# Patient Record
Sex: MALE | Race: BLACK OR AFRICAN AMERICAN | NOT HISPANIC OR LATINO | Employment: FULL TIME | URBAN - METROPOLITAN AREA
[De-identification: names, ages, dates, MRNs, and addresses within clinical notes are randomized per-mention and may not be internally consistent; named-entity substitution may affect disease eponyms.]

---

## 2017-01-14 ENCOUNTER — TELEPHONE (OUTPATIENT)
Dept: NURSING | Facility: CLINIC | Age: 57
End: 2017-01-14

## 2017-01-14 ENCOUNTER — HOSPITAL ENCOUNTER (EMERGENCY)
Facility: CLINIC | Age: 57
Discharge: HOME | End: 2017-01-15
Attending: EMERGENCY MEDICINE | Admitting: EMERGENCY MEDICINE
Payer: MEDICAID

## 2017-01-14 DIAGNOSIS — I10 ESSENTIAL HYPERTENSION: ICD-10-CM

## 2017-01-14 DIAGNOSIS — T50.905A MEDICATION SIDE EFFECTS, INITIAL ENCOUNTER: ICD-10-CM

## 2017-01-14 DIAGNOSIS — R51.9 ACUTE NONINTRACTABLE HEADACHE, UNSPECIFIED HEADACHE TYPE: ICD-10-CM

## 2017-01-14 PROCEDURE — 93005 ELECTROCARDIOGRAM TRACING: CPT

## 2017-01-14 PROCEDURE — 99285 EMERGENCY DEPT VISIT HI MDM: CPT

## 2017-01-14 PROCEDURE — 96361 HYDRATE IV INFUSION ADD-ON: CPT

## 2017-01-14 PROCEDURE — 96375 TX/PRO/DX INJ NEW DRUG ADDON: CPT

## 2017-01-14 PROCEDURE — 96374 THER/PROPH/DIAG INJ IV PUSH: CPT

## 2017-01-14 RX ORDER — DIPHENHYDRAMINE HYDROCHLORIDE 50 MG/ML
25 INJECTION INTRAMUSCULAR; INTRAVENOUS ONCE
Status: COMPLETED | OUTPATIENT
Start: 2017-01-14 | End: 2017-01-15

## 2017-01-14 RX ORDER — METOCLOPRAMIDE HYDROCHLORIDE 5 MG/ML
10 INJECTION INTRAMUSCULAR; INTRAVENOUS ONCE
Status: COMPLETED | OUTPATIENT
Start: 2017-01-14 | End: 2017-01-15

## 2017-01-14 ASSESSMENT — ENCOUNTER SYMPTOMS
VOMITING: 0
CHILLS: 1
NUMBNESS: 1
FEVER: 1
NAUSEA: 1
WEAKNESS: 0
DIZZINESS: 1
HEADACHES: 1
PHOTOPHOBIA: 1

## 2017-01-14 NOTE — ED AVS SNAPSHOT
[facility] Emergency Department    [address]    [facility] [address]    Phone:  [phone]    Fax:  [phone]                                       Perico Houston   MRN: [id number]    Department:  [facility] Emergency Department   Date of Visit:  1/14/2017           After Visit Summary Signature Page     I have received my discharge instructions, and my questions have been answered. I have discussed any challenges I see with this plan with the nurse or doctor.    ..........................................................................................................................................  Patient/Patient Representative Signature      ..........................................................................................................................................  Patient Representative Print Name and Relationship to Patient    ..................................................               ................................................  Date                                            Time    ..........................................................................................................................................  Reviewed by Signature/Title    ...................................................              ..............................................  Date                                                            Time

## 2017-01-14 NOTE — ED AVS SNAPSHOT
[facility] Emergency Department    [address] [place] [address]    Phone:  [phone]    Fax:  [phone]                                       Perico Houston   MRN: [id number]    Department:  [facility] Emergency Department   Date of Visit:  1/14/2017           Patient Information     Date Of Birth          1960        Your diagnoses for this visit were:     Acute nonintractable headache, unspecified headache type     Medication side effects, initial encounter     Essential hypertension        You were seen by Barbi Ho MD.      Follow-up Information     Follow up with [facility], [place].    Specialty:  Family Practice    Why:  This week for recheck    Contact information:    [address] [place] [address]  [phone]          Discharge Instructions       It is safe to take your regular medications including clonazepam.      We were unable to dispose of your venlafaxine medication.  Discuss this with your local pharmacy.      Discharge Instructions  Headache    You were seen today for a headache. Headaches may be caused by many different things such as muscle tension, sinus inflammation, anxiety and stress, having too little sleep, too much alcohol, some medical conditions or injury. You may have a migraine, which is caused by changes in the blood vessels in your head.  At this time your doctor does not find that your headache is a sign of anything dangerous or life-threatening.  However, sometimes the signs of serious illness do not show up right away.  If you have new or worse symptoms, you may need to be seen again in the emergency department or by your primary doctor.      Return to the Emergency Department if:    You get a fever of 101 F or higher.    Your headache gets much worse.    You get a stiff neck with your headache.    You get a new headache that is different or worse than headaches you have had before.    You are  vomiting and can t keep food or water down.    You have blurry or double vision or other problems with your eyes.    You have a new weakness on one side of your body.    You have difficulty with balance which is new.    You or your family thinks you are confused.    You have a seizure or convulsion.    What can I do to help myself?    Pain medications - You may take a pain medication such as Tylenol  (acetaminophen), Advil , Nuprin  (ibuprofen) or Aleve  (naproxen).  If you have been given a narcotic such as Vicodin  (hydrocodone with acetaminophen), Percocet  (oxycodone with acetaminophen), codeine, or a muscle relaxant such as Flexeril  (cyclobenzaprine) or Soma  (carisoprodol), do not drive for four hours after you have taken it. If the narcotic contains Tylenol  (acetaminophen), do not take Tylenol  with it. All narcotics will cause constipation, so eat a high fiber diet.        Take a pain reliever as soon as you notice symptoms.  Starting medications as soon as you start to have symptoms may lessen the amount of pain you have.    Relaxing in a quiet, dark room may help.    Get enough sleep and eat meals regularly.    Schedule an appointment with your primary physician as instructed, or at least within 1 week.    You may need to watch for certain foods or other things which may trigger your headaches.  Keeping a journal of your headaches and possible triggers may help you and your primary doctor to identify things which you should avoid which may be causing your headaches.  If you were given a prescription for medicine here today, be sure to read all of the information (including the package insert) that comes with your prescription.  This will include important information about the medicine, its side effects, and any warnings that you need to know about.  The pharmacist who fills the prescription can provide more information and answer questions you may have about the medicine.  If you have questions or  concerns that the pharmacist cannot address, please call or return to the Emergency Department.     Remember that you can always come back to the Emergency Department if you are not able to see your regular doctor in the amount of time listed above, if you get any new symptoms, or if there is anything that worries you.          24 Hour Appointment Hotline       To make an appointment at any [facility], call [phone] ([phone]). If you don't have a family doctor or clinic, we will help you find one. [facility] clinics are conveniently located to serve the needs of you and your family.             Review of your medicines      Our records show that you are taking the medicines listed below. If these are incorrect, please call your family doctor or clinic.        Dose / Directions Last dose taken    acetaminophen 325 MG tablet   Commonly known as:  TYLENOL   Dose:  650 mg   Quantity:  100 tablet        Take 2 tablets (650 mg) by mouth every 6 hours as needed Maximum Dose in 24hr (includes all routes of administration and all acetaminophen-containing products): 4000 mg   Refills:  0        alum & mag hydroxide-simethicone 200-200-20 MG/5ML Susp suspension   Commonly known as:  MYLANTA/MAALOX   Dose:  30 mL   Quantity:  1 Bottle        Take 30 mLs by mouth every 4 hours as needed for indigestion   Refills:  0        aspirin 81 MG EC tablet   Dose:  81 mg   Quantity:  30 tablet        Take 1 tablet (81 mg) by mouth daily   Refills:  0        clonazePAM 0.5 MG tablet   Commonly known as:  klonoPIN   Dose:  0.5 mg   Quantity:  20 tablet        Take 1 tablet (0.5 mg) by mouth 3 times daily as needed for anxiety   Refills:  0        GABAPENTIN PO   Dose:  300 mg        Take 300 mg by mouth 3 times daily   Refills:  0        lisinopril 40 MG tablet   Commonly known as:  PRINIVIL/ZESTRIL   Dose:  40 mg   Quantity:  30 tablet        Take 1 tablet (40 mg) by mouth daily   Refills:  0        metFORMIN 1000 MG tablet    Commonly known as:  GLUCOPHAGE   Dose:  1000 mg   Quantity:  60 tablet        Take 1 tablet (1,000 mg) by mouth 2 times daily (with meals)   Refills:  0        nitroglycerin 0.4 MG sublingual tablet   Commonly known as:  NITROSTAT   Dose:  0.4 mg   Quantity:  25 tablet        Place 1 tablet (0.4 mg) under the tongue every 5 minutes as needed for chest pain if you are still having symptoms after 3 doses (15 minutes) call 911.   Refills:  0                Procedures and tests performed during your visit     Alcohol ethyl    CBC with platelets differential    Comprehensive metabolic panel    EKG 12-lead, tracing only    Head CT w/o contrast      Orders Needing Specimen Collection     None      Pending Results     No orders found for last 2 day(s).            Pending Culture Results     No orders found for last 2 day(s).       Test Results from your hospital stay           1/15/2017 12:32 AM - Interface, Vital Vio Results      Component Results     Component Value Ref Range & Units Status    WBC 8.6 4.0 - 11.0 10e9/L Final    RBC Count 5.25 4.4 - 5.9 10e12/L Final    Hemoglobin 14.5 13.3 - 17.7 g/dL Final    Hematocrit 44.6 40.0 - 53.0 % Final    MCV 85 78 - 100 fl Final    MCH 27.6 26.5 - 33.0 pg Final    MCHC 32.5 31.5 - 36.5 g/dL Final    RDW 13.0 10.0 - 15.0 % Final    Platelet Count 253 150 - 450 10e9/L Final    Diff Method Automated Method  Final    % Neutrophils 53.4 % Final    % Lymphocytes 33.4 % Final    % Monocytes 9.1 % Final    % Eosinophils 2.8 % Final    % Basophils 0.6 % Final    % Immature Granulocytes 0.7 % Final    Nucleated RBCs 0 0 /100 Final    Absolute Neutrophil 4.6 1.6 - 8.3 10e9/L Final    Absolute Lymphocytes 2.9 0.8 - 5.3 10e9/L Final    Absolute Monocytes 0.8 0.0 - 1.3 10e9/L Final    Absolute Eosinophils 0.2 0.0 - 0.7 10e9/L Final    Absolute Basophils 0.1 0.0 - 0.2 10e9/L Final    Abs Immature Granulocytes 0.1 0 - 0.4 10e9/L Final    Absolute Nucleated RBC 0.0  Final         1/15/2017  12:47 AM - Interface, Flexilab Results      Component Results     Component Value Ref Range & Units Status    Sodium 134 133 - 144 mmol/L Final    Potassium 4.3 3.4 - 5.3 mmol/L Final    Chloride 102 94 - 109 mmol/L Final    Carbon Dioxide 26 20 - 32 mmol/L Final    Anion Gap 6 3 - 14 mmol/L Final    Glucose 157 (H) 70 - 99 mg/dL Final    Urea Nitrogen 18 7 - 30 mg/dL Final    Creatinine 0.65 (L) 0.66 - 1.25 mg/dL Final    GFR Estimate >90  Non  GFR Calc   >60 mL/min/1.7m2 Final    GFR Estimate If Black >90   GFR Calc   >60 mL/min/1.7m2 Final    Calcium 8.4 (L) 8.5 - 10.1 mg/dL Final    Bilirubin Total 0.4 0.2 - 1.3 mg/dL Final    Albumin 3.6 3.4 - 5.0 g/dL Final    Protein Total 6.9 6.8 - 8.8 g/dL Final    Alkaline Phosphatase 72 40 - 150 U/L Final    ALT 19 0 - 70 U/L Final    AST 10 0 - 45 U/L Final         1/15/2017 12:44 AM - Interface, Radiant Ib      Narrative     CT HEAD W/O CONTRAST 1/15/2017 12:39 AM    HISTORY: Hypertension. Headache.    COMPARISON: 6/2/2016    TECHNIQUE: Noncontrast head CT.  Radiation dose for this scan was  reduced using automated exposure control, adjustment of the mA and/or  kV according to patient size, or iterative reconstruction technique.    FINDINGS: No intracranial hemorrhage. No abnormal extra-axial fluid  collection. Midline is maintained. Ventricular volumes are normal. No  evidence of infarct. Calvarium is intact. Sinuses and mastoid air  cells are normally aerated.        Impression     IMPRESSION: Normal head CT.    VANITA SHAH MD         1/15/2017 12:47 AM - Interface, Flexilab Results      Component Results     Component Value Ref Range & Units Status    Ethanol g/dL <0.01 <0.01 g/dL Final                Clinical Quality Measure: Blood Pressure Screening     Your blood pressure was checked while you were in the emergency department today. The last reading we obtained was  BP: 131/83 mmHg . Please read the guidelines below about what  "these numbers mean and what you should do about them.  If your systolic blood pressure (the top number) is less than 120 and your diastolic blood pressure (the bottom number) is less than 80, then your blood pressure is normal. There is nothing more that you need to do about it.  If your systolic blood pressure (the top number) is 120-139 or your diastolic blood pressure (the bottom number) is 80-89, your blood pressure may be higher than it should be. You should have your blood pressure rechecked within a year by a primary care provider.  If your systolic blood pressure (the top number) is 140 or greater or your diastolic blood pressure (the bottom number) is 90 or greater, you may have high blood pressure. High blood pressure is treatable, but if left untreated over time it can put you at risk for heart attack, stroke, or kidney failure. You should have your blood pressure rechecked by a primary care provider within the next 4 weeks.  If your provider in the emergency department today gave you specific instructions to follow-up with your doctor or provider even sooner than that, you should follow that instruction and not wait for up to 4 weeks for your follow-up visit.        Thank you for choosing [facility]       Thank you for choosing [facility] for your care. Our goal is always to provide you with excellent care. Hearing back from our patients is one way we can continue to improve our services. Please take a few minutes to complete the written survey that you may receive in the mail after you visit with us. Thank you!        [facility] Information     [employer] lets you send messages to your doctor, view your test results, renew your prescriptions, schedule appointments and more. To sign up, go to [email or web address] . Click on \"Log in\" on the left side of the screen, which will take you to the Welcome page. Then click on \"Sign up Now\" on the right side of the page.     You will be asked to enter the access code " listed below, as well as some personal information. Please follow the directions to create your username and password.     Your access code is: [email or web address]  Expires: 3/5/2017  1:38 PM     Your access code will  in 90 days. If you need help or a new code, please call your [facility] clinic or [phone].        Care EveryWhere ID     This is your Care EveryWhere ID. This could be used by other organizations to access your [facility] medical records  [id number]        After Visit Summary       This is your record. Keep this with you and show to your community pharmacist(s) and doctor(s) at your next visit.

## 2017-01-15 ENCOUNTER — APPOINTMENT (OUTPATIENT)
Dept: CT IMAGING | Facility: CLINIC | Age: 57
End: 2017-01-15
Attending: EMERGENCY MEDICINE
Payer: MEDICAID

## 2017-01-15 VITALS
BODY MASS INDEX: 28.44 KG/M2 | RESPIRATION RATE: 18 BRPM | DIASTOLIC BLOOD PRESSURE: 87 MMHG | SYSTOLIC BLOOD PRESSURE: 135 MMHG | OXYGEN SATURATION: 98 % | WEIGHT: 210 LBS | TEMPERATURE: 97.6 F | HEART RATE: 76 BPM | HEIGHT: 72 IN

## 2017-01-15 LAB
ALBUMIN SERPL-MCNC: 3.6 G/DL (ref 3.4–5)
ALP SERPL-CCNC: 72 U/L (ref 40–150)
ALT SERPL W P-5'-P-CCNC: 19 U/L (ref 0–70)
ANION GAP SERPL CALCULATED.3IONS-SCNC: 6 MMOL/L (ref 3–14)
AST SERPL W P-5'-P-CCNC: 10 U/L (ref 0–45)
BASOPHILS # BLD AUTO: 0.1 10E9/L (ref 0–0.2)
BASOPHILS NFR BLD AUTO: 0.6 %
BILIRUB SERPL-MCNC: 0.4 MG/DL (ref 0.2–1.3)
BUN SERPL-MCNC: 18 MG/DL (ref 7–30)
CALCIUM SERPL-MCNC: 8.4 MG/DL (ref 8.5–10.1)
CHLORIDE SERPL-SCNC: 102 MMOL/L (ref 94–109)
CO2 SERPL-SCNC: 26 MMOL/L (ref 20–32)
CREAT SERPL-MCNC: 0.65 MG/DL (ref 0.66–1.25)
DIFFERENTIAL METHOD BLD: NORMAL
EOSINOPHIL # BLD AUTO: 0.2 10E9/L (ref 0–0.7)
EOSINOPHIL NFR BLD AUTO: 2.8 %
ERYTHROCYTE [DISTWIDTH] IN BLOOD BY AUTOMATED COUNT: 13 % (ref 10–15)
ETHANOL SERPL-MCNC: <0.01 G/DL
GFR SERPL CREATININE-BSD FRML MDRD: ABNORMAL ML/MIN/1.7M2
GLUCOSE SERPL-MCNC: 157 MG/DL (ref 70–99)
HCT VFR BLD AUTO: 44.6 % (ref 40–53)
HGB BLD-MCNC: 14.5 G/DL (ref 13.3–17.7)
IMM GRANULOCYTES # BLD: 0.1 10E9/L (ref 0–0.4)
IMM GRANULOCYTES NFR BLD: 0.7 %
INTERPRETATION ECG - MUSE: NORMAL
LYMPHOCYTES # BLD AUTO: 2.9 10E9/L (ref 0.8–5.3)
LYMPHOCYTES NFR BLD AUTO: 33.4 %
MCH RBC QN AUTO: 27.6 PG (ref 26.5–33)
MCHC RBC AUTO-ENTMCNC: 32.5 G/DL (ref 31.5–36.5)
MCV RBC AUTO: 85 FL (ref 78–100)
MONOCYTES # BLD AUTO: 0.8 10E9/L (ref 0–1.3)
MONOCYTES NFR BLD AUTO: 9.1 %
NEUTROPHILS # BLD AUTO: 4.6 10E9/L (ref 1.6–8.3)
NEUTROPHILS NFR BLD AUTO: 53.4 %
NRBC # BLD AUTO: 0 10*3/UL
NRBC BLD AUTO-RTO: 0 /100
PLATELET # BLD AUTO: 253 10E9/L (ref 150–450)
POTASSIUM SERPL-SCNC: 4.3 MMOL/L (ref 3.4–5.3)
PROT SERPL-MCNC: 6.9 G/DL (ref 6.8–8.8)
RBC # BLD AUTO: 5.25 10E12/L (ref 4.4–5.9)
SODIUM SERPL-SCNC: 134 MMOL/L (ref 133–144)
WBC # BLD AUTO: 8.6 10E9/L (ref 4–11)

## 2017-01-15 PROCEDURE — 80320 DRUG SCREEN QUANTALCOHOLS: CPT | Performed by: EMERGENCY MEDICINE

## 2017-01-15 PROCEDURE — 96361 HYDRATE IV INFUSION ADD-ON: CPT

## 2017-01-15 PROCEDURE — 85025 COMPLETE CBC W/AUTO DIFF WBC: CPT | Performed by: EMERGENCY MEDICINE

## 2017-01-15 PROCEDURE — 25000125 ZZHC RX 250: Performed by: EMERGENCY MEDICINE

## 2017-01-15 PROCEDURE — 70450 CT HEAD/BRAIN W/O DYE: CPT

## 2017-01-15 PROCEDURE — 25000128 H RX IP 250 OP 636: Performed by: EMERGENCY MEDICINE

## 2017-01-15 PROCEDURE — 96375 TX/PRO/DX INJ NEW DRUG ADDON: CPT

## 2017-01-15 PROCEDURE — 96374 THER/PROPH/DIAG INJ IV PUSH: CPT

## 2017-01-15 PROCEDURE — 80053 COMPREHEN METABOLIC PANEL: CPT | Performed by: EMERGENCY MEDICINE

## 2017-01-15 RX ADMIN — DIPHENHYDRAMINE HYDROCHLORIDE 25 MG: 50 INJECTION, SOLUTION INTRAMUSCULAR; INTRAVENOUS at 00:17

## 2017-01-15 RX ADMIN — SODIUM CHLORIDE 1000 ML: 9 INJECTION, SOLUTION INTRAVENOUS at 00:16

## 2017-01-15 RX ADMIN — METOCLOPRAMIDE 10 MG: 5 INJECTION, SOLUTION INTRAMUSCULAR; INTRAVENOUS at 00:18

## 2017-01-15 NOTE — ED PROVIDER NOTES
"  History     Chief Complaint:  Headache      HPI   Perico Houston is a 56 year old male with a history of diabetes mellitus, NSTEMI, coronary artery disease, and hypertension who presents to the emergency department via EMS for evaluation of headache. Of note, the patient states that he inadvertently took 2 doses of venlafaxine 75mg at 0800 this morning, which he was previously prescribed but does not currently take daily.  He thought it was his medication for nerve pain.  Following this, the patient states that he developed an intense headache with photophobia and dizziness this morning, explaining that he felt somewhat \"off balance while walking.\" He additionally notes that he has been intermittently flushed and chilled as well. The patient states that his wife was concerned about this headache and called a nursing line, who recommended symptomatic relief. He notes that he took a short nap this afternoon, but woke up with a worsened headache and nausea without vomiting. The patient took Tylenol for this headache, with little relief. He called poison control this evening, who recommended that he seek evaluation here in the emergency department. The patient then contacted EMS this evening. He received IV fluids and Zofran in route. Of note, the patient additionally has had intermittent tingling in his left hand for the past week or so as well, but denies any weakness.     Allergies:  Aspirin, abdominal pain     Medications:    GABAPENTIN PO  clonazePAM (KLONOPIN) 0.5 MG tablet  aspirin EC 81 MG EC tablet  metFORMIN (GLUCOPHAGE) 1000 MG tablet  lisinopril (PRINIVIL,ZESTRIL) 40 MG tablet  alum & mag hydroxide-simethicone (MYLANTA/MAALOX) 200-200-20 MG/5ML SUSP  nitroglycerin (NITROSTAT) 0.4 MG SL tablet    Past Medical History:    hypertension  GERD  Depression  Anxiety  coronary artery disease  diabetes mellitus  NSTEMI     Past Surgical History:    cholecystectomy  Partial liver resection  Clavicle " "surgery  Angiogram     Family History:    diabetes mellitus     Social History:  Presents alone.  Current Some day smoker, 0.25 ppd for 20 years.  Positive for alcohol use, twice monthly.  Marital Status:   [2]    Review of Systems   Constitutional: Positive for fever (\"feverish\") and chills.   Eyes: Positive for photophobia.   Gastrointestinal: Positive for nausea. Negative for vomiting.   Neurological: Positive for dizziness, numbness and headaches. Negative for weakness.   All other systems reviewed and are negative.    Physical Exam     Patient Vitals for the past 24 hrs:   BP Temp Temp src Pulse Resp SpO2 Height Weight   01/15/17 0130 131/83 mmHg - - - - 98 % - -   01/15/17 0015 (!) 160/100 mmHg - - - - - - -   01/15/17 0000 (!) 169/100 mmHg - - - - - - -   01/14/17 2352 - - - - - 98 % - -   01/14/17 2345 (!) 177/109 mmHg - - - - - - -   01/14/17 2338 - - - - - 97 % - -   01/14/17 2330 (!) 162/102 mmHg - - - - - - -   01/14/17 2324 - - - - - 97 % - -   01/14/17 2321 (!) 166/101 mmHg 97.6  F (36.4  C) Oral 76 18 98 % 1.829 m (6') 95.255 kg (210 lb)       Physical Exam  General: Cooperative, lights dimmed in room  Head:  The scalp, face, and head appear normal and symmetric  Eyes:  Sclera white; PERRL; EOMI  ENT:    External ears and nares normal  Neck:  No meningismus or muscular tenderness  CV:  Regular rate and rhythm  No murmur   Resp:  Breath sounds clear and equal bilaterally  GI:  Abdomen is soft, non-tender, non-distended  MS:  Moves all extremities    Sensation intact in all dermatomes of the hand.  No weakness or numbness.  Full ROM.  Strength symmetric and intact.  Neuro: Speech is normal and fluent. No apparent deficit    Strength 5/5 x4.  Sensation intact x4.      Cranial nerves II-XII intact by examination.    No pronator drift, normal heel to shin      Emergency Department Course   ECG:  Indication: Dizziness and Headache  Time: 0036  Vent. Rate 68 bpm. MI interval 168. QRS duration 92. " QT/QTc 420/446. P-R-T axis 36 3 -13.  Normal sinus rhythm. Normal ECG. No significant change compared to EKG dated 12/5/2016. Read time: 0042    Imaging:  Radiographic findings were communicated with the patient who voiced understanding of the findings.    CT Head without contrast:   Normal head CT. As per radiology.    Laboratory:  CBC: WBC: 8.6, HGB: 14.5, PLT: 253  CMP: Glucose 157 (H). Creatinine 0.65 (L), Calcium 8.4 (L), o/w WNL     Alcohol Ethyl: <0.01    Interventions:  0016 NS 1L IV  0017 Benadryl 25 mg IV  0018 Reglan 10 mg IV    Emergency Department Course:  Nursing notes and vitals reviewed. I performed an exam of the patient as documented above.     2351 I consulted with Haritha with poison control regarding the patient's history and presentation here in the emergency department.     IV inserted. Medicine administered as documented above. Blood drawn. This was sent to the lab for further testing, results above.    EKG obtained in the ED, see results above.     The patient was sent for a Head CT while in the emergency department, findings above.     0132 I rechecked the patient and discussed the results of his workup thus far.    Findings and plan explained to the Patient. Patient discharged home with instructions regarding supportive care, medications, and reasons to return. The importance of close follow-up was reviewed.   I personally reviewed the laboratory results with the Patient and answered all related questions prior to discharge.     Impression & Plan    Medical Decision Making:  Perico Houston is a 56 year old male who is here for evaluation of a new type of headache for him after accidentally taking medication that he has not been on now for some time. Although this could certainly be medication related, the severity of symptoms was concerning for separate pathology such as tumor or bleed.  Elevated blood pressure on arrival is concerning for the latter.  Electrolyte disorders, hyperglycemia,  renal insufficieny, and alcohol intoxication were all considered as well. Case was discussed with the poison center and they do not feel that the patient took enough of the medication to be at risk for seizures. However, side effects can last up to 24 hours. They state that it is safe for him to take his regular benzodiazepines and in fact, they would recommend it for treating side effects with this particular medication. Remainder of workup was negative here. Patient was sleeping soon after arrival and is doing better.  He will be discharged and can follow up in clinic.    Diagnosis:    ICD-10-CM   1. Acute nonintractable headache, unspecified headache type R51   2. Medication side effects, initial encounter T88.7XXA   3. Essential hypertension I10       Disposition:  discharged to home    ILoan, am serving as a scribe on 1/14/2017 at 11:37 PM to personally document services performed by Barbi Ho MD based on my observations and the provider's statements to me.       Loan Fraser  1/14/2017   [facility] EMERGENCY DEPARTMENT        Barbi Ho MD  01/15/17 0343

## 2017-01-15 NOTE — ED NOTES
Pt to ER with c/o HA , pain starts at back of neck and spreads up and over to front of head, Pt states that he accidentally took his clonazepam and his venlafaxine  At the same time this a.m. , EMS gave IVF and Zofran.

## 2017-01-15 NOTE — ED NOTES
Bed: HW01  Expected date:   Expected time:   Means of arrival:   Comments:  [facility]

## 2017-01-15 NOTE — TELEPHONE ENCOUNTER
"Call Type: Triage Call    Presenting Problem: \" I took the wrong medication today, I took 2  tabls of Venlafaxine, 75mg, and I have a bad headache now , and  upset stomach .  He has such a  bad headache , and bad  stomach ache  form this medication . He  is having a hard time  thinking, there  has been no relief .  He is quite concerned, he does have a history  of  liver resection, and   of heart racing . He is rambling , does  not  have  a blood pressure cuff at home. He is not sweating, but he  does fellconfused, and  disoriented.  He has been trying to sleep  though this, has spoken to  [employer] control, and [facility]  nurseline also . They advised hime to wait it out, and the effects  will disapate, however they have not changed, and he feels he cant  tolerate the feeling.  He had taken his klonopin today also, and has  never taken then together .   he is wanting to come ot emergency for  his headache pain.  Triage Note:  Guideline Title: Headache  Recommended Disposition: See Provider within 4 hours  Original Inclination: Did not know what to do  Override Disposition:  Intended Action: Go to Hospital / ED  Physician Contacted: No  Onset of vomiting associated with severe, worsening headache, especially if  different from previous headaches                       See Provider within 4  hours ?  YES  Follows head trauma ? NO  Unconscious now ? NO  Smoke/carbon monoxide exposure ? NO  New seizure now or within last 6 hours ? NO  Pregnancy 20 weeks or more with sudden onset or worsening facial or hand swelling  ? NO  Temperature of 101.5 F (38.6 C) or greater that has not responded to 24 hours of  home care measures ? NO  New onset of severe dizziness/vertigo ? NO  Sudden loss or change in vision (double or blurred vision, increased light  sensitivity, partial loss of visual field) AND not previously evaluated ? NO  Sudden, severe disabling head pain OR caller spontaneously verbalizes \"worst  headache of my " "life\" ? NO  Sudden change in mental status or new change in speech ? NO  New onset of moderate to severe headache and taking anticoagulants (e.g. Coumadin,  warfarin, Lovenox, Plavix, Pradaxa, or Xarelto). ? NO  New onset of severe or worsening generalized headache AND fever, neck pain with  forward head movement (no injury) or altered mental status ? NO  High to low (but not zero) risk of exposure to Ebola within the past 21 days ? NO  Any temperature elevation in an immunocompromised individual OR frail elderly with  signs of dehydration ? NO  New numbness, weakness or paralysis involving face, arm or leg, especially on same  side of body, loss of balance or coordination, confusion or trouble speaking  occurring now or within last 8 hours ? NO  Sudden onset of one-sided headache with severe eye pain or eye tearing on same  side ? NO  Physician Instructions:  Care Advice: Call provider if symptoms worsen or new symptoms develop.  Do not eat or drink anything until evaluated by provider.  Do not take aspirin for headache until discussing with your provider.  Go to the ED if has new onset of stiff neck, drowsiness or confusion.  CAUTIONS  List, or take, all current prescription(s), nonprescription or alternative  medication(s) to provider for evaluation.  "

## 2017-01-15 NOTE — DISCHARGE INSTRUCTIONS
It is safe to take your regular medications including clonazepam.      We were unable to dispose of your venlafaxine medication.  Discuss this with your local pharmacy.      Discharge Instructions  Headache    You were seen today for a headache. Headaches may be caused by many different things such as muscle tension, sinus inflammation, anxiety and stress, having too little sleep, too much alcohol, some medical conditions or injury. You may have a migraine, which is caused by changes in the blood vessels in your head.  At this time your doctor does not find that your headache is a sign of anything dangerous or life-threatening.  However, sometimes the signs of serious illness do not show up right away.  If you have new or worse symptoms, you may need to be seen again in the emergency department or by your primary doctor.      Return to the Emergency Department if:    You get a fever of 101 F or higher.    Your headache gets much worse.    You get a stiff neck with your headache.    You get a new headache that is different or worse than headaches you have had before.    You are vomiting and can t keep food or water down.    You have blurry or double vision or other problems with your eyes.    You have a new weakness on one side of your body.    You have difficulty with balance which is new.    You or your family thinks you are confused.    You have a seizure or convulsion.    What can I do to help myself?    Pain medications - You may take a pain medication such as Tylenol  (acetaminophen), Advil , Nuprin  (ibuprofen) or Aleve  (naproxen).  If you have been given a narcotic such as Vicodin  (hydrocodone with acetaminophen), Percocet  (oxycodone with acetaminophen), codeine, or a muscle relaxant such as Flexeril  (cyclobenzaprine) or Soma  (carisoprodol), do not drive for four hours after you have taken it. If the narcotic contains Tylenol  (acetaminophen), do not take Tylenol  with it. All narcotics will cause  constipation, so eat a high fiber diet.        Take a pain reliever as soon as you notice symptoms.  Starting medications as soon as you start to have symptoms may lessen the amount of pain you have.    Relaxing in a quiet, dark room may help.    Get enough sleep and eat meals regularly.    Schedule an appointment with your primary physician as instructed, or at least within 1 week.    You may need to watch for certain foods or other things which may trigger your headaches.  Keeping a journal of your headaches and possible triggers may help you and your primary doctor to identify things which you should avoid which may be causing your headaches.  If you were given a prescription for medicine here today, be sure to read all of the information (including the package insert) that comes with your prescription.  This will include important information about the medicine, its side effects, and any warnings that you need to know about.  The pharmacist who fills the prescription can provide more information and answer questions you may have about the medicine.  If you have questions or concerns that the pharmacist cannot address, please call or return to the Emergency Department.     Remember that you can always come back to the Emergency Department if you are not able to see your regular doctor in the amount of time listed above, if you get any new symptoms, or if there is anything that worries you.

## 2017-01-22 ENCOUNTER — APPOINTMENT (OUTPATIENT)
Dept: GENERAL RADIOLOGY | Facility: CLINIC | Age: 57
End: 2017-01-22
Attending: EMERGENCY MEDICINE
Payer: MEDICAID

## 2017-01-22 ENCOUNTER — HOSPITAL ENCOUNTER (EMERGENCY)
Facility: CLINIC | Age: 57
Discharge: HOME | End: 2017-01-23
Attending: EMERGENCY MEDICINE | Admitting: EMERGENCY MEDICINE
Payer: MEDICAID

## 2017-01-22 DIAGNOSIS — S20.229A BACK CONTUSION, UNSPECIFIED LATERALITY, INITIAL ENCOUNTER: ICD-10-CM
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
## 2017-01-22 PROCEDURE — 25000125 ZZHC RX 250: Performed by: EMERGENCY MEDICINE

## 2017-01-22 PROCEDURE — 99284 EMERGENCY DEPT VISIT MOD MDM: CPT

## 2017-01-22 PROCEDURE — 72220 X-RAY EXAM SACRUM TAILBONE: CPT

## 2017-01-22 PROCEDURE — 72100 X-RAY EXAM L-S SPINE 2/3 VWS: CPT

## 2017-01-22 PROCEDURE — 25000132 ZZH RX MED GY IP 250 OP 250 PS 637: Performed by: EMERGENCY MEDICINE

## 2017-01-22 RX ORDER — ONDANSETRON 4 MG/1
4 TABLET, ORALLY DISINTEGRATING ORAL ONCE
Status: COMPLETED | OUTPATIENT
Start: 2017-01-22 | End: 2017-01-22

## 2017-01-22 RX ORDER — OXYCODONE AND ACETAMINOPHEN 5; 325 MG/1; MG/1
1 TABLET ORAL ONCE
Status: COMPLETED | OUTPATIENT
Start: 2017-01-22 | End: 2017-01-22

## 2017-01-22 RX ORDER — IBUPROFEN 800 MG/1
800 TABLET, FILM COATED ORAL ONCE
Status: COMPLETED | OUTPATIENT
Start: 2017-01-22 | End: 2017-01-22

## 2017-01-22 RX ADMIN — ONDANSETRON 4 MG: 4 TABLET, ORALLY DISINTEGRATING ORAL at 23:27

## 2017-01-22 RX ADMIN — IBUPROFEN 800 MG: 800 TABLET, FILM COATED ORAL at 23:21

## 2017-01-22 RX ADMIN — OXYCODONE HYDROCHLORIDE AND ACETAMINOPHEN 1 TABLET: 5; 325 TABLET ORAL at 23:21

## 2017-01-22 ASSESSMENT — ENCOUNTER SYMPTOMS
BACK PAIN: 1
LIGHT-HEADEDNESS: 1

## 2017-01-22 NOTE — ED AVS SNAPSHOT
[facility] Emergency Department    [address]    [facility] [address]    Phone:  [phone]    Fax:  [phone]                                       Perico Houston   MRN: [id number]    Department:  [facility] Emergency Department   Date of Visit:  1/22/2017           After Visit Summary Signature Page     I have received my discharge instructions, and my questions have been answered. I have discussed any challenges I see with this plan with the nurse or doctor.    ..........................................................................................................................................  Patient/Patient Representative Signature      ..........................................................................................................................................  Patient Representative Print Name and Relationship to Patient    ..................................................               ................................................  Date                                            Time    ..........................................................................................................................................  Reviewed by Signature/Title    ...................................................              ..............................................  Date                                                            Time

## 2017-01-22 NOTE — ED AVS SNAPSHOT
[facility] Emergency Department    [address] [place] [address]    Phone:  [phone]    Fax:  [phone]                                       Perico Houston   MRN: [id number]    Department:  [facility] Emergency Department   Date of Visit:  1/22/2017           Patient Information     Date Of Birth          1960        Your diagnoses for this visit were:     Fall, initial encounter     Back contusion, unspecified laterality, initial encounter        You were seen by Evans Beckham MD.      Follow-up Information     Follow up with [facility], [place] In 2 days.    Specialty:  Family Practice    Why:  Tuesday as scheduled.    Contact information:    [address] [place] [address]  [phone]          Follow up with [facility] Emergency Department.    Specialty:  EMERGENCY MEDICINE    Why:  As needed, If symptoms worsen    Contact information:    [address] [place] [address]  [phone]        Discharge Instructions         Back Contusion    You have a contusion to your back. A contusion is also called a bruise. There is swelling and some bleeding under the skin. The skin may be purplish. You may have muscle aching and stiffness in the area of the bruise. There are no broken bones.  Contusions heal on their own, without further treatment. However, pain and skin discoloration may take weeks to months to go away.   Home care    Rest. Avoid heavy lifting, strenuous exertion, or any activity that causes pain.    Ice the area to reduce pain and swelling. Put ice cubes in a plastic bag or use a cold pack. (Wrap the cold source in a thin towel. Do not place it directly on your skin.) Ice the injured area for 20 minutes every 1-2 hours the first day. Continue with ice packs 3-4 times a day for the next two days, then as needed for the relief of pain and swelling.    Take any prescribed pain medication. If  none was prescribed, take acetaminophen, ibuprofen, or naproxen to control pain.  Follow-up care  Follow up with your healthcare provider, or as directed. Call if you are not better in 1-2 weeks.  When to seek medical advice  Call your healthcare provider for any of the following:    New or worsening pain    Increased swelling around the bruise    Pain spreads to one or both legs    Weakness or numbness in one or both legs     Loss of bowel or bladder control    Numbness in the groin or genital area    Fever of 100.4 F (38 C) or higher, or as directed by your healthcare provider    8117-8352 [employer]. [address] [place] [address]. All rights reserved. This information is not intended as a substitute for professional medical care. Always follow your healthcare professional's instructions.          24 Hour Appointment Hotline       To make an appointment at any [facility], call [phone] ([phone]). If you don't have a family doctor or clinic, we will help you find one. [facility] clinics are conveniently located to serve the needs of you and your family.             Review of your medicines      Our records show that you are taking the medicines listed below. If these are incorrect, please call your family doctor or clinic.        Dose / Directions Last dose taken    acetaminophen 325 MG tablet   Commonly known as:  TYLENOL   Dose:  650 mg   Quantity:  100 tablet        Take 2 tablets (650 mg) by mouth every 6 hours as needed Maximum Dose in 24hr (includes all routes of administration and all acetaminophen-containing products): 4000 mg   Refills:  0        alum & mag hydroxide-simethicone 200-200-20 MG/5ML Susp suspension   Commonly known as:  MYLANTA/MAALOX   Dose:  30 mL   Quantity:  1 Bottle        Take 30 mLs by mouth every 4 hours as needed for indigestion   Refills:  0        aspirin 81 MG EC tablet   Dose:  81 mg   Quantity:  30 tablet        Take 1 tablet (81 mg) by  mouth daily   Refills:  0        clonazePAM 0.5 MG tablet   Commonly known as:  klonoPIN   Dose:  0.5 mg   Quantity:  20 tablet        Take 1 tablet (0.5 mg) by mouth 3 times daily as needed for anxiety   Refills:  0        GABAPENTIN PO   Dose:  300 mg        Take 300 mg by mouth 3 times daily   Refills:  0        lisinopril 40 MG tablet   Commonly known as:  PRINIVIL/ZESTRIL   Dose:  40 mg   Quantity:  30 tablet        Take 1 tablet (40 mg) by mouth daily   Refills:  0        metFORMIN 1000 MG tablet   Commonly known as:  GLUCOPHAGE   Dose:  1000 mg   Quantity:  60 tablet        Take 1 tablet (1,000 mg) by mouth 2 times daily (with meals)   Refills:  0        nitroglycerin 0.4 MG sublingual tablet   Commonly known as:  NITROSTAT   Dose:  0.4 mg   Quantity:  25 tablet        Place 1 tablet (0.4 mg) under the tongue every 5 minutes as needed for chest pain if you are still having symptoms after 3 doses (15 minutes) call 911.   Refills:  0                Procedures and tests performed during your visit     Lumbar spine XR, 2-3 views    XR Sacrum and Coccyx 2 Views      Orders Needing Specimen Collection     None      Pending Results     Date and Time Order Name Status Description    1/22/2017 2318 XR Sacrum and Coccyx 2 Views Preliminary     1/22/2017 2318 Lumbar spine XR, 2-3 views Preliminary             Pending Culture Results     No orders found for last 2 day(s).       Test Results from your hospital stay           1/23/2017  2:31 AM - Interface, Radiant Ib      Narrative     XR LUMBAR SPINE 2-3 VIEWS  1/23/2017 2:01 AM      HISTORY: Fall, low back pain.     COMPARISON: 2/3/2016.        Impression     IMPRESSION: No acute fracture or malalignment. Small endplate  osteophytes at L3-L4 and L4-L5.         1/23/2017  2:31 AM - Interface, Radiant Ib      Narrative     XR SACRUM AND COCCYX 2 VIEWS  1/23/2017 2:01 AM      HISTORY: Fall, sacral pain.     COMPARISON: None.        Impression     IMPRESSION: No acute  fracture or malalignment.                Clinical Quality Measure: Blood Pressure Screening     Your blood pressure was checked while you were in the emergency department today. The last reading we obtained was  BP: (!) 163/94 mmHg . Please read the guidelines below about what these numbers mean and what you should do about them.  If your systolic blood pressure (the top number) is less than 120 and your diastolic blood pressure (the bottom number) is less than 80, then your blood pressure is normal. There is nothing more that you need to do about it.  If your systolic blood pressure (the top number) is 120-139 or your diastolic blood pressure (the bottom number) is 80-89, your blood pressure may be higher than it should be. You should have your blood pressure rechecked within a year by a primary care provider.  If your systolic blood pressure (the top number) is 140 or greater or your diastolic blood pressure (the bottom number) is 90 or greater, you may have high blood pressure. High blood pressure is treatable, but if left untreated over time it can put you at risk for heart attack, stroke, or kidney failure. You should have your blood pressure rechecked by a primary care provider within the next 4 weeks.  If your provider in the emergency department today gave you specific instructions to follow-up with your doctor or provider even sooner than that, you should follow that instruction and not wait for up to 4 weeks for your follow-up visit.        Thank you for choosing [facility]       Thank you for choosing [facility] for your care. Our goal is always to provide you with excellent care. Hearing back from our patients is one way we can continue to improve our services. Please take a few minutes to complete the written survey that you may receive in the mail after you visit with us. Thank you!        [employer] Information     [employer] lets you send messages to your doctor, view your test results, renew your  "prescriptions, schedule appointments and more. To sign up, go to [email or web address] . Click on \"Log in\" on the left side of the screen, which will take you to the Welcome page. Then click on \"Sign up Now\" on the right side of the page.     You will be asked to enter the access code listed below, as well as some personal information. Please follow the directions to create your username and password.     Your access code is: [email or web address]  Expires: 3/5/2017  1:38 PM     Your access code will  in 90 days. If you need help or a new code, please call your [facility] clinic or [phone].        Care EveryWhere ID     This is your Care EveryWhere ID. This could be used by other organizations to access your [facility] medical records  [id number]        After Visit Summary       This is your record. Keep this with you and show to your community pharmacist(s) and doctor(s) at your next visit.                  "

## 2017-01-23 VITALS
HEART RATE: 82 BPM | OXYGEN SATURATION: 95 % | DIASTOLIC BLOOD PRESSURE: 95 MMHG | SYSTOLIC BLOOD PRESSURE: 155 MMHG | TEMPERATURE: 98.6 F | RESPIRATION RATE: 18 BRPM

## 2017-01-23 NOTE — ED NOTES
Pt was at  last night, had a few glasses of wine and fell backwards and landed on butt/back. Pt's friends helped him up and assisted him home. This morning pt woke up with lower back pain mainly on right side. Pt reports feeling nauseous right now. Denies pain anywhere else other than lower back. ABCs intact.

## 2017-01-23 NOTE — DISCHARGE INSTRUCTIONS
Back Contusion    You have a contusion to your back. A contusion is also called a bruise. There is swelling and some bleeding under the skin. The skin may be purplish. You may have muscle aching and stiffness in the area of the bruise. There are no broken bones.  Contusions heal on their own, without further treatment. However, pain and skin discoloration may take weeks to months to go away.   Home care    Rest. Avoid heavy lifting, strenuous exertion, or any activity that causes pain.    Ice the area to reduce pain and swelling. Put ice cubes in a plastic bag or use a cold pack. (Wrap the cold source in a thin towel. Do not place it directly on your skin.) Ice the injured area for 20 minutes every 1-2 hours the first day. Continue with ice packs 3-4 times a day for the next two days, then as needed for the relief of pain and swelling.    Take any prescribed pain medication. If none was prescribed, take acetaminophen, ibuprofen, or naproxen to control pain.  Follow-up care  Follow up with your healthcare provider, or as directed. Call if you are not better in 1-2 weeks.  When to seek medical advice  Call your healthcare provider for any of the following:    New or worsening pain    Increased swelling around the bruise    Pain spreads to one or both legs    Weakness or numbness in one or both legs     Loss of bowel or bladder control    Numbness in the groin or genital area    Fever of 100.4 F (38 C) or higher, or as directed by your healthcare provider    1848-7830 [employer]. [address] [place] [address], [place] [address]. All rights reserved. This information is not intended as a substitute for professional medical care. Always follow your healthcare professional's instructions.

## 2017-01-23 NOTE — ED NOTES
Bed: ED17  Expected date: 1/22/17  Expected time: 10:47 PM  Means of arrival: Ambulance  Comments:  Carlos Manuel Nolan
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
## 2017-01-24 ENCOUNTER — APPOINTMENT (OUTPATIENT)
Dept: CT IMAGING | Facility: CLINIC | Age: 57
End: 2017-01-24
Attending: EMERGENCY MEDICINE
Payer: MEDICAID

## 2017-01-24 ENCOUNTER — HOSPITAL ENCOUNTER (EMERGENCY)
Facility: CLINIC | Age: 57
Discharge: HOME | End: 2017-01-24
Attending: EMERGENCY MEDICINE | Admitting: EMERGENCY MEDICINE
Payer: MEDICAID

## 2017-01-24 ENCOUNTER — APPOINTMENT (OUTPATIENT)
Dept: GENERAL RADIOLOGY | Facility: CLINIC | Age: 57
End: 2017-01-24
Attending: EMERGENCY MEDICINE
Payer: MEDICAID

## 2017-01-24 VITALS
SYSTOLIC BLOOD PRESSURE: 141 MMHG | DIASTOLIC BLOOD PRESSURE: 93 MMHG | RESPIRATION RATE: 24 BRPM | HEART RATE: 92 BPM | OXYGEN SATURATION: 97 % | TEMPERATURE: 98.3 F

## 2017-01-24 DIAGNOSIS — M54.9 ACUTE BACK PAIN, UNSPECIFIED BACK LOCATION, UNSPECIFIED BACK PAIN LATERALITY: ICD-10-CM

## 2017-01-24 PROCEDURE — 99284 EMERGENCY DEPT VISIT MOD MDM: CPT | Mod: 25

## 2017-01-24 PROCEDURE — 72072 X-RAY EXAM THORAC SPINE 3VWS: CPT

## 2017-01-24 PROCEDURE — 70450 CT HEAD/BRAIN W/O DYE: CPT

## 2017-01-24 PROCEDURE — 25000132 ZZH RX MED GY IP 250 OP 250 PS 637: Performed by: EMERGENCY MEDICINE

## 2017-01-24 RX ORDER — OXYCODONE AND ACETAMINOPHEN 5; 325 MG/1; MG/1
1 TABLET ORAL ONCE
Status: COMPLETED | OUTPATIENT
Start: 2017-01-24 | End: 2017-01-24

## 2017-01-24 RX ORDER — METHOCARBAMOL 500 MG/1
1000 TABLET, FILM COATED ORAL 3 TIMES DAILY PRN
Qty: 30 TABLET | Refills: 1 | Status: SHIPPED | OUTPATIENT
Start: 2017-01-24 | End: 2017-06-04

## 2017-01-24 RX ORDER — DIAZEPAM 5 MG
5 TABLET ORAL ONCE
Status: COMPLETED | OUTPATIENT
Start: 2017-01-24 | End: 2017-01-24

## 2017-01-24 RX ORDER — HYDROCODONE BITARTRATE AND ACETAMINOPHEN 5; 325 MG/1; MG/1
1 TABLET ORAL ONCE
Status: COMPLETED | OUTPATIENT
Start: 2017-01-24 | End: 2017-01-24

## 2017-01-24 RX ADMIN — HYDROCODONE BITARTRATE AND ACETAMINOPHEN 1 TABLET: 5; 325 TABLET ORAL at 11:25

## 2017-01-24 RX ADMIN — OXYCODONE HYDROCHLORIDE AND ACETAMINOPHEN 1 TABLET: 5; 325 TABLET ORAL at 13:35

## 2017-01-24 RX ADMIN — DIAZEPAM 5 MG: 5 TABLET ORAL at 11:25

## 2017-01-24 ASSESSMENT — ENCOUNTER SYMPTOMS
BACK PAIN: 1
WEAKNESS: 0
DIZZINESS: 0
HEADACHES: 0
NUMBNESS: 0
NAUSEA: 0
VOMITING: 0
NECK PAIN: 1

## 2017-01-24 NOTE — ED NOTES
Sent from clinic with back pain. Patient reports has had back pain since falling on Sunday. Seen in ED at that time. States has been taking ibuprofen and ice with no relief. Patient is tearful and appear anxious.

## 2017-01-24 NOTE — ED AVS SNAPSHOT
[facility] Emergency Department    [address] [place] [address]    Phone:  [phone]    Fax:  [phone]                                       Perico Houston   MRN: [id number]    Department:  [facility] Emergency Department   Date of Visit:  1/24/2017           Patient Information     Date Of Birth          1960        Your diagnoses for this visit were:     Acute back pain, unspecified back location, unspecified back pain laterality        You were seen by Vidal Malin DO.      Follow-up Information     Follow up with [facility], [place]. Call in 2 days.    Specialty:  Family Practice    Why:  As needed    Contact information:    [address] [place] [address]  [phone]          Follow up with [facility] Emergency Department.    Specialty:  EMERGENCY MEDICINE    Why:  If symptoms worsen    Contact information:    [address] [place] [id number] [phone]      24 Hour Appointment Hotline       To make an appointment at any [facility] clinic, call [phone] ([phone]). If you don't have a family doctor or clinic, we will help you find one. [facility] clinics are conveniently located to serve the needs of you and your family.             Review of your medicines      START taking        Dose / Directions Last dose taken    methocarbamol 500 MG tablet   Commonly known as:  ROBAXIN   Dose:  1000 mg   Quantity:  30 tablet        Take 2 tablets (1,000 mg) by mouth 3 times daily as needed for muscle spasms   Refills:  1          Our records show that you are taking the medicines listed below. If these are incorrect, please call your family doctor or clinic.        Dose / Directions Last dose taken    acetaminophen 325 MG tablet   Commonly known as:  TYLENOL   Dose:  650 mg   Quantity:  100 tablet        Take 2 tablets (650 mg) by mouth every 6 hours as needed Maximum Dose in 24hr (includes all  routes of administration and all acetaminophen-containing products): 4000 mg   Refills:  0        alum & mag hydroxide-simethicone 200-200-20 MG/5ML Susp suspension   Commonly known as:  MYLANTA/MAALOX   Dose:  30 mL   Quantity:  1 Bottle        Take 30 mLs by mouth every 4 hours as needed for indigestion   Refills:  0        aspirin 81 MG EC tablet   Dose:  81 mg   Quantity:  30 tablet        Take 1 tablet (81 mg) by mouth daily   Refills:  0        clonazePAM 0.5 MG tablet   Commonly known as:  klonoPIN   Dose:  0.5 mg   Quantity:  20 tablet        Take 1 tablet (0.5 mg) by mouth 3 times daily as needed for anxiety   Refills:  0        GABAPENTIN PO   Dose:  300 mg        Take 300 mg by mouth 3 times daily   Refills:  0        lisinopril 40 MG tablet   Commonly known as:  PRINIVIL/ZESTRIL   Dose:  40 mg   Quantity:  30 tablet        Take 1 tablet (40 mg) by mouth daily   Refills:  0        metFORMIN 1000 MG tablet   Commonly known as:  GLUCOPHAGE   Dose:  1000 mg   Quantity:  60 tablet        Take 1 tablet (1,000 mg) by mouth 2 times daily (with meals)   Refills:  0        nitroglycerin 0.4 MG sublingual tablet   Commonly known as:  NITROSTAT   Dose:  0.4 mg   Quantity:  25 tablet        Place 1 tablet (0.4 mg) under the tongue every 5 minutes as needed for chest pain if you are still having symptoms after 3 doses (15 minutes) call 911.   Refills:  0                Prescriptions were sent or printed at these locations (1 Prescription)                   Other Prescriptions                Printed at Department/Unit printer (1 of 1)         methocarbamol (ROBAXIN) 500 MG tablet                Procedures and tests performed during your visit     Head CT w/o contrast    Thoracic spine XR, 3 views      Orders Needing Specimen Collection     None      Pending Results     No orders found from 1/23/2017 to 1/25/2017.            Pending Culture Results     No orders found from 1/23/2017 to 1/25/2017.       Test Results from  your hospital stay           1/24/2017 12:33 PM - Interface, Radiant Ib      Narrative     XR THORACIC SPINE 3 VW 1/24/2017 12:32 PM    HISTORY: fall, back pain        Impression     IMPRESSION: Minimal thoracolumbar curve. Exam otherwise unremarkable.    PACO MORRIS MD         1/24/2017 12:39 PM - Interface, Radiant Ib      Narrative     CT SCAN OF THE HEAD WITHOUT CONTRAST January 24, 2017 12:15 PM     HISTORY: Fall, head injury.    TECHNIQUE: Axial images of the head and coronal reformations without  IV contrast material. Radiation dose for this scan was reduced using  automated exposure control, adjustment of the mA and/or kV according  to patient size, or iterative reconstruction technique.    COMPARISON: 1/15/2017    FINDINGS: The ventricles are normal in size, shape and configuration.   The brain parenchyma and subarachnoid spaces are normal. There is no  evidence of intracranial hemorrhage, mass, acute infarct or anomaly.     The visualized portions of the sinuses and mastoids appear normal.  There is no evidence of trauma.        Impression     IMPRESSION: Normal CT scan of the head.     DHEERAJ WEISS MD                Clinical Quality Measure: Blood Pressure Screening     Your blood pressure was checked while you were in the emergency department today. The last reading we obtained was  BP: 138/90 mmHg . Please read the guidelines below about what these numbers mean and what you should do about them.  If your systolic blood pressure (the top number) is less than 120 and your diastolic blood pressure (the bottom number) is less than 80, then your blood pressure is normal. There is nothing more that you need to do about it.  If your systolic blood pressure (the top number) is 120-139 or your diastolic blood pressure (the bottom number) is 80-89, your blood pressure may be higher than it should be. You should have your blood pressure rechecked within a year by a primary care provider.  If your systolic blood  "pressure (the top number) is 140 or greater or your diastolic blood pressure (the bottom number) is 90 or greater, you may have high blood pressure. High blood pressure is treatable, but if left untreated over time it can put you at risk for heart attack, stroke, or kidney failure. You should have your blood pressure rechecked by a primary care provider within the next 4 weeks.  If your provider in the emergency department today gave you specific instructions to follow-up with your doctor or provider even sooner than that, you should follow that instruction and not wait for up to 4 weeks for your follow-up visit.        Thank you for choosing [facility]       Thank you for choosing [facility] for your care. Our goal is always to provide you with excellent care. Hearing back from our patients is one way we can continue to improve our services. Please take a few minutes to complete the written survey that you may receive in the mail after you visit with us. Thank you!        [employer] Information     [employer] lets you send messages to your doctor, view your test results, renew your prescriptions, schedule appointments and more. To sign up, go to [email or web address] . Click on \"Log in\" on the left side of the screen, which will take you to the Welcome page. Then click on \"Sign up Now\" on the right side of the page.     You will be asked to enter the access code listed below, as well as some personal information. Please follow the directions to create your username and password.     Your access code is: [email or web address]  Expires: 3/5/2017  1:38 PM     Your access code will  in 90 days. If you need help or a new code, please call your [facility] clinic or [phone].        Care EveryWhere ID     This is your Care EveryWhere ID. This could be used by other organizations to access your [facility] medical records  [id number]        After Visit Summary       This is your record. Keep this with you and show to your community " pharmacist(s) and doctor(s) at your next visit.

## 2017-01-24 NOTE — ED NOTES
Pt educated on Percocet. Pt has a ride home today, pt given another pillow for comfort. Respirations equal and unlabored

## 2017-01-24 NOTE — ED NOTES
Bed: ED06  Expected date: 1/24/17  Expected time: 10:23 AM  Means of arrival: Ambulance  Comments:  BV 55 y/o fall

## 2017-01-24 NOTE — ED AVS SNAPSHOT
[facility] Emergency Department    [address]    [facility] [address]    Phone:  [phone]    Fax:  [phone]                                       Perico Houston   MRN: [id number]    Department:  [facility] Emergency Department   Date of Visit:  1/24/2017           After Visit Summary Signature Page     I have received my discharge instructions, and my questions have been answered. I have discussed any challenges I see with this plan with the nurse or doctor.    ..........................................................................................................................................  Patient/Patient Representative Signature      ..........................................................................................................................................  Patient Representative Print Name and Relationship to Patient    ..................................................               ................................................  Date                                            Time    ..........................................................................................................................................  Reviewed by Signature/Title    ...................................................              ..............................................  Date                                                            Time
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
## 2017-01-25 ENCOUNTER — HOSPITAL ENCOUNTER (EMERGENCY)
Facility: CLINIC | Age: 57
Discharge: HOME | End: 2017-01-25
Attending: EMERGENCY MEDICINE | Admitting: EMERGENCY MEDICINE
Payer: MEDICAID

## 2017-01-25 VITALS
HEART RATE: 86 BPM | OXYGEN SATURATION: 97 % | DIASTOLIC BLOOD PRESSURE: 94 MMHG | RESPIRATION RATE: 24 BRPM | SYSTOLIC BLOOD PRESSURE: 158 MMHG | TEMPERATURE: 97.8 F | BODY MASS INDEX: 28.49 KG/M2 | WEIGHT: 210.1 LBS

## 2017-01-25 DIAGNOSIS — M54.42 BILATERAL LOW BACK PAIN WITH LEFT-SIDED SCIATICA, UNSPECIFIED CHRONICITY: ICD-10-CM

## 2017-01-25 PROCEDURE — 99284 EMERGENCY DEPT VISIT MOD MDM: CPT | Mod: 25

## 2017-01-25 PROCEDURE — 25000125 ZZHC RX 250: Performed by: EMERGENCY MEDICINE

## 2017-01-25 PROCEDURE — 25000132 ZZH RX MED GY IP 250 OP 250 PS 637: Performed by: EMERGENCY MEDICINE

## 2017-01-25 PROCEDURE — 96372 THER/PROPH/DIAG INJ SC/IM: CPT

## 2017-01-25 RX ORDER — KETOROLAC TROMETHAMINE 30 MG/ML
30 INJECTION, SOLUTION INTRAMUSCULAR; INTRAVENOUS ONCE
Status: COMPLETED | OUTPATIENT
Start: 2017-01-25 | End: 2017-01-25

## 2017-01-25 RX ORDER — CYCLOBENZAPRINE HCL 10 MG
10 TABLET ORAL ONCE
Status: COMPLETED | OUTPATIENT
Start: 2017-01-25 | End: 2017-01-25

## 2017-01-25 RX ORDER — LIDOCAINE 50 MG/G
PATCH TOPICAL
Qty: 15 PATCH | Refills: 0 | Status: SHIPPED | OUTPATIENT
Start: 2017-01-25 | End: 2017-01-29

## 2017-01-25 RX ORDER — TRAMADOL HYDROCHLORIDE 50 MG/1
50 TABLET ORAL EVERY 6 HOURS PRN
Qty: 15 TABLET | Refills: 0 | Status: SHIPPED | OUTPATIENT
Start: 2017-01-25 | End: 2017-05-23

## 2017-01-25 RX ORDER — OXYCODONE HYDROCHLORIDE 5 MG/1
5 TABLET ORAL ONCE
Status: COMPLETED | OUTPATIENT
Start: 2017-01-25 | End: 2017-01-25

## 2017-01-25 RX ADMIN — OXYCODONE HYDROCHLORIDE 5 MG: 5 TABLET ORAL at 11:53

## 2017-01-25 RX ADMIN — KETOROLAC TROMETHAMINE 30 MG: 30 INJECTION, SOLUTION INTRAMUSCULAR at 11:53

## 2017-01-25 RX ADMIN — CYCLOBENZAPRINE HYDROCHLORIDE 10 MG: 10 TABLET, FILM COATED ORAL at 11:53

## 2017-01-25 ASSESSMENT — ENCOUNTER SYMPTOMS
BACK PAIN: 1
HEADACHES: 0
NUMBNESS: 0
NAUSEA: 0
NERVOUS/ANXIOUS: 1
NECK PAIN: 0
DIZZINESS: 0
WEAKNESS: 0
VOMITING: 0

## 2017-01-25 NOTE — DISCHARGE INSTRUCTIONS
Please follow up closely with your regular physician. Please return to the ED if your symptoms worsen or if you develop new or concerning symptoms.       Discharge Instructions  Back Pain  You were seen today for back pain. Back pain can have many causes, but most will get better without surgery or other specific treatment. Sometimes there is a herniated ( slipped ) disc. We don t usually do MRI scans to look for these right away, since most herniated discs will get better on their own with time.  Today, we did not find any evidence that your back pain was caused by a serious condition, such as an infection, fracture, or tumor. However, sometimes symptoms develop over time and cannot be found during an emergency visit, so it is very important that you follow up with your primary doctor.  Return to the Emergency Department if:    You develop a fever with your back pain.     You have weakness or change in sensation in one or both legs.    You lose control of your bowels or bladder, or can t empty your bladder.    Your pain gets much worse.     Follow-up with your doctor:    Unless your pain has completely gone away, please make an appointment with your doctor within one week.  You may need further management of your back pain, such as more pain medication, imaging such as an X-ray or MRI, or physical therapy.    What can I do to help myself?    Remain Active -- People are often afraid that they will hurt their back further or delay recovery by remaining active, but this is one of the best things you can do for your back. In fact, prolonged bed rest is not recommended. Studies have shown that people with low back pain recover faster when they remain active. Movement helps to bring blood flow to the muscles and relieve muscle spasms as well as preventing loss of muscle strength.    Heat -- Using a heating pad can help with low back pain during the first few weeks. Do not sleep with a heating pad, as you can be burned.      Pain medications - You may take a pain medication such as Tylenol  (acetaminophen), Advil , Nuprin  (ibuprofen) or Aleve  (naproxen).  If you have been given a narcotic such as Vicodin  (hydrocodone with acetaminophen), Percocet  (oxycodone with acetaminophen), codeine, or a muscle relaxant such as Flexeril  (cyclobenzaprine) or Soma  (carisoprodol), do not drive for four hours after you have taken it. If the narcotic contains Tylenol  (acetaminophen), do not take Tylenol  with it. All narcotics will cause constipation, so eat a high fiber diet.   If you were given a prescription for medicine here today, be sure to read all of the information (including the package insert) that comes with your prescription.  This will include important information about the medicine, its side effects, and any warnings that you need to know about.  The pharmacist who fills the prescription can provide more information and answer questions you may have about the medicine.  If you have questions or concerns that the pharmacist cannot address, please call or return to the Emergency Department.   Opioid Medication Information    Pain medications are among the most commonly prescribed medicines, so we are including this information for all our patients. If you did not receive pain medication or get a prescription for pain medicine, you can ignore it.     You may have been given a prescription for an opioid (narcotic) pain medicine and/or have received a pain medicine while here in the Emergency Department. These medicines can make you drowsy or impaired. You must not drive, operate dangerous equipment, or engage in any other dangerous activities while taking these medications. If you drive while taking these medications, you could be arrested for DUI, or driving under the influence. Do not drink any alcohol while you are taking these medications.     Opioid pain medications can cause addiction. If you have a history of chemical  dependency of any type, you are at a higher risk of becoming addicted to pain medications.  Only take these prescribed medications to treat your pain when all other options have been tried. Take it for as short a time and as few doses as possible. Store your pain pills in a secure place, as they are frequently stolen and provide a dangerous opportunity for children or visitors in your house to start abusing these powerful medications. We will not replace any lost or stolen medicine.  As soon as your pain is better, you should flush all your remaining medication.     Many prescription pain medications contain Tylenol  (acetaminophen), including Vicodin , Tylenol #3 , Norco , Lortab , and Percocet .  You should not take any extra pills of Tylenol  if you are using these prescription medications or you can get very sick.  Do not ever take more than 3000 mg of acetaminophen in any 24 hour period.    All opioids tend to cause constipation. Drink plenty of water and eat foods that have a lot of fiber, such as fruits, vegetables, prune juice, apple juice and high fiber cereal.  Take a laxative if you don t move your bowels at least every other day. Miralax , Milk of Magnesia, Colace , or Senna  can be used to keep you regular.      Remember that you can always come back to the Emergency Department if you are not able to see your regular doctor in the amount of time listed above, if you get any new symptoms, or if there is anything that worries you.

## 2017-01-25 NOTE — ED NOTES
Pt fell several days ago and was seen in ED. Pt was seen at his  yesterday and they activated EMS to bring pt to ED. Pt's son brought pt to ED today. Pt speaking in a nearly unintelligible manner with intermittent crying.
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
## 2017-01-25 NOTE — ED AVS SNAPSHOT
[facility] Emergency Department    [address] [place] [address]    Phone:  [phone]    Fax:  [phone]                                       Perico [place]   MRN: [id number]    Department:  [facility] Emergency Department   Date of Visit:  1/25/2017           Patient Information     Date Of Birth          1960        Your diagnoses for this visit were:     Bilateral low back pain with left-sided sciatica, unspecified chronicity        You were seen by Shefali Garcia MD.      Follow-up Information     Follow up with [facility], [place] In 3 days.    Specialty:  Family Practice    Contact information:    [address] [place] [address]  [phone]          Discharge Instructions       Please follow up closely with your regular physician. Please return to the ED if your symptoms worsen or if you develop new or concerning symptoms.       Discharge Instructions  Back Pain  You were seen today for back pain. Back pain can have many causes, but most will get better without surgery or other specific treatment. Sometimes there is a herniated ( slipped ) disc. We don t usually do MRI scans to look for these right away, since most herniated discs will get better on their own with time.  Today, we did not find any evidence that your back pain was caused by a serious condition, such as an infection, fracture, or tumor. However, sometimes symptoms develop over time and cannot be found during an emergency visit, so it is very important that you follow up with your primary doctor.  Return to the Emergency Department if:    You develop a fever with your back pain.     You have weakness or change in sensation in one or both legs.    You lose control of your bowels or bladder, or can t empty your bladder.    Your pain gets much worse.     Follow-up with your doctor:    Unless your pain has completely gone away, please make an appointment with your doctor  within one week.  You may need further management of your back pain, such as more pain medication, imaging such as an X-ray or MRI, or physical therapy.    What can I do to help myself?    Remain Active -- People are often afraid that they will hurt their back further or delay recovery by remaining active, but this is one of the best things you can do for your back. In fact, prolonged bed rest is not recommended. Studies have shown that people with low back pain recover faster when they remain active. Movement helps to bring blood flow to the muscles and relieve muscle spasms as well as preventing loss of muscle strength.    Heat -- Using a heating pad can help with low back pain during the first few weeks. Do not sleep with a heating pad, as you can be burned.     Pain medications - You may take a pain medication such as Tylenol  (acetaminophen), Advil , Nuprin  (ibuprofen) or Aleve  (naproxen).  If you have been given a narcotic such as Vicodin  (hydrocodone with acetaminophen), Percocet  (oxycodone with acetaminophen), codeine, or a muscle relaxant such as Flexeril  (cyclobenzaprine) or Soma  (carisoprodol), do not drive for four hours after you have taken it. If the narcotic contains Tylenol  (acetaminophen), do not take Tylenol  with it. All narcotics will cause constipation, so eat a high fiber diet.   If you were given a prescription for medicine here today, be sure to read all of the information (including the package insert) that comes with your prescription.  This will include important information about the medicine, its side effects, and any warnings that you need to know about.  The pharmacist who fills the prescription can provide more information and answer questions you may have about the medicine.  If you have questions or concerns that the pharmacist cannot address, please call or return to the Emergency Department.   Opioid Medication Information    Pain medications are among the most commonly  prescribed medicines, so we are including this information for all our patients. If you did not receive pain medication or get a prescription for pain medicine, you can ignore it.     You may have been given a prescription for an opioid (narcotic) pain medicine and/or have received a pain medicine while here in the Emergency Department. These medicines can make you drowsy or impaired. You must not drive, operate dangerous equipment, or engage in any other dangerous activities while taking these medications. If you drive while taking these medications, you could be arrested for DUI, or driving under the influence. Do not drink any alcohol while you are taking these medications.     Opioid pain medications can cause addiction. If you have a history of chemical dependency of any type, you are at a higher risk of becoming addicted to pain medications.  Only take these prescribed medications to treat your pain when all other options have been tried. Take it for as short a time and as few doses as possible. Store your pain pills in a secure place, as they are frequently stolen and provide a dangerous opportunity for children or visitors in your house to start abusing these powerful medications. We will not replace any lost or stolen medicine.  As soon as your pain is better, you should flush all your remaining medication.     Many prescription pain medications contain Tylenol  (acetaminophen), including Vicodin , Tylenol #3 , Norco , Lortab , and Percocet .  You should not take any extra pills of Tylenol  if you are using these prescription medications or you can get very sick.  Do not ever take more than 3000 mg of acetaminophen in any 24 hour period.    All opioids tend to cause constipation. Drink plenty of water and eat foods that have a lot of fiber, such as fruits, vegetables, prune juice, apple juice and high fiber cereal.  Take a laxative if you don t move your bowels at least every other day. Miralax , Milk of  Magnesia, Colace , or Senna  can be used to keep you regular.      Remember that you can always come back to the Emergency Department if you are not able to see your regular doctor in the amount of time listed above, if you get any new symptoms, or if there is anything that worries you.        24 Hour Appointment Hotline       To make an appointment at any [facility], call [phone] ([phone]). If you don't have a family doctor or clinic, we will help you find one. [facility] are conveniently located to serve the needs of you and your family.             Review of your medicines      START taking        Dose / Directions Last dose taken    traMADol 50 MG tablet   Commonly known as:  ULTRAM   Dose:  50 mg   Quantity:  15 tablet        Take 1 tablet (50 mg) by mouth every 6 hours as needed for pain   Refills:  0          Our records show that you are taking the medicines listed below. If these are incorrect, please call your family doctor or clinic.        Dose / Directions Last dose taken    acetaminophen 325 MG tablet   Commonly known as:  TYLENOL   Dose:  650 mg   Quantity:  100 tablet        Take 2 tablets (650 mg) by mouth every 6 hours as needed Maximum Dose in 24hr (includes all routes of administration and all acetaminophen-containing products): 4000 mg   Refills:  0        alum & mag hydroxide-simethicone 200-200-20 MG/5ML Susp suspension   Commonly known as:  MYLANTA/MAALOX   Dose:  30 mL   Quantity:  1 Bottle        Take 30 mLs by mouth every 4 hours as needed for indigestion   Refills:  0        aspirin 81 MG EC tablet   Dose:  81 mg   Quantity:  30 tablet        Take 1 tablet (81 mg) by mouth daily   Refills:  0        clonazePAM 0.5 MG tablet   Commonly known as:  klonoPIN   Dose:  0.5 mg   Quantity:  20 tablet        Take 1 tablet (0.5 mg) by mouth 3 times daily as needed for anxiety   Refills:  0        GABAPENTIN PO   Dose:  300 mg        Take 300 mg by mouth 3 times daily    Refills:  0        lisinopril 40 MG tablet   Commonly known as:  PRINIVIL/ZESTRIL   Dose:  40 mg   Quantity:  30 tablet        Take 1 tablet (40 mg) by mouth daily   Refills:  0        metFORMIN 1000 MG tablet   Commonly known as:  GLUCOPHAGE   Dose:  1000 mg   Quantity:  60 tablet        Take 1 tablet (1,000 mg) by mouth 2 times daily (with meals)   Refills:  0        methocarbamol 500 MG tablet   Commonly known as:  ROBAXIN   Dose:  1000 mg   Quantity:  30 tablet        Take 2 tablets (1,000 mg) by mouth 3 times daily as needed for muscle spasms   Refills:  1        nitroglycerin 0.4 MG sublingual tablet   Commonly known as:  NITROSTAT   Dose:  0.4 mg   Quantity:  25 tablet        Place 1 tablet (0.4 mg) under the tongue every 5 minutes as needed for chest pain if you are still having symptoms after 3 doses (15 minutes) call 911.   Refills:  0                Prescriptions were sent or printed at these locations (1 Prescription)                   Other Prescriptions                Printed at Department/Unit printer (1 of 1)         traMADol (ULTRAM) 50 MG tablet                Orders Needing Specimen Collection     None      Pending Results     No orders found from 1/24/2017 to 1/26/2017.            Pending Culture Results     No orders found from 1/24/2017 to 1/26/2017.             Test Results from your hospital stay            Clinical Quality Measure: Blood Pressure Screening     Your blood pressure was checked while you were in the emergency department today. The last reading we obtained was  BP: 147/90 mmHg . Please read the guidelines below about what these numbers mean and what you should do about them.  If your systolic blood pressure (the top number) is less than 120 and your diastolic blood pressure (the bottom number) is less than 80, then your blood pressure is normal. There is nothing more that you need to do about it.  If your systolic blood pressure (the top number) is 120-139 or your diastolic  "blood pressure (the bottom number) is 80-89, your blood pressure may be higher than it should be. You should have your blood pressure rechecked within a year by a primary care provider.  If your systolic blood pressure (the top number) is 140 or greater or your diastolic blood pressure (the bottom number) is 90 or greater, you may have high blood pressure. High blood pressure is treatable, but if left untreated over time it can put you at risk for heart attack, stroke, or kidney failure. You should have your blood pressure rechecked by a primary care provider within the next 4 weeks.  If your provider in the emergency department today gave you specific instructions to follow-up with your doctor or provider even sooner than that, you should follow that instruction and not wait for up to 4 weeks for your follow-up visit.        Thank you for choosing [facility]       Thank you for choosing [facility] for your care. Our goal is always to provide you with excellent care. Hearing back from our patients is one way we can continue to improve our services. Please take a few minutes to complete the written survey that you may receive in the mail after you visit with us. Thank you!        [employer] Information     [employer] lets you send messages to your doctor, view your test results, renew your prescriptions, schedule appointments and more. To sign up, go to [email or web address] . Click on \"Log in\" on the left side of the screen, which will take you to the Welcome page. Then click on \"Sign up Now\" on the right side of the page.     You will be asked to enter the access code listed below, as well as some personal information. Please follow the directions to create your username and password.     Your access code is: [email or web address]  Expires: 3/5/2017  1:38 PM     Your access code will  in 90 days. If you need help or a new code, please call your [facility] clinic or [phone].        Care EveryWhere ID     This is your Care " EveryWhere ID. This could be used by other organizations to access your [facility] medical records  [id number]        After Visit Summary       This is your record. Keep this with you and show to your community pharmacist(s) and doctor(s) at your next visit.

## 2017-01-25 NOTE — ED AVS SNAPSHOT
[facility] Emergency Department    [address]    [facility] [address]    Phone:  [phone]    Fax:  [phone]                                       Perico Houston   MRN: [id number]    Department:  [facility] Emergency Department   Date of Visit:  1/25/2017           After Visit Summary Signature Page     I have received my discharge instructions, and my questions have been answered. I have discussed any challenges I see with this plan with the nurse or doctor.    ..........................................................................................................................................  Patient/Patient Representative Signature      ..........................................................................................................................................  Patient Representative Print Name and Relationship to Patient    ..................................................               ................................................  Date                                            Time    ..........................................................................................................................................  Reviewed by Signature/Title    ...................................................              ..............................................  Date                                                            Time

## 2017-01-29 ENCOUNTER — HOSPITAL ENCOUNTER (EMERGENCY)
Facility: CLINIC | Age: 57
Discharge: HOME | End: 2017-01-29
Attending: EMERGENCY MEDICINE | Admitting: EMERGENCY MEDICINE
Payer: MEDICAID

## 2017-01-29 VITALS
HEART RATE: 75 BPM | HEIGHT: 72 IN | BODY MASS INDEX: 30.2 KG/M2 | TEMPERATURE: 97.7 F | OXYGEN SATURATION: 97 % | WEIGHT: 223 LBS | RESPIRATION RATE: 20 BRPM | DIASTOLIC BLOOD PRESSURE: 73 MMHG | SYSTOLIC BLOOD PRESSURE: 118 MMHG

## 2017-01-29 DIAGNOSIS — M54.50 ACUTE RIGHT-SIDED LOW BACK PAIN WITHOUT SCIATICA: ICD-10-CM

## 2017-01-29 PROCEDURE — 25000132 ZZH RX MED GY IP 250 OP 250 PS 637: Performed by: EMERGENCY MEDICINE

## 2017-01-29 PROCEDURE — 96372 THER/PROPH/DIAG INJ SC/IM: CPT

## 2017-01-29 PROCEDURE — 25000125 ZZHC RX 250: Performed by: EMERGENCY MEDICINE

## 2017-01-29 PROCEDURE — 99284 EMERGENCY DEPT VISIT MOD MDM: CPT | Mod: 25

## 2017-01-29 RX ORDER — DIAZEPAM 5 MG
5 TABLET ORAL ONCE
Status: COMPLETED | OUTPATIENT
Start: 2017-01-29 | End: 2017-01-29

## 2017-01-29 RX ORDER — KETOROLAC TROMETHAMINE 30 MG/ML
30 INJECTION, SOLUTION INTRAMUSCULAR; INTRAVENOUS ONCE
Status: COMPLETED | OUTPATIENT
Start: 2017-01-29 | End: 2017-01-29

## 2017-01-29 RX ADMIN — DIAZEPAM 5 MG: 5 TABLET ORAL at 08:04

## 2017-01-29 RX ADMIN — KETOROLAC TROMETHAMINE 30 MG: 30 INJECTION, SOLUTION INTRAMUSCULAR at 08:04

## 2017-01-29 ASSESSMENT — ENCOUNTER SYMPTOMS
NECK PAIN: 1
BACK PAIN: 1

## 2017-01-29 NOTE — ED NOTES
Patient fell on 1/21/17.  Has been seen here and at the clinic several times this week.  Called 911 this morning due to pain continuing.  ABCDs intact.  Patient ambulatory to the bed from the medic cart.  Patient walks with a stiff right leg, but was able to ambulate.  ABCDs intact.

## 2017-01-29 NOTE — ED NOTES
Bed: ED07  Expected date: 1/29/17  Expected time: 7:19 AM  Means of arrival: Ambulance  Comments:  Carlos Manuel [phone] M, back pain for 1 week

## 2017-01-29 NOTE — ED AVS SNAPSHOT
[facility]    [address] [place] [address]    Phone:  [phone]    Fax:  [phone]                                       Perico Houston   MRN: [id number]    Department:  [facility]   Date of Visit:  1/29/2017           Patient Information     Date Of Birth          1960        Your diagnoses for this visit were:     Acute right-sided low back pain without sciatica        You were seen by Evans Aburto MD.      Follow-up Information     Call [facility], [place].    Specialty:  Family Practice    Why:  tomorrow for recheck    Contact information:    [address] [place] [address]  [phone]          Discharge Instructions         Exercises To Strengthen Your Lower Back  Strong lower-back and abdominal muscles work together to support your spine. The exercises below will help strengthen the muscles of the lower back. It is important that you begin exercising slowly and increase levels gradually.  Always begin any exercise program with stretching. If you feel pain while doing any of these exercises, stop and talk to your doctor about a more specific exercise program that better suits your condition.   Low back stretch  The point of stretching is to make you more flexible and increase your range of motion. Stretch only as much as you are able. Stretch slowly. Do not push your stretch to the limit. If at any point you feel pain while stretching, this is your (temporary) limit.    Lie on your back with your knees bent and both feet on the ground.    Slowly raise your left knee to your chest as you flatten your lower back against the floor. Hold for 5 seconds.    Relax and repeat the exercise with your right knee.    Do 10 of these exercises for each leg.    Repeat hugging both knees to your chest at the same time.  Building lower back strength  Start your exercise routine with 10 to 30 minutes a day,  1 to 3 times a day.  Initial exercises  Lying on your back:    Ankle pumps: Move your foot up and down, towards your head, and then away. Repeat 10 times with each foot.    Heel slides: Slowly bend your knee, drawing the heel of your foot towards you. Then slide your heel/foot from you, straightening your knee. Do not lift your foot off the floor (this is not a leg lift).    Abdominal contraction: Bend your knees and put your hands on your stomach. Tighten your stomach muscles. Hold for 5 seconds, then relax. Repeat 10 times.    Straight leg raise: Bend one leg at the knee and keep the other leg straight. Tighten your stomach muscles. Slowly lift your straight leg 6 to 12 inches off the floor and hold for up to 5 seconds. Repeat 10 times on each side.  Standin. Wall squats: Stand with your back against the wall. Move your feet about 12 inches away from the wall. Tighten your stomach muscles, and slowly bend your knees until they are at about a 45 degree angle. Do not go down too far. Hold about 5 seconds. Then slowly return to your starting position. Repeat 10 times.  2. Heel raises: Stand facing the wall. Slowly raise the heels of your feet up and down, while keeping your toes on the floor. If you have trouble balancing, you can touch the wall with your hands. Repeat 10 times.  More advanced exercises  When you feel comfortable enough, try these exercises.  1. Kneeling lumbar extension: Begin on your hands and knees. At the same time, raise and straighten your right arm and left leg until they are parallel to the ground. Hold for 2 seconds and come back slowly to a starting position. Repeat with left arm and right leg, alternating 10 times.  2. Prone lumbar extension: Lie face down, arms extended overhead, palms on the floor. At the same time, raise your right arm and left leg as high as comfortably possible. Hold for 10 seconds and slowly return to start. Repeat with left arm and right leg, alternating 10  times. Gradually build up to 20 times. (Advanced: Repeat this exercise raising both arms and both legs a few inches off the floor at the same time. Hold for 5 seconds and release.)  3. Pelvic tilt: Lie on the floor on your back with your knees bent at 90 degrees. Your feet should be flat on the floor. Inhale, exhale, then slowly contract your abdominal muscles bringing your navel toward your spine. Let your pelvis rock back until your lower back is flat on the floor. Hold for 10 seconds while breathing smoothly.  4. Abdominal crunch: Perform a pelvic tilt (above) flattening your lower back against the floor. Holding the tension in your abdominal muscles, take another breath and raise your shoulder blades off the ground (this is not a full sit-up). Keep your head in line with your body (don t bend your neck forward). Hold for 2 seconds, then slowly lower.    7462-2825 [employer]. [address] [place] [address]. All rights reserved. This information is not intended as a substitute for professional medical care. Always follow your healthcare professional's instructions.          Back Contusion    You have a contusion to your back. A contusion is also called a bruise. There is swelling and some bleeding under the skin. The skin may be purplish. You may have muscle aching and stiffness in the area of the bruise. There are no broken bones.  Contusions heal on their own, without further treatment. However, pain and skin discoloration may take weeks to months to go away.   Home care    Rest. Avoid heavy lifting, strenuous exertion, or any activity that causes pain.    Ice the area to reduce pain and swelling. Put ice cubes in a plastic bag or use a cold pack. (Wrap the cold source in a thin towel. Do not place it directly on your skin.) Ice the injured area for 20 minutes every 1-2 hours the first day. Continue with ice packs 3-4 times a day for the next two days, then as needed for the relief of  pain and swelling.    Take any prescribed pain medication. If none was prescribed, take acetaminophen, ibuprofen, or naproxen to control pain.  Follow-up care  Follow up with your healthcare provider, or as directed. Call if you are not better in 1-2 weeks.  When to seek medical advice  Call your healthcare provider for any of the following:    New or worsening pain    Increased swelling around the bruise    Pain spreads to one or both legs    Weakness or numbness in one or both legs     Loss of bowel or bladder control    Numbness in the groin or genital area    Fever of 100.4 F (38 C) or higher, or as directed by your healthcare provider    6785-9308 [employer]. [address] [place] [address], [place] [address]. All rights reserved. This information is not intended as a substitute for professional medical care. Always follow your healthcare professional's instructions.      Discharge Instructions  Back Pain  You were seen today for back pain. Back pain can have many causes, but most will get better without surgery or other specific treatment. Sometimes there is a herniated ( slipped ) disc. We don t usually do MRI scans to look for these right away, since most herniated discs will get better on their own with time.  Today, we did not find any evidence that your back pain was caused by a serious condition, such as an infection, fracture, or tumor. However, sometimes symptoms develop over time and cannot be found during an emergency visit, so it is very important that you follow up with your primary doctor.  Return to the Emergency Department if:    You develop a fever with your back pain.     You have weakness or change in sensation in one or both legs.    You lose control of your bowels or bladder, or can t empty your bladder.    Your pain gets much worse.     Follow-up with your doctor:    Unless your pain has completely gone away, please make an appointment with your doctor within one week.  You may need  further management of your back pain, such as more pain medication, imaging such as an X-ray or MRI, or physical therapy.    What can I do to help myself?    Remain Active -- People are often afraid that they will hurt their back further or delay recovery by remaining active, but this is one of the best things you can do for your back. In fact, prolonged bed rest is not recommended. Studies have shown that people with low back pain recover faster when they remain active. Movement helps to bring blood flow to the muscles and relieve muscle spasms as well as preventing loss of muscle strength.    Heat -- Using a heating pad can help with low back pain during the first few weeks. Do not sleep with a heating pad, as you can be burned.     Pain medications - You may take a pain medication such as Tylenol  (acetaminophen), Advil , Nuprin  (ibuprofen) or Aleve  (naproxen).  If you have been given a narcotic such as Vicodin  (hydrocodone with acetaminophen), Percocet  (oxycodone with acetaminophen), codeine, or a muscle relaxant such as Flexeril  (cyclobenzaprine) or Soma  (carisoprodol), do not drive for four hours after you have taken it. If the narcotic contains Tylenol  (acetaminophen), do not take Tylenol  with it. All narcotics will cause constipation, so eat a high fiber diet.   If you were given a prescription for medicine here today, be sure to read all of the information (including the package insert) that comes with your prescription.  This will include important information about the medicine, its side effects, and any warnings that you need to know about.  The pharmacist who fills the prescription can provide more information and answer questions you may have about the medicine.  If you have questions or concerns that the pharmacist cannot address, please call or return to the Emergency Department.       24 Hour Appointment Hotline       To make an appointment at any [facility], call [phone]  ([phone]). If you don't have a family doctor or clinic, we will help you find one. [facility] clinics are conveniently located to serve the needs of you and your family.             Review of your medicines      Our records show that you are taking the medicines listed below. If these are incorrect, please call your family doctor or clinic.        Dose / Directions Last dose taken    aspirin 81 MG EC tablet   Dose:  81 mg   Quantity:  30 tablet        Take 1 tablet (81 mg) by mouth daily   Refills:  0        clonazePAM 0.5 MG tablet   Commonly known as:  klonoPIN   Dose:  0.5 mg   Quantity:  20 tablet        Take 1 tablet (0.5 mg) by mouth 3 times daily as needed for anxiety   Refills:  0        GABAPENTIN PO   Dose:  300 mg        Take 300 mg by mouth 3 times daily   Refills:  0        GLIPIZIDE XL PO        Refills:  0        lisinopril 40 MG tablet   Commonly known as:  PRINIVIL/ZESTRIL   Dose:  40 mg   Quantity:  30 tablet        Take 1 tablet (40 mg) by mouth daily   Refills:  0        metFORMIN 1000 MG tablet   Commonly known as:  GLUCOPHAGE   Dose:  1000 mg   Quantity:  60 tablet        Take 1 tablet (1,000 mg) by mouth 2 times daily (with meals)   Refills:  0        methocarbamol 500 MG tablet   Commonly known as:  ROBAXIN   Dose:  1000 mg   Quantity:  30 tablet        Take 2 tablets (1,000 mg) by mouth 3 times daily as needed for muscle spasms   Refills:  1        nitroglycerin 0.4 MG sublingual tablet   Commonly known as:  NITROSTAT   Dose:  0.4 mg   Quantity:  25 tablet        Place 1 tablet (0.4 mg) under the tongue every 5 minutes as needed for chest pain if you are still having symptoms after 3 doses (15 minutes) call 911.   Refills:  0        traMADol 50 MG tablet   Commonly known as:  ULTRAM   Dose:  50 mg   Quantity:  15 tablet        Take 1 tablet (50 mg) by mouth every 6 hours as needed for pain   Refills:  0                Orders Needing Specimen Collection     None      Pending Results      No orders found from 1/28/2017 to 1/30/2017.            Pending Culture Results     No orders found from 1/28/2017 to 1/30/2017.             Test Results from your hospital stay            Clinical Quality Measure: Blood Pressure Screening     Your blood pressure was checked while you were in the emergency department today. The last reading we obtained was  BP: 118/73 mmHg . Please read the guidelines below about what these numbers mean and what you should do about them.  If your systolic blood pressure (the top number) is less than 120 and your diastolic blood pressure (the bottom number) is less than 80, then your blood pressure is normal. There is nothing more that you need to do about it.  If your systolic blood pressure (the top number) is 120-139 or your diastolic blood pressure (the bottom number) is 80-89, your blood pressure may be higher than it should be. You should have your blood pressure rechecked within a year by a primary care provider.  If your systolic blood pressure (the top number) is 140 or greater or your diastolic blood pressure (the bottom number) is 90 or greater, you may have high blood pressure. High blood pressure is treatable, but if left untreated over time it can put you at risk for heart attack, stroke, or kidney failure. You should have your blood pressure rechecked by a primary care provider within the next 4 weeks.  If your provider in the emergency department today gave you specific instructions to follow-up with your doctor or provider even sooner than that, you should follow that instruction and not wait for up to 4 weeks for your follow-up visit.        Thank you for choosing [facility]       Thank you for choosing [facility] for your care. Our goal is always to provide you with excellent care. Hearing back from our patients is one way we can continue to improve our services. Please take a few minutes to complete the written survey that you may receive in the mail after you visit  "with us. Thank you!        [facility] Information     [facility] lets you send messages to your doctor, view your test results, renew your prescriptions, schedule appointments and more. To sign up, go to [email or web address] . Click on \"Log in\" on the left side of the screen, which will take you to the Welcome page. Then click on \"Sign up Now\" on the right side of the page.     You will be asked to enter the access code listed below, as well as some personal information. Please follow the directions to create your username and password.     Your access code is: [email or web address]  Expires: 3/5/2017  1:38 PM     Your access code will  in 90 days. If you need help or a new code, please call your [facility] clinic or [phone].        Care EveryWhere ID     This is your Care EveryWhere ID. This could be used by other organizations to access your [facility] medical records  [id number]        After Visit Summary       This is your record. Keep this with you and show to your community pharmacist(s) and doctor(s) at your next visit.                  "

## 2017-01-29 NOTE — DISCHARGE INSTRUCTIONS
Exercises To Strengthen Your Lower Back  Strong lower-back and abdominal muscles work together to support your spine. The exercises below will help strengthen the muscles of the lower back. It is important that you begin exercising slowly and increase levels gradually.  Always begin any exercise program with stretching. If you feel pain while doing any of these exercises, stop and talk to your doctor about a more specific exercise program that better suits your condition.   Low back stretch  The point of stretching is to make you more flexible and increase your range of motion. Stretch only as much as you are able. Stretch slowly. Do not push your stretch to the limit. If at any point you feel pain while stretching, this is your (temporary) limit.    Lie on your back with your knees bent and both feet on the ground.    Slowly raise your left knee to your chest as you flatten your lower back against the floor. Hold for 5 seconds.    Relax and repeat the exercise with your right knee.    Do 10 of these exercises for each leg.    Repeat hugging both knees to your chest at the same time.  Building lower back strength  Start your exercise routine with 10 to 30 minutes a day, 1 to 3 times a day.  Initial exercises  Lying on your back:    Ankle pumps: Move your foot up and down, towards your head, and then away. Repeat 10 times with each foot.    Heel slides: Slowly bend your knee, drawing the heel of your foot towards you. Then slide your heel/foot from you, straightening your knee. Do not lift your foot off the floor (this is not a leg lift).    Abdominal contraction: Bend your knees and put your hands on your stomach. Tighten your stomach muscles. Hold for 5 seconds, then relax. Repeat 10 times.    Straight leg raise: Bend one leg at the knee and keep the other leg straight. Tighten your stomach muscles. Slowly lift your straight leg 6 to 12 inches off the floor and hold for up to 5 seconds. Repeat 10 times on each  side.  Standin. Wall squats: Stand with your back against the wall. Move your feet about 12 inches away from the wall. Tighten your stomach muscles, and slowly bend your knees until they are at about a 45 degree angle. Do not go down too far. Hold about 5 seconds. Then slowly return to your starting position. Repeat 10 times.  2. Heel raises: Stand facing the wall. Slowly raise the heels of your feet up and down, while keeping your toes on the floor. If you have trouble balancing, you can touch the wall with your hands. Repeat 10 times.  More advanced exercises  When you feel comfortable enough, try these exercises.  1. Kneeling lumbar extension: Begin on your hands and knees. At the same time, raise and straighten your right arm and left leg until they are parallel to the ground. Hold for 2 seconds and come back slowly to a starting position. Repeat with left arm and right leg, alternating 10 times.  2. Prone lumbar extension: Lie face down, arms extended overhead, palms on the floor. At the same time, raise your right arm and left leg as high as comfortably possible. Hold for 10 seconds and slowly return to start. Repeat with left arm and right leg, alternating 10 times. Gradually build up to 20 times. (Advanced: Repeat this exercise raising both arms and both legs a few inches off the floor at the same time. Hold for 5 seconds and release.)  3. Pelvic tilt: Lie on the floor on your back with your knees bent at 90 degrees. Your feet should be flat on the floor. Inhale, exhale, then slowly contract your abdominal muscles bringing your navel toward your spine. Let your pelvis rock back until your lower back is flat on the floor. Hold for 10 seconds while breathing smoothly.  4. Abdominal crunch: Perform a pelvic tilt (above) flattening your lower back against the floor. Holding the tension in your abdominal muscles, take another breath and raise your shoulder blades off the ground (this is not a full sit-up).  Keep your head in line with your body (don t bend your neck forward). Hold for 2 seconds, then slowly lower.    4889-4102 [employer]. [address] [place] [address]. All rights reserved. This information is not intended as a substitute for professional medical care. Always follow your healthcare professional's instructions.          Back Contusion    You have a contusion to your back. A contusion is also called a bruise. There is swelling and some bleeding under the skin. The skin may be purplish. You may have muscle aching and stiffness in the area of the bruise. There are no broken bones.  Contusions heal on their own, without further treatment. However, pain and skin discoloration may take weeks to months to go away.   Home care    Rest. Avoid heavy lifting, strenuous exertion, or any activity that causes pain.    Ice the area to reduce pain and swelling. Put ice cubes in a plastic bag or use a cold pack. (Wrap the cold source in a thin towel. Do not place it directly on your skin.) Ice the injured area for 20 minutes every 1-2 hours the first day. Continue with ice packs 3-4 times a day for the next two days, then as needed for the relief of pain and swelling.    Take any prescribed pain medication. If none was prescribed, take acetaminophen, ibuprofen, or naproxen to control pain.  Follow-up care  Follow up with your healthcare provider, or as directed. Call if you are not better in 1-2 weeks.  When to seek medical advice  Call your healthcare provider for any of the following:    New or worsening pain    Increased swelling around the bruise    Pain spreads to one or both legs    Weakness or numbness in one or both legs     Loss of bowel or bladder control    Numbness in the groin or genital area    Fever of 100.4 F (38 C) or higher, or as directed by your healthcare provider    4094-8473 [employer]. [address] [place] [address]. All rights reserved. This  information is not intended as a substitute for professional medical care. Always follow your healthcare professional's instructions.      Discharge Instructions  Back Pain  You were seen today for back pain. Back pain can have many causes, but most will get better without surgery or other specific treatment. Sometimes there is a herniated ( slipped ) disc. We don t usually do MRI scans to look for these right away, since most herniated discs will get better on their own with time.  Today, we did not find any evidence that your back pain was caused by a serious condition, such as an infection, fracture, or tumor. However, sometimes symptoms develop over time and cannot be found during an emergency visit, so it is very important that you follow up with your primary doctor.  Return to the Emergency Department if:    You develop a fever with your back pain.     You have weakness or change in sensation in one or both legs.    You lose control of your bowels or bladder, or can t empty your bladder.    Your pain gets much worse.     Follow-up with your doctor:    Unless your pain has completely gone away, please make an appointment with your doctor within one week.  You may need further management of your back pain, such as more pain medication, imaging such as an X-ray or MRI, or physical therapy.    What can I do to help myself?    Remain Active -- People are often afraid that they will hurt their back further or delay recovery by remaining active, but this is one of the best things you can do for your back. In fact, prolonged bed rest is not recommended. Studies have shown that people with low back pain recover faster when they remain active. Movement helps to bring blood flow to the muscles and relieve muscle spasms as well as preventing loss of muscle strength.    Heat -- Using a heating pad can help with low back pain during the first few weeks. Do not sleep with a heating pad, as you can be burned.     Pain  medications - You may take a pain medication such as Tylenol  (acetaminophen), Advil , Nuprin  (ibuprofen) or Aleve  (naproxen).  If you have been given a narcotic such as Vicodin  (hydrocodone with acetaminophen), Percocet  (oxycodone with acetaminophen), codeine, or a muscle relaxant such as Flexeril  (cyclobenzaprine) or Soma  (carisoprodol), do not drive for four hours after you have taken it. If the narcotic contains Tylenol  (acetaminophen), do not take Tylenol  with it. All narcotics will cause constipation, so eat a high fiber diet.   If you were given a prescription for medicine here today, be sure to read all of the information (including the package insert) that comes with your prescription.  This will include important information about the medicine, its side effects, and any warnings that you need to know about.  The pharmacist who fills the prescription can provide more information and answer questions you may have about the medicine.  If you have questions or concerns that the pharmacist cannot address, please call or return to the Emergency Department.
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
## 2017-01-29 NOTE — ED NOTES
"Patient states \"ever time I takes ma pills the pain gets worser.  It like it knows it comin and can't handle it.\"  "

## 2017-01-29 NOTE — ED AVS SNAPSHOT
[facility] Emergency Department    [address]    [facility] [address]    Phone:  [phone]    Fax:  [phone]                                       Perico Houston   MRN: [id number]    Department:  [facility] Emergency Department   Date of Visit:  1/29/2017           After Visit Summary Signature Page     I have received my discharge instructions, and my questions have been answered. I have discussed any challenges I see with this plan with the nurse or doctor.    ..........................................................................................................................................  Patient/Patient Representative Signature      ..........................................................................................................................................  Patient Representative Print Name and Relationship to Patient    ..................................................               ................................................  Date                                            Time    ..........................................................................................................................................  Reviewed by Signature/Title    ...................................................              ..............................................  Date                                                            Time

## 2017-02-09 ENCOUNTER — HOSPITAL ENCOUNTER (EMERGENCY)
Facility: CLINIC | Age: 57
Discharge: HOME | End: 2017-02-09
Attending: EMERGENCY MEDICINE | Admitting: EMERGENCY MEDICINE
Payer: COMMERCIAL

## 2017-02-09 VITALS
SYSTOLIC BLOOD PRESSURE: 153 MMHG | OXYGEN SATURATION: 98 % | DIASTOLIC BLOOD PRESSURE: 97 MMHG | RESPIRATION RATE: 10 BRPM | HEART RATE: 78 BPM | TEMPERATURE: 97.5 F

## 2017-02-09 DIAGNOSIS — M54.5 LOW BACK PAIN, UNSPECIFIED BACK PAIN LATERALITY, UNSPECIFIED CHRONICITY, WITH SCIATICA PRESENCE UNSPECIFIED: ICD-10-CM

## 2017-02-09 PROCEDURE — 99283 EMERGENCY DEPT VISIT LOW MDM: CPT

## 2017-02-09 PROCEDURE — 25000132 ZZH RX MED GY IP 250 OP 250 PS 637: Performed by: EMERGENCY MEDICINE

## 2017-02-09 RX ORDER — IBUPROFEN 600 MG/1
600 TABLET, FILM COATED ORAL ONCE
Status: COMPLETED | OUTPATIENT
Start: 2017-02-09 | End: 2017-02-09

## 2017-02-09 RX ORDER — CYCLOBENZAPRINE HCL 10 MG
10 TABLET ORAL 3 TIMES DAILY PRN
Qty: 20 TABLET | Refills: 0 | Status: SHIPPED | OUTPATIENT
Start: 2017-02-09 | End: 2017-02-15

## 2017-02-09 RX ORDER — DIAZEPAM 5 MG
5 TABLET ORAL ONCE
Status: COMPLETED | OUTPATIENT
Start: 2017-02-09 | End: 2017-02-09

## 2017-02-09 RX ORDER — METHYLPREDNISOLONE 4 MG
4 TABLET, DOSE PACK ORAL SEE ADMIN INSTRUCTIONS
Qty: 21 TABLET | Refills: 0 | Status: SHIPPED | OUTPATIENT
Start: 2017-02-09 | End: 2017-05-23

## 2017-02-09 RX ORDER — OXYCODONE AND ACETAMINOPHEN 5; 325 MG/1; MG/1
1 TABLET ORAL ONCE
Status: COMPLETED | OUTPATIENT
Start: 2017-02-09 | End: 2017-02-09

## 2017-02-09 RX ADMIN — DIAZEPAM 5 MG: 5 TABLET ORAL at 10:52

## 2017-02-09 RX ADMIN — OXYCODONE HYDROCHLORIDE AND ACETAMINOPHEN 1 TABLET: 5; 325 TABLET ORAL at 10:52

## 2017-02-09 RX ADMIN — IBUPROFEN 600 MG: 600 TABLET ORAL at 10:52

## 2017-02-09 ASSESSMENT — ENCOUNTER SYMPTOMS
BACK PAIN: 1
WEAKNESS: 0
NUMBNESS: 0
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
## 2017-02-09 NOTE — ED AVS SNAPSHOT
[facility] Emergency Department    [address]    [facility] [address]    Phone:  [phone]    Fax:  [phone]                                       Perico Houston   MRN: [id number]    Department:  [facility] Emergency Department   Date of Visit:  2/9/2017           After Visit Summary Signature Page     I have received my discharge instructions, and my questions have been answered. I have discussed any challenges I see with this plan with the nurse or doctor.    ..........................................................................................................................................  Patient/Patient Representative Signature      ..........................................................................................................................................  Patient Representative Print Name and Relationship to Patient    ..................................................               ................................................  Date                                            Time    ..........................................................................................................................................  Reviewed by Signature/Title    ...................................................              ..............................................  Date                                                            Time

## 2017-02-09 NOTE — ED NOTES
Bed: ED08  Expected date:   Expected time:   Means of arrival:   Comments:  massimo [phone]  55 yo male

## 2017-02-09 NOTE — ED NOTES
"Patient reports he fell 2 weeks ago, \"slipped and went backwards and landed on the edge of the porch on the right side of my back and ever since then I've had pain that goes straight down my right leg and sometimes into the left too\". He was seen here. He is still having pain.  He has an appointment at pain clinic tomorrow but \"it's so bad I cried all night and I passed out twice from the pain\".  He has been taking tylenol and ibuprofen for pain.  Given 100 mcg of fentanyl by EMS for pain 10/10, now 5/10.    "

## 2017-02-09 NOTE — ED AVS SNAPSHOT
[facility] Emergency Department    [address] [place] [address]    Phone:  [phone]    Fax:  [phone]                                       Perico Houston   MRN: [id number]    Department:  [facility] Emergency Department   Date of Visit:  2/9/2017           Patient Information     Date Of Birth          1960        Your diagnoses for this visit were:     Low back pain, unspecified back pain laterality, unspecified chronicity, with sciatica presence unspecified        You were seen by Mick Mcclain MD.      Follow-up Information     Follow up with [facility], [place]. Call today.    Specialty:  Family Practice    Contact information:    [address]   [place] [address]  [phone]          Discharge Instructions         Discharge Instructions  Back Pain  You were seen today for back pain. Back pain can have many causes, but most will get better without surgery or other specific treatment. Sometimes there is a herniated ( slipped ) disc. We don t usually do MRI scans to look for these right away, since most herniated discs will get better on their own with time.  Today, we did not find any evidence that your back pain was caused by a serious condition, such as an infection, fracture, or tumor. However, sometimes symptoms develop over time and cannot be found during an emergency visit, so it is very important that you follow up with your primary doctor.  Return to the Emergency Department if:    You develop a fever with your back pain.     You have weakness or change in sensation in one or both legs.    You lose control of your bowels or bladder, or can t empty your bladder.    Your pain gets much worse.     Follow-up with your doctor:    Unless your pain has completely gone away, please make an appointment with your doctor within one week.  You may need further management of your back pain, such as more pain medication, imaging such as an  X-ray or MRI, or physical therapy.    What can I do to help myself?    Remain Active -- People are often afraid that they will hurt their back further or delay recovery by remaining active, but this is one of the best things you can do for your back. In fact, prolonged bed rest is not recommended. Studies have shown that people with low back pain recover faster when they remain active. Movement helps to bring blood flow to the muscles and relieve muscle spasms as well as preventing loss of muscle strength.    Heat -- Using a heating pad can help with low back pain during the first few weeks. Do not sleep with a heating pad, as you can be burned.     Pain medications - You may take a pain medication such as Tylenol  (acetaminophen), Advil , Nuprin  (ibuprofen) or Aleve  (naproxen).  If you have been given a narcotic such as Vicodin  (hydrocodone with acetaminophen), Percocet  (oxycodone with acetaminophen), codeine, or a muscle relaxant such as Flexeril  (cyclobenzaprine) or Soma  (carisoprodol), do not drive for four hours after you have taken it. If the narcotic contains Tylenol  (acetaminophen), do not take Tylenol  with it. All narcotics will cause constipation, so eat a high fiber diet.   If you were given a prescription for medicine here today, be sure to read all of the information (including the package insert) that comes with your prescription.  This will include important information about the medicine, its side effects, and any warnings that you need to know about.  The pharmacist who fills the prescription can provide more information and answer questions you may have about the medicine.  If you have questions or concerns that the pharmacist cannot address, please call or return to the Emergency Department.   Opioid Medication Information    Pain medications are among the most commonly prescribed medicines, so we are including this information for all our patients. If you did not receive pain medication  or get a prescription for pain medicine, you can ignore it.     You may have been given a prescription for an opioid (narcotic) pain medicine and/or have received a pain medicine while here in the Emergency Department. These medicines can make you drowsy or impaired. You must not drive, operate dangerous equipment, or engage in any other dangerous activities while taking these medications. If you drive while taking these medications, you could be arrested for DUI, or driving under the influence. Do not drink any alcohol while you are taking these medications.     Opioid pain medications can cause addiction. If you have a history of chemical dependency of any type, you are at a higher risk of becoming addicted to pain medications.  Only take these prescribed medications to treat your pain when all other options have been tried. Take it for as short a time and as few doses as possible. Store your pain pills in a secure place, as they are frequently stolen and provide a dangerous opportunity for children or visitors in your house to start abusing these powerful medications. We will not replace any lost or stolen medicine.  As soon as your pain is better, you should flush all your remaining medication.     Many prescription pain medications contain Tylenol  (acetaminophen), including Vicodin , Tylenol #3 , Norco , Lortab , and Percocet .  You should not take any extra pills of Tylenol  if you are using these prescription medications or you can get very sick.  Do not ever take more than 3000 mg of acetaminophen in any 24 hour period.    All opioids tend to cause constipation. Drink plenty of water and eat foods that have a lot of fiber, such as fruits, vegetables, prune juice, apple juice and high fiber cereal.  Take a laxative if you don t move your bowels at least every other day. Miralax , Milk of Magnesia, Colace , or Senna  can be used to keep you regular.      Remember that you can always come back to the Emergency  Department if you are not able to see your regular doctor in the amount of time listed above, if you get any new symptoms, or if there is anything that worries you.        24 Hour Appointment Hotline       To make an appointment at any [facility], call [phone] ([phone]). If you don't have a family doctor or clinic, we will help you find one. [facility] are conveniently located to serve the needs of you and your family.             Review of your medicines      START taking        Dose / Directions Last dose taken    cyclobenzaprine 10 MG tablet   Commonly known as:  FLEXERIL   Dose:  10 mg   Quantity:  20 tablet        Take 1 tablet (10 mg) by mouth 3 times daily as needed for muscle spasms   Refills:  0        methylPREDNISolone 4 MG tablet   Commonly known as:  MEDROL DOSEPAK   Dose:  4 mg   Quantity:  21 tablet        Take 1 tablet (4 mg) by mouth See Admin Instructions   Refills:  0          Our records show that you are taking the medicines listed below. If these are incorrect, please call your family doctor or clinic.        Dose / Directions Last dose taken    aspirin 81 MG EC tablet   Dose:  81 mg   Quantity:  30 tablet        Take 1 tablet (81 mg) by mouth daily   Refills:  0        clonazePAM 0.5 MG tablet   Commonly known as:  klonoPIN   Dose:  0.5 mg   Quantity:  20 tablet        Take 1 tablet (0.5 mg) by mouth 3 times daily as needed for anxiety   Refills:  0        GABAPENTIN PO   Dose:  300 mg        Take 300 mg by mouth 3 times daily   Refills:  0        GLIPIZIDE XL PO        Refills:  0        lisinopril 40 MG tablet   Commonly known as:  PRINIVIL/ZESTRIL   Dose:  40 mg   Quantity:  30 tablet        Take 1 tablet (40 mg) by mouth daily   Refills:  0        metFORMIN 1000 MG tablet   Commonly known as:  GLUCOPHAGE   Dose:  1000 mg   Quantity:  60 tablet        Take 1 tablet (1,000 mg) by mouth 2 times daily (with meals)   Refills:  0        methocarbamol 500 MG tablet    Commonly known as:  ROBAXIN   Dose:  1000 mg   Quantity:  30 tablet        Take 2 tablets (1,000 mg) by mouth 3 times daily as needed for muscle spasms   Refills:  1        nitroglycerin 0.4 MG sublingual tablet   Commonly known as:  NITROSTAT   Dose:  0.4 mg   Quantity:  25 tablet        Place 1 tablet (0.4 mg) under the tongue every 5 minutes as needed for chest pain if you are still having symptoms after 3 doses (15 minutes) call 911.   Refills:  0        traMADol 50 MG tablet   Commonly known as:  ULTRAM   Dose:  50 mg   Quantity:  15 tablet        Take 1 tablet (50 mg) by mouth every 6 hours as needed for pain   Refills:  0                Prescriptions were sent or printed at these locations (2 Prescriptions)                   Other Prescriptions                Printed at Department/Unit printer (2 of 2)         methylPREDNISolone (MEDROL DOSEPAK) 4 MG tablet               cyclobenzaprine (FLEXERIL) 10 MG tablet                Orders Needing Specimen Collection     None      Pending Results     No orders found from 2/8/2017 to 2/10/2017.            Pending Culture Results     No orders found from 2/8/2017 to 2/10/2017.             Test Results from your hospital stay            Clinical Quality Measure: Blood Pressure Screening     Your blood pressure was checked while you were in the emergency department today. The last reading we obtained was  BP: 145/87 mmHg . Please read the guidelines below about what these numbers mean and what you should do about them.  If your systolic blood pressure (the top number) is less than 120 and your diastolic blood pressure (the bottom number) is less than 80, then your blood pressure is normal. There is nothing more that you need to do about it.  If your systolic blood pressure (the top number) is 120-139 or your diastolic blood pressure (the bottom number) is 80-89, your blood pressure may be higher than it should be. You should have your blood pressure rechecked within a  "year by a primary care provider.  If your systolic blood pressure (the top number) is 140 or greater or your diastolic blood pressure (the bottom number) is 90 or greater, you may have high blood pressure. High blood pressure is treatable, but if left untreated over time it can put you at risk for heart attack, stroke, or kidney failure. You should have your blood pressure rechecked by a primary care provider within the next 4 weeks.  If your provider in the emergency department today gave you specific instructions to follow-up with your doctor or provider even sooner than that, you should follow that instruction and not wait for up to 4 weeks for your follow-up visit.        Thank you for choosing [facility]       Thank you for choosing [facility] for your care. Our goal is always to provide you with excellent care. Hearing back from our patients is one way we can continue to improve our services. Please take a few minutes to complete the written survey that you may receive in the mail after you visit with us. Thank you!        [employer] Information     [employer] lets you send messages to your doctor, view your test results, renew your prescriptions, schedule appointments and more. To sign up, go to [email or web address] . Click on \"Log in\" on the left side of the screen, which will take you to the Welcome page. Then click on \"Sign up Now\" on the right side of the page.     You will be asked to enter the access code listed below, as well as some personal information. Please follow the directions to create your username and password.     Your access code is: [email or web address]  Expires: 3/5/2017  1:38 PM     Your access code will  in 90 days. If you need help or a new code, please call your [facility] clinic or [phone].        Care EveryWhere ID     This is your Care EveryWhere ID. This could be used by other organizations to access your [facility] medical records  [id number]        After Visit Summary       This is " your record. Keep this with you and show to your community pharmacist(s) and doctor(s) at your next visit.

## 2017-02-16 ENCOUNTER — HOSPITAL ENCOUNTER (EMERGENCY)
Facility: CLINIC | Age: 57
Discharge: HOME | End: 2017-02-16
Attending: EMERGENCY MEDICINE | Admitting: EMERGENCY MEDICINE
Payer: COMMERCIAL

## 2017-02-16 VITALS
TEMPERATURE: 97.5 F | RESPIRATION RATE: 18 BRPM | DIASTOLIC BLOOD PRESSURE: 93 MMHG | SYSTOLIC BLOOD PRESSURE: 152 MMHG | HEART RATE: 83 BPM | OXYGEN SATURATION: 97 %

## 2017-02-16 DIAGNOSIS — M54.5 CHRONIC LOW BACK PAIN, UNSPECIFIED BACK PAIN LATERALITY, WITH SCIATICA PRESENCE UNSPECIFIED: ICD-10-CM

## 2017-02-16 DIAGNOSIS — M25.519 SHOULDER PAIN, UNSPECIFIED CHRONICITY, UNSPECIFIED LATERALITY: ICD-10-CM

## 2017-02-16 DIAGNOSIS — G89.29 CHRONIC LOW BACK PAIN, UNSPECIFIED BACK PAIN LATERALITY, WITH SCIATICA PRESENCE UNSPECIFIED: ICD-10-CM

## 2017-02-16 PROCEDURE — 99283 EMERGENCY DEPT VISIT LOW MDM: CPT

## 2017-02-16 PROCEDURE — 25000132 ZZH RX MED GY IP 250 OP 250 PS 637: Performed by: EMERGENCY MEDICINE

## 2017-02-16 RX ORDER — DIAZEPAM 5 MG
5 TABLET ORAL ONCE
Status: COMPLETED | OUTPATIENT
Start: 2017-02-16 | End: 2017-02-16

## 2017-02-16 RX ORDER — OXYCODONE AND ACETAMINOPHEN 5; 325 MG/1; MG/1
1 TABLET ORAL ONCE
Status: COMPLETED | OUTPATIENT
Start: 2017-02-16 | End: 2017-02-16

## 2017-02-16 RX ORDER — IBUPROFEN 200 MG
400 TABLET ORAL ONCE
Status: COMPLETED | OUTPATIENT
Start: 2017-02-16 | End: 2017-02-16

## 2017-02-16 RX ADMIN — IBUPROFEN 400 MG: 200 TABLET, FILM COATED ORAL at 08:16

## 2017-02-16 RX ADMIN — OXYCODONE HYDROCHLORIDE AND ACETAMINOPHEN 1 TABLET: 5; 325 TABLET ORAL at 08:16

## 2017-02-16 RX ADMIN — DIAZEPAM 5 MG: 5 TABLET ORAL at 08:16

## 2017-02-16 ASSESSMENT — ENCOUNTER SYMPTOMS
BACK PAIN: 1
NUMBNESS: 0
WEAKNESS: 0

## 2017-02-16 NOTE — ED PROVIDER NOTES
"  History     Chief Complaint:  Back Pain    HPI   Perico Houston is a 56 year old male who presents to the emergency department today for evaluation of back pain. The patient sustained a mechanical fall on the 21st of December and has had 6 ED visits since then with the same complaint of back pain and right shoulder pain. He has had multiple imaging studies, including lumbar, thoracic, and cervical spine x-ray, as well as a head CT, which all came back negative on 1/23-1/24 of this year. The patient returns to the ED today with continuation of his pain in the lumbar back pain and right shoulder; the latter of which is more chronic. He states that he has yet to have an MRI of his back through his pain specialist and has been following with them, but came to the ED because he is in too much pain; affirming that \"it is the same pain\" as previous. He states that he is unsure exactly why he came back to the ED, but does not know what to do about his pain and who he should be going too for ongoing care. He has been told multiple times to follow up with a primary care physician or back pain specialist. He has continued with Robaxin, Tylenol, and Motrin, but is still having considerable pain in his back and shoulder. He denies any numbness, weakness, more recent trauma or falls. He has not experienced any saddle anesthesia, or loss of bowel or bladder control. No other concerns are voiced at this time.     Allergies:  No Known Drug Allergies     Medications:    Medrol Dosepak  Glipizide  Ultram  Robaxin  Klonopin  Aspirin 81 mg  Nitrostat  Metformin  Lisinopril    Past Medical History:    Anxiety  CAD  Depressive disorder  Diabetes mellitus  GERD  Hypertension  NSTEMI    Past Surgical History:    Cholecystectomy  Partial liver  Clavicle surgery  Angiogram    Family History:    Father - Diabetes  Daughter - Diabetes    Social History:  The patient was unaccompanied to the ED.  Smoking Status: Current Some Day " Smoker  Alcohol Use: Positive  Marital Status:   [2]     Review of Systems   Genitourinary: Negative for enuresis.   Musculoskeletal: Positive for back pain (Lumbar).        Right shoulder pain   Neurological: Negative for weakness and numbness.   All other systems reviewed and are negative.    Physical Exam   Vitals:  Patient Vitals for the past 24 hrs:   BP Temp Temp src Pulse Resp SpO2   02/16/17 0853 - - - - 18 -   02/16/17 0845 (!) 152/93 - - - - -   02/16/17 0815 (!) 171/106 - - - - -   02/16/17 0800 (!) 155/112 - - - - -   02/16/17 0745 (!) 147/103 97.5  F (36.4  C) Oral 83 20 97 %     Physical Exam  General: The patient is alert, in no respiratory distress. Patient in minor distress secondary to pain.    HENT: Mucous membranes moist.    Cardiovascular: Regular rate and rhythm. Good pulses in all four extremities. Normal capillary refill and skin turgor.     Respiratory: Lungs are clear. No nasal flaring. No retractions. No wheezing, no crackles.    Gastrointestinal: Abdomen soft. No guarding, no rebound. No palpable hernias.     Musculoskeletal: No gross deformity.     Skin: No rashes or petechiae.     Neurologic: The patient is alert and oriented x3. GCS 15. No testable cranial nerve deficit. Follows commands with clear and appropriate speech. Gives appropriate answers. Good strength in all extremities. No gross neurologic deficit. Gross sensation intact. Pupils are round and reactive. No meningismus.     Lymphatic: No cervical adenopathy. No lower extremity swelling.    Psychiatric: The patient is non-tearful.    Emergency Department Course     Interventions:  0816 Percocet 1 Tablet PO  0816 Ibuprofen 400 mg PO  0816 Valium 5 mg PO     Emergency Department Course:  Nursing notes and vitals reviewed.  I performed an exam of the patient as documented above.   I discussed the treatment plan with the patient. They expressed understanding of this plan and consented to discharge. They will be discharged  home with instructions for care and follow up. In addition, the patient will return to the emergency department if their symptoms persist, worsen, if new symptoms arise or if there is any concern.  All questions were answered.    Impression & Plan      Medical Decision Making:  Perico Houston is a 56 year old male who has had several visits to the ER due to pain in his back. There has been no new trauma and there has been multiple x-rays that have been negative. The patient says that he has seen a pain specialist and they questioned why had not had an MRI. I discussed with the patient that he should follow up with an outpatient physician to schedule this as this is not an emergency. There are no signs of neurologic deficit or bowel or bladder problems. The patient admits that he came to the ER because he is unsure what to do. The plan will be for follow up with PMD and using them to coordinate his care. He felt comfortable with. Patient was treated with pain medication here. He was discharged and actually scheduled an appointment for himself in the clinic. He was discharged neurologically intact.       Diagnosis:    ICD-10-CM    1. Chronic low back pain, unspecified back pain laterality, with sciatica presence unspecified M54.5     G89.29    2. Shoulder pain, unspecified chronicity, unspecified laterality M25.519        Scribe Disclosure:  I, Dhruv Alonzo, am serving as a scribe at 7:37 AM on 2/16/2017 to document services personally performed by Rogelio Bedolla MD, based on my observations and the provider's statements to me.    2/16/2017   [facility] EMERGENCY DEPARTMENT       Rogelio Bedolla MD  02/22/17 8455

## 2017-02-16 NOTE — ED AVS SNAPSHOT
[facility] Emergency Department    [address] [place] [address]    Phone:  [phone]    Fax:  [phone]                                       Perico Houston   MRN: [id number]    Department:  [facility] Emergency Department   Date of Visit:  2/16/2017           Patient Information     Date Of Birth          1960        Your diagnoses for this visit were:     Chronic low back pain, unspecified back pain laterality, with sciatica presence unspecified     Shoulder pain, unspecified chronicity, unspecified laterality        You were seen by Rogelio Bedolla MD.      Follow-up Information     Follow up with [facility], [place]. Schedule an appointment as soon as possible for a visit in 1 day.    Specialty:  Family Practice    Contact information:    [address] [place] [address]  [phone]          Discharge Instructions         Discharge Instructions  Back Pain  You were seen today for back pain. Back pain can have many causes, but most will get better without surgery or other specific treatment. Sometimes there is a herniated ( slipped ) disc. We don t usually do MRI scans to look for these right away, since most herniated discs will get better on their own with time.  Today, we did not find any evidence that your back pain was caused by a serious condition, such as an infection, fracture, or tumor. However, sometimes symptoms develop over time and cannot be found during an emergency visit, so it is very important that you follow up with your primary doctor.  Return to the Emergency Department if:    You develop a fever with your back pain.     You have weakness or change in sensation in one or both legs.    You lose control of your bowels or bladder, or can t empty your bladder.    Your pain gets much worse.     Follow-up with your doctor:    Unless your pain has completely gone away, please make an appointment with your doctor within  one week.  You may need further management of your back pain, such as more pain medication, imaging such as an X-ray or MRI, or physical therapy.    What can I do to help myself?    Remain Active -- People are often afraid that they will hurt their back further or delay recovery by remaining active, but this is one of the best things you can do for your back. In fact, prolonged bed rest is not recommended. Studies have shown that people with low back pain recover faster when they remain active. Movement helps to bring blood flow to the muscles and relieve muscle spasms as well as preventing loss of muscle strength.    Heat -- Using a heating pad can help with low back pain during the first few weeks. Do not sleep with a heating pad, as you can be burned.     Pain medications - You may take a pain medication such as Tylenol  (acetaminophen), Advil , Nuprin  (ibuprofen) or Aleve  (naproxen).  If you have been given a narcotic such as Vicodin  (hydrocodone with acetaminophen), Percocet  (oxycodone with acetaminophen), codeine, or a muscle relaxant such as Flexeril  (cyclobenzaprine) or Soma  (carisoprodol), do not drive for four hours after you have taken it. If the narcotic contains Tylenol  (acetaminophen), do not take Tylenol  with it. All narcotics will cause constipation, so eat a high fiber diet.   If you were given a prescription for medicine here today, be sure to read all of the information (including the package insert) that comes with your prescription.  This will include important information about the medicine, its side effects, and any warnings that you need to know about.  The pharmacist who fills the prescription can provide more information and answer questions you may have about the medicine.  If you have questions or concerns that the pharmacist cannot address, please call or return to the Emergency Department.   Opioid Medication Information    Pain medications are among the most commonly prescribed  medicines, so we are including this information for all our patients. If you did not receive pain medication or get a prescription for pain medicine, you can ignore it.     You may have been given a prescription for an opioid (narcotic) pain medicine and/or have received a pain medicine while here in the Emergency Department. These medicines can make you drowsy or impaired. You must not drive, operate dangerous equipment, or engage in any other dangerous activities while taking these medications. If you drive while taking these medications, you could be arrested for DUI, or driving under the influence. Do not drink any alcohol while you are taking these medications.     Opioid pain medications can cause addiction. If you have a history of chemical dependency of any type, you are at a higher risk of becoming addicted to pain medications.  Only take these prescribed medications to treat your pain when all other options have been tried. Take it for as short a time and as few doses as possible. Store your pain pills in a secure place, as they are frequently stolen and provide a dangerous opportunity for children or visitors in your house to start abusing these powerful medications. We will not replace any lost or stolen medicine.  As soon as your pain is better, you should flush all your remaining medication.     Many prescription pain medications contain Tylenol  (acetaminophen), including Vicodin , Tylenol #3 , Norco , Lortab , and Percocet .  You should not take any extra pills of Tylenol  if you are using these prescription medications or you can get very sick.  Do not ever take more than 3000 mg of acetaminophen in any 24 hour period.    All opioids tend to cause constipation. Drink plenty of water and eat foods that have a lot of fiber, such as fruits, vegetables, prune juice, apple juice and high fiber cereal.  Take a laxative if you don t move your bowels at least every other day. Miralax , Milk of Magnesia,  Colace , or Senna  can be used to keep you regular.      Remember that you can always come back to the Emergency Department if you are not able to see your regular doctor in the amount of time listed above, if you get any new symptoms, or if there is anything that worries you.        24 Hour Appointment Hotline       To make an appointment at any [facility], call [phone] ([phone]). If you don't have a family doctor or clinic, we will help you find one. [facility] clinics are conveniently located to serve the needs of you and your family.             Review of your medicines      Our records show that you are taking the medicines listed below. If these are incorrect, please call your family doctor or clinic.        Dose / Directions Last dose taken    aspirin 81 MG EC tablet   Dose:  81 mg   Quantity:  30 tablet        Take 1 tablet (81 mg) by mouth daily   Refills:  0        clonazePAM 0.5 MG tablet   Commonly known as:  klonoPIN   Dose:  0.5 mg   Quantity:  20 tablet        Take 1 tablet (0.5 mg) by mouth 3 times daily as needed for anxiety   Refills:  0        GABAPENTIN PO   Dose:  300 mg        Take 300 mg by mouth 3 times daily   Refills:  0        GLIPIZIDE XL PO        Refills:  0        lisinopril 40 MG tablet   Commonly known as:  PRINIVIL/ZESTRIL   Dose:  40 mg   Quantity:  30 tablet        Take 1 tablet (40 mg) by mouth daily   Refills:  0        metFORMIN 1000 MG tablet   Commonly known as:  GLUCOPHAGE   Dose:  1000 mg   Quantity:  60 tablet        Take 1 tablet (1,000 mg) by mouth 2 times daily (with meals)   Refills:  0        methocarbamol 500 MG tablet   Commonly known as:  ROBAXIN   Dose:  1000 mg   Quantity:  30 tablet        Take 2 tablets (1,000 mg) by mouth 3 times daily as needed for muscle spasms   Refills:  1        methylPREDNISolone 4 MG tablet   Commonly known as:  MEDROL DOSEPAK   Dose:  4 mg   Quantity:  21 tablet        Take 1 tablet (4 mg) by mouth See Admin  Instructions   Refills:  0        nitroglycerin 0.4 MG sublingual tablet   Commonly known as:  NITROSTAT   Dose:  0.4 mg   Quantity:  25 tablet        Place 1 tablet (0.4 mg) under the tongue every 5 minutes as needed for chest pain if you are still having symptoms after 3 doses (15 minutes) call 911.   Refills:  0        traMADol 50 MG tablet   Commonly known as:  ULTRAM   Dose:  50 mg   Quantity:  15 tablet        Take 1 tablet (50 mg) by mouth every 6 hours as needed for pain   Refills:  0          ASK your doctor about these medications        Dose / Directions Last dose taken    cyclobenzaprine 10 MG tablet   Commonly known as:  FLEXERIL   Dose:  10 mg   Quantity:  20 tablet   Ask about: Should I take this medication?        Take 1 tablet (10 mg) by mouth 3 times daily as needed for muscle spasms   Refills:  0                Orders Needing Specimen Collection     None      Pending Results     No orders found from 2/14/2017 to 2/17/2017.            Pending Culture Results     No orders found from 2/14/2017 to 2/17/2017.             Test Results from your hospital stay            Clinical Quality Measure: Blood Pressure Screening     Your blood pressure was checked while you were in the emergency department today. The last reading we obtained was  BP: (!) 176/114 . Please read the guidelines below about what these numbers mean and what you should do about them.  If your systolic blood pressure (the top number) is less than 120 and your diastolic blood pressure (the bottom number) is less than 80, then your blood pressure is normal. There is nothing more that you need to do about it.  If your systolic blood pressure (the top number) is 120-139 or your diastolic blood pressure (the bottom number) is 80-89, your blood pressure may be higher than it should be. You should have your blood pressure rechecked within a year by a primary care provider.  If your systolic blood pressure (the top number) is 140 or greater or  "your diastolic blood pressure (the bottom number) is 90 or greater, you may have high blood pressure. High blood pressure is treatable, but if left untreated over time it can put you at risk for heart attack, stroke, or kidney failure. You should have your blood pressure rechecked by a primary care provider within the next 4 weeks.  If your provider in the emergency department today gave you specific instructions to follow-up with your doctor or provider even sooner than that, you should follow that instruction and not wait for up to 4 weeks for your follow-up visit.        Thank you for choosing [facility]       Thank you for choosing [facility] for your care. Our goal is always to provide you with excellent care. Hearing back from our patients is one way we can continue to improve our services. Please take a few minutes to complete the written survey that you may receive in the mail after you visit with us. Thank you!        [email or web address] Information     [employer] lets you send messages to your doctor, view your test results, renew your prescriptions, schedule appointments and more. To sign up, go to [email or web address] . Click on \"Log in\" on the left side of the screen, which will take you to the Welcome page. Then click on \"Sign up Now\" on the right side of the page.     You will be asked to enter the access code listed below, as well as some personal information. Please follow the directions to create your username and password.     Your access code is: [email or web address]  Expires: 3/5/2017  1:38 PM     Your access code will  in 90 days. If you need help or a new code, please call your [facility] clinic or [phone].        Care EveryWhere ID     This is your Care EveryWhere ID. This could be used by other organizations to access your [facility] medical records  [id number]        After Visit Summary       This is your record. Keep this with you and show to your community pharmacist(s) and doctor(s) at your next visit. "

## 2017-02-16 NOTE — ED NOTES
"Pt here with low back pain that radiates into the R groin/leg area. Pt reports falling 3 weeks ago and has been seen multiple times since. Pt reports the \"percocet and clonazepam\" aren't working. Pt was going to call the pain clinic today to tell them that it is not working. Pt states \"there has to be some nerve damage\" Last took percocet at 4 am. Pt able to transfer from wheelchair to bed without assistance.   "

## 2017-02-16 NOTE — ED AVS SNAPSHOT
[facility] Emergency Department    [address]    [facility] [address]    Phone:  [phone]    Fax:  [phone]                                       Perico Houston   MRN: [id number]    Department:  [facility] Emergency Department   Date of Visit:  2/16/2017           After Visit Summary Signature Page     I have received my discharge instructions, and my questions have been answered. I have discussed any challenges I see with this plan with the nurse or doctor.    ..........................................................................................................................................  Patient/Patient Representative Signature      ..........................................................................................................................................  Patient Representative Print Name and Relationship to Patient    ..................................................               ................................................  Date                                            Time    ..........................................................................................................................................  Reviewed by Signature/Title    ...................................................              ..............................................  Date                                                            Time

## 2017-04-13 ENCOUNTER — HOSPITAL ENCOUNTER (EMERGENCY)
Facility: CLINIC | Age: 57
Discharge: HOME | End: 2017-04-13
Admitting: EMERGENCY MEDICINE
Payer: COMMERCIAL

## 2017-04-13 VITALS
RESPIRATION RATE: 20 BRPM | HEART RATE: 83 BPM | DIASTOLIC BLOOD PRESSURE: 94 MMHG | SYSTOLIC BLOOD PRESSURE: 152 MMHG | OXYGEN SATURATION: 100 % | TEMPERATURE: 98.3 F

## 2017-04-13 LAB — INTERPRETATION ECG - MUSE: NORMAL

## 2017-04-13 PROCEDURE — 40000268 ZZH STATISTIC NO CHARGES

## 2017-04-13 PROCEDURE — 93005 ELECTROCARDIOGRAM TRACING: CPT

## 2017-04-13 NOTE — ED NOTES
Patient called to be taken back to ED treatment area at 1010 and 1127. Patient is not present in ED triage area.

## 2017-04-13 NOTE — ED NOTES
In Triage: ABC's intact. Alert and oriented x 3.  Pt c/o increased leg pain since last night. Took home pain meds with little relief. States he was at therapy today and chest began to also hurt but he is most concerned about his leg pain.

## 2017-04-23 ENCOUNTER — HOSPITAL ENCOUNTER (EMERGENCY)
Facility: CLINIC | Age: 57
Discharge: HOME | End: 2017-04-23
Attending: EMERGENCY MEDICINE | Admitting: EMERGENCY MEDICINE
Payer: COMMERCIAL

## 2017-04-23 VITALS
TEMPERATURE: 98.7 F | HEART RATE: 92 BPM | SYSTOLIC BLOOD PRESSURE: 123 MMHG | OXYGEN SATURATION: 95 % | RESPIRATION RATE: 18 BRPM | BODY MASS INDEX: 30.65 KG/M2 | DIASTOLIC BLOOD PRESSURE: 72 MMHG | WEIGHT: 226 LBS

## 2017-04-23 DIAGNOSIS — M54.16 LUMBAR RADICULOPATHY: ICD-10-CM

## 2017-04-23 LAB
ANION GAP SERPL CALCULATED.3IONS-SCNC: 7 MMOL/L (ref 3–14)
BASOPHILS # BLD AUTO: 0.1 10E9/L (ref 0–0.2)
BASOPHILS NFR BLD AUTO: 0.7 %
BUN SERPL-MCNC: 17 MG/DL (ref 7–30)
CALCIUM SERPL-MCNC: 8.6 MG/DL (ref 8.5–10.1)
CHLORIDE SERPL-SCNC: 100 MMOL/L (ref 94–109)
CO2 SERPL-SCNC: 28 MMOL/L (ref 20–32)
CREAT SERPL-MCNC: 0.84 MG/DL (ref 0.66–1.25)
D DIMER PPP FEU-MCNC: NORMAL UG/ML FEU (ref 0–0.5)
DIFFERENTIAL METHOD BLD: NORMAL
EOSINOPHIL # BLD AUTO: 0.4 10E9/L (ref 0–0.7)
EOSINOPHIL NFR BLD AUTO: 4.7 %
ERYTHROCYTE [DISTWIDTH] IN BLOOD BY AUTOMATED COUNT: 13.2 % (ref 10–15)
GFR SERPL CREATININE-BSD FRML MDRD: ABNORMAL ML/MIN/1.7M2
GLUCOSE BLDC GLUCOMTR-MCNC: 133 MG/DL (ref 70–99)
GLUCOSE SERPL-MCNC: 118 MG/DL (ref 70–99)
HCT VFR BLD AUTO: 45.2 % (ref 40–53)
HGB BLD-MCNC: 14.5 G/DL (ref 13.3–17.7)
IMM GRANULOCYTES # BLD: 0.1 10E9/L (ref 0–0.4)
IMM GRANULOCYTES NFR BLD: 0.9 %
LYMPHOCYTES # BLD AUTO: 2.9 10E9/L (ref 0.8–5.3)
LYMPHOCYTES NFR BLD AUTO: 39.2 %
MAGNESIUM SERPL-MCNC: 1.8 MG/DL (ref 1.6–2.3)
MCH RBC QN AUTO: 28.1 PG (ref 26.5–33)
MCHC RBC AUTO-ENTMCNC: 32.1 G/DL (ref 31.5–36.5)
MCV RBC AUTO: 88 FL (ref 78–100)
MONOCYTES # BLD AUTO: 0.7 10E9/L (ref 0–1.3)
MONOCYTES NFR BLD AUTO: 9 %
NEUTROPHILS # BLD AUTO: 3.4 10E9/L (ref 1.6–8.3)
NEUTROPHILS NFR BLD AUTO: 45.5 %
NRBC # BLD AUTO: 0 10*3/UL
NRBC BLD AUTO-RTO: 0 /100
PLATELET # BLD AUTO: 252 10E9/L (ref 150–450)
POTASSIUM SERPL-SCNC: 4.3 MMOL/L (ref 3.4–5.3)
RBC # BLD AUTO: 5.16 10E12/L (ref 4.4–5.9)
SODIUM SERPL-SCNC: 135 MMOL/L (ref 133–144)
WBC # BLD AUTO: 7.4 10E9/L (ref 4–11)

## 2017-04-23 PROCEDURE — 83735 ASSAY OF MAGNESIUM: CPT | Performed by: EMERGENCY MEDICINE

## 2017-04-23 PROCEDURE — 25000132 ZZH RX MED GY IP 250 OP 250 PS 637: Performed by: EMERGENCY MEDICINE

## 2017-04-23 PROCEDURE — 99283 EMERGENCY DEPT VISIT LOW MDM: CPT

## 2017-04-23 PROCEDURE — 36415 COLL VENOUS BLD VENIPUNCTURE: CPT | Performed by: EMERGENCY MEDICINE

## 2017-04-23 PROCEDURE — 80048 BASIC METABOLIC PNL TOTAL CA: CPT | Performed by: EMERGENCY MEDICINE

## 2017-04-23 PROCEDURE — 00000146 ZZHCL STATISTIC GLUCOSE BY METER IP

## 2017-04-23 PROCEDURE — 85379 FIBRIN DEGRADATION QUANT: CPT | Performed by: EMERGENCY MEDICINE

## 2017-04-23 PROCEDURE — 85025 COMPLETE CBC W/AUTO DIFF WBC: CPT | Performed by: EMERGENCY MEDICINE

## 2017-04-23 RX ORDER — HYDROXYZINE HYDROCHLORIDE 25 MG/1
50 TABLET, FILM COATED ORAL ONCE
Status: COMPLETED | OUTPATIENT
Start: 2017-04-23 | End: 2017-04-23

## 2017-04-23 RX ORDER — GABAPENTIN 300 MG/1
300 CAPSULE ORAL 3 TIMES DAILY
Qty: 90 CAPSULE | Refills: 1 | Status: SHIPPED | OUTPATIENT
Start: 2017-04-23 | End: 2022-09-30

## 2017-04-23 RX ORDER — HYDROXYZINE HYDROCHLORIDE 50 MG/1
50 TABLET, FILM COATED ORAL 3 TIMES DAILY PRN
Qty: 20 TABLET | Refills: 0 | Status: SHIPPED | OUTPATIENT
Start: 2017-04-23 | End: 2022-09-30

## 2017-04-23 RX ORDER — LIDOCAINE 50 MG/G
1 PATCH TOPICAL EVERY 24 HOURS
Qty: 10 PATCH | Refills: 0 | Status: SHIPPED | OUTPATIENT
Start: 2017-04-23 | End: 2017-05-03

## 2017-04-23 RX ORDER — GABAPENTIN 300 MG/1
300 CAPSULE ORAL ONCE
Status: COMPLETED | OUTPATIENT
Start: 2017-04-23 | End: 2017-04-23

## 2017-04-23 RX ADMIN — GABAPENTIN 300 MG: 300 CAPSULE ORAL at 10:57

## 2017-04-23 RX ADMIN — HYDROXYZINE HYDROCHLORIDE 50 MG: 25 TABLET ORAL at 10:57

## 2017-04-23 ASSESSMENT — ENCOUNTER SYMPTOMS
BACK PAIN: 1
HEADACHES: 1
DIFFICULTY URINATING: 0
DYSURIA: 0
FREQUENCY: 0
CONSTIPATION: 1

## 2017-04-23 NOTE — ED AVS SNAPSHOT
[facility] Emergency Department    [address]    [facility] [address]    Phone:  [phone]    Fax:  [phone]                                       Perico Houston   MRN: [id number]    Department:  [facility] Emergency Department   Date of Visit:  4/23/2017           After Visit Summary Signature Page     I have received my discharge instructions, and my questions have been answered. I have discussed any challenges I see with this plan with the nurse or doctor.    ..........................................................................................................................................  Patient/Patient Representative Signature      ..........................................................................................................................................  Patient Representative Print Name and Relationship to Patient    ..................................................               ................................................  Date                                            Time    ..........................................................................................................................................  Reviewed by Signature/Title    ...................................................              ..............................................  Date                                                            Time

## 2017-04-23 NOTE — ED NOTES
Patient has numerous sites of pain. Abdominal pain, right leg pain, headache, neck ache. Patient states he takes 6 Percocet a day with no relief.

## 2017-04-23 NOTE — ED AVS SNAPSHOT
[facility] Emergency Department    [address]    [place] [address]    Phone:  [phone]    Fax:  [phone]                                       Perico Houston   MRN: [id number]    Department:  [facility] Emergency Department   Date of Visit:  4/23/2017           Patient Information     Date Of Birth          1960        Your diagnoses for this visit were:     Lumbar radiculopathy        You were seen by Gavin Cyr MD.      Follow-up Information     Follow up with [facility], [place] In 3 days.    Specialty:  Family Practice    Why:  As needed    Contact information:    [address] [place] [address]  [phone]          Discharge Instructions         Discharge Instructions  Back Pain  You were seen today for back pain. Back pain can have many causes, but most will get better without surgery or other specific treatment. Sometimes there is a herniated ( slipped ) disc. We don t usually do MRI scans to look for these right away, since most herniated discs will get better on their own with time.  Today, we did not find any evidence that your back pain was caused by a serious condition, such as an infection, fracture, or tumor. However, sometimes symptoms develop over time and cannot be found during an emergency visit, so it is very important that you follow up with your primary doctor.  Return to the Emergency Department if:    You develop a fever with your back pain.     You have weakness or change in sensation in one or both legs.    You lose control of your bowels or bladder, or can t empty your bladder.    Your pain gets much worse.     Follow-up with your doctor:    Unless your pain has completely gone away, please make an appointment with your doctor within one week.  You may need further management of your back pain, such as more pain medication, imaging such as an X-ray or MRI, or physical therapy.    What can I do to help  myself?    Remain Active -- People are often afraid that they will hurt their back further or delay recovery by remaining active, but this is one of the best things you can do for your back. In fact, prolonged bed rest is not recommended. Studies have shown that people with low back pain recover faster when they remain active. Movement helps to bring blood flow to the muscles and relieve muscle spasms as well as preventing loss of muscle strength.    Heat -- Using a heating pad can help with low back pain during the first few weeks. Do not sleep with a heating pad, as you can be burned.     Pain medications - You may take a pain medication such as Tylenol  (acetaminophen), Advil , Nuprin  (ibuprofen) or Aleve  (naproxen).  If you have been given a narcotic such as Vicodin  (hydrocodone with acetaminophen), Percocet  (oxycodone with acetaminophen), codeine, or a muscle relaxant such as Flexeril  (cyclobenzaprine) or Soma  (carisoprodol), do not drive for four hours after you have taken it. If the narcotic contains Tylenol  (acetaminophen), do not take Tylenol  with it. All narcotics will cause constipation, so eat a high fiber diet.   If you were given a prescription for medicine here today, be sure to read all of the information (including the package insert) that comes with your prescription.  This will include important information about the medicine, its side effects, and any warnings that you need to know about.  The pharmacist who fills the prescription can provide more information and answer questions you may have about the medicine.  If you have questions or concerns that the pharmacist cannot address, please call or return to the Emergency Department.   Opioid Medication Information    Pain medications are among the most commonly prescribed medicines, so we are including this information for all our patients. If you did not receive pain medication or get a prescription for pain medicine, you can ignore it.      You may have been given a prescription for an opioid (narcotic) pain medicine and/or have received a pain medicine while here in the Emergency Department. These medicines can make you drowsy or impaired. You must not drive, operate dangerous equipment, or engage in any other dangerous activities while taking these medications. If you drive while taking these medications, you could be arrested for DUI, or driving under the influence. Do not drink any alcohol while you are taking these medications.     Opioid pain medications can cause addiction. If you have a history of chemical dependency of any type, you are at a higher risk of becoming addicted to pain medications.  Only take these prescribed medications to treat your pain when all other options have been tried. Take it for as short a time and as few doses as possible. Store your pain pills in a secure place, as they are frequently stolen and provide a dangerous opportunity for children or visitors in your house to start abusing these powerful medications. We will not replace any lost or stolen medicine.  As soon as your pain is better, you should flush all your remaining medication.     Many prescription pain medications contain Tylenol  (acetaminophen), including Vicodin , Tylenol #3 , Norco , Lortab , and Percocet .  You should not take any extra pills of Tylenol  if you are using these prescription medications or you can get very sick.  Do not ever take more than 3000 mg of acetaminophen in any 24 hour period.    All opioids tend to cause constipation. Drink plenty of water and eat foods that have a lot of fiber, such as fruits, vegetables, prune juice, apple juice and high fiber cereal.  Take a laxative if you don t move your bowels at least every other day. Miralax , Milk of Magnesia, Colace , or Senna  can be used to keep you regular.      Remember that you can always come back to the Emergency Department if you are not able to see your regular doctor  in the amount of time listed above, if you get any new symptoms, or if there is anything that worries you.        24 Hour Appointment Hotline       To make an appointment at any [facility] clinic, call [phone] ([phone]). If you don't have a family doctor or clinic, we will help you find one. [facility] clinics are conveniently located to serve the needs of you and your family.             Review of your medicines      START taking        Dose / Directions Last dose taken    hydrOXYzine 50 MG tablet   Commonly known as:  ATARAX   Dose:  50 mg   Quantity:  20 tablet        Take 1 tablet (50 mg) by mouth 3 times daily as needed for other (back pain)   Refills:  0        lidocaine 5 % Patch   Commonly known as:  LIDODERM   Dose:  1 patch   Quantity:  10 patch        Place 1 patch onto the skin every 24 hours for 10 days   Refills:  0          CONTINUE these medicines which may have CHANGED, or have new prescriptions. If we are uncertain of the size of tablets/capsules you have at home, strength may be listed as something that might have changed.        Dose / Directions Last dose taken    gabapentin 300 MG capsule   Commonly known as:  NEURONTIN   Dose:  300 mg   What changed:  medication strength   Quantity:  90 capsule        Take 1 capsule (300 mg) by mouth 3 times daily   Refills:  1          Our records show that you are taking the medicines listed below. If these are incorrect, please call your family doctor or clinic.        Dose / Directions Last dose taken    aspirin 81 MG EC tablet   Dose:  81 mg   Quantity:  30 tablet        Take 1 tablet (81 mg) by mouth daily   Refills:  0        clonazePAM 0.5 MG tablet   Commonly known as:  klonoPIN   Dose:  0.5 mg   Quantity:  20 tablet        Take 1 tablet (0.5 mg) by mouth 3 times daily as needed for anxiety   Refills:  0        GLIPIZIDE XL PO        Refills:  0        lisinopril 40 MG tablet   Commonly known as:  PRINIVIL/ZESTRIL   Dose:  40 mg   Quantity:   30 tablet        Take 1 tablet (40 mg) by mouth daily   Refills:  0        metFORMIN 1000 MG tablet   Commonly known as:  GLUCOPHAGE   Dose:  1000 mg   Quantity:  60 tablet        Take 1 tablet (1,000 mg) by mouth 2 times daily (with meals)   Refills:  0        methocarbamol 500 MG tablet   Commonly known as:  ROBAXIN   Dose:  1000 mg   Quantity:  30 tablet        Take 2 tablets (1,000 mg) by mouth 3 times daily as needed for muscle spasms   Refills:  1        methylPREDNISolone 4 MG tablet   Commonly known as:  MEDROL DOSEPAK   Dose:  4 mg   Quantity:  21 tablet        Take 1 tablet (4 mg) by mouth See Admin Instructions   Refills:  0        nitroglycerin 0.4 MG sublingual tablet   Commonly known as:  NITROSTAT   Dose:  0.4 mg   Quantity:  25 tablet        Place 1 tablet (0.4 mg) under the tongue every 5 minutes as needed for chest pain if you are still having symptoms after 3 doses (15 minutes) call 911.   Refills:  0        traMADol 50 MG tablet   Commonly known as:  ULTRAM   Dose:  50 mg   Quantity:  15 tablet        Take 1 tablet (50 mg) by mouth every 6 hours as needed for pain   Refills:  0                Prescriptions were sent or printed at these locations (3 Prescriptions)                   Other Prescriptions                Printed at Department/Unit printer (3 of 3)         gabapentin (NEURONTIN) 300 MG capsule               hydrOXYzine (ATARAX) 50 MG tablet               lidocaine (LIDODERM) 5 % Patch                Procedures and tests performed during your visit     Basic metabolic panel (BMP)    CBC + differential    D dimer quantitative    Glucose by meter    Magnesium      Orders Needing Specimen Collection     Ordered          04/23/17 1044  UA with Microscopic reflex to Culture - STAT, [facility]: STAT, Needs to be Collected     Scheduled Task Status   04/23/17 1045 Collect UA with Microscopic reflex to Culture Open   Order Class:  PCU Collect                04/23/17 1044  Drug abuse screen 77 urine -  STAT, [facility]: STAT, Needs to be Collected     Scheduled Task Status   04/23/17 1045 Collect Drug abuse screen 77 urine Open   Order Class:  PCU Collect                  Pending Results     No orders found from 4/21/2017 to 4/24/2017.            Pending Culture Results     No orders found from 4/21/2017 to 4/24/2017.            Test Results From Your Hospital Stay        4/23/2017 10:36 AM      Component Results     Component Value Ref Range & Units Status    Glucose 133 (H) 70 - 99 mg/dL Final    Dr/RN Notified         4/23/2017 11:44 AM      Component Results     Component Value Ref Range & Units Status    Sodium 135 133 - 144 mmol/L Final    Potassium 4.3 3.4 - 5.3 mmol/L Final    Chloride 100 94 - 109 mmol/L Final    Carbon Dioxide 28 20 - 32 mmol/L Final    Anion Gap 7 3 - 14 mmol/L Final    Glucose 118 (H) 70 - 99 mg/dL Final    Urea Nitrogen 17 7 - 30 mg/dL Final    Creatinine 0.84 0.66 - 1.25 mg/dL Final    GFR Estimate >90  Non  GFR Calc   >60 mL/min/1.7m2 Final    GFR Estimate If Black >90   GFR Calc   >60 mL/min/1.7m2 Final    Calcium 8.6 8.5 - 10.1 mg/dL Final         4/23/2017 11:14 AM      Component Results     Component Value Ref Range & Units Status    WBC 7.4 4.0 - 11.0 10e9/L Final    RBC Count 5.16 4.4 - 5.9 10e12/L Final    Hemoglobin 14.5 13.3 - 17.7 g/dL Final    Hematocrit 45.2 40.0 - 53.0 % Final    MCV 88 78 - 100 fl Final    MCH 28.1 26.5 - 33.0 pg Final    MCHC 32.1 31.5 - 36.5 g/dL Final    RDW 13.2 10.0 - 15.0 % Final    Platelet Count 252 150 - 450 10e9/L Final    Diff Method Automated Method  Final    % Neutrophils 45.5 % Final    % Lymphocytes 39.2 % Final    % Monocytes 9.0 % Final    % Eosinophils 4.7 % Final    % Basophils 0.7 % Final    % Immature Granulocytes 0.9 % Final    Nucleated RBCs 0 0 /100 Final    Absolute Neutrophil 3.4 1.6 - 8.3 10e9/L Final    Absolute Lymphocytes 2.9 0.8 - 5.3 10e9/L Final    Absolute Monocytes 0.7 0.0 - 1.3 10e9/L Final     Absolute Eosinophils 0.4 0.0 - 0.7 10e9/L Final    Absolute Basophils 0.1 0.0 - 0.2 10e9/L Final    Abs Immature Granulocytes 0.1 0 - 0.4 10e9/L Final    Absolute Nucleated RBC 0.0  Final         4/23/2017 11:28 AM      Component Results     Component Value Ref Range & Units Status    D Dimer  0.0 - 0.50 ug/ml FEU Final    <0.3  This D-dimer assay is intended for use in conjuntion with a clinical pretest   probability assessment model to exclude pulmonary embolism (PE) and as an aid   in the diagnosis of deep venous thrombosis (DVT) in outpatients suspected of PE   or DVT. The cut-off value is 0.5 g/mL FEU.           4/23/2017 11:44 AM      Component Results     Component Value Ref Range & Units Status    Magnesium 1.8 1.6 - 2.3 mg/dL Final                Clinical Quality Measure: Blood Pressure Screening     Your blood pressure was checked while you were in the emergency department today. The last reading we obtained was  BP: 123/72 . Please read the guidelines below about what these numbers mean and what you should do about them.  If your systolic blood pressure (the top number) is less than 120 and your diastolic blood pressure (the bottom number) is less than 80, then your blood pressure is normal. There is nothing more that you need to do about it.  If your systolic blood pressure (the top number) is 120-139 or your diastolic blood pressure (the bottom number) is 80-89, your blood pressure may be higher than it should be. You should have your blood pressure rechecked within a year by a primary care provider.  If your systolic blood pressure (the top number) is 140 or greater or your diastolic blood pressure (the bottom number) is 90 or greater, you may have high blood pressure. High blood pressure is treatable, but if left untreated over time it can put you at risk for heart attack, stroke, or kidney failure. You should have your blood pressure rechecked by a primary care provider within the next 4  "weeks.  If your provider in the emergency department today gave you specific instructions to follow-up with your doctor or provider even sooner than that, you should follow that instruction and not wait for up to 4 weeks for your follow-up visit.        Thank you for choosing [facility]       Thank you for choosing [facility] for your care. Our goal is always to provide you with excellent care. Hearing back from our patients is one way we can continue to improve our services. Please take a few minutes to complete the written survey that you may receive in the mail after you visit with us. Thank you!        [employer] Information     [employer] lets you send messages to your doctor, view your test results, renew your prescriptions, schedule appointments and more. To sign up, go to [email or web address] . Click on \"Log in\" on the left side of the screen, which will take you to the Welcome page. Then click on \"Sign up Now\" on the right side of the page.     You will be asked to enter the access code listed below, as well as some personal information. Please follow the directions to create your username and password.     Your access code is: [email or web address]  Expires: 2017 12:18 PM     Your access code will  in 90 days. If you need help or a new code, please call your [facility] clinic or [phone].        Care EveryWhere ID     This is your Care EveryWhere ID. This could be used by other organizations to access your [facility] medical records  [id number]        After Visit Summary       This is your record. Keep this with you and show to your community pharmacist(s) and doctor(s) at your next visit.                  "
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
## 2017-05-09 ENCOUNTER — HOSPITAL ENCOUNTER (EMERGENCY)
Facility: CLINIC | Age: 57
Discharge: HOME | End: 2017-05-09
Attending: EMERGENCY MEDICINE | Admitting: EMERGENCY MEDICINE
Payer: COMMERCIAL

## 2017-05-09 VITALS
SYSTOLIC BLOOD PRESSURE: 169 MMHG | OXYGEN SATURATION: 97 % | TEMPERATURE: 97.9 F | HEART RATE: 86 BPM | DIASTOLIC BLOOD PRESSURE: 109 MMHG | RESPIRATION RATE: 18 BRPM

## 2017-05-09 DIAGNOSIS — R07.9 NONSPECIFIC CHEST PAIN: ICD-10-CM
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
## 2017-05-09 DIAGNOSIS — F41.9 ANXIETY: ICD-10-CM

## 2017-05-09 LAB
ANION GAP SERPL CALCULATED.3IONS-SCNC: 10 MMOL/L (ref 3–14)
BASOPHILS # BLD AUTO: 0.1 10E9/L (ref 0–0.2)
BASOPHILS NFR BLD AUTO: 0.7 %
BUN SERPL-MCNC: 15 MG/DL (ref 7–30)
CALCIUM SERPL-MCNC: 9.1 MG/DL (ref 8.5–10.1)
CHLORIDE SERPL-SCNC: 101 MMOL/L (ref 94–109)
CO2 SERPL-SCNC: 23 MMOL/L (ref 20–32)
CREAT SERPL-MCNC: 0.84 MG/DL (ref 0.66–1.25)
DIFFERENTIAL METHOD BLD: NORMAL
EOSINOPHIL # BLD AUTO: 0.2 10E9/L (ref 0–0.7)
EOSINOPHIL NFR BLD AUTO: 2.2 %
ERYTHROCYTE [DISTWIDTH] IN BLOOD BY AUTOMATED COUNT: 12.3 % (ref 10–15)
GFR SERPL CREATININE-BSD FRML MDRD: ABNORMAL ML/MIN/1.7M2
GLUCOSE SERPL-MCNC: 157 MG/DL (ref 70–99)
HCT VFR BLD AUTO: 49 % (ref 40–53)
HGB BLD-MCNC: 16.1 G/DL (ref 13.3–17.7)
IMM GRANULOCYTES # BLD: 0 10E9/L (ref 0–0.4)
IMM GRANULOCYTES NFR BLD: 0.4 %
INTERPRETATION ECG - MUSE: NORMAL
LYMPHOCYTES # BLD AUTO: 2.7 10E9/L (ref 0.8–5.3)
LYMPHOCYTES NFR BLD AUTO: 37.7 %
MCH RBC QN AUTO: 27.7 PG (ref 26.5–33)
MCHC RBC AUTO-ENTMCNC: 32.9 G/DL (ref 31.5–36.5)
MCV RBC AUTO: 84 FL (ref 78–100)
MONOCYTES # BLD AUTO: 0.7 10E9/L (ref 0–1.3)
MONOCYTES NFR BLD AUTO: 9.2 %
NEUTROPHILS # BLD AUTO: 3.6 10E9/L (ref 1.6–8.3)
NEUTROPHILS NFR BLD AUTO: 49.8 %
NRBC # BLD AUTO: 0 10*3/UL
NRBC BLD AUTO-RTO: 0 /100
PLATELET # BLD AUTO: 308 10E9/L (ref 150–450)
POTASSIUM SERPL-SCNC: 4.2 MMOL/L (ref 3.4–5.3)
RBC # BLD AUTO: 5.81 10E12/L (ref 4.4–5.9)
SODIUM SERPL-SCNC: 134 MMOL/L (ref 133–144)
TROPONIN I SERPL-MCNC: NORMAL UG/L (ref 0–0.04)
WBC # BLD AUTO: 7.2 10E9/L (ref 4–11)

## 2017-05-09 PROCEDURE — 25000125 ZZHC RX 250: Performed by: EMERGENCY MEDICINE

## 2017-05-09 PROCEDURE — 80048 BASIC METABOLIC PNL TOTAL CA: CPT | Performed by: EMERGENCY MEDICINE

## 2017-05-09 PROCEDURE — 99284 EMERGENCY DEPT VISIT MOD MDM: CPT

## 2017-05-09 PROCEDURE — 85025 COMPLETE CBC W/AUTO DIFF WBC: CPT | Performed by: EMERGENCY MEDICINE

## 2017-05-09 PROCEDURE — 84484 ASSAY OF TROPONIN QUANT: CPT | Performed by: EMERGENCY MEDICINE

## 2017-05-09 PROCEDURE — 25000132 ZZH RX MED GY IP 250 OP 250 PS 637: Performed by: EMERGENCY MEDICINE

## 2017-05-09 PROCEDURE — 93005 ELECTROCARDIOGRAM TRACING: CPT

## 2017-05-09 RX ORDER — ASPIRIN 325 MG
325 TABLET ORAL ONCE
Status: COMPLETED | OUTPATIENT
Start: 2017-05-09 | End: 2017-05-09

## 2017-05-09 RX ORDER — OXYCODONE AND ACETAMINOPHEN 5; 325 MG/1; MG/1
2 TABLET ORAL EVERY 4 HOURS PRN
COMMUNITY
End: 2017-06-04

## 2017-05-09 RX ORDER — OXYCODONE AND ACETAMINOPHEN 5; 325 MG/1; MG/1
2 TABLET ORAL ONCE
Status: COMPLETED | OUTPATIENT
Start: 2017-05-09 | End: 2017-05-09

## 2017-05-09 RX ADMIN — ASPIRIN 325 MG ORAL TABLET 325 MG: 325 PILL ORAL at 09:33

## 2017-05-09 RX ADMIN — OXYCODONE HYDROCHLORIDE AND ACETAMINOPHEN 2 TABLET: 5; 325 TABLET ORAL at 10:39

## 2017-05-09 RX ADMIN — LIDOCAINE HYDROCHLORIDE 30 ML: 20 SOLUTION ORAL; TOPICAL at 09:33

## 2017-05-09 ASSESSMENT — ENCOUNTER SYMPTOMS
COUGH: 0
FEVER: 0
ABDOMINAL PAIN: 1
NAUSEA: 1
ARTHRALGIAS: 1
NERVOUS/ANXIOUS: 1

## 2017-05-09 NOTE — ED NOTES
Bed: ED01  Expected date: 5/9/17  Expected time: 8:15 AM  Means of arrival: Ambulance  Comments:  Carlos Manuel 029 67 M
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
## 2017-05-09 NOTE — ED NOTES
Arrives to ED via EMS , RLE pain ongoing goes to pain clinic, chest pain 0400 nitro x 2 and percocet x 2. Pt extremely anxious, wife reported pt has not been getting gout of bed, not compliant with meds.

## 2017-05-09 NOTE — DISCHARGE INSTRUCTIONS
You were evaluated for chest pain, and for a fall. You did not appear to have a serious cause for your chest pain, or any significant injuries from your fall.    Keep your appointment with your pain clinic tomorrow as planned.    If there are any different or worse symptoms (like worse chest pain or trouble breathing, numbness, weakness, worse back pain) return to the ER.      *CHEST PAIN, NONCARDIAC    Based on your visit today, the exact cause of your chest pain is not certain. Your condition does not seem serious and your pain does not appear to be coming from your heart. However, sometimes the signs of a serious problem take more time to appear. Therefore, please watch for the warning signs listed below.  HOME CARE:  1. Rest today and avoid strenuous activity.  2. Take any prescribed medicine as directed.  FOLLOW UP with your doctor in 1-3 days.   GET PROMPT MEDICAL ATTENTION if any of the following occur:    A change in the type of pain: if it feels different, becomes more severe, lasts longer, or begins to spread into your shoulder, arm, neck, jaw or back    Shortness of breath or increased pain with breathing    Cough with blood or dark colored sputum (phlegm)    Weakness, dizziness, or fainting    Fever over 101  F (38.3  C)    Swelling, pain or redness in one leg    6345-7451 [facility], [address] [place] [address]. All rights reserved. This information is not intended as a substitute for professional medical care. Always follow your healthcare professional's instructions.      Mechanical Fall  You have had a fall today. It appears that the cause is  mechanical . That means that you slipped, tripped or lost your balance. If your fall had been due to fainting or a seizure, further tests would be required.  Home Care:  3. Rest today and resume your normal activities when you are feeling back to normal.  4. If you were injured during the fall, follow the advice from your doctor regarding  care of your injury.  5. You may use acetaminophen (Tylenol) or ibuprofen (Motrin, Advil) to control pain, unless another pain medicine was prescribed. [NOTE: If you have chronic liver or kidney disease or ever had a stomach ulcer or GI bleeding, talk with your doctor before using these medicines.]  Fall Prevention:    Was there anything that caused your fall that can be fixed, removed, or replaced?    Make your home safe by keeping walkways clear of objects you may trip over.    Use non-slip pads under rugs.    Do not walk in poorly lit areas.    Do not stand on chairs or wobbly ladders.    Use caution when reaching overhead or looking upward. This position can cause a loss of balance.    Be sure your shoes fit properly, have non-slip bottoms and are in good condition.    Be cautious when going up and down curbs, and walking on uneven sidewalks.    If your balance is poor, consider using a cane or walker.    Stay as active as you can. Balance, flexibility, strength, and endurance all come from exercise. They all play a role in preventing falls.  Follow Up  with your doctor or as advised by our staff.  Get Prompt Medical Attention  if any of the following occur:    Repeated mechanical falls, or unexplained falls    Dizziness, fainting or seizure    Severe headache    Chest pain or shortness of breath    Palpitations (very rapid or very slow or irregular heartbeat)    Blood in vomit, stools (black or red color)    Weakness of an arm or leg or one side of the face    Difficulty with speech or vision    5294-8363 [employer]. [address] [place] [address], [place] [address]. All rights reserved. This information is not intended as a substitute for professional medical care. Always follow your healthcare professional's instructions.

## 2017-05-09 NOTE — ED AVS SNAPSHOT
[facility] Emergency Department    [address] [place] [address]    Phone:  [phone]    Fax:  [phone]                                       Perico Houston   MRN: [id number]    Department:  [facility] Emergency Department   Date of Visit:  5/9/2017           Patient Information     Date Of Birth          1960        Your diagnoses for this visit were:     Anxiety     Nonspecific chest pain     Fall, initial encounter        You were seen by Cierra Sanchez MD.        Discharge Instructions       You were evaluated for chest pain, and for a fall. You did not appear to have a serious cause for your chest pain, or any significant injuries from your fall.    Keep your appointment with your pain clinic tomorrow as planned.    If there are any different or worse symptoms (like worse chest pain or trouble breathing, numbness, weakness, worse back pain) return to the ER.      *CHEST PAIN, NONCARDIAC    Based on your visit today, the exact cause of your chest pain is not certain. Your condition does not seem serious and your pain does not appear to be coming from your heart. However, sometimes the signs of a serious problem take more time to appear. Therefore, please watch for the warning signs listed below.  HOME CARE:  1. Rest today and avoid strenuous activity.  2. Take any prescribed medicine as directed.  FOLLOW UP with your doctor in 1-3 days.   GET PROMPT MEDICAL ATTENTION if any of the following occur:    A change in the type of pain: if it feels different, becomes more severe, lasts longer, or begins to spread into your shoulder, arm, neck, jaw or back    Shortness of breath or increased pain with breathing    Cough with blood or dark colored sputum (phlegm)    Weakness, dizziness, or fainting    Fever over 101  F (38.3  C)    Swelling, pain or redness in one leg    8602-7140 [facility], [address] [place] [address], [place] [address]. All rights reserved.  This information is not intended as a substitute for professional medical care. Always follow your healthcare professional's instructions.      Mechanical Fall  You have had a fall today. It appears that the cause is  mechanical . That means that you slipped, tripped or lost your balance. If your fall had been due to fainting or a seizure, further tests would be required.  Home Care:  3. Rest today and resume your normal activities when you are feeling back to normal.  4. If you were injured during the fall, follow the advice from your doctor regarding care of your injury.  5. You may use acetaminophen (Tylenol) or ibuprofen (Motrin, Advil) to control pain, unless another pain medicine was prescribed. [NOTE: If you have chronic liver or kidney disease or ever had a stomach ulcer or GI bleeding, talk with your doctor before using these medicines.]  Fall Prevention:    Was there anything that caused your fall that can be fixed, removed, or replaced?    Make your home safe by keeping walkways clear of objects you may trip over.    Use non-slip pads under rugs.    Do not walk in poorly lit areas.    Do not stand on chairs or wobbly ladders.    Use caution when reaching overhead or looking upward. This position can cause a loss of balance.    Be sure your shoes fit properly, have non-slip bottoms and are in good condition.    Be cautious when going up and down curbs, and walking on uneven sidewalks.    If your balance is poor, consider using a cane or walker.    Stay as active as you can. Balance, flexibility, strength, and endurance all come from exercise. They all play a role in preventing falls.  Follow Up  with your doctor or as advised by our staff.  Get Prompt Medical Attention  if any of the following occur:    Repeated mechanical falls, or unexplained falls    Dizziness, fainting or seizure    Severe headache    Chest pain or shortness of breath    Palpitations (very rapid or very slow or irregular  heartbeat)    Blood in vomit, stools (black or red color)    Weakness of an arm or leg or one side of the face    Difficulty with speech or vision    9003-6951 [employer]. [address] [place] [address], [place] [address]. All rights reserved. This information is not intended as a substitute for professional medical care. Always follow your healthcare professional's instructions.          24 Hour Appointment Hotline       To make an appointment at any [facility], call [phone] ([phone]). If you don't have a family doctor or clinic, we will help you find one. [facility] clinics are conveniently located to serve the needs of you and your family.             Review of your medicines      Our records show that you are taking the medicines listed below. If these are incorrect, please call your family doctor or clinic.        Dose / Directions Last dose taken    aspirin 81 MG EC tablet   Dose:  81 mg   Quantity:  30 tablet        Take 1 tablet (81 mg) by mouth daily   Refills:  0        clonazePAM 0.5 MG tablet   Commonly known as:  klonoPIN   Dose:  0.5 mg   Quantity:  20 tablet        Take 1 tablet (0.5 mg) by mouth 3 times daily as needed for anxiety   Refills:  0        gabapentin 300 MG capsule   Commonly known as:  NEURONTIN   Dose:  300 mg   Quantity:  90 capsule        Take 1 capsule (300 mg) by mouth 3 times daily   Refills:  1        GLIPIZIDE XL PO        Refills:  0        hydrOXYzine 50 MG tablet   Commonly known as:  ATARAX   Dose:  50 mg   Quantity:  20 tablet        Take 1 tablet (50 mg) by mouth 3 times daily as needed for other (back pain)   Refills:  0        lisinopril 40 MG tablet   Commonly known as:  PRINIVIL/ZESTRIL   Dose:  40 mg   Quantity:  30 tablet        Take 1 tablet (40 mg) by mouth daily   Refills:  0        metFORMIN 1000 MG tablet   Commonly known as:  GLUCOPHAGE   Dose:  1000 mg   Quantity:  60 tablet        Take 1 tablet (1,000 mg) by mouth 2 times daily (with  meals)   Refills:  0        methocarbamol 500 MG tablet   Commonly known as:  ROBAXIN   Dose:  1000 mg   Quantity:  30 tablet        Take 2 tablets (1,000 mg) by mouth 3 times daily as needed for muscle spasms   Refills:  1        methylPREDNISolone 4 MG tablet   Commonly known as:  MEDROL DOSEPAK   Dose:  4 mg   Quantity:  21 tablet        Take 1 tablet (4 mg) by mouth See Admin Instructions   Refills:  0        nitroglycerin 0.4 MG sublingual tablet   Commonly known as:  NITROSTAT   Dose:  0.4 mg   Quantity:  25 tablet        Place 1 tablet (0.4 mg) under the tongue every 5 minutes as needed for chest pain if you are still having symptoms after 3 doses (15 minutes) call 911.   Refills:  0        oxyCODONE-acetaminophen 5-325 MG per tablet   Commonly known as:  PERCOCET   Dose:  2 tablet        Take 2 tablets by mouth every 4 hours as needed for moderate to severe pain   Refills:  0        traMADol 50 MG tablet   Commonly known as:  ULTRAM   Dose:  50 mg   Quantity:  15 tablet        Take 1 tablet (50 mg) by mouth every 6 hours as needed for pain   Refills:  0                Procedures and tests performed during your visit     Basic metabolic panel    CBC with platelets differential    Cardiac Continuous Monitoring    Troponin I      Orders Needing Specimen Collection     None      Pending Results     No orders found from 5/7/2017 to 5/10/2017.            Pending Culture Results     No orders found from 5/7/2017 to 5/10/2017.            Pending Results Instructions     If you had any lab results that were not finalized at the time of your Discharge, you can call the ED Lab Result RN at [phone]. You will be contacted by this team for any positive Lab results or changes in treatment. The nurses are available 7 days a week from 10A to 6:30P.  You can leave a message 24 hours per day and they will return your call.        Test Results From Your Hospital Stay        5/9/2017  9:34 AM      Component Results      Component Value Ref Range & Units Status    WBC 7.2 4.0 - 11.0 10e9/L Final    RBC Count 5.81 4.4 - 5.9 10e12/L Final    Hemoglobin 16.1 13.3 - 17.7 g/dL Final    Hematocrit 49.0 40.0 - 53.0 % Final    MCV 84 78 - 100 fl Final    MCH 27.7 26.5 - 33.0 pg Final    MCHC 32.9 31.5 - 36.5 g/dL Final    RDW 12.3 10.0 - 15.0 % Final    Platelet Count 308 150 - 450 10e9/L Final    Diff Method Automated Method  Final    % Neutrophils 49.8 % Final    % Lymphocytes 37.7 % Final    % Monocytes 9.2 % Final    % Eosinophils 2.2 % Final    % Basophils 0.7 % Final    % Immature Granulocytes 0.4 % Final    Nucleated RBCs 0 0 /100 Final    Absolute Neutrophil 3.6 1.6 - 8.3 10e9/L Final    Absolute Lymphocytes 2.7 0.8 - 5.3 10e9/L Final    Absolute Monocytes 0.7 0.0 - 1.3 10e9/L Final    Absolute Eosinophils 0.2 0.0 - 0.7 10e9/L Final    Absolute Basophils 0.1 0.0 - 0.2 10e9/L Final    Abs Immature Granulocytes 0.0 0 - 0.4 10e9/L Final    Absolute Nucleated RBC 0.0  Final         5/9/2017  9:58 AM      Component Results     Component Value Ref Range & Units Status    Troponin I ES  0.000 - 0.045 ug/L Final    <0.015  The 99th percentile for upper reference range is 0.045 ug/L.  Troponin values in   the range of 0.045 - 0.120 ug/L may be associated with risks of adverse   clinical events.           5/9/2017  9:55 AM      Component Results     Component Value Ref Range & Units Status    Sodium 134 133 - 144 mmol/L Final    Potassium 4.2 3.4 - 5.3 mmol/L Final    Chloride 101 94 - 109 mmol/L Final    Carbon Dioxide 23 20 - 32 mmol/L Final    Anion Gap 10 3 - 14 mmol/L Final    Glucose 157 (H) 70 - 99 mg/dL Final    Urea Nitrogen 15 7 - 30 mg/dL Final    Creatinine 0.84 0.66 - 1.25 mg/dL Final    GFR Estimate >90  Non  GFR Calc   >60 mL/min/1.7m2 Final    GFR Estimate If Black >90   GFR Calc   >60 mL/min/1.7m2 Final    Calcium 9.1 8.5 - 10.1 mg/dL Final                Clinical Quality Measure: Blood  Pressure Screening     Your blood pressure was checked while you were in the emergency department today. The last reading we obtained was  BP: (!) 174/100 . Please read the guidelines below about what these numbers mean and what you should do about them.  If your systolic blood pressure (the top number) is less than 120 and your diastolic blood pressure (the bottom number) is less than 80, then your blood pressure is normal. There is nothing more that you need to do about it.  If your systolic blood pressure (the top number) is 120-139 or your diastolic blood pressure (the bottom number) is 80-89, your blood pressure may be higher than it should be. You should have your blood pressure rechecked within a year by a primary care provider.  If your systolic blood pressure (the top number) is 140 or greater or your diastolic blood pressure (the bottom number) is 90 or greater, you may have high blood pressure. High blood pressure is treatable, but if left untreated over time it can put you at risk for heart attack, stroke, or kidney failure. You should have your blood pressure rechecked by a primary care provider within the next 4 weeks.  If your provider in the emergency department today gave you specific instructions to follow-up with your doctor or provider even sooner than that, you should follow that instruction and not wait for up to 4 weeks for your follow-up visit.        Thank you for choosing [facility]       Thank you for choosing [facility] for your care. Our goal is always to provide you with excellent care. Hearing back from our patients is one way we can continue to improve our services. Please take a few minutes to complete the written survey that you may receive in the mail after you visit with us. Thank you!        [employer] Information     [employer] lets you send messages to your doctor, view your test results, renew your prescriptions, schedule appointments and more. To sign up, go to [email or web address] Click on \"Log in\" on the left side of the screen, which will take you to the Welcome page. Then click on \"Sign up Now\" on the right side of the page.     You will be asked to enter the access code listed below, as well as some personal information. Please follow the directions to create your username and password.     Your access code is: [email or web address]  Expires: 2017 12:18 PM     Your access code will  in 90 days. If you need help or a new code, please call your [facility] clinic or [phone].        Care EveryWhere ID     This is your Care EveryWhere ID. This could be used by other organizations to access your [facility] medical records  [id number]        After Visit Summary       This is your record. Keep this with you and show to your community pharmacist(s) and doctor(s) at your next visit.                  "

## 2017-05-09 NOTE — ED AVS SNAPSHOT
[facility] Emergency Department    [address]    [facility] [address]    Phone:  [phone]    Fax:  [phone]                                       Perico Houston   MRN: [id number]    Department:  [facility] Emergency Department   Date of Visit:  5/9/2017           After Visit Summary Signature Page     I have received my discharge instructions, and my questions have been answered. I have discussed any challenges I see with this plan with the nurse or doctor.    ..........................................................................................................................................  Patient/Patient Representative Signature      ..........................................................................................................................................  Patient Representative Print Name and Relationship to Patient    ..................................................               ................................................  Date                                            Time    ..........................................................................................................................................  Reviewed by Signature/Title    ...................................................              ..............................................  Date                                                            Time

## 2017-05-23 ENCOUNTER — APPOINTMENT (OUTPATIENT)
Dept: GENERAL RADIOLOGY | Facility: CLINIC | Age: 57
End: 2017-05-23
Attending: EMERGENCY MEDICINE
Payer: COMMERCIAL

## 2017-05-23 ENCOUNTER — HOSPITAL ENCOUNTER (EMERGENCY)
Facility: CLINIC | Age: 57
Discharge: HOME | End: 2017-05-23
Attending: EMERGENCY MEDICINE | Admitting: EMERGENCY MEDICINE
Payer: COMMERCIAL

## 2017-05-23 VITALS
OXYGEN SATURATION: 99 % | RESPIRATION RATE: 18 BRPM | DIASTOLIC BLOOD PRESSURE: 95 MMHG | BODY MASS INDEX: 28.6 KG/M2 | WEIGHT: 230 LBS | SYSTOLIC BLOOD PRESSURE: 151 MMHG | TEMPERATURE: 97.6 F | HEIGHT: 75 IN

## 2017-05-23 DIAGNOSIS — G89.29 ACUTE EXACERBATION OF CHRONIC LOW BACK PAIN: ICD-10-CM

## 2017-05-23 DIAGNOSIS — M54.50 ACUTE EXACERBATION OF CHRONIC LOW BACK PAIN: ICD-10-CM

## 2017-05-23 PROCEDURE — 25000132 ZZH RX MED GY IP 250 OP 250 PS 637: Performed by: EMERGENCY MEDICINE

## 2017-05-23 PROCEDURE — 99283 EMERGENCY DEPT VISIT LOW MDM: CPT

## 2017-05-23 PROCEDURE — 25000125 ZZHC RX 250: Performed by: EMERGENCY MEDICINE

## 2017-05-23 PROCEDURE — 72100 X-RAY EXAM L-S SPINE 2/3 VWS: CPT

## 2017-05-23 RX ORDER — OXYCODONE AND ACETAMINOPHEN 5; 325 MG/1; MG/1
1 TABLET ORAL ONCE
Status: COMPLETED | OUTPATIENT
Start: 2017-05-23 | End: 2017-05-23

## 2017-05-23 RX ORDER — IBUPROFEN 800 MG/1
800 TABLET, FILM COATED ORAL ONCE
Status: COMPLETED | OUTPATIENT
Start: 2017-05-23 | End: 2017-05-23

## 2017-05-23 RX ADMIN — IBUPROFEN 800 MG: 800 TABLET, FILM COATED ORAL at 04:53

## 2017-05-23 RX ADMIN — OXYCODONE HYDROCHLORIDE AND ACETAMINOPHEN 1 TABLET: 5; 325 TABLET ORAL at 04:53

## 2017-05-23 RX ADMIN — LIDOCAINE HYDROCHLORIDE 30 ML: 20 SOLUTION ORAL; TOPICAL at 05:57

## 2017-05-23 ASSESSMENT — ENCOUNTER SYMPTOMS
BACK PAIN: 1
FEVER: 0

## 2017-05-23 NOTE — ED AVS SNAPSHOT
[facility] Emergency Department    [address]    [facility] [address]    Phone:  [phone]    Fax:  [phone]                                       Perico Houston   MRN: [id number]    Department:  [facility] Emergency Department   Date of Visit:  5/23/2017           After Visit Summary Signature Page     I have received my discharge instructions, and my questions have been answered. I have discussed any challenges I see with this plan with the nurse or doctor.    ..........................................................................................................................................  Patient/Patient Representative Signature      ..........................................................................................................................................  Patient Representative Print Name and Relationship to Patient    ..................................................               ................................................  Date                                            Time    ..........................................................................................................................................  Reviewed by Signature/Title    ...................................................              ..............................................  Date                                                            Time

## 2017-05-23 NOTE — ED PROVIDER NOTES
History     Chief Complaint:  Back Pain      HPI   Perico Houston is a 57 year old male who presents to the emergency department today for evaluation of back pain. The patient suffered a mechanical fall on the 21st of December and has had frequent ED visits since then with the same complaint of back pain (See ED care plan below). He has had multiple imaging studies, including lumbar, thoracic, and cervical spine x-ray, as well as a head CT, which all came back negative on 1/23-1/24 of this year. He states that he has an upcoming injection scheduled two days from now on 5/25/17. He states that he ran out of his oral percocet and he no longer has enough to bridge the gap to this appointment and he presents tonight due to increased low back pain with radiation to his bilateral lower extremities, his typical presentation.  The patient notes that he fell about 2 weeks ago and was seen in the emergency department, but imaging was not done at that time. The patient denies fevers.        Allergies:  No known drug allergies.      Medications:    Oxycodone-acetaminophen (percocet) 5-325 mg per tablet  Gabapentin (neurontin) 300 mg capsule  Hydroxyzine (atarax) 50 mg tablet  Glipizide xl po  Methocarbamol (robaxin) 500 mg tablet  Clonazepam (klonopin) 0.5 mg tablet  Aspirin ec 81 mg ec tablet  Nitroglycerin (nitrostat) 0.4 mg sl tablet  Metformin (glucophage) 1000 mg tablet  Lisinopril (prinivil,zestril) 40 mg tablet    Past Medical History:    Anxiety   CAD (coronary artery disease)   Chronic low back pain   Depressive disorder   Gastro-oesophageal reflux disease   Hypertension   Diabetes mellitus type 2 with neurological manifestations  Cervical disc herniation    Past Surgical History:    Angiogram Smooth 40-50% distal RCA stenosis, 50% stenosis in a small caliber first diagonal, minimal CAD, no other stenoses greater than 25%, EF 55%. Recommend Medical management and risk factor modification.  Cholecystectomy   Clavicle  "surgery     Family History:    Diabetes - Father, Daughter     Social History:  Marital Status:   Presents to the ED alone  Tobacco Use: Former Smoker, 0.25 ppd for 20 years, quit 05/2017   Alcohol Use: Yes  PCP: [facility]      Review of Systems   Constitutional: Negative for fever.   Musculoskeletal: Positive for back pain.        Positive for bilateral leg pain   All other systems reviewed and are negative.        Physical Exam     Patient Vitals for the past 24 hrs:   BP Temp Temp src Heart Rate Resp SpO2 Height Weight   05/23/17 0545 (!) 151/95 - - - - - - -   05/23/17 0532 - - - - - 99 % - -   05/23/17 0531 (!) 135/92 - - - - - - -   05/23/17 0433 (!) 184/111 97.6  F (36.4  C) Oral 79 18 98 % 1.905 m (6' 3\") 104.3 kg (230 lb)   05/23/17 0430 (!) 179/106 - - - - - - -       Physical Exam     Nursing note and vitals reviewed.  Constitutional: Cooperative. anxious appearing laying flat on back.  HENT:   Mouth/Throat: Mucous membranes are normal.   Cardiovascular: Normal rate, regular rhythm and normal heart sounds.  No murmur.  Pulmonary/Chest: Effort normal and breath sounds normal. No respiratory distress. No wheezes. No rales.   Abdominal: Soft. Normal appearance and bowel sounds are normal. No distension. There is no tenderness. There is no rigidity and no guarding.   Musculoskeletal: Normal range of motion of LE's.   Neurological: Alert. 1 + patellar reflexes bilaterally.  normal strength and sensation in his legs   Skin: Skin is warm and dry. No rash noted.   Psychiatric: Normal mood and affect.     Emergency Department Course     Imaging:  Lumbar spine XR, 2-3 views  1. No visualized acute fracture or malalignment of the lumbar spine.  2. Minimal degenerative changes in the lumbar spine.  3. Atherosclerotic calcification in the abdominal aorta.  Report per radiology.     Radiographic findings were communicated with the patient who voiced understanding of the " findings.    Interventions:  (0453) Ibuprofen, 800 mg, PO   (0453) Percocet, 5-325 mg, PO x 1  (0557) GI Cocktail, 30 mL suspension, PO      Emergency Department Course:  Nursing notes and vitals reviewed.  I performed an exam of the patient as documented above.   The patient was sent for a lumbar spine x-ray while in the emergency department, findings above.     (4441) I rechecked on the patient.    Findings and plan explained to the patient. Patient discharged home with instructions regarding supportive care, medications, and reasons to return. The importance of close follow-up was reviewed.      Impression & Plan      Medical Decision Making:  Perico Houston is a 57 year old male well known to the emergency department with a ED care plan. Please see below. Today he presents with back pain. X-ray was obtained given the history of a fall down the steps two weeks ago. There had been no imaging since that time, but fortunately radiographs are normal here. No indication for MRI or CT. This is not concerning for decompensated lumbar stenosis or cauda equina syndrome. I am not concerned for spinal hematoma or abscess. His neurologic exam a this time in the lower extremities is normal. He did receive one tablet of percocet here, but per the chronic pain policy, will not receive anything IV or be discharged home with anything. It sounds like he is in between pain clinics, but I have encouraged him to call his regular physician's office this week to talk about ongoing pain management in the interim before his next appointment for what sounds like directed steroid injections on Thursday. Blood pressure was initially elevated. He does have history of this in association with his anxiety. Without intervention this has improved to 130's/90's.       Diagnosis:    ICD-10-CM    1. Acute exacerbation of chronic low back pain M54.5     G89.29    2. Elevated blood pressure I10     Improved       Disposition:  discharged to  home      I, Froylan Jacobson , am serving as a scribe on 5/23/2017 at 6:29 AM to personally document services performed by Dr. Hummel based on my observations and the provider's statements to me.      5/23/2017   [facility] EMERGENCY DEPARTMENT      Jaquan Bailey MD Wed Nov 16, 2016 4:11 PM   EMERGENCY CARE PLAN     At each Emergency Department visit, we will evaluate this patient to determine if an emergency medical condition is present.     Acute care plan for the following conditions: Recurrent visits to ED for recurrent epigastric pain, chest pain, and headaches.  Brief History of Condition:   Perico Houston is a 56-year-old male with type II diabetes, who often presents to the ED with epigastric pain and/or chest pain. An anxiety component often accompanies his symptoms and likely plays a major role in his frequent presentations. He has had a diagnosis in 01/2106 of NSTEMI (max Trop 0.944), with subsequent heart cath which showed mild-moderate CAD (see below) without need of intervention. He has gastritis/duodenitis on resent Upper GI (see below) and has proven with multiple repeat studies to have negative lab tests and imaging studies. Often he arrives to the ED via EMS and appears to be in extreme pain with associated diaphoresis, which makes differentiating an acute vs. chronic condition difficult and leads to unnecessary testing.     Previous Surgical History:  Cholecystectomy; Partial Liver Resection     Authorized treatments in the Emergency Department (with specific medications and doses):      1. Chest Pain: Perico often presents with chest pain and usually other associated symptoms including headache, back pain, fatigue, dizziness, and epigastric pain. He is currently medically managed by Cardiology who believes anxiety is likely playing a role in his recurrent presentations. There may be a stable angina component as well as he feels nitroglycerin often improves his pain. If  symptomatically improved and has unchanged ECG from baseline, with negative serial troponins it has been proven he can be safely discharged home with outpatient follow up.      2. Abdominal/Epigastric pain: He has had a cholecystectomy, and has had multiple lab tests and imaging studies in the ED that have proven to be negative. No history of pancreatitis or liver disease has been found. His pain is often resolved with a GI cocktail. He sees a GI specialist from [facility], and is being treated with omeprazole and Hyoscyamine. Plans are for future gastric emptying study for further evaluation. (see upper endoscopy results below)   *Recommend: limiting unnecessary CT imaging studies and serum lab tests.     3. Depression: Evaluate for suicidal ideation or patient safety risk. DEC evaluation if warranted.      Indications for Admission or Consultation: Hospitalization should be avoided unless clearly medically indicated.   Hospitalization should be considered: if patient signs of acute organ dysfunction, or suicidal ideation.     Expected home rescue plan: Call Primary Care Clinic     Follow up plan after an ED visit: Primary MD clinic visit     Restricted Status if restricted to Hospital or Prescriber: none     PCP: Ivan Patel M.D. ([facility]) [place], [place]  Cardiology: Claudia Melgoza M.D.  Gastroenterology: Theron Francis M.D.   ED Visits: # 27 ED Visits since 10/2015 Admissions: # 5 since 10/2015     [place] Prescription Monitoring Program:  Routine Benzodiazepine Rx s for same provider. No significant narcotic prescriptions during last 12 months  Past Procedural Studies:   Myocardial Perfusion/Lexiscan:   Myocardial perfusion imaging using single isotope technique demonstrated no evidence for myocardial ischemia. EF 53%  Heart Cath 01/04/2016:  1. Smooth 40-50% distal RCA stenosis.  2. 50% stenosis in a small caliber first diagonal.   3. Otherwise minimal coronary artery disease, no other stenosis greater than  25%.   4. Normal left ventricular function. Estimated ejection fraction of 55%.  EGD 07/19/2016:   Impression: - Normal esophagus.  - Erythematous mucosa in the antrum. - Erythematous duodenopathy.   Imaging:  #8 CT scans since 01/2016: 3 Head CT s, 1 CTA Head/Neck, 2 CT Chest, 2 CT abdomen (all essentially negative)    Initiated:  November 16, 2016          Jaquan Hummel MD  05/23/17 0703

## 2017-05-23 NOTE — ED AVS SNAPSHOT
[facility] Emergency Department    [address] [place] [address]    Phone:  [phone]    Fax:  [phone]                                       Perico Houston   MRN: [id number]    Department:  [facility] Emergency Department   Date of Visit:  5/23/2017           Patient Information     Date Of Birth          1960        Your diagnoses for this visit were:     Acute exacerbation of chronic low back pain     Elevated blood pressure Improved       You were seen by Jaquan Hummel MD.      Follow-up Information     Follow up with PCP and your pain clinic this week.        Discharge Instructions       Discharge Instructions  Chronic Pain  You were seen today for an issue regarding chronic or recurrent pain. You may have a condition that gives you pain every day, or a condition that causes pain that keeps coming back, or several conditions involving pain.   Many patients with chronic or recurrent pain come to the Emergency Department thinking that a shot of narcotic pain medicine or a prescription for pain pills to take home is the best answer to their problem. We have discovered, though, that these approaches put our patients at high risk of long-term problems. This sort of treatment may actually make your pain worse, make it harder to control, and put you at an unacceptable risk of complications.   Because of the risks, we are very hesitant to treat your type of pain with short-acting or potentially habit-forming medications. These medications represent a significant risk to your health, and need to be managed by a physician who can follow your care consistently.  We have established a policy for the treatment of patients with chronic or recurrent pain that does not allow for treatment with injection narcotics or take-home prescriptions for narcotics.  We will treat your symptoms with non-narcotic medications and other treatments, and we will make referrals to pain  specialists or other specialized providers if we think such referrals would benefit you.  You should expect to receive treatment consistent with this policy during future visits.    If you have concerns about our policy, please discuss them with your primary care provider or pain specialist, who can contact us if necessary for further information or clarification.  If you were given a prescription for medicine here today, be sure to read all of the information (including the package insert) that comes with your prescription.  This will include important information about the medicine, its side effects, and any warnings that you need to know about.  The pharmacist who fills the prescription can provide more information and answer questions you may have about the medicine.  If you have questions or concerns that the pharmacist cannot address, please call or return to the Emergency Department.   Remember that you can always come back to the Emergency Department if you develop any new symptoms or if there is anything that worries you.        Discharge References/Attachments     HIGH BLOOD PRESSURE, ESTABLISHED, OUT OF CONTROL (ENGLISH)      24 Hour Appointment Hotline       To make an appointment at any [facility], call [phone] ([phone]). If you don't have a family doctor or clinic, we will help you find one. [facility] clinics are conveniently located to serve the needs of you and your family.             Review of your medicines      Our records show that you are taking the medicines listed below. If these are incorrect, please call your family doctor or clinic.        Dose / Directions Last dose taken    aspirin 81 MG EC tablet   Dose:  81 mg   Quantity:  30 tablet        Take 1 tablet (81 mg) by mouth daily   Refills:  0        clonazePAM 0.5 MG tablet   Commonly known as:  klonoPIN   Dose:  0.5 mg   Quantity:  20 tablet        Take 1 tablet (0.5 mg) by mouth 3 times daily as needed for anxiety    Refills:  0        gabapentin 300 MG capsule   Commonly known as:  NEURONTIN   Dose:  300 mg   Quantity:  90 capsule        Take 1 capsule (300 mg) by mouth 3 times daily   Refills:  1        GLIPIZIDE XL PO        Refills:  0        hydrOXYzine 50 MG tablet   Commonly known as:  ATARAX   Dose:  50 mg   Quantity:  20 tablet        Take 1 tablet (50 mg) by mouth 3 times daily as needed for other (back pain)   Refills:  0        lisinopril 40 MG tablet   Commonly known as:  PRINIVIL/ZESTRIL   Dose:  40 mg   Quantity:  30 tablet        Take 1 tablet (40 mg) by mouth daily   Refills:  0        metFORMIN 1000 MG tablet   Commonly known as:  GLUCOPHAGE   Dose:  1000 mg   Quantity:  60 tablet        Take 1 tablet (1,000 mg) by mouth 2 times daily (with meals)   Refills:  0        methocarbamol 500 MG tablet   Commonly known as:  ROBAXIN   Dose:  1000 mg   Quantity:  30 tablet        Take 2 tablets (1,000 mg) by mouth 3 times daily as needed for muscle spasms   Refills:  1        nitroglycerin 0.4 MG sublingual tablet   Commonly known as:  NITROSTAT   Dose:  0.4 mg   Quantity:  25 tablet        Place 1 tablet (0.4 mg) under the tongue every 5 minutes as needed for chest pain if you are still having symptoms after 3 doses (15 minutes) call 911.   Refills:  0        oxyCODONE-acetaminophen 5-325 MG per tablet   Commonly known as:  PERCOCET   Dose:  2 tablet        Take 2 tablets by mouth every 4 hours as needed for moderate to severe pain   Refills:  0                Procedures and tests performed during your visit     Lumbar spine XR, 2-3 views      Orders Needing Specimen Collection     None      Pending Results     Date and Time Order Name Status Description    5/23/2017 0448 Lumbar spine XR, 2-3 views Preliminary             Pending Culture Results     No orders found from 5/21/2017 to 5/24/2017.            Pending Results Instructions     If you had any lab results that were not finalized at the time of your Discharge,  you can call the ED Lab Result RN at [phone]. You will be contacted by this team for any positive Lab results or changes in treatment. The nurses are available 7 days a week from 10A to 6:30P.  You can leave a message 24 hours per day and they will return your call.        Test Results From Your Hospital Stay        5/23/2017  5:35 AM      Narrative     LUMBAR SPINE 2 VIEWS  5/23/2017 5:10 AM     HISTORY: Fall two weeks ago.    COMPARISON: 1/23/2017.        Impression     IMPRESSION:  1. No visualized acute fracture or malalignment of the lumbar spine.  2. Minimal degenerative changes in the lumbar spine.  3. Atherosclerotic calcification in the abdominal aorta.                Clinical Quality Measure: Blood Pressure Screening     Your blood pressure was checked while you were in the emergency department today. The last reading we obtained was  BP: (!) 135/92 . Please read the guidelines below about what these numbers mean and what you should do about them.  If your systolic blood pressure (the top number) is less than 120 and your diastolic blood pressure (the bottom number) is less than 80, then your blood pressure is normal. There is nothing more that you need to do about it.  If your systolic blood pressure (the top number) is 120-139 or your diastolic blood pressure (the bottom number) is 80-89, your blood pressure may be higher than it should be. You should have your blood pressure rechecked within a year by a primary care provider.  If your systolic blood pressure (the top number) is 140 or greater or your diastolic blood pressure (the bottom number) is 90 or greater, you may have high blood pressure. High blood pressure is treatable, but if left untreated over time it can put you at risk for heart attack, stroke, or kidney failure. You should have your blood pressure rechecked by a primary care provider within the next 4 weeks.  If your provider in the emergency department today gave you specific  "instructions to follow-up with your doctor or provider even sooner than that, you should follow that instruction and not wait for up to 4 weeks for your follow-up visit.        Thank you for choosing [facility]       Thank you for choosing [facility] for your care. Our goal is always to provide you with excellent care. Hearing back from our patients is one way we can continue to improve our services. Please take a few minutes to complete the written survey that you may receive in the mail after you visit with us. Thank you!        [employer] Information     [employer] lets you send messages to your doctor, view your test results, renew your prescriptions, schedule appointments and more. To sign up, go to [email or web address] . Click on \"Log in\" on the left side of the screen, which will take you to the Welcome page. Then click on \"Sign up Now\" on the right side of the page.     You will be asked to enter the access code listed below, as well as some personal information. Please follow the directions to create your username and password.     Your access code is: [email or web address]  Expires: 2017 12:18 PM     Your access code will  in 90 days. If you need help or a new code, please call your [facility] clinic or [phone].        Care EveryWhere ID     This is your Care EveryWhere ID. This could be used by other organizations to access your [facility] medical records  [id number]        After Visit Summary       This is your record. Keep this with you and show to your community pharmacist(s) and doctor(s) at your next visit.                  "

## 2017-05-23 NOTE — DISCHARGE INSTRUCTIONS
Discharge Instructions  Chronic Pain  You were seen today for an issue regarding chronic or recurrent pain. You may have a condition that gives you pain every day, or a condition that causes pain that keeps coming back, or several conditions involving pain.   Many patients with chronic or recurrent pain come to the Emergency Department thinking that a shot of narcotic pain medicine or a prescription for pain pills to take home is the best answer to their problem. We have discovered, though, that these approaches put our patients at high risk of long-term problems. This sort of treatment may actually make your pain worse, make it harder to control, and put you at an unacceptable risk of complications.   Because of the risks, we are very hesitant to treat your type of pain with short-acting or potentially habit-forming medications. These medications represent a significant risk to your health, and need to be managed by a physician who can follow your care consistently.  We have established a policy for the treatment of patients with chronic or recurrent pain that does not allow for treatment with injection narcotics or take-home prescriptions for narcotics.  We will treat your symptoms with non-narcotic medications and other treatments, and we will make referrals to pain specialists or other specialized providers if we think such referrals would benefit you.  You should expect to receive treatment consistent with this policy during future visits.    If you have concerns about our policy, please discuss them with your primary care provider or pain specialist, who can contact us if necessary for further information or clarification.  If you were given a prescription for medicine here today, be sure to read all of the information (including the package insert) that comes with your prescription.  This will include important information about the medicine, its side effects, and any warnings that you need to know about.   The pharmacist who fills the prescription can provide more information and answer questions you may have about the medicine.  If you have questions or concerns that the pharmacist cannot address, please call or return to the Emergency Department.   Remember that you can always come back to the Emergency Department if you develop any new symptoms or if there is anything that worries you.

## 2017-06-04 ENCOUNTER — HOSPITAL ENCOUNTER (EMERGENCY)
Facility: CLINIC | Age: 57
Discharge: HOME | End: 2017-06-04
Attending: EMERGENCY MEDICINE | Admitting: EMERGENCY MEDICINE
Payer: COMMERCIAL

## 2017-06-04 ENCOUNTER — APPOINTMENT (OUTPATIENT)
Dept: GENERAL RADIOLOGY | Facility: CLINIC | Age: 57
End: 2017-06-04
Attending: EMERGENCY MEDICINE
Payer: COMMERCIAL

## 2017-06-04 VITALS
OXYGEN SATURATION: 95 % | SYSTOLIC BLOOD PRESSURE: 158 MMHG | DIASTOLIC BLOOD PRESSURE: 93 MMHG | HEART RATE: 88 BPM | RESPIRATION RATE: 16 BRPM | TEMPERATURE: 98.3 F

## 2017-06-04 DIAGNOSIS — M54.41 ACUTE RIGHT-SIDED LOW BACK PAIN WITH RIGHT-SIDED SCIATICA: ICD-10-CM

## 2017-06-04 DIAGNOSIS — M25.551 HIP PAIN, RIGHT: ICD-10-CM

## 2017-06-04 DIAGNOSIS — G89.4 CHRONIC PAIN SYNDROME: ICD-10-CM

## 2017-06-04 LAB
ALBUMIN SERPL-MCNC: 3.7 G/DL (ref 3.4–5)
ALP SERPL-CCNC: 58 U/L (ref 40–150)
ALT SERPL W P-5'-P-CCNC: 36 U/L (ref 0–70)
ANION GAP SERPL CALCULATED.3IONS-SCNC: 7 MMOL/L (ref 3–14)
APAP SERPL-MCNC: NORMAL MG/L (ref 10–20)
AST SERPL W P-5'-P-CCNC: 23 U/L (ref 0–45)
BASOPHILS # BLD AUTO: 0 10E9/L (ref 0–0.2)
BASOPHILS NFR BLD AUTO: 0.6 %
BILIRUB SERPL-MCNC: 0.6 MG/DL (ref 0.2–1.3)
BUN SERPL-MCNC: 13 MG/DL (ref 7–30)
CALCIUM SERPL-MCNC: 8.8 MG/DL (ref 8.5–10.1)
CHLORIDE SERPL-SCNC: 102 MMOL/L (ref 94–109)
CO2 SERPL-SCNC: 27 MMOL/L (ref 20–32)
CREAT SERPL-MCNC: 0.75 MG/DL (ref 0.66–1.25)
DIFFERENTIAL METHOD BLD: NORMAL
EOSINOPHIL # BLD AUTO: 0.2 10E9/L (ref 0–0.7)
EOSINOPHIL NFR BLD AUTO: 3.4 %
ERYTHROCYTE [DISTWIDTH] IN BLOOD BY AUTOMATED COUNT: 12.9 % (ref 10–15)
GFR SERPL CREATININE-BSD FRML MDRD: ABNORMAL ML/MIN/1.7M2
GLUCOSE SERPL-MCNC: 171 MG/DL (ref 70–99)
HCT VFR BLD AUTO: 41.6 % (ref 40–53)
HGB BLD-MCNC: 13.3 G/DL (ref 13.3–17.7)
IMM GRANULOCYTES # BLD: 0 10E9/L (ref 0–0.4)
IMM GRANULOCYTES NFR BLD: 0.3 %
LYMPHOCYTES # BLD AUTO: 2.4 10E9/L (ref 0.8–5.3)
LYMPHOCYTES NFR BLD AUTO: 37.3 %
MCH RBC QN AUTO: 27.5 PG (ref 26.5–33)
MCHC RBC AUTO-ENTMCNC: 32 G/DL (ref 31.5–36.5)
MCV RBC AUTO: 86 FL (ref 78–100)
MONOCYTES # BLD AUTO: 0.5 10E9/L (ref 0–1.3)
MONOCYTES NFR BLD AUTO: 8.3 %
NEUTROPHILS # BLD AUTO: 3.2 10E9/L (ref 1.6–8.3)
NEUTROPHILS NFR BLD AUTO: 50.1 %
NRBC # BLD AUTO: 0 10*3/UL
NRBC BLD AUTO-RTO: 0 /100
PLATELET # BLD AUTO: 254 10E9/L (ref 150–450)
POTASSIUM SERPL-SCNC: 3.8 MMOL/L (ref 3.4–5.3)
PROT SERPL-MCNC: 6.8 G/DL (ref 6.8–8.8)
RBC # BLD AUTO: 4.84 10E12/L (ref 4.4–5.9)
SODIUM SERPL-SCNC: 136 MMOL/L (ref 133–144)
WBC # BLD AUTO: 6.4 10E9/L (ref 4–11)

## 2017-06-04 PROCEDURE — 73552 X-RAY EXAM OF FEMUR 2/>: CPT | Mod: RT

## 2017-06-04 PROCEDURE — 85025 COMPLETE CBC W/AUTO DIFF WBC: CPT | Performed by: EMERGENCY MEDICINE

## 2017-06-04 PROCEDURE — 96374 THER/PROPH/DIAG INJ IV PUSH: CPT

## 2017-06-04 PROCEDURE — 80053 COMPREHEN METABOLIC PANEL: CPT | Performed by: EMERGENCY MEDICINE

## 2017-06-04 PROCEDURE — 99285 EMERGENCY DEPT VISIT HI MDM: CPT | Mod: 25

## 2017-06-04 PROCEDURE — 73562 X-RAY EXAM OF KNEE 3: CPT | Mod: RT

## 2017-06-04 PROCEDURE — 72170 X-RAY EXAM OF PELVIS: CPT

## 2017-06-04 PROCEDURE — 80329 ANALGESICS NON-OPIOID 1 OR 2: CPT | Performed by: EMERGENCY MEDICINE

## 2017-06-04 PROCEDURE — 25000128 H RX IP 250 OP 636: Performed by: EMERGENCY MEDICINE

## 2017-06-04 RX ORDER — LIDOCAINE 40 MG/G
CREAM TOPICAL
Status: DISCONTINUED | OUTPATIENT
Start: 2017-06-04 | End: 2017-06-04 | Stop reason: HOSPADM

## 2017-06-04 RX ORDER — KETOROLAC TROMETHAMINE 15 MG/ML
15 INJECTION, SOLUTION INTRAMUSCULAR; INTRAVENOUS ONCE
Status: COMPLETED | OUTPATIENT
Start: 2017-06-04 | End: 2017-06-04

## 2017-06-04 RX ADMIN — KETOROLAC TROMETHAMINE 15 MG: 15 INJECTION, SOLUTION INTRAMUSCULAR; INTRAVENOUS at 20:04

## 2017-06-04 ASSESSMENT — ENCOUNTER SYMPTOMS: BACK PAIN: 1

## 2017-06-04 NOTE — ED NOTES
Patient presents with a complaint of right sided hip pain. Patient states that this afternoon he tripped and fell striking his right hip on a door frame. Patient states that he then took 7x 350mg of tylenol and laid down in bed to take a nap. Patient states he woke up with abdominal discomfort and called EMS for assistance. EMS reports the patient arrived on scene and was escorting the patient down a straight hallway when the patient appeared to fall to the ground without cause. EMS reports the patient was able to rise without assistance and continue walking to the ambulance. Patient reports that he has not been able to get his regular pain medicine because of issues with his pain clinic. Patient reports that he has been taking approx 20 - 22 pills of tylenol per day for the past week with an unknown mix of 325mg and 500mg tablets since he has been without his regular pain medications. ABC intact, A&Ox4, CMS intact.
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
## 2017-06-04 NOTE — ED AVS SNAPSHOT
[facility] Emergency Department    [address]    [facility] [address]    Phone:  [phone]    Fax:  [phone]                                       Perico Houston   MRN: [id number]    Department:  [facility] Emergency Department   Date of Visit:  6/4/2017           After Visit Summary Signature Page     I have received my discharge instructions, and my questions have been answered. I have discussed any challenges I see with this plan with the nurse or doctor.    ..........................................................................................................................................  Patient/Patient Representative Signature      ..........................................................................................................................................  Patient Representative Print Name and Relationship to Patient    ..................................................               ................................................  Date                                            Time    ..........................................................................................................................................  Reviewed by Signature/Title    ...................................................              ..............................................  Date                                                            Time

## 2017-06-04 NOTE — ED AVS SNAPSHOT
[facility] Emergency Department    [address] [place] [address]    Phone:  [phone]    Fax:  [phone]                                       Perico Houston   MRN: [id number]    Department:  [facility] Emergency Department   Date of Visit:  6/4/2017           Patient Information     Date Of Birth          1960        Your diagnoses for this visit were:     Acute right-sided low back pain with right-sided sciatica     Hip pain, right     Chronic pain syndrome        You were seen by Mario Leroy MD.      Follow-up Information     Follow up with [facility], [place] In 2 days.    Specialty:  Family Practice    Contact information:    [address]  [place] [address]  [phone]          Please follow up.    Why:  [facility] 1 week, call monday for an appointment        Discharge Instructions         Back Basics: A Healthy Spine  A healthy spine supports the body while letting it move freely. It does this with the help of three natural curves. Strong, flexible muscles help, too. They support the spine by keeping its curves properly aligned. The disks that cushion the bones of your spine also play a role in back fitness.    Three natural curves  The spine is made of bones (vertebrae) and pads of soft tissue (disks). These parts are arranged in three curves: cervical, thoracic, and lumbar. When properly aligned, these curves keep your body balanced. They also support your body when you move. By distributing your weight throughout your spine, the curves make back injuries less likely.  Strong, flexible muscles  Strong, flexible back muscles help support the three curves of the spine. They do so by holding the vertebrae and disks in proper alignment. Strong, flexible abdominal, hip, and leg muscles also reduce strain on the back.  The lumbar curve  The lumbar curve is the hardest-working part of the spine. It carries  more weight and moves the most. Aligning this curve helps prevent damage to vertebrae, disks, and other parts of the spine.  Cushioning disks  Disks are the soft pads of tissue between the vertebrae. The disks absorb shock caused by movement. Each disk has a spongy center (nucleus) and a tougher outer ring (annulus). Movement within the nucleus allows the vertebrae to rock back and forth on the disks. This provides the flexibility needed to bend and move.         5041-3906 [employer]. [address] [place] [address]. All rights reserved. This information is not intended as a substitute for professional medical care. Always follow your healthcare professional's instructions.          Back Care Tips    Caring for your back  These are things you can do to prevent a recurrence of acute back pain and to reduce symptoms from chronic back pain:    Maintain a healthy weight. If you are overweight, losing weight will help most types of back pain.    Exercise is an important part of recovery from most types of back pain. The back is supported by the muscles behind and in front of the spine. This means both the back muscles and the abdominal muscles must be strengthened to provide better support for your spine.     Swimming and brisk walking are good overall exercises to improve your fitness level.    Practice safe lifting methods (below).    Practice good posture when sitting, standing and walking. Avoid prolonged sitting. This puts more stress on the lower back than standing or walking.    Wear quality shoes with sufficient arch support. Foot and ankle alignment can affect back symptoms. Women should avoid high heels.    Therapeutic massage can help  relax the back muscles without stretching them.    During the first 24 to 72 hours after an acute injury or flare-up of chronic back pain, apply an ice pack to the painful area for 20 minutes and then remove it for 20 minutes over a period of 60 to 90  minutes or several times a day. As a safety precaution, do not use a heating pad at bedtime. Sleeping on a heating pad can lead to skin burns or tissue damage.    Ice and heat therapies can be alternated.  Medications  Talk to your doctor before using medications, especially if you have other medical problems or are taking other medicines.    You may use acetaminophen or ibuprofen to control pain, unless other pain medicine was prescribed. If you have chronic conditions like diabetes, liver or kidney disease, stomach ulcers or gastrointestinal bleeding, or are taking blood thinners, talk with your doctor before taking any meidcations.    Be careful if you are given prescription pain medicines, narcotics, or medication for muscle spasm. They can cause drowsiness, affect your coordination, reflexes and judgment. Do not drive or operate heavy machinery.  Lumbar stretch  Here is a simple stretching exercise that will help relax muscle spasm and keep your back more limber. If exercise makes your back pain worse, don t do it.    Lie on your back with your knees bent and both feet on the ground.    Slowly raise your left knee to your chest as you flatten your lower back against the floor. Hold for 5 seconds.    Relax and repeat the exercise with your right knee.    Do 10 of these exercises for each leg.  Safe lifting method    Don t bend over at the waist to lift an object off the floor.  Instead, bend your knees and hips in a squat.     Keep your back and head upright    Hold the object close to your body, directly in front of you.    Straighten your legs to lift the object.     Lower the object to the floor in the reverse fashion.    If you must slide something across the floor, push it.  Posture tips  Sitting  Sit in chairs with straight backs or low-back support. rKeep your k nees lower than your hip, with your feet flat on the floor.  When driving, sit up straight. Adjust the seat forward so you are not leaning toward  the steering wheel.  A small pillow or rolled towel behind your lower back may help if you are driving long distances.   Standing  When standing for long periods, shift most of your weight to one leg at a time. Alternate legs every few minutes.   Sleeping  The best way to sleep is on your side with your knees bent. Put a low pillow under your head to support your neck in a neutral spine position. Avoid thick pillows that bend your neck to one side. Put a pillow between your legs to further relax your lower back. If you sleep on your back, put pillows under your knees to support your legs in a slightly flexed position. Use a firm mattress. If your mattress sags, replace it, or use a 1/2-inch plywood board under the mattress to add support.  Follow-up care  Follow up with your health care provider or as directed by our staff.  If X-rays, a CT scan or an MRI scan were taken, they will be reviewed by a radiologist. You will be notified of any new findings that may affect your care.  Call 911  Seek emergency medical care if any of the following occur:    Trouble breathing    Confusion    Very drowsy or trouble breathing    Fainting or loss of consciousness    Rapid or very slow heart rate    Loss of  bwel or bladder control  When to seek medical care  Call your health care provider if any of the following occur:    Pain becomes worse or spreads to your arms or legs    Weakness or numbness in one or both arms or legs    Numbness in the groin area    6856-4200 [employer]. [address] [place] [address]. All rights reserved. This information is not intended as a substitute for professional medical care. Always follow your healthcare professional's instructions.          24 Hour Appointment Hotline       To make an appointment at any [facility], call [phone] ([phone]). If you don't have a family doctor or clinic, we will help you find one. [facility] are conveniently located  to serve the needs of you and your family.             Review of your medicines      Our records show that you are taking the medicines listed below. If these are incorrect, please call your family doctor or clinic.        Dose / Directions Last dose taken    aspirin 81 MG EC tablet   Dose:  81 mg   Quantity:  30 tablet        Take 1 tablet (81 mg) by mouth daily   Refills:  0        clonazePAM 0.5 MG tablet   Commonly known as:  klonoPIN   Dose:  0.5 mg   Quantity:  20 tablet        Take 1 tablet (0.5 mg) by mouth 3 times daily as needed for anxiety   Refills:  0        gabapentin 300 MG capsule   Commonly known as:  NEURONTIN   Dose:  300 mg   Quantity:  90 capsule        Take 1 capsule (300 mg) by mouth 3 times daily   Refills:  1        GLIPIZIDE XL PO        Refills:  0        hydrOXYzine 50 MG tablet   Commonly known as:  ATARAX   Dose:  50 mg   Quantity:  20 tablet        Take 1 tablet (50 mg) by mouth 3 times daily as needed for other (back pain)   Refills:  0        lisinopril 40 MG tablet   Commonly known as:  PRINIVIL/ZESTRIL   Dose:  40 mg   Quantity:  30 tablet        Take 1 tablet (40 mg) by mouth daily   Refills:  0        metFORMIN 1000 MG tablet   Commonly known as:  GLUCOPHAGE   Dose:  1000 mg   Quantity:  60 tablet        Take 1 tablet (1,000 mg) by mouth 2 times daily (with meals)   Refills:  0        nitroglycerin 0.4 MG sublingual tablet   Commonly known as:  NITROSTAT   Dose:  0.4 mg   Quantity:  25 tablet        Place 1 tablet (0.4 mg) under the tongue every 5 minutes as needed for chest pain if you are still having symptoms after 3 doses (15 minutes) call 911.   Refills:  0                Procedures and tests performed during your visit     Acetaminophen level    CBC with platelets differential    Comprehensive metabolic panel    Peripheral IV catheter    XR Femur Right 2 Views    XR Knee Right 3 Views    XR Pelvis 1/2 Views      Orders Needing Specimen Collection     None      Pending  Results     Date and Time Order Name Status Description    6/4/2017 1828 XR Pelvis 1/2 Views Preliminary     6/4/2017 1827 XR Knee Right 3 Views Preliminary     6/4/2017 1827 XR Femur Right 2 Views Preliminary             Pending Culture Results     No orders found from 6/2/2017 to 6/5/2017.            Pending Results Instructions     If you had any lab results that were not finalized at the time of your Discharge, you can call the ED Lab Result RN at [phone]. You will be contacted by this team for any positive Lab results or changes in treatment. The nurses are available 7 days a week from 10A to 6:30P.  You can leave a message 24 hours per day and they will return your call.        Test Results From Your Hospital Stay        6/4/2017  7:25 PM      Narrative     RIGHT FEMUR TWO VIEWS   6/4/2017 7:15 PM     HISTORY: Pain, evaluate for fracture.    COMPARISON: None.        Impression     IMPRESSION: No acute fracture or dislocation identified. Patellar  osteophytes.         6/4/2017  7:26 PM      Narrative     RIGHT KNEE THREE VIEWS   6/4/2017 7:16 PM     HISTORY: Pain, evaluate for fracture.    COMPARISON: None.        Impression     IMPRESSION: No acute fracture or dislocation identified. Patellar  osteophytes noted. Vascular calcifications.         6/4/2017  6:57 PM      Component Results     Component Value Ref Range & Units Status    WBC 6.4 4.0 - 11.0 10e9/L Final    RBC Count 4.84 4.4 - 5.9 10e12/L Final    Hemoglobin 13.3 13.3 - 17.7 g/dL Final    Hematocrit 41.6 40.0 - 53.0 % Final    MCV 86 78 - 100 fl Final    MCH 27.5 26.5 - 33.0 pg Final    MCHC 32.0 31.5 - 36.5 g/dL Final    RDW 12.9 10.0 - 15.0 % Final    Platelet Count 254 150 - 450 10e9/L Final    Diff Method Automated Method  Final    % Neutrophils 50.1 % Final    % Lymphocytes 37.3 % Final    % Monocytes 8.3 % Final    % Eosinophils 3.4 % Final    % Basophils 0.6 % Final    % Immature Granulocytes 0.3 % Final    Nucleated RBCs 0 0 /100 Final     Absolute Neutrophil 3.2 1.6 - 8.3 10e9/L Final    Absolute Lymphocytes 2.4 0.8 - 5.3 10e9/L Final    Absolute Monocytes 0.5 0.0 - 1.3 10e9/L Final    Absolute Eosinophils 0.2 0.0 - 0.7 10e9/L Final    Absolute Basophils 0.0 0.0 - 0.2 10e9/L Final    Abs Immature Granulocytes 0.0 0 - 0.4 10e9/L Final    Absolute Nucleated RBC 0.0  Final         6/4/2017  7:11 PM      Component Results     Component Value Ref Range & Units Status    Sodium 136 133 - 144 mmol/L Final    Potassium 3.8 3.4 - 5.3 mmol/L Final    Chloride 102 94 - 109 mmol/L Final    Carbon Dioxide 27 20 - 32 mmol/L Final    Anion Gap 7 3 - 14 mmol/L Final    Glucose 171 (H) 70 - 99 mg/dL Final    Urea Nitrogen 13 7 - 30 mg/dL Final    Creatinine 0.75 0.66 - 1.25 mg/dL Final    GFR Estimate >90  Non  GFR Calc   >60 mL/min/1.7m2 Final    GFR Estimate If Black >90   GFR Calc   >60 mL/min/1.7m2 Final    Calcium 8.8 8.5 - 10.1 mg/dL Final    Bilirubin Total 0.6 0.2 - 1.3 mg/dL Final    Albumin 3.7 3.4 - 5.0 g/dL Final    Protein Total 6.8 6.8 - 8.8 g/dL Final    Alkaline Phosphatase 58 40 - 150 U/L Final    ALT 36 0 - 70 U/L Final    AST 23 0 - 45 U/L Final         6/4/2017  7:26 PM      Component Results     Component Value Ref Range & Units Status    Acetaminophen Level <2  Therapeutic range: 10-20 mg/L   mg/L Final         6/4/2017  7:25 PM      Narrative     PELVIS ONE-TWO VIEWS   6/4/2017 7:17 PM     HISTORY: Pain, evaluate for hip fracture.    COMPARISON: 1/23/2017.        Impression     IMPRESSION: No acute fracture or dislocation identified.                Clinical Quality Measure: Blood Pressure Screening     Your blood pressure was checked while you were in the emergency department today. The last reading we obtained was  BP: (!) 158/93 . Please read the guidelines below about what these numbers mean and what you should do about them.  If your systolic blood pressure (the top number) is less than 120 and your  "diastolic blood pressure (the bottom number) is less than 80, then your blood pressure is normal. There is nothing more that you need to do about it.  If your systolic blood pressure (the top number) is 120-139 or your diastolic blood pressure (the bottom number) is 80-89, your blood pressure may be higher than it should be. You should have your blood pressure rechecked within a year by a primary care provider.  If your systolic blood pressure (the top number) is 140 or greater or your diastolic blood pressure (the bottom number) is 90 or greater, you may have high blood pressure. High blood pressure is treatable, but if left untreated over time it can put you at risk for heart attack, stroke, or kidney failure. You should have your blood pressure rechecked by a primary care provider within the next 4 weeks.  If your provider in the emergency department today gave you specific instructions to follow-up with your doctor or provider even sooner than that, you should follow that instruction and not wait for up to 4 weeks for your follow-up visit.        Thank you for choosing [facility]       Thank you for choosing [facility] for your care. Our goal is always to provide you with excellent care. Hearing back from our patients is one way we can continue to improve our services. Please take a few minutes to complete the written survey that you may receive in the mail after you visit with us. Thank you!        [facility] Information     [facility] lets you send messages to your doctor, view your test results, renew your prescriptions, schedule appointments and more. To sign up, go to [email or web address] . Click on \"Log in\" on the left side of the screen, which will take you to the Welcome page. Then click on \"Sign up Now\" on the right side of the page.     You will be asked to enter the access code listed below, as well as some personal information. Please follow the directions to create your username and password.     Your " access code is: [email or web address]  Expires: 2017 12:18 PM     Your access code will  in 90 days. If you need help or a new code, please call your [place] clinic or [phone].        Care EveryWhere ID     This is your Care EveryWhere ID. This could be used by other organizations to access your [facility] medical records  [id number]        After Visit Summary       This is your record. Keep this with you and show to your community pharmacist(s) and doctor(s) at your next visit.

## 2017-06-05 NOTE — DISCHARGE INSTRUCTIONS
Back Basics: A Healthy Spine  A healthy spine supports the body while letting it move freely. It does this with the help of three natural curves. Strong, flexible muscles help, too. They support the spine by keeping its curves properly aligned. The disks that cushion the bones of your spine also play a role in back fitness.    Three natural curves  The spine is made of bones (vertebrae) and pads of soft tissue (disks). These parts are arranged in three curves: cervical, thoracic, and lumbar. When properly aligned, these curves keep your body balanced. They also support your body when you move. By distributing your weight throughout your spine, the curves make back injuries less likely.  Strong, flexible muscles  Strong, flexible back muscles help support the three curves of the spine. They do so by holding the vertebrae and disks in proper alignment. Strong, flexible abdominal, hip, and leg muscles also reduce strain on the back.  The lumbar curve  The lumbar curve is the hardest-working part of the spine. It carries more weight and moves the most. Aligning this curve helps prevent damage to vertebrae, disks, and other parts of the spine.  Cushioning disks  Disks are the soft pads of tissue between the vertebrae. The disks absorb shock caused by movement. Each disk has a spongy center (nucleus) and a tougher outer ring (annulus). Movement within the nucleus allows the vertebrae to rock back and forth on the disks. This provides the flexibility needed to bend and move.         3224-9192 [employer]. [address] [place] [address]. All rights reserved. This information is not intended as a substitute for professional medical care. Always follow your healthcare professional's instructions.          Back Care Tips    Caring for your back  These are things you can do to prevent a recurrence of acute back pain and to reduce symptoms from chronic back pain:    Maintain a healthy weight. If you  are overweight, losing weight will help most types of back pain.    Exercise is an important part of recovery from most types of back pain. The back is supported by the muscles behind and in front of the spine. This means both the back muscles and the abdominal muscles must be strengthened to provide better support for your spine.     Swimming and brisk walking are good overall exercises to improve your fitness level.    Practice safe lifting methods (below).    Practice good posture when sitting, standing and walking. Avoid prolonged sitting. This puts more stress on the lower back than standing or walking.    Wear quality shoes with sufficient arch support. Foot and ankle alignment can affect back symptoms. Women should avoid high heels.    Therapeutic massage can help  relax the back muscles without stretching them.    During the first 24 to 72 hours after an acute injury or flare-up of chronic back pain, apply an ice pack to the painful area for 20 minutes and then remove it for 20 minutes over a period of 60 to 90 minutes or several times a day. As a safety precaution, do not use a heating pad at bedtime. Sleeping on a heating pad can lead to skin burns or tissue damage.    Ice and heat therapies can be alternated.  Medications  Talk to your doctor before using medications, especially if you have other medical problems or are taking other medicines.    You may use acetaminophen or ibuprofen to control pain, unless other pain medicine was prescribed. If you have chronic conditions like diabetes, liver or kidney disease, stomach ulcers or gastrointestinal bleeding, or are taking blood thinners, talk with your doctor before taking any meidcations.    Be careful if you are given prescription pain medicines, narcotics, or medication for muscle spasm. They can cause drowsiness, affect your coordination, reflexes and judgment. Do not drive or operate heavy machinery.  Lumbar stretch  Here is a simple stretching  exercise that will help relax muscle spasm and keep your back more limber. If exercise makes your back pain worse, don t do it.    Lie on your back with your knees bent and both feet on the ground.    Slowly raise your left knee to your chest as you flatten your lower back against the floor. Hold for 5 seconds.    Relax and repeat the exercise with your right knee.    Do 10 of these exercises for each leg.  Safe lifting method    Don t bend over at the waist to lift an object off the floor.  Instead, bend your knees and hips in a squat.     Keep your back and head upright    Hold the object close to your body, directly in front of you.    Straighten your legs to lift the object.     Lower the object to the floor in the reverse fashion.    If you must slide something across the floor, push it.  Posture tips  Sitting  Sit in chairs with straight backs or low-back support. rKeep your k nees lower than your hip, with your feet flat on the floor.  When driving, sit up straight. Adjust the seat forward so you are not leaning toward the steering wheel.  A small pillow or rolled towel behind your lower back may help if you are driving long distances.   Standing  When standing for long periods, shift most of your weight to one leg at a time. Alternate legs every few minutes.   Sleeping  The best way to sleep is on your side with your knees bent. Put a low pillow under your head to support your neck in a neutral spine position. Avoid thick pillows that bend your neck to one side. Put a pillow between your legs to further relax your lower back. If you sleep on your back, put pillows under your knees to support your legs in a slightly flexed position. Use a firm mattress. If your mattress sags, replace it, or use a 1/2-inch plywood board under the mattress to add support.  Follow-up care  Follow up with your health care provider or as directed by our staff.  If X-rays, a CT scan or an MRI scan were taken, they will be reviewed  by a radiologist. You will be notified of any new findings that may affect your care.  Call 911  Seek emergency medical care if any of the following occur:    Trouble breathing    Confusion    Very drowsy or trouble breathing    Fainting or loss of consciousness    Rapid or very slow heart rate    Loss of  bwel or bladder control  When to seek medical care  Call your health care provider if any of the following occur:    Pain becomes worse or spreads to your arms or legs    Weakness or numbness in one or both arms or legs    Numbness in the groin area    9510-6879 [employer]. [address] [place] [id number]. All rights reserved. This information is not intended as a substitute for professional medical care. Always follow your healthcare professional's instructions.

## 2017-06-30 ENCOUNTER — HOSPITAL ENCOUNTER (EMERGENCY)
Facility: CLINIC | Age: 57
Discharge: HOME | End: 2017-06-30
Attending: EMERGENCY MEDICINE | Admitting: EMERGENCY MEDICINE
Payer: COMMERCIAL

## 2017-06-30 VITALS
SYSTOLIC BLOOD PRESSURE: 178 MMHG | TEMPERATURE: 98 F | RESPIRATION RATE: 19 BRPM | DIASTOLIC BLOOD PRESSURE: 113 MMHG | OXYGEN SATURATION: 98 %

## 2017-06-30 DIAGNOSIS — I25.10 CORONARY ARTERY DISEASE INVOLVING NATIVE CORONARY ARTERY OF NATIVE HEART WITHOUT ANGINA PECTORIS: ICD-10-CM

## 2017-06-30 DIAGNOSIS — R07.9 ACUTE CHEST PAIN: ICD-10-CM

## 2017-06-30 LAB
ANION GAP SERPL CALCULATED.3IONS-SCNC: 6 MMOL/L (ref 3–14)
BASOPHILS # BLD AUTO: 0 10E9/L (ref 0–0.2)
BASOPHILS NFR BLD AUTO: 0.7 %
BUN SERPL-MCNC: 10 MG/DL (ref 7–30)
CALCIUM SERPL-MCNC: 8.5 MG/DL (ref 8.5–10.1)
CHLORIDE SERPL-SCNC: 100 MMOL/L (ref 94–109)
CO2 SERPL-SCNC: 27 MMOL/L (ref 20–32)
CREAT BLD-MCNC: 0.8 MG/DL (ref 0.66–1.25)
CREAT SERPL-MCNC: 0.78 MG/DL (ref 0.66–1.25)
DIFFERENTIAL METHOD BLD: ABNORMAL
DIFFERENTIAL METHOD BLD: NORMAL
EOSINOPHIL # BLD AUTO: 0.2 10E9/L (ref 0–0.7)
EOSINOPHIL NFR BLD AUTO: 3.5 %
ERYTHROCYTE [DISTWIDTH] IN BLOOD BY AUTOMATED COUNT: 12.3 % (ref 10–15)
ERYTHROCYTE [DISTWIDTH] IN BLOOD BY AUTOMATED COUNT: NORMAL % (ref 10–15)
GFR SERPL CREATININE-BSD FRML MDRD: >90 ML/MIN/1.7M2
GFR SERPL CREATININE-BSD FRML MDRD: ABNORMAL ML/MIN/1.7M2
GLUCOSE SERPL-MCNC: 265 MG/DL (ref 70–99)
HCT VFR BLD AUTO: 37.7 % (ref 40–53)
HCT VFR BLD AUTO: NORMAL % (ref 40–53)
HGB BLD-MCNC: 12.1 G/DL (ref 13.3–17.7)
HGB BLD-MCNC: NORMAL G/DL (ref 13.3–17.7)
IMM GRANULOCYTES # BLD: 0 10E9/L (ref 0–0.4)
IMM GRANULOCYTES NFR BLD: 0.3 %
LYMPHOCYTES # BLD AUTO: 2 10E9/L (ref 0.8–5.3)
LYMPHOCYTES NFR BLD AUTO: 35.4 %
MCH RBC QN AUTO: 27.6 PG (ref 26.5–33)
MCH RBC QN AUTO: NORMAL PG (ref 26.5–33)
MCHC RBC AUTO-ENTMCNC: 32.1 G/DL (ref 31.5–36.5)
MCHC RBC AUTO-ENTMCNC: NORMAL G/DL (ref 31.5–36.5)
MCV RBC AUTO: 86 FL (ref 78–100)
MCV RBC AUTO: NORMAL FL (ref 78–100)
MONOCYTES # BLD AUTO: 0.5 10E9/L (ref 0–1.3)
MONOCYTES NFR BLD AUTO: 9.2 %
NEUTROPHILS # BLD AUTO: 2.9 10E9/L (ref 1.6–8.3)
NEUTROPHILS NFR BLD AUTO: 50.9 %
NRBC # BLD AUTO: 0 10*3/UL
NRBC BLD AUTO-RTO: 0 /100
PLATELET # BLD AUTO: 243 10E9/L (ref 150–450)
PLATELET # BLD AUTO: NORMAL 10E9/L (ref 150–450)
POTASSIUM SERPL-SCNC: 4.3 MMOL/L (ref 3.4–5.3)
RBC # BLD AUTO: 4.38 10E12/L (ref 4.4–5.9)
RBC # BLD AUTO: NORMAL 10E12/L (ref 4.4–5.9)
SODIUM SERPL-SCNC: 133 MMOL/L (ref 133–144)
TROPONIN I BLD-MCNC: 0 UG/L (ref 0–0.1)
TROPONIN I SERPL-MCNC: NORMAL UG/L (ref 0–0.04)
TROPONIN I SERPL-MCNC: NORMAL UG/L (ref 0–0.04)
WBC # BLD AUTO: 5.8 10E9/L (ref 4–11)
WBC # BLD AUTO: NORMAL 10E9/L (ref 4–11)

## 2017-06-30 PROCEDURE — 93005 ELECTROCARDIOGRAM TRACING: CPT

## 2017-06-30 PROCEDURE — 25000132 ZZH RX MED GY IP 250 OP 250 PS 637: Performed by: EMERGENCY MEDICINE

## 2017-06-30 PROCEDURE — 25000128 H RX IP 250 OP 636: Performed by: EMERGENCY MEDICINE

## 2017-06-30 PROCEDURE — 84484 ASSAY OF TROPONIN QUANT: CPT

## 2017-06-30 PROCEDURE — 82565 ASSAY OF CREATININE: CPT | Mod: 91

## 2017-06-30 PROCEDURE — 84484 ASSAY OF TROPONIN QUANT: CPT | Performed by: EMERGENCY MEDICINE

## 2017-06-30 PROCEDURE — 99285 EMERGENCY DEPT VISIT HI MDM: CPT | Mod: 25

## 2017-06-30 PROCEDURE — 96374 THER/PROPH/DIAG INJ IV PUSH: CPT

## 2017-06-30 PROCEDURE — 85025 COMPLETE CBC W/AUTO DIFF WBC: CPT | Performed by: EMERGENCY MEDICINE

## 2017-06-30 PROCEDURE — 25000132 ZZH RX MED GY IP 250 OP 250 PS 637

## 2017-06-30 PROCEDURE — 96376 TX/PRO/DX INJ SAME DRUG ADON: CPT

## 2017-06-30 PROCEDURE — 36415 COLL VENOUS BLD VENIPUNCTURE: CPT | Performed by: EMERGENCY MEDICINE

## 2017-06-30 PROCEDURE — 25000125 ZZHC RX 250: Performed by: EMERGENCY MEDICINE

## 2017-06-30 PROCEDURE — 80048 BASIC METABOLIC PNL TOTAL CA: CPT | Performed by: EMERGENCY MEDICINE

## 2017-06-30 RX ORDER — NITROGLYCERIN 0.4 MG/1
0.4 TABLET SUBLINGUAL EVERY 5 MIN PRN
Status: DISCONTINUED | OUTPATIENT
Start: 2017-06-30 | End: 2017-06-30 | Stop reason: HOSPADM

## 2017-06-30 RX ORDER — LORAZEPAM 2 MG/ML
0.5 INJECTION INTRAMUSCULAR ONCE
Status: COMPLETED | OUTPATIENT
Start: 2017-06-30 | End: 2017-06-30

## 2017-06-30 RX ORDER — ASPIRIN 81 MG/1
324 TABLET, CHEWABLE ORAL ONCE
Status: COMPLETED | OUTPATIENT
Start: 2017-06-30 | End: 2017-06-30

## 2017-06-30 RX ORDER — SODIUM CHLORIDE 9 MG/ML
1000 INJECTION, SOLUTION INTRAVENOUS CONTINUOUS
Status: DISCONTINUED | OUTPATIENT
Start: 2017-06-30 | End: 2017-06-30 | Stop reason: HOSPADM

## 2017-06-30 RX ORDER — ASPIRIN 81 MG/1
TABLET, CHEWABLE ORAL
Status: COMPLETED
Start: 2017-06-30 | End: 2017-06-30

## 2017-06-30 RX ADMIN — LORAZEPAM 0.5 MG: 2 INJECTION INTRAMUSCULAR; INTRAVENOUS at 14:50

## 2017-06-30 RX ADMIN — SODIUM CHLORIDE 1000 ML: 9 INJECTION, SOLUTION INTRAVENOUS at 14:50

## 2017-06-30 RX ADMIN — LORAZEPAM 0.5 MG: 2 INJECTION INTRAMUSCULAR; INTRAVENOUS at 16:48

## 2017-06-30 RX ADMIN — ASPIRIN 324 MG: 81 TABLET, CHEWABLE ORAL at 14:49

## 2017-06-30 RX ADMIN — NITROGLYCERIN 0.4 MG: 0.4 TABLET SUBLINGUAL at 14:52

## 2017-06-30 RX ADMIN — LIDOCAINE HYDROCHLORIDE 30 ML: 20 SOLUTION ORAL; TOPICAL at 16:48

## 2017-06-30 RX ADMIN — ASPIRIN 81 MG 324 MG: 81 TABLET ORAL at 14:49

## 2017-06-30 ASSESSMENT — ENCOUNTER SYMPTOMS
ABDOMINAL PAIN: 0
NECK PAIN: 1
HEADACHES: 1
SHORTNESS OF BREATH: 0
NUMBNESS: 0

## 2017-06-30 NOTE — ED AVS SNAPSHOT
[facility] Emergency Department    [address]    [facility] [address]    Phone:  [phone]    Fax:  [phone]                                       Perico Houston   MRN: [id number]    Department:  [facility] Emergency Department   Date of Visit:  6/30/2017           After Visit Summary Signature Page     I have received my discharge instructions, and my questions have been answered. I have discussed any challenges I see with this plan with the nurse or doctor.    ..........................................................................................................................................  Patient/Patient Representative Signature      ..........................................................................................................................................  Patient Representative Print Name and Relationship to Patient    ..................................................               ................................................  Date                                            Time    ..........................................................................................................................................  Reviewed by Signature/Title    ...................................................              ..............................................  Date                                                            Time

## 2017-06-30 NOTE — ED NOTES
Pt AOx4, ABCs intact.Pt returned home from Physical therapy at 11 when he began having chest pain. Pt took nitro and then took a nap. Pt then awoke still having chest pain and took another nitro. Second nitro did not provide relief. Pt complaining of chest pain along his sternum and then down his right arm and right leg, as well as in his head. Pt states that the chest pain has not stopped since 11 this morning.
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
## 2017-06-30 NOTE — ED AVS SNAPSHOT
[facility] Emergency Department    [address] [place] [address]    Phone:  [phone]    Fax:  [phone]                                       [place]ico [place]   MRN: [id number]    Department:  [facility] Emergency Department   Date of Visit:  6/30/2017           Patient Information     Date Of Birth          1960        Your diagnoses for this visit were:     Acute chest pain     Coronary artery disease involving native coronary artery of native heart without angina pectoris        You were seen by Cierra Sanchez MD and Mario Leroy MD.        Discharge Instructions          *CHEST PAIN, UNCERTAIN CAUSE    Based on your exam today, the exact cause of your chest pain is not certain. Your condition does not seem serious at this time, and your pain does not appear to be coming from your heart. However, sometimes the signs of a serious problem take more time to appear. Therefore, watch for the warning signs listed below.  HOME CARE:  1. Rest today and avoid strenuous activity.  2. Take any prescribed medicine as directed.  FOLLOW UP with your doctor in 1-3 days.   GET PROMPT MEDICAL ATTENTION if any of the following occur:    A change in the type of pain: if it feels different, becomes more severe, lasts longer, or begins to spread into your shoulder, arm, neck, jaw or back    Shortness of breath or increased pain with breathing    Weakness, dizziness, or fainting    Cough with blood or dark colored sputum (phlegm)    Fever over 101  F (38.3  C)    Swelling, pain or redness in one leg    [id number] [place] [address]. All rights reserved. This information is not intended as a substitute for professional medical care. Always follow your healthcare professional's instructions.      24 Hour Appointment Hotline       To make an appointment at any [facility] clinic, call [phone]). If you don't have  a family doctor or clinic, we will help you find one. [facility] are conveniently located to serve the needs of you and your family.             Review of your medicines      Our records show that you are taking the medicines listed below. If these are incorrect, please call your family doctor or clinic.        Dose / Directions Last dose taken    aspirin 81 MG EC tablet   Dose:  81 mg   Quantity:  30 tablet        Take 1 tablet (81 mg) by mouth daily   Refills:  0        clonazePAM 0.5 MG tablet   Commonly known as:  klonoPIN   Dose:  0.5 mg   Quantity:  20 tablet        Take 1 tablet (0.5 mg) by mouth 3 times daily as needed for anxiety   Refills:  0        gabapentin 300 MG capsule   Commonly known as:  NEURONTIN   Dose:  300 mg   Quantity:  90 capsule        Take 1 capsule (300 mg) by mouth 3 times daily   Refills:  1        GLIPIZIDE XL PO        Refills:  0        hydrOXYzine 50 MG tablet   Commonly known as:  ATARAX   Dose:  50 mg   Quantity:  20 tablet        Take 1 tablet (50 mg) by mouth 3 times daily as needed for other (back pain)   Refills:  0        lisinopril 40 MG tablet   Commonly known as:  PRINIVIL/ZESTRIL   Dose:  40 mg   Quantity:  30 tablet        Take 1 tablet (40 mg) by mouth daily   Refills:  0        metFORMIN 1000 MG tablet   Commonly known as:  GLUCOPHAGE   Dose:  1000 mg   Quantity:  60 tablet        Take 1 tablet (1,000 mg) by mouth 2 times daily (with meals)   Refills:  0        nitroGLYcerin 0.4 MG sublingual tablet   Commonly known as:  NITROSTAT   Dose:  0.4 mg   Quantity:  25 tablet        Place 1 tablet (0.4 mg) under the tongue every 5 minutes as needed for chest pain if you are still having symptoms after 3 doses (15 minutes) call 911.   Refills:  0                Procedures and tests performed during your visit     Procedure/Test Number of Times Performed    Basic metabolic panel 1    CBC with platelets differential 2    Cardiac Continuous Monitoring 1    Creatinine POCT  1    EKG 12 lead 1    EKG 12-lead, tracing only 1    Troponin I 2    Troponin POCT 1      Orders Needing Specimen Collection     None      Pending Results     Date and Time Order Name Status Description    6/30/2017 1744 EKG 12-lead, tracing only Preliminary     6/30/2017 1423 EKG 12 lead Preliminary             Pending Culture Results     No orders found from 6/28/2017 to 7/1/2017.            Pending Results Instructions     If you had any lab results that were not finalized at the time of your Discharge, you can call the ED Lab Result RN at [phone]. You will be contacted by this team for any positive Lab results or changes in treatment. The nurses are available 7 days a week from 10A to 6:30P.  You can leave a message 24 hours per day and they will return your call.        Test Results From Your Hospital Stay        6/30/2017  3:12 PM      Component Results     Component Value Ref Range & Units Status    WBC  4.0 - 11.0 10e9/L Final    Canceled, Test credited   Unsatisfactory specimen - clotted  CALLED JEFF DUITZEL IN [facility] ON 06.30.17 AT 1510 BY       RBC Count  4.4 - 5.9 10e12/L Final    Canceled, Test credited   Unsatisfactory specimen - clotted  CALLED JEFF LIN IN [facility] ON 06.30.17 AT 1510 BY VL      Hemoglobin  13.3 - 17.7 g/dL Final    Canceled, Test credited   Unsatisfactory specimen - clotted  CALLED JEFF LIN IN [facility] ON 06.30.17 AT 1510 BY VL      Hematocrit  40.0 - 53.0 % Final    Canceled, Test credited   Unsatisfactory specimen - clotted  CALLED JEFF LIN IN [facility] ON 06.30.17 AT 1510 BY VL      MCV  78 - 100 fl Final    Canceled, Test credited   Unsatisfactory specimen - clotted  CALLED JEFF LIN IN [facility] ON 06.30.17 AT 1510 BY VL      MCH  26.5 - 33.0 pg Final    Canceled, Test credited   Unsatisfactory specimen - clotted  CALLED JEFF LIN IN [facility] ON 06.30.17 AT 1510 BY VL      MCHC  31.5 - 36.5 g/dL Final    Canceled, Test credited   Unsatisfactory specimen - clotted  CALLED JEFF  DELIA IN [facility] ON 06.30.17 AT 1510 BY       RDW  10.0 - 15.0 % Final    Canceled, Test credited   Unsatisfactory specimen - clotted  CALLED JFEF LIN IN [facility] ON 06.30.17 AT 1510 BY VL      Platelet Count  150 - 450 10e9/L Final    Canceled, Test credited   Unsatisfactory specimen - clotted  CALLED JEFF LIN IN [facility] ON 06.30.17 AT 1510 BY VL      Diff Method   Final    Canceled, Test credited   Unsatisfactory specimen - clotted  CALLED JEFF LIN IN [facility] ON 06.30.17 AT 1510 BY VL           6/30/2017  3:33 PM      Component Results     Component Value Ref Range & Units Status    Sodium 133 133 - 144 mmol/L Final    Potassium 4.3 3.4 - 5.3 mmol/L Final    Specimen moderately hemolyzed, Potassium may be falsely elevated    Chloride 100 94 - 109 mmol/L Final    Carbon Dioxide 27 20 - 32 mmol/L Final    Anion Gap 6 3 - 14 mmol/L Final    Glucose 265 (H) 70 - 99 mg/dL Final    Urea Nitrogen 10 7 - 30 mg/dL Final    Creatinine 0.78 0.66 - 1.25 mg/dL Final    GFR Estimate >90  Non  GFR Calc   >60 mL/min/1.7m2 Final    GFR Estimate If Black >90   GFR Calc   >60 mL/min/1.7m2 Final    Calcium 8.5 8.5 - 10.1 mg/dL Final         6/30/2017  3:33 PM      Component Results     Component Value Ref Range & Units Status    Troponin I ES  0.000 - 0.045 ug/L Final    <0.015  The 99th percentile for upper reference range is 0.045 ug/L.  Troponin values in   the range of 0.045 - 0.120 ug/L may be associated with risks of adverse   clinical events.           6/30/2017  2:55 PM      Component Results     Component Value Ref Range & Units Status    Troponin I 0.00 0.00 - 0.10 ug/L Final         6/30/2017  3:06 PM      Component Results     Component Value Ref Range & Units Status    Creatinine 0.8 0.66 - 1.25 mg/dL Final    GFR Estimate >90 >60 mL/min/1.7m2 Final    GFR Estimate If Black >90 >60 mL/min/1.7m2 Final         6/30/2017  3:42 PM      Component Results     Component Value Ref Range & Units  Status    WBC 5.8 4.0 - 11.0 10e9/L Final    RBC Count 4.38 (L) 4.4 - 5.9 10e12/L Final    Hemoglobin 12.1 (L) 13.3 - 17.7 g/dL Final    Hematocrit 37.7 (L) 40.0 - 53.0 % Final    MCV 86 78 - 100 fl Final    MCH 27.6 26.5 - 33.0 pg Final    MCHC 32.1 31.5 - 36.5 g/dL Final    RDW 12.3 10.0 - 15.0 % Final    Platelet Count 243 150 - 450 10e9/L Final    Diff Method Automated Method  Final    % Neutrophils 50.9 % Final    % Lymphocytes 35.4 % Final    % Monocytes 9.2 % Final    % Eosinophils 3.5 % Final    % Basophils 0.7 % Final    % Immature Granulocytes 0.3 % Final    Nucleated RBCs 0 0 /100 Final    Absolute Neutrophil 2.9 1.6 - 8.3 10e9/L Final    Absolute Lymphocytes 2.0 0.8 - 5.3 10e9/L Final    Absolute Monocytes 0.5 0.0 - 1.3 10e9/L Final    Absolute Eosinophils 0.2 0.0 - 0.7 10e9/L Final    Absolute Basophils 0.0 0.0 - 0.2 10e9/L Final    Abs Immature Granulocytes 0.0 0 - 0.4 10e9/L Final    Absolute Nucleated RBC 0.0  Final         6/30/2017  7:19 PM      Component Results     Component Value Ref Range & Units Status    Troponin I ES  0.000 - 0.045 ug/L Final    <0.015  The 99th percentile for upper reference range is 0.045 ug/L.  Troponin values in   the range of 0.045 - 0.120 ug/L may be associated with risks of adverse   clinical events.                  Clinical Quality Measure: Blood Pressure Screening     Your blood pressure was checked while you were in the emergency department today. The last reading we obtained was  BP: 154/86 . Please read the guidelines below about what these numbers mean and what you should do about them.  If your systolic blood pressure (the top number) is less than 120 and your diastolic blood pressure (the bottom number) is less than 80, then your blood pressure is normal. There is nothing more that you need to do about it.  If your systolic blood pressure (the top number) is 120-139 or your diastolic blood pressure (the bottom number) is 80-89, your blood pressure may be  "higher than it should be. You should have your blood pressure rechecked within a year by a primary care provider.  If your systolic blood pressure (the top number) is 140 or greater or your diastolic blood pressure (the bottom number) is 90 or greater, you may have high blood pressure. High blood pressure is treatable, but if left untreated over time it can put you at risk for heart attack, stroke, or kidney failure. You should have your blood pressure rechecked by a primary care provider within the next 4 weeks.  If your provider in the emergency department today gave you specific instructions to follow-up with your doctor or provider even sooner than that, you should follow that instruction and not wait for up to 4 weeks for your follow-up visit.        Thank you for choosing [facility]       Thank you for choosing [facility] for your care. Our goal is always to provide you with excellent care. Hearing back from our patients is one way we can continue to improve our services. Please take a few minutes to complete the written survey that you may receive in the mail after you visit with us. Thank you!        [employer] Information     [employer] lets you send messages to your doctor, view your test results, renew your prescriptions, schedule appointments and more. To sign up, go to [email or web address] . Click on \"Log in\" on the left side of the screen, which will take you to the Welcome page. Then click on \"Sign up Now\" on the right side of the page.     You will be asked to enter the access code listed below, as well as some personal information. Please follow the directions to create your username and password.     Your access code is: [email or web address]  Expires: 2017 12:18 PM     Your access code will  in 90 days. If you need help or a new code, please call your [facility] clinic or [phone].        Care EveryWhere ID     This is your Care EveryWhere ID. This could be used by other organizations to access your " [facility] medical records  [id number]        Equal Access to Services     MILADIS SIMPSON : Rosa Maria Marcelino, pj stephens, brittney wisdom, fady perez. So [facility] [phone].    ATENCIÓN: Si habla español, tiene a burgos disposición servicios gratuitos de asistencia lingüística. Llame al [phone].    We comply with applicable federal civil rights laws and [place] laws. We do not discriminate on the basis of race, color, national origin, age, disability sex, sexual orientation or gender identity.            After Visit Summary       This is your record. Keep this with you and show to your community pharmacist(s) and doctor(s) at your next visit.

## 2017-06-30 NOTE — ED PROVIDER NOTES
[facility] Emergency Medicine Sign-out Note:       HPI:  This is a 57 year old male who was signed out to me by Dr. Sanchez pending repeat trop and EKG.  Briefly, patient presented with chest pain, long hx of this and care plan for this issue.  The workup here was significant for normal troponin.   I was asked to reassess the patient and follow-up on repeat troponin and EKG.    RESULTS:   Results for orders placed or performed during the hospital encounter of 06/30/17 (from the past 24 hour(s))   EKG 12 lead     Status: None (Preliminary result)    Collection Time: 06/30/17  2:29 PM   Result Value Ref Range    Interpretation ECG Click View Image link to view waveform and result    EKG 12-lead, tracing only     Status: None (Preliminary result)    Collection Time: 06/30/17  2:30 PM   Result Value Ref Range    Interpretation ECG Click View Image link to view waveform and result    CBC with platelets differential     Status: None    Collection Time: 06/30/17  2:35 PM   Result Value Ref Range    WBC  4.0 - 11.0 10e9/L     Canceled, Test credited   Unsatisfactory specimen - clotted  CALLED JEFF DUFF IN [facility] ON 06.30.17 AT 1510 BY       RBC Count  4.4 - 5.9 10e12/L     Canceled, Test credited   Unsatisfactory specimen - clotted  CALLED JEFF DUFF IN [facility] ON 06.30.17 AT 1510 BY VL      Hemoglobin  13.3 - 17.7 g/dL     Canceled, Test credited   Unsatisfactory specimen - clotted  CALLED JEFF DUFF IN [facility] ON 06.30.17 AT 1510 BY VL      Hematocrit  40.0 - 53.0 %     Canceled, Test credited   Unsatisfactory specimen - clotted  CALLED JEFF DUFF IN [facility] ON 06.30.17 AT 1510 BY VL      MCV  78 - 100 fl     Canceled, Test credited   Unsatisfactory specimen - clotted  CALLED JEFF DUFF IN [facility] ON 06.30.17 AT 1510 BY VL      MCH  26.5 - 33.0 pg     Canceled, Test credited   Unsatisfactory specimen - clotted  CALLED JEFF DUFF IN [facility] ON 06.30.17 AT 1510 BY VL      MCHC  31.5 - 36.5 g/dL     Canceled, Test credited    Unsatisfactory specimen - clotted  CALLED JEFF LIN IN [facility] ON 06.30.17 AT 1510 BY       RDW  10.0 - 15.0 %     Canceled, Test credited   Unsatisfactory specimen - clotted  CALLED JEFF LIN IN [facility] ON 06.30.17 AT 1510 BY       Platelet Count  150 - 450 10e9/L     Canceled, Test credited   Unsatisfactory specimen - clotted  CALLED JEFF LIN IN [facility] ON 06.30.17 AT 1510 BY VL      Diff Method       Canceled, Test credited   Unsatisfactory specimen - clotted  CALLED JEFF LIN IN [facility] ON 06.30.17 AT 1510 BY VL     Basic metabolic panel     Status: Abnormal    Collection Time: 06/30/17  2:35 PM   Result Value Ref Range    Sodium 133 133 - 144 mmol/L    Potassium 4.3 3.4 - 5.3 mmol/L    Chloride 100 94 - 109 mmol/L    Carbon Dioxide 27 20 - 32 mmol/L    Anion Gap 6 3 - 14 mmol/L    Glucose 265 (H) 70 - 99 mg/dL    Urea Nitrogen 10 7 - 30 mg/dL    Creatinine 0.78 0.66 - 1.25 mg/dL    GFR Estimate >90  Non  GFR Calc   >60 mL/min/1.7m2    GFR Estimate If Black >90   GFR Calc   >60 mL/min/1.7m2    Calcium 8.5 8.5 - 10.1 mg/dL   Troponin I     Status: None    Collection Time: 06/30/17  2:35 PM   Result Value Ref Range    Troponin I ES  0.000 - 0.045 ug/L     <0.015  The 99th percentile for upper reference range is 0.045 ug/L.  Troponin values in   the range of 0.045 - 0.120 ug/L may be associated with risks of adverse   clinical events.     Troponin POCT     Status: None    Collection Time: 06/30/17  2:38 PM   Result Value Ref Range    Troponin I 0.00 0.00 - 0.10 ug/L   Creatinine POCT     Status: None    Collection Time: 06/30/17  2:54 PM   Result Value Ref Range    Creatinine 0.8 0.66 - 1.25 mg/dL    GFR Estimate >90 >60 mL/min/1.7m2    GFR Estimate If Black >90 >60 mL/min/1.7m2   CBC with platelets differential     Status: Abnormal    Collection Time: 06/30/17  3:30 PM   Result Value Ref Range    WBC 5.8 4.0 - 11.0 10e9/L    RBC Count 4.38 (L) 4.4 - 5.9 10e12/L    Hemoglobin  12.1 (L) 13.3 - 17.7 g/dL    Hematocrit 37.7 (L) 40.0 - 53.0 %    MCV 86 78 - 100 fl    MCH 27.6 26.5 - 33.0 pg    MCHC 32.1 31.5 - 36.5 g/dL    RDW 12.3 10.0 - 15.0 %    Platelet Count 243 150 - 450 10e9/L    Diff Method Automated Method     % Neutrophils 50.9 %    % Lymphocytes 35.4 %    % Monocytes 9.2 %    % Eosinophils 3.5 %    % Basophils 0.7 %    % Immature Granulocytes 0.3 %    Nucleated RBCs 0 0 /100    Absolute Neutrophil 2.9 1.6 - 8.3 10e9/L    Absolute Lymphocytes 2.0 0.8 - 5.3 10e9/L    Absolute Monocytes 0.5 0.0 - 1.3 10e9/L    Absolute Eosinophils 0.2 0.0 - 0.7 10e9/L    Absolute Basophils 0.0 0.0 - 0.2 10e9/L    Abs Immature Granulocytes 0.0 0 - 0.4 10e9/L    Absolute Nucleated RBC 0.0    Troponin I     Status: None    Collection Time: 06/30/17  6:42 PM   Result Value Ref Range    Troponin I ES  0.000 - 0.045 ug/L     <0.015  The 99th percentile for upper reference range is 0.045 ug/L.  Troponin values in   the range of 0.045 - 0.120 ug/L may be associated with risks of adverse   clinical events.         Exam: A brief, limited, pertinent physical exam was performed after results obtained, exam is below.     Vitals:    Vitals:    06/30/17 1502 06/30/17 1517 06/30/17 1632 06/30/17 1647   BP: 133/89 139/88 146/86 (!) 149/92   Resp:       Temp:       TempSrc:       SpO2: 98% 98% 99% 99%     Gen: Patient is alert and interactive  CV: RRR. S1/S2  RESP: CTAB  ABD: Soft and nontender    ECG:  ECG taken at 1830, ECG read at 1830  Normal sinus rhythm  Normal ECG  Rate 73 bpm. SD interval 182. QRS duration 98. QT/QTc 416/458. P-R-T axes 29 14 -8.    Medical Decision Making:   This is a 57 year old male who presented with chest pain.  Please see previous ED Provider note for specifics regarding workup and medical decision making up to this point.  My reexamination and the pending results I was asked to follow-up on indicates safe for d/c home. Delta troponin and EKG reassuring.  Discussed difficulties w/ chronic  pain, known CAD, care plan.  We followed care plan to the best of our abilities today. Therefore, supportive outpatient management is indicated.  Questions were answered and follow-up assured by patient.      Impression:    ICD-10-CM    1. Acute chest pain R07.9    2. Coronary artery disease involving native coronary artery of native heart without angina pectoris I25.10        Plan:    1.  Followup with Primary Care Physician and cardiolgoy              Mario Leroy MD  Emergency Medicine Physician  [facility]         Mario Leroy MD  06/30/17 1935

## 2017-07-01 LAB
INTERPRETATION ECG - MUSE: NORMAL

## 2017-07-01 NOTE — DISCHARGE INSTRUCTIONS
*CHEST PAIN, UNCERTAIN CAUSE    Based on your exam today, the exact cause of your chest pain is not certain. Your condition does not seem serious at this time, and your pain does not appear to be coming from your heart. However, sometimes the signs of a serious problem take more time to appear. Therefore, watch for the warning signs listed below.  HOME CARE:  1. Rest today and avoid strenuous activity.  2. Take any prescribed medicine as directed.  FOLLOW UP with your doctor in 1-3 days.   GET PROMPT MEDICAL ATTENTION if any of the following occur:    A change in the type of pain: if it feels different, becomes more severe, lasts longer, or begins to spread into your shoulder, arm, neck, jaw or back    Shortness of breath or increased pain with breathing    Weakness, dizziness, or fainting    Cough with blood or dark colored sputum (phlegm)    Fever over 101  F (38.3  C)    Swelling, pain or redness in one leg    [id number] [address], [place] [address]. All rights reserved. This information is not intended as a substitute for professional medical care. Always follow your healthcare professional's instructions.

## 2017-07-01 NOTE — ED NOTES
PT HTN: MD Lamas notified. MD advised pt to take at home BP medications when he gets home and monitor. Pt also advised to f/u with PCP

## 2017-07-04 ENCOUNTER — HOSPITAL ENCOUNTER (EMERGENCY)
Facility: CLINIC | Age: 57
Discharge: HOME | End: 2017-07-04
Attending: EMERGENCY MEDICINE | Admitting: EMERGENCY MEDICINE
Payer: COMMERCIAL

## 2017-07-04 ENCOUNTER — APPOINTMENT (OUTPATIENT)
Dept: GENERAL RADIOLOGY | Facility: CLINIC | Age: 57
End: 2017-07-04
Attending: EMERGENCY MEDICINE
Payer: COMMERCIAL

## 2017-07-04 VITALS
TEMPERATURE: 98.1 F | OXYGEN SATURATION: 96 % | RESPIRATION RATE: 16 BRPM | SYSTOLIC BLOOD PRESSURE: 112 MMHG | DIASTOLIC BLOOD PRESSURE: 79 MMHG | HEART RATE: 92 BPM

## 2017-07-04 DIAGNOSIS — R07.9 CHEST PAIN, UNSPECIFIED TYPE: ICD-10-CM

## 2017-07-04 DIAGNOSIS — R22.0 LIP SWELLING: ICD-10-CM

## 2017-07-04 DIAGNOSIS — R06.00 DYSPNEA, UNSPECIFIED TYPE: ICD-10-CM

## 2017-07-04 DIAGNOSIS — G89.29 CHRONIC LOW BACK PAIN, UNSPECIFIED BACK PAIN LATERALITY, WITH SCIATICA PRESENCE UNSPECIFIED: ICD-10-CM

## 2017-07-04 DIAGNOSIS — M54.5 CHRONIC LOW BACK PAIN, UNSPECIFIED BACK PAIN LATERALITY, WITH SCIATICA PRESENCE UNSPECIFIED: ICD-10-CM

## 2017-07-04 LAB
ANION GAP SERPL CALCULATED.3IONS-SCNC: 9 MMOL/L (ref 3–14)
BASOPHILS # BLD AUTO: 0.1 10E9/L (ref 0–0.2)
BASOPHILS NFR BLD AUTO: 0.5 %
BUN SERPL-MCNC: 18 MG/DL (ref 7–30)
CALCIUM SERPL-MCNC: 8.8 MG/DL (ref 8.5–10.1)
CHLORIDE SERPL-SCNC: 100 MMOL/L (ref 94–109)
CO2 SERPL-SCNC: 24 MMOL/L (ref 20–32)
CREAT SERPL-MCNC: 0.82 MG/DL (ref 0.66–1.25)
DIFFERENTIAL METHOD BLD: ABNORMAL
EOSINOPHIL # BLD AUTO: 0.3 10E9/L (ref 0–0.7)
EOSINOPHIL NFR BLD AUTO: 2.9 %
ERYTHROCYTE [DISTWIDTH] IN BLOOD BY AUTOMATED COUNT: 12.5 % (ref 10–15)
GFR SERPL CREATININE-BSD FRML MDRD: >90 ML/MIN/1.7M2
GLUCOSE SERPL-MCNC: 146 MG/DL (ref 70–99)
HCT VFR BLD AUTO: 48.8 % (ref 40–53)
HGB BLD-MCNC: 15.7 G/DL (ref 13.3–17.7)
IMM GRANULOCYTES # BLD: 0.1 10E9/L (ref 0–0.4)
IMM GRANULOCYTES NFR BLD: 0.8 %
LYMPHOCYTES # BLD AUTO: 3.6 10E9/L (ref 0.8–5.3)
LYMPHOCYTES NFR BLD AUTO: 30.8 %
MCH RBC QN AUTO: 27.3 PG (ref 26.5–33)
MCHC RBC AUTO-ENTMCNC: 32.2 G/DL (ref 31.5–36.5)
MCV RBC AUTO: 85 FL (ref 78–100)
MONOCYTES # BLD AUTO: 0.8 10E9/L (ref 0–1.3)
MONOCYTES NFR BLD AUTO: 7.1 %
NEUTROPHILS # BLD AUTO: 6.7 10E9/L (ref 1.6–8.3)
NEUTROPHILS NFR BLD AUTO: 57.9 %
NRBC # BLD AUTO: 0 10*3/UL
NRBC BLD AUTO-RTO: 0 /100
NT-PROBNP SERPL-MCNC: NORMAL PG/ML (ref 0–900)
PLATELET # BLD AUTO: 314 10E9/L (ref 150–450)
POTASSIUM SERPL-SCNC: 4.9 MMOL/L (ref 3.4–5.3)
RBC # BLD AUTO: 5.76 10E12/L (ref 4.4–5.9)
SODIUM SERPL-SCNC: 133 MMOL/L (ref 133–144)
TROPONIN I BLD-MCNC: 0.01 UG/L (ref 0–0.1)
TROPONIN I BLD-MCNC: 0.02 UG/L (ref 0–0.1)
TROPONIN I SERPL-MCNC: NORMAL UG/L (ref 0–0.04)
WBC # BLD AUTO: 11.6 10E9/L (ref 4–11)

## 2017-07-04 PROCEDURE — 85025 COMPLETE CBC W/AUTO DIFF WBC: CPT | Performed by: EMERGENCY MEDICINE

## 2017-07-04 PROCEDURE — 83880 ASSAY OF NATRIURETIC PEPTIDE: CPT | Performed by: EMERGENCY MEDICINE

## 2017-07-04 PROCEDURE — 93005 ELECTROCARDIOGRAM TRACING: CPT | Mod: 76

## 2017-07-04 PROCEDURE — 99285 EMERGENCY DEPT VISIT HI MDM: CPT | Mod: 25

## 2017-07-04 PROCEDURE — 93005 ELECTROCARDIOGRAM TRACING: CPT

## 2017-07-04 PROCEDURE — 80048 BASIC METABOLIC PNL TOTAL CA: CPT | Performed by: EMERGENCY MEDICINE

## 2017-07-04 PROCEDURE — 84484 ASSAY OF TROPONIN QUANT: CPT | Mod: 91

## 2017-07-04 PROCEDURE — 84484 ASSAY OF TROPONIN QUANT: CPT | Performed by: EMERGENCY MEDICINE

## 2017-07-04 PROCEDURE — 40000986 XR CHEST 2 VW

## 2017-07-04 RX ORDER — LIDOCAINE 40 MG/G
CREAM TOPICAL
Status: DISCONTINUED | OUTPATIENT
Start: 2017-07-04 | End: 2017-07-04 | Stop reason: HOSPADM

## 2017-07-04 ASSESSMENT — ENCOUNTER SYMPTOMS
COUGH: 0
SHORTNESS OF BREATH: 1
FEVER: 0

## 2017-07-04 NOTE — ED AVS SNAPSHOT
[facility] Emergency Department    [address]    [facility] [address]    Phone:  [phone]    Fax:  [phone]                                       Perico Houston   MRN: [id number]    Department:  [facility] Emergency Department   Date of Visit:  7/4/2017           Patient Information     Date Of Birth          1960        Your diagnoses for this visit were:     Chest pain, unspecified type     Dyspnea, unspecified type     Chronic low back pain, unspecified back pain laterality, with sciatica presence unspecified     Lip swelling        You were seen by Rachel Martinez MD.      Follow-up Information     Follow up with [facility], [place].    Specialty:  Family Practice    Why:  within 2-3 days for reassessment    Contact information:    [address]  [place] [address]  [phone]          Follow up with [facility] Emergency Department.    Specialty:  EMERGENCY MEDICINE    Why:  As needed, If symptoms worsen    Contact information:    [address]  [facility] [id number] [phone]        Discharge Instructions          *CHEST PAIN, UNCERTAIN CAUSE    Based on your exam today, the exact cause of your chest pain is not certain. Your condition does not seem serious at this time, and your pain does not appear to be coming from your heart. However, sometimes the signs of a serious problem take more time to appear. Therefore, watch for the warning signs listed below.  HOME CARE:  1. Rest today and avoid strenuous activity.  2. Take any prescribed medicine as directed.  FOLLOW UP with your doctor in 1-3 days.   GET PROMPT MEDICAL ATTENTION if any of the following occur:    A change in the type of pain: if it feels different, becomes more severe, lasts longer, or begins to spread into your shoulder, arm, neck, jaw or back    Shortness of breath or increased pain with breathing    Weakness, dizziness, or  fainting    Cough with blood or dark colored sputum (phlegm)    Fever over 101  F (38.3  C)    Swelling, pain or redness in one leg    [id number] [employer], [address] [place] [address], [place] [address]. All rights reserved. This information is not intended as a substitute for professional medical care. Always follow your healthcare professional's instructions.          Shortness of Breath (Dyspnea)  Shortness of breath is the feeling that you can't catch your breath or get enough air. It is also known as dyspnea.  Dyspnea can be caused by many different conditions. They include:    Acute asthma attack.    Worsening of chronic lung diseases such as chronic bronchitis and emphysema.    Heart failure. This is when weak heart muscle allows extra fluid to collect in the lungs.    Panic attacks or anxiety. Fear can cause rapid breathing (hyperventilation).    Pneumonia, or an infection in the lung tissue.    Exposure to toxic substances, fumes, smoke, or certain medicines.    Blood clot in the lung (pulmonary embolism). This is often from a piece of blood clot in a deep vein of the leg (deep vein thrombosis) that breaks off and travels to the lungs.    Heart attack or heart-related chest pain (angina).    Anemia.    Collapsed lung (pneumothorax).    Dehydration.    Pregnancy.  Based on your visit today, the exact cause of your shortness of breath is not certain. Your tests don t show any of the serious causes of dyspnea. You may need other tests to find out if you have a serious problem. It s important to watch for any new symptoms or symptoms that get worse. Follow up with your healthcare provider as directed.  Home care  Follow these tips to take care of yourself at home:    When your symptoms are better, go back to your usual activities.    If you smoke, you should stop. Join a quit-smoking program or ask your healthcare provider for help.    Eat a healthy diet and get plenty of sleep.    Get regular exercise. Talk  with your healthcare provider before starting to exercise, especially if you have other medical problems.    Cut down on the amount of caffeine and stimulants you consume.  Follow-up care  Follow up with your healthcare provider, or as advised.  If tests were done, you will be told if your treatment needs to be changed. You can call as directed for the results.  (Note: If an X-ray was taken, a specialist will review it. You will be notified of any new findings that may affect your care.)  Call 911 or get immediate medical care  Shortness of breath may be a sign of a serious medical problem. For example, it may be a problem with your heart or lungs. Call 911 if you have worsening shortness of breath or trouble breathing, especially with any of the symptoms below:    You are confused or it s difficult to wake you.    You faint or lose consciousness.    You have a fast heartbeat, or your heartbeat is irregular.    You are coughing up blood.    You have pain in your chest, arm, shoulder, neck, or upper back.    You break out in a sweat.  When to seek medical advice  Call your healthcare provider right away if any of these occur:    Slight shortness of breath or wheezing    Redness, pain or swelling in your leg, arm, or other body area    Swelling in both legs or ankles    Fast weight gain    Dizziness or weakness    Fever of 100.4 F (38 C) or higher, or as directed by your healthcare provider  Date Last Reviewed: 9/13/2015 2000-2017 [employer]. [address] [place] [address], [place] [address]. All rights reserved. This information is not intended as a substitute for professional medical care. Always follow your healthcare professional's instructions.      Discontinue lisinopril (may be a cause of the lip swelling) and make a log of blood pressure measurements (no more then 3 times daily) over the next 2-3 days while awaiting reassessment by your doctor. You may still need blood pressure medication, but at  this time, despite no blood pressure medicine since early yesterday, your blood pressure is normal.    24 Hour Appointment Hotline       To make an appointment at any [facility], call [phone] ([phone]). If you don't have a family doctor or clinic, we will help you find one. [facility] clinics are conveniently located to serve the needs of you and your family.             Review of your medicines      Our records show that you are taking the medicines listed below. If these are incorrect, please call your family doctor or clinic.        Dose / Directions Last dose taken    aspirin 81 MG EC tablet   Dose:  81 mg   Quantity:  30 tablet        Take 1 tablet (81 mg) by mouth daily   Refills:  0        clonazePAM 0.5 MG tablet   Commonly known as:  klonoPIN   Dose:  0.5 mg   Quantity:  20 tablet        Take 1 tablet (0.5 mg) by mouth 3 times daily as needed for anxiety   Refills:  0        gabapentin 300 MG capsule   Commonly known as:  NEURONTIN   Dose:  300 mg   Quantity:  90 capsule        Take 1 capsule (300 mg) by mouth 3 times daily   Refills:  1        GLIPIZIDE XL PO        Refills:  0        hydrOXYzine 50 MG tablet   Commonly known as:  ATARAX   Dose:  50 mg   Quantity:  20 tablet        Take 1 tablet (50 mg) by mouth 3 times daily as needed for other (back pain)   Refills:  0        lisinopril 40 MG tablet   Commonly known as:  PRINIVIL/ZESTRIL   Dose:  40 mg   Quantity:  30 tablet        Take 1 tablet (40 mg) by mouth daily   Refills:  0        metFORMIN 1000 MG tablet   Commonly known as:  GLUCOPHAGE   Dose:  1000 mg   Quantity:  60 tablet        Take 1 tablet (1,000 mg) by mouth 2 times daily (with meals)   Refills:  0        nitroGLYcerin 0.4 MG sublingual tablet   Commonly known as:  NITROSTAT   Dose:  0.4 mg   Quantity:  25 tablet        Place 1 tablet (0.4 mg) under the tongue every 5 minutes as needed for chest pain if you are still having symptoms after 3 doses (15 minutes) call [phone].    Refills:  0                Procedures and tests performed during your visit     Procedure/Test Number of Times Performed    Basic metabolic panel 1    CBC with platelets differential 1    Cardiac Continuous Monitoring 1    EKG 12 lead 2    ISTAT troponin 1    Nt probnp inpatient (BNP) 1    Peripheral IV: Standard 1    Pulse oximetry nursing 1    Troponin I 1    Troponin POCT 2    XR Chest 2 Views 1      Orders Needing Specimen Collection     None      Pending Results     Date and Time Order Name Status Description    7/4/2017 0826 EKG 12 lead Preliminary     7/4/2017 0617 EKG 12 lead Preliminary             Pending Culture Results     No orders found from 7/2/2017 to 7/5/2017.            Pending Results Instructions     If you had any lab results that were not finalized at the time of your Discharge, you can call the ED Lab Result RN at [phone]. You will be contacted by this team for any positive Lab results or changes in treatment. The nurses are available 7 days a week from 10A to 6:30P.  You can leave a message 24 hours per day and they will return your call.        Test Results From Your Hospital Stay        7/4/2017  6:31 AM      Component Results     Component Value Ref Range & Units Status    Troponin I 0.01 0.00 - 0.10 ug/L Final         7/4/2017  6:58 AM      Component Results     Component Value Ref Range & Units Status    WBC 11.6 (H) 4.0 - 11.0 10e9/L Final    RBC Count 5.76 4.4 - 5.9 10e12/L Final    Hemoglobin 15.7 13.3 - 17.7 g/dL Final    Hematocrit 48.8 40.0 - 53.0 % Final    MCV 85 78 - 100 fl Final    MCH 27.3 26.5 - 33.0 pg Final    MCHC 32.2 31.5 - 36.5 g/dL Final    RDW 12.5 10.0 - 15.0 % Final    Platelet Count 314 150 - 450 10e9/L Final    Diff Method Automated Method  Final    % Neutrophils 57.9 % Final    % Lymphocytes 30.8 % Final    % Monocytes 7.1 % Final    % Eosinophils 2.9 % Final    % Basophils 0.5 % Final    % Immature Granulocytes 0.8 % Final    Nucleated RBCs 0 0 /100 Final     Absolute Neutrophil 6.7 1.6 - 8.3 10e9/L Final    Absolute Lymphocytes 3.6 0.8 - 5.3 10e9/L Final    Absolute Monocytes 0.8 0.0 - 1.3 10e9/L Final    Absolute Eosinophils 0.3 0.0 - 0.7 10e9/L Final    Absolute Basophils 0.1 0.0 - 0.2 10e9/L Final    Abs Immature Granulocytes 0.1 0 - 0.4 10e9/L Final    Absolute Nucleated RBC 0.0  Final         7/4/2017  7:45 AM      Component Results     Component Value Ref Range & Units Status    Sodium 133 133 - 144 mmol/L Final    Potassium 4.9 3.4 - 5.3 mmol/L Final    Specimen slightly hemolyzed, potassium may be falsely elevated    Chloride 100 94 - 109 mmol/L Final    Carbon Dioxide 24 20 - 32 mmol/L Final    Anion Gap 9 3 - 14 mmol/L Final    Glucose 146 (H) 70 - 99 mg/dL Final    Urea Nitrogen 18 7 - 30 mg/dL Final    Creatinine 0.82 0.66 - 1.25 mg/dL Final    GFR Estimate >90 >60 mL/min/1.7m2 Final    GFR Estimate If Black >90 >60 mL/min/1.7m2 Final    Calcium 8.8 8.5 - 10.1 mg/dL Final         7/4/2017  7:45 AM      Component Results     Component Value Ref Range & Units Status    Troponin I ES  0.000 - 0.045 ug/L Final    <0.015  The 99th percentile for upper reference range is 0.045 ug/L.  Troponin values in   the range of 0.045 - 0.120 ug/L may be associated with risks of adverse   clinical events.           7/4/2017  9:05 AM      Narrative     CHEST TWO VIEWS July 4, 2017 7:03 AM     HISTORY: Chest pain.    COMPARISON: 11/24/2016.        Impression     IMPRESSION: Expiratory radiographs. Lordotic PA view obscuring detail  especially at the right lung base. Right upper quadrant surgical  clips. No acute infiltrate. Normal heart size. No pleural effusion.    ROBER BARTHOLOMEW MD         7/4/2017  7:45 AM      Component Results     Component Value Ref Range & Units Status    N-Terminal Pro BNP Inpatient  0 - 900 pg/mL Final    <5    Reference range shown and results flagged as abnormal are suggested inpatient   cut points for confirming diagnosis if CHF in an acute  setting. Establishing a   baseline value for each individual patient is useful for follow-up. An   inpatient or emergency department NT-proPBNP <300 pg/mL effectively rules out   acute CHF, with 99% negative predictive value.  The outpatient non-acute reference range for ruling out CHF is:   0-125 pg/mL (age 18 to less than 75)   0-450 pg/mL (age 75 yrs and older)           7/4/2017  9:01 AM      Component Results     Component Value Ref Range & Units Status    Troponin I 0.02 0.00 - 0.10 ug/L Final                Clinical Quality Measure: Blood Pressure Screening     Your blood pressure was checked while you were in the emergency department today. The last reading we obtained was  BP: 110/82 . Please read the guidelines below about what these numbers mean and what you should do about them.  If your systolic blood pressure (the top number) is less than 120 and your diastolic blood pressure (the bottom number) is less than 80, then your blood pressure is normal. There is nothing more that you need to do about it.  If your systolic blood pressure (the top number) is 120-139 or your diastolic blood pressure (the bottom number) is 80-89, your blood pressure may be higher than it should be. You should have your blood pressure rechecked within a year by a primary care provider.  If your systolic blood pressure (the top number) is 140 or greater or your diastolic blood pressure (the bottom number) is 90 or greater, you may have high blood pressure. High blood pressure is treatable, but if left untreated over time it can put you at risk for heart attack, stroke, or kidney failure. You should have your blood pressure rechecked by a primary care provider within the next 4 weeks.  If your provider in the emergency department today gave you specific instructions to follow-up with your doctor or provider even sooner than that, you should follow that instruction and not wait for up to 4 weeks for your follow-up visit.       "  Thank you for choosing [facility]       Thank you for choosing [facility] for your care. Our goal is always to provide you with excellent care. Hearing back from our patients is one way we can continue to improve our services. Please take a few minutes to complete the written survey that you may receive in the mail after you visit with us. Thank you!        [employer] Information     [employer] lets you send messages to your doctor, view your test results, renew your prescriptions, schedule appointments and more. To sign up, go to [email or web address] . Click on \"Log in\" on the left side of the screen, which will take you to the Welcome page. Then click on \"Sign up Now\" on the right side of the page.     You will be asked to enter the access code listed below, as well as some personal information. Please follow the directions to create your username and password.     Your access code is: [email or web address]  Expires: 2017 12:18 PM     Your access code will  in 90 days. If you need help or a new code, please call your [facility] clinic or [phone].        Care EveryWhere ID     This is your Care EveryWhere ID. This could be used by other organizations to access your [facility] medical records  [id number]        Equal Access to Services     MILADIS SIMPSON : Rosa Maria rivaso Ryland, waaxda luqadaha, qaybta kaalmada adechelayada, fady perez. So [facility] [phone].    ATENCIÓN: Si habla español, tiene a burgos disposición servicios gratuitos de asistencia lingüística. Llame al [phone].    We comply with applicable federal civil rights laws and [place] laws. We do not discriminate on the basis of race, color, national origin, age, disability sex, sexual orientation or gender identity.            After Visit Summary       This is your record. Keep this with you and show to your community pharmacist(s) and doctor(s) at your next visit.                  "

## 2017-07-04 NOTE — ED NOTES
Pt complains of chest pain 7/10 started last evening, received 325 ASA, 1 nitro and zofran from EMS.

## 2017-07-04 NOTE — DISCHARGE INSTRUCTIONS
*CHEST PAIN, UNCERTAIN CAUSE    Based on your exam today, the exact cause of your chest pain is not certain. Your condition does not seem serious at this time, and your pain does not appear to be coming from your heart. However, sometimes the signs of a serious problem take more time to appear. Therefore, watch for the warning signs listed below.  HOME CARE:  1. Rest today and avoid strenuous activity.  2. Take any prescribed medicine as directed.  FOLLOW UP with your doctor in 1-3 days.   GET PROMPT MEDICAL ATTENTION if any of the following occur:    A change in the type of pain: if it feels different, becomes more severe, lasts longer, or begins to spread into your shoulder, arm, neck, jaw or back    Shortness of breath or increased pain with breathing    Weakness, dizziness, or fainting    Cough with blood or dark colored sputum (phlegm)    Fever over 101  F (38.3  C)    Swelling, pain or redness in one leg    8331-2653 [place] [address]. All rights reserved. This information is not intended as a substitute for professional medical care. Always follow your healthcare professional's instructions.          Shortness of Breath (Dyspnea)  Shortness of breath is the feeling that you can't catch your breath or get enough air. It is also known as dyspnea.  Dyspnea can be caused by many different conditions. They include:    Acute asthma attack.    Worsening of chronic lung diseases such as chronic bronchitis and emphysema.    Heart failure. This is when weak heart muscle allows extra fluid to collect in the lungs.    Panic attacks or anxiety. Fear can cause rapid breathing (hyperventilation).    Pneumonia, or an infection in the lung tissue.    Exposure to toxic substances, fumes, smoke, or certain medicines.    Blood clot in the lung (pulmonary embolism). This is often from a piece of blood clot in a deep vein of the leg (deep vein thrombosis) that breaks off and travels to  the lungs.    Heart attack or heart-related chest pain (angina).    Anemia.    Collapsed lung (pneumothorax).    Dehydration.    Pregnancy.  Based on your visit today, the exact cause of your shortness of breath is not certain. Your tests don t show any of the serious causes of dyspnea. You may need other tests to find out if you have a serious problem. It s important to watch for any new symptoms or symptoms that get worse. Follow up with your healthcare provider as directed.  Home care  Follow these tips to take care of yourself at home:    When your symptoms are better, go back to your usual activities.    If you smoke, you should stop. Join a quit-smoking program or ask your healthcare provider for help.    Eat a healthy diet and get plenty of sleep.    Get regular exercise. Talk with your healthcare provider before starting to exercise, especially if you have other medical problems.    Cut down on the amount of caffeine and stimulants you consume.  Follow-up care  Follow up with your healthcare provider, or as advised.  If tests were done, you will be told if your treatment needs to be changed. You can call as directed for the results.  (Note: If an X-ray was taken, a specialist will review it. You will be notified of any new findings that may affect your care.)  Call 911 or get immediate medical care  Shortness of breath may be a sign of a serious medical problem. For example, it may be a problem with your heart or lungs. Call 911 if you have worsening shortness of breath or trouble breathing, especially with any of the symptoms below:    You are confused or it s difficult to wake you.    You faint or lose consciousness.    You have a fast heartbeat, or your heartbeat is irregular.    You are coughing up blood.    You have pain in your chest, arm, shoulder, neck, or upper back.    You break out in a sweat.  When to seek medical advice  Call your healthcare provider right away if any of these occur:    Slight  shortness of breath or wheezing    Redness, pain or swelling in your leg, arm, or other body area    Swelling in both legs or ankles    Fast weight gain    Dizziness or weakness    Fever of 100.4 F (38 C) or higher, or as directed by your healthcare provider  Date Last Reviewed: 9/13/2015 2000-2017 [employer]. [address] [place] [address]. All rights reserved. This information is not intended as a substitute for professional medical care. Always follow your healthcare professional's instructions.      Discontinue lisinopril (may be a cause of the lip swelling) and make a log of blood pressure measurements (no more then 3 times daily) over the next 2-3 days while awaiting reassessment by your doctor. You may still need blood pressure medication, but at this time, despite no blood pressure medicine since early yesterday, your blood pressure is normal.

## 2017-07-04 NOTE — ED AVS SNAPSHOT
[facility] Emergency Department    [address]    [facility] [address]    Phone:  [phone]    Fax:  [phone]                                       Perico Houston   MRN: [id number]    Department:  [facility] Emergency Department   Date of Visit:  7/4/2017           After Visit Summary Signature Page     I have received my discharge instructions, and my questions have been answered. I have discussed any challenges I see with this plan with the nurse or doctor.    ..........................................................................................................................................  Patient/Patient Representative Signature      ..........................................................................................................................................  Patient Representative Print Name and Relationship to Patient    ..................................................               ................................................  Date                                            Time    ..........................................................................................................................................  Reviewed by Signature/Title    ...................................................              ..............................................  Date                                                            Time
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
## 2017-07-05 LAB
INTERPRETATION ECG - MUSE: NORMAL
INTERPRETATION ECG - MUSE: NORMAL

## 2017-07-08 ENCOUNTER — APPOINTMENT (OUTPATIENT)
Dept: GENERAL RADIOLOGY | Facility: CLINIC | Age: 57
End: 2017-07-08
Attending: EMERGENCY MEDICINE
Payer: COMMERCIAL

## 2017-07-08 ENCOUNTER — HOSPITAL ENCOUNTER (OUTPATIENT)
Facility: CLINIC | Age: 57
Setting detail: OBSERVATION
Discharge: HOME | End: 2017-07-09
Attending: EMERGENCY MEDICINE | Admitting: INTERNAL MEDICINE
Payer: COMMERCIAL

## 2017-07-08 ENCOUNTER — APPOINTMENT (OUTPATIENT)
Dept: CT IMAGING | Facility: CLINIC | Age: 57
End: 2017-07-08
Attending: EMERGENCY MEDICINE
Payer: COMMERCIAL

## 2017-07-08 DIAGNOSIS — R07.9 CHEST PAIN: ICD-10-CM

## 2017-07-08 LAB
ANION GAP SERPL CALCULATED.3IONS-SCNC: 7 MMOL/L (ref 3–14)
BASOPHILS # BLD AUTO: 0.1 10E9/L (ref 0–0.2)
BASOPHILS NFR BLD AUTO: 0.8 %
BUN SERPL-MCNC: 11 MG/DL (ref 7–30)
CALCIUM SERPL-MCNC: 8.7 MG/DL (ref 8.5–10.1)
CHLORIDE SERPL-SCNC: 103 MMOL/L (ref 94–109)
CO2 SERPL-SCNC: 24 MMOL/L (ref 20–32)
CREAT SERPL-MCNC: 0.7 MG/DL (ref 0.66–1.25)
D DIMER PPP FEU-MCNC: 1.5 UG/ML FEU (ref 0–0.5)
DIFFERENTIAL METHOD BLD: NORMAL
EOSINOPHIL # BLD AUTO: 0.4 10E9/L (ref 0–0.7)
EOSINOPHIL NFR BLD AUTO: 5.3 %
ERYTHROCYTE [DISTWIDTH] IN BLOOD BY AUTOMATED COUNT: 12.4 % (ref 10–15)
GFR SERPL CREATININE-BSD FRML MDRD: ABNORMAL ML/MIN/1.7M2
GLUCOSE SERPL-MCNC: 130 MG/DL (ref 70–99)
HCT VFR BLD AUTO: 43 % (ref 40–53)
HGB BLD-MCNC: 13.8 G/DL (ref 13.3–17.7)
IMM GRANULOCYTES # BLD: 0 10E9/L (ref 0–0.4)
IMM GRANULOCYTES NFR BLD: 0.3 %
INTERPRETATION ECG - MUSE: NORMAL
LYMPHOCYTES # BLD AUTO: 3.3 10E9/L (ref 0.8–5.3)
LYMPHOCYTES NFR BLD AUTO: 43.5 %
MCH RBC QN AUTO: 27.4 PG (ref 26.5–33)
MCHC RBC AUTO-ENTMCNC: 32.1 G/DL (ref 31.5–36.5)
MCV RBC AUTO: 85 FL (ref 78–100)
MONOCYTES # BLD AUTO: 0.6 10E9/L (ref 0–1.3)
MONOCYTES NFR BLD AUTO: 7.4 %
NEUTROPHILS # BLD AUTO: 3.3 10E9/L (ref 1.6–8.3)
NEUTROPHILS NFR BLD AUTO: 42.7 %
NRBC # BLD AUTO: 0 10*3/UL
NRBC BLD AUTO-RTO: 0 /100
PLATELET # BLD AUTO: 282 10E9/L (ref 150–450)
POTASSIUM SERPL-SCNC: 4.2 MMOL/L (ref 3.4–5.3)
RBC # BLD AUTO: 5.04 10E12/L (ref 4.4–5.9)
SODIUM SERPL-SCNC: 134 MMOL/L (ref 133–144)
TROPONIN I SERPL-MCNC: NORMAL UG/L (ref 0–0.04)
WBC # BLD AUTO: 7.7 10E9/L (ref 4–11)

## 2017-07-08 PROCEDURE — 25000128 H RX IP 250 OP 636: Performed by: EMERGENCY MEDICINE

## 2017-07-08 PROCEDURE — 93005 ELECTROCARDIOGRAM TRACING: CPT

## 2017-07-08 PROCEDURE — 25000132 ZZH RX MED GY IP 250 OP 250 PS 637: Performed by: EMERGENCY MEDICINE

## 2017-07-08 PROCEDURE — 71260 CT THORAX DX C+: CPT

## 2017-07-08 PROCEDURE — 25000132 ZZH RX MED GY IP 250 OP 250 PS 637

## 2017-07-08 PROCEDURE — 80048 BASIC METABOLIC PNL TOTAL CA: CPT | Performed by: EMERGENCY MEDICINE

## 2017-07-08 PROCEDURE — 99219 ZZC INITIAL OBSERVATION CARE,LEVL II: CPT | Performed by: INTERNAL MEDICINE

## 2017-07-08 PROCEDURE — 96376 TX/PRO/DX INJ SAME DRUG ADON: CPT

## 2017-07-08 PROCEDURE — 71020 XR CHEST 2 VW: CPT

## 2017-07-08 PROCEDURE — 85025 COMPLETE CBC W/AUTO DIFF WBC: CPT | Performed by: EMERGENCY MEDICINE

## 2017-07-08 PROCEDURE — 84484 ASSAY OF TROPONIN QUANT: CPT | Performed by: EMERGENCY MEDICINE

## 2017-07-08 PROCEDURE — 96374 THER/PROPH/DIAG INJ IV PUSH: CPT | Mod: 59

## 2017-07-08 PROCEDURE — 85379 FIBRIN DEGRADATION QUANT: CPT | Performed by: EMERGENCY MEDICINE

## 2017-07-08 PROCEDURE — 99285 EMERGENCY DEPT VISIT HI MDM: CPT | Mod: 25

## 2017-07-08 RX ORDER — NITROGLYCERIN 0.4 MG/1
TABLET SUBLINGUAL
Status: COMPLETED
Start: 2017-07-08 | End: 2017-07-08

## 2017-07-08 RX ORDER — IOPAMIDOL 755 MG/ML
500 INJECTION, SOLUTION INTRAVASCULAR ONCE
Status: COMPLETED | OUTPATIENT
Start: 2017-07-08 | End: 2017-07-08

## 2017-07-08 RX ORDER — NITROGLYCERIN 0.4 MG/1
0.4 TABLET SUBLINGUAL EVERY 5 MIN PRN
Status: DISCONTINUED | OUTPATIENT
Start: 2017-07-08 | End: 2017-07-09

## 2017-07-08 RX ORDER — LORAZEPAM 2 MG/ML
0.5 INJECTION INTRAMUSCULAR ONCE
Status: COMPLETED | OUTPATIENT
Start: 2017-07-08 | End: 2017-07-08

## 2017-07-08 RX ORDER — LIDOCAINE 40 MG/G
CREAM TOPICAL
Status: DISCONTINUED | OUTPATIENT
Start: 2017-07-08 | End: 2017-07-09

## 2017-07-08 RX ORDER — OXYCODONE HYDROCHLORIDE 5 MG/1
10 TABLET ORAL ONCE
Status: COMPLETED | OUTPATIENT
Start: 2017-07-08 | End: 2017-07-08

## 2017-07-08 RX ADMIN — LORAZEPAM 0.5 MG: 2 INJECTION INTRAMUSCULAR; INTRAVENOUS at 22:14

## 2017-07-08 RX ADMIN — OXYCODONE HYDROCHLORIDE 10 MG: 5 TABLET ORAL at 21:14

## 2017-07-08 RX ADMIN — LORAZEPAM 0.5 MG: 2 INJECTION INTRAMUSCULAR; INTRAVENOUS at 23:45

## 2017-07-08 RX ADMIN — IOPAMIDOL 90 ML: 755 INJECTION, SOLUTION INTRAVENOUS at 22:40

## 2017-07-08 RX ADMIN — NITROGLYCERIN 0.4 MG: 0.4 TABLET SUBLINGUAL at 22:06

## 2017-07-08 RX ADMIN — SODIUM CHLORIDE 90 ML: 9 INJECTION, SOLUTION INTRAVENOUS at 22:40

## 2017-07-08 ASSESSMENT — ENCOUNTER SYMPTOMS
HEADACHES: 0
FEVER: 0
VOMITING: 0
NAUSEA: 1
BACK PAIN: 1
DIARRHEA: 0

## 2017-07-08 NOTE — IP AVS SNAPSHOT
MRN:[id number]                      After Visit Summary   7/8/2017    Perico Houston    MRN: [id number]           Thank you!     Thank you for choosing [facility] for your care. Our goal is always to provide you with excellent care. Hearing back from our patients is one way we can continue to improve our services. Please take a few minutes to complete the written survey that you may receive in the mail after you visit. If you would like to speak to someone directly about your visit please contact Patient Relations at [phone]. Thank you!          Patient Information     Date Of Birth          1960        About your hospital stay     You were admitted on:  July 9, 2017 You last received care in the:  [facility] Observation Department    You were discharged on:  July 9, 2017        Reason for your hospital stay       Chest pain, anxiety                  Who to Call     For medical emergencies, please call 911.  For non-urgent questions about your medical care, please call your primary care provider or clinic, [phone]          Attending Provider     Provider Specialty    Gavin Cyr MD Emergency Medicine    Loan Webb MD Internal Medicine       Primary Care Provider Office Phone # Fax #    [facility] [phone]      After Care Instructions     Activity       Your activity upon discharge: activity as tolerated            Diet       Follow this diet upon discharge: Orders Placed This Encounter      Combination Diet 2516-7796 Calories: Moderate Consistent CHO (4-6 CHO units/meal); Low Saturated Fat Na <2400mg Diet, No Caffeine Diet                  Follow-up Appointments     Follow-up and recommended labs and tests        Follow up with primary care provider, [facility], within 7 days for hospital follow- up.  The following labs/tests are recommended: discuss with your primary care doctor about the need  "for an outpatient stress test.                             Pending Results     No orders found for last 3 day(s).            Statement of Approval     Ordered          17 1036  I have reviewed and agree with all the recommendations and orders detailed in this document.  EFFECTIVE NOW     Approved and electronically signed by:  Joshua Collado MD             Admission Information     Date & Time Department Dept. Phone    2017 [facility] Observation Department [phone]      Your Vitals Were     Blood Pressure Pulse Temperature Respirations Height Weight    151/81 (BP Location: Left arm) 82 97.4  F (36.3  C) (Oral) 20 1.829 m (6') 103.4 kg (228 lb)    Pulse Oximetry BMI (Body Mass Index)                95% 30.92 kg/m2          MyChart Information     [facility] lets you send messages to your doctor, view your test results, renew your prescriptions, schedule appointments and more. To sign up, go to [email or web address] . Click on \"Log in\" on the left side of the screen, which will take you to the Welcome page. Then click on \"Sign up Now\" on the right side of the page.     You will be asked to enter the access code listed below, as well as some personal information. Please follow the directions to create your username and password.     Your access code is: [email or web address]  Expires: 2017 12:18 PM     Your access code will  in 90 days. If you need help or a new code, please call your [place] clinic or [phone].        Care EveryWhere ID     This is your Care EveryWhere ID. This could be used by other organizations to access your [facility] medical records  [id number]        Equal Access to Services     [facility]: Haddeonte Marcelino, pj stephens, qafady toribio. So [facility] [phone].    ATENCIÓN: Si habla español, tiene a burgos disposición servicios gratuitos de asistencia lingüística. Llame al " [phone].    We comply with applicable federal civil rights laws and [place] laws. We do not discriminate on the basis of race, color, national origin, age, disability sex, sexual orientation or gender identity.               Review of your medicines      CONTINUE these medicines which have NOT CHANGED        Dose / Directions    aspirin 81 MG EC tablet   Used for:  NSTEMI (non-ST elevated myocardial infarction) (H)        Dose:  81 mg   Take 1 tablet (81 mg) by mouth daily   Quantity:  30 tablet   Refills:  0       clonazePAM 0.5 MG tablet   Commonly known as:  klonoPIN        Dose:  1 mg   Take 1 mg by mouth 3 times daily   Refills:  0       gabapentin 300 MG capsule   Commonly known as:  NEURONTIN        Dose:  300 mg   Take 1 capsule (300 mg) by mouth 3 times daily   Quantity:  90 capsule   Refills:  1       glipiZIDE 5 MG 24 hr tablet   Commonly known as:  GLUCOTROL XL        Dose:  5 mg   Take 5 mg by mouth daily   Refills:  0       hydrOXYzine 50 MG tablet   Commonly known as:  ATARAX        Dose:  50 mg   Take 1 tablet (50 mg) by mouth 3 times daily as needed for other (back pain)   Quantity:  20 tablet   Refills:  0       lisinopril 40 MG tablet   Commonly known as:  PRINIVIL/ZESTRIL   Used for:  Essential hypertension        Dose:  40 mg   Take 1 tablet (40 mg) by mouth daily   Quantity:  30 tablet   Refills:  0       metFORMIN 1000 MG tablet   Commonly known as:  GLUCOPHAGE   Used for:  Diabetes mellitus due to underlying condition with hyperglycemia (H)        Dose:  1000 mg   Take 1 tablet (1,000 mg) by mouth 2 times daily (with meals)   Quantity:  60 tablet   Refills:  0       nitroGLYcerin 0.4 MG sublingual tablet   Commonly known as:  NITROSTAT   Used for:  NSTEMI (non-ST elevated myocardial infarction) (H)        Dose:  0.4 mg   Place 1 tablet (0.4 mg) under the tongue every 5 minutes as needed for chest pain if you are still having symptoms after 3 doses (15 minutes) call 911.   Quantity:  25  tablet   Refills:  0       oxyCODONE 10 MG IR tablet   Commonly known as:  ROXICODONE        Dose:  10 mg   Take 10 mg by mouth 3 times daily   Refills:  0                Protect others around you: Learn how to safely use, store and throw away your medicines at www.disposemymeds.org.             Medication List: This is a list of all your medications and when to take them. Check marks below indicate your daily home schedule. Keep this list as a reference.      Medications           Morning Afternoon Evening Bedtime As Needed    aspirin 81 MG EC tablet   Take 1 tablet (81 mg) by mouth daily   Last time this was given:  81 mg on 7/9/2017  7:49 AM                                clonazePAM 0.5 MG tablet   Commonly known as:  klonoPIN   Take 1 mg by mouth 3 times daily   Last time this was given:  0.5 mg on 7/9/2017  2:19 AM                                gabapentin 300 MG capsule   Commonly known as:  NEURONTIN   Take 1 capsule (300 mg) by mouth 3 times daily   Last time this was given:  300 mg on 7/9/2017  7:48 AM                                glipiZIDE 5 MG 24 hr tablet   Commonly known as:  GLUCOTROL XL   Take 5 mg by mouth daily                                hydrOXYzine 50 MG tablet   Commonly known as:  ATARAX   Take 1 tablet (50 mg) by mouth 3 times daily as needed for other (back pain)   Last time this was given:  50 mg on 7/9/2017  4:12 AM                                lisinopril 40 MG tablet   Commonly known as:  PRINIVIL/ZESTRIL   Take 1 tablet (40 mg) by mouth daily   Last time this was given:  40 mg on 7/9/2017  7:48 AM                                metFORMIN 1000 MG tablet   Commonly known as:  GLUCOPHAGE   Take 1 tablet (1,000 mg) by mouth 2 times daily (with meals)   Last time this was given:  1,000 mg on 7/9/2017  7:48 AM                                nitroGLYcerin 0.4 MG sublingual tablet   Commonly known as:  NITROSTAT   Place 1 tablet (0.4 mg) under the tongue every 5 minutes as needed for chest  pain if you are still having symptoms after 3 doses (15 minutes) call 911.   Last time this was given:  0.4 mg on 7/8/2017 10:06 PM                                oxyCODONE 10 MG IR tablet   Commonly known as:  ROXICODONE   Take 10 mg by mouth 3 times daily   Last time this was given:  10 mg on 7/8/2017  9:14 PM

## 2017-07-08 NOTE — IP AVS SNAPSHOT
[facility] Observation Department    [address]    [facility] [address]    Phone:  [phone]                                       After Visit Summary   7/8/2017    Perico Houston    MRN: [id number]           After Visit Summary Signature Page     I have received my discharge instructions, and my questions have been answered. I have discussed any challenges I see with this plan with the nurse or doctor.    ..........................................................................................................................................  Patient/Patient Representative Signature      ..........................................................................................................................................  Patient Representative Print Name and Relationship to Patient    ..................................................               ................................................  Date                                            Time    ..........................................................................................................................................  Reviewed by Signature/Title    ...................................................              ..............................................  Date                                                            Time

## 2017-07-09 VITALS
HEART RATE: 82 BPM | TEMPERATURE: 97.4 F | WEIGHT: 228 LBS | DIASTOLIC BLOOD PRESSURE: 81 MMHG | BODY MASS INDEX: 30.88 KG/M2 | HEIGHT: 72 IN | RESPIRATION RATE: 20 BRPM | OXYGEN SATURATION: 95 % | SYSTOLIC BLOOD PRESSURE: 151 MMHG

## 2017-07-09 LAB
TROPONIN I SERPL-MCNC: NORMAL UG/L (ref 0–0.04)

## 2017-07-09 PROCEDURE — G0378 HOSPITAL OBSERVATION PER HR: HCPCS

## 2017-07-09 PROCEDURE — 84484 ASSAY OF TROPONIN QUANT: CPT | Mod: 91 | Performed by: INTERNAL MEDICINE

## 2017-07-09 PROCEDURE — 99217 ZZC OBSERVATION CARE DISCHARGE: CPT | Performed by: HOSPITALIST

## 2017-07-09 PROCEDURE — 96375 TX/PRO/DX INJ NEW DRUG ADDON: CPT

## 2017-07-09 PROCEDURE — 25000125 ZZHC RX 250: Performed by: EMERGENCY MEDICINE

## 2017-07-09 PROCEDURE — 25000132 ZZH RX MED GY IP 250 OP 250 PS 637: Performed by: INTERNAL MEDICINE

## 2017-07-09 PROCEDURE — 36415 COLL VENOUS BLD VENIPUNCTURE: CPT | Performed by: INTERNAL MEDICINE

## 2017-07-09 PROCEDURE — 84484 ASSAY OF TROPONIN QUANT: CPT | Performed by: EMERGENCY MEDICINE

## 2017-07-09 PROCEDURE — 25000128 H RX IP 250 OP 636: Performed by: INTERNAL MEDICINE

## 2017-07-09 PROCEDURE — 25000132 ZZH RX MED GY IP 250 OP 250 PS 637: Performed by: EMERGENCY MEDICINE

## 2017-07-09 RX ORDER — ACETAMINOPHEN 325 MG/1
650 TABLET ORAL EVERY 4 HOURS PRN
Status: DISCONTINUED | OUTPATIENT
Start: 2017-07-09 | End: 2017-07-09 | Stop reason: HOSPADM

## 2017-07-09 RX ORDER — OXYCODONE HYDROCHLORIDE 5 MG/1
10 TABLET ORAL 3 TIMES DAILY
Status: DISCONTINUED | OUTPATIENT
Start: 2017-07-09 | End: 2017-07-09 | Stop reason: HOSPADM

## 2017-07-09 RX ORDER — LIDOCAINE 40 MG/G
CREAM TOPICAL
Status: DISCONTINUED | OUTPATIENT
Start: 2017-07-09 | End: 2017-07-09 | Stop reason: HOSPADM

## 2017-07-09 RX ORDER — KETOROLAC TROMETHAMINE 30 MG/ML
15 INJECTION, SOLUTION INTRAMUSCULAR; INTRAVENOUS EVERY 6 HOURS PRN
Status: DISCONTINUED | OUTPATIENT
Start: 2017-07-09 | End: 2017-07-09 | Stop reason: HOSPADM

## 2017-07-09 RX ORDER — NALOXONE HYDROCHLORIDE 0.4 MG/ML
.1-.4 INJECTION, SOLUTION INTRAMUSCULAR; INTRAVENOUS; SUBCUTANEOUS
Status: DISCONTINUED | OUTPATIENT
Start: 2017-07-09 | End: 2017-07-09 | Stop reason: HOSPADM

## 2017-07-09 RX ORDER — GABAPENTIN 300 MG/1
300 CAPSULE ORAL 3 TIMES DAILY
Status: DISCONTINUED | OUTPATIENT
Start: 2017-07-09 | End: 2017-07-09 | Stop reason: HOSPADM

## 2017-07-09 RX ORDER — CLONAZEPAM 1 MG/1
1 TABLET ORAL 3 TIMES DAILY
Status: DISCONTINUED | OUTPATIENT
Start: 2017-07-09 | End: 2017-07-09 | Stop reason: HOSPADM

## 2017-07-09 RX ORDER — NICOTINE POLACRILEX 4 MG
15-30 LOZENGE BUCCAL
Status: DISCONTINUED | OUTPATIENT
Start: 2017-07-09 | End: 2017-07-09 | Stop reason: HOSPADM

## 2017-07-09 RX ORDER — ALUMINA, MAGNESIA, AND SIMETHICONE 2400; 2400; 240 MG/30ML; MG/30ML; MG/30ML
15-30 SUSPENSION ORAL EVERY 4 HOURS PRN
Status: DISCONTINUED | OUTPATIENT
Start: 2017-07-09 | End: 2017-07-09 | Stop reason: HOSPADM

## 2017-07-09 RX ORDER — ASPIRIN 81 MG/1
81 TABLET ORAL DAILY
Status: DISCONTINUED | OUTPATIENT
Start: 2017-07-09 | End: 2017-07-09 | Stop reason: HOSPADM

## 2017-07-09 RX ORDER — LISINOPRIL 20 MG/1
40 TABLET ORAL DAILY
Status: DISCONTINUED | OUTPATIENT
Start: 2017-07-09 | End: 2017-07-09 | Stop reason: HOSPADM

## 2017-07-09 RX ORDER — DEXTROSE MONOHYDRATE 25 G/50ML
25-50 INJECTION, SOLUTION INTRAVENOUS
Status: DISCONTINUED | OUTPATIENT
Start: 2017-07-09 | End: 2017-07-09 | Stop reason: HOSPADM

## 2017-07-09 RX ORDER — ACETAMINOPHEN 650 MG/1
650 SUPPOSITORY RECTAL EVERY 4 HOURS PRN
Status: DISCONTINUED | OUTPATIENT
Start: 2017-07-09 | End: 2017-07-09 | Stop reason: HOSPADM

## 2017-07-09 RX ORDER — OXYCODONE HYDROCHLORIDE 10 MG/1
10 TABLET ORAL 3 TIMES DAILY
COMMUNITY
End: 2022-09-30

## 2017-07-09 RX ORDER — CLONAZEPAM 0.5 MG/1
1 TABLET ORAL DAILY
COMMUNITY
Start: 2016-06-07

## 2017-07-09 RX ORDER — HYDROXYZINE HYDROCHLORIDE 50 MG/1
50 TABLET, FILM COATED ORAL 3 TIMES DAILY PRN
Status: DISCONTINUED | OUTPATIENT
Start: 2017-07-09 | End: 2017-07-09 | Stop reason: HOSPADM

## 2017-07-09 RX ORDER — GLIPIZIDE 5 MG/1
5 TABLET, FILM COATED, EXTENDED RELEASE ORAL DAILY
Status: DISCONTINUED | OUTPATIENT
Start: 2017-07-09 | End: 2017-07-09 | Stop reason: HOSPADM

## 2017-07-09 RX ORDER — CLONAZEPAM 0.5 MG/1
0.5 TABLET ORAL 3 TIMES DAILY PRN
Status: DISCONTINUED | OUTPATIENT
Start: 2017-07-09 | End: 2017-07-09

## 2017-07-09 RX ORDER — ALUMINA, MAGNESIA, AND SIMETHICONE 2400; 2400; 240 MG/30ML; MG/30ML; MG/30ML
SUSPENSION ORAL
Status: DISCONTINUED
Start: 2017-07-09 | End: 2017-07-09 | Stop reason: HOSPADM

## 2017-07-09 RX ORDER — NITROGLYCERIN 0.4 MG/1
0.4 TABLET SUBLINGUAL EVERY 5 MIN PRN
Status: DISCONTINUED | OUTPATIENT
Start: 2017-07-09 | End: 2017-07-09 | Stop reason: HOSPADM

## 2017-07-09 RX ORDER — GLIPIZIDE 5 MG/1
10 TABLET, FILM COATED, EXTENDED RELEASE ORAL DAILY
COMMUNITY
Start: 2017-04-11

## 2017-07-09 RX ADMIN — ASPIRIN 81 MG: 81 TABLET, COATED ORAL at 07:49

## 2017-07-09 RX ADMIN — GABAPENTIN 300 MG: 300 CAPSULE ORAL at 07:48

## 2017-07-09 RX ADMIN — METFORMIN HYDROCHLORIDE 1000 MG: 500 TABLET ORAL at 07:48

## 2017-07-09 RX ADMIN — LISINOPRIL 40 MG: 20 TABLET ORAL at 07:48

## 2017-07-09 RX ADMIN — HYDROXYZINE HYDROCHLORIDE 50 MG: 50 TABLET, FILM COATED ORAL at 04:12

## 2017-07-09 RX ADMIN — KETOROLAC TROMETHAMINE 15 MG: 30 INJECTION, SOLUTION INTRAMUSCULAR; INTRAVENOUS at 02:18

## 2017-07-09 RX ADMIN — CLONAZEPAM 0.5 MG: 0.5 TABLET ORAL at 02:19

## 2017-07-09 RX ADMIN — LIDOCAINE HYDROCHLORIDE 30 ML: 20 SOLUTION ORAL; TOPICAL at 00:12
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
## 2017-07-09 NOTE — ED NOTES
Bed: ED30  Expected date: 7/8/17  Expected time: 8:34 PM  Means of arrival:   Comments:  A595, 57M
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
## 2017-07-09 NOTE — PLAN OF CARE
Problem: Goal Outcome Summary  Goal: Goal Outcome Summary  Outcome: Adequate for Discharge Date Met:  07/09/17  PRIMARY DIAGNOSIS: CHEST PAIN  OUTPATIENT/OBSERVATION GOALS TO BE MET BEFORE DISCHARGE:  1. Ruled out acute coronary syndrome (negative or stable Troponin):  Yes  2. Pain Status: Pain free.  3. Appropriate provocative testing performed: Yes  - Stress Test Procedure: Regular  - Interpretation of cardiac rhythm per telemetry tech: NSR     4. Cleared by Consultants (if applicable):Yes  5. Return to near baseline physical activity: Yes  Discharge Planner Nurse   Safe discharge environment identified: Yes  Barriers to discharge: No       Entered by: Ijeoma Armijo 07/09/2017 11:25 AM     Please review provider order for any additional goals.   Nurse to notify provider when observation goals have been met and patient is ready for discharge.    OBSERVATION patient END time: 7453

## 2017-07-09 NOTE — PHARMACY-ADMISSION MEDICATION HISTORY
Admission medication history interview status for this patient is complete. See [facility] admission navigator for allergy information, prior to admission medications and immunization status.     Medication history interview source(s):Patient  Medication history resources (including written lists, pill bottles, clinic record):None  Primary pharmacy: [facility] in [place] ([address])    Changes made to PTA medication list:  Added: oxycodone  Deleted: none  Changed: clonazepam changed from 0.5mg TID to 1 mg TID, added dose to glipizide     Actions taken by pharmacist (provider contacted, etc):None     Additional medication history information: Patient mentioned receiving oxycodone through pain clinic.     Medication reconciliation/reorder completed by provider prior to medication history? No    Do you take OTC medications (eg tylenol, ibuprofen, fish oil, eye/ear drops, etc)? Y (Y/N) aspirin     For patients on insulin therapy: N (Y/N)  Lantus/levemir/NPH/Mix 70/30 dose:   (Y/N) (see below)   Sliding scale Novolog Y/N  If Yes, do you have a baseline novolog pre-meal dose:  units with meals   Patients eat three meals a day:   Y/N     Any Barriers to therapy:  cost of medications/comfortable with giving injections (if applicable)/ comfortable and confident with current diabetes regimen:       Prior to Admission medications    Medication Sig Last Dose Taking? Auth Provider   glipiZIDE (GLUCOTROL XL) 5 MG 24 hr tablet Take 5 mg by mouth daily  7/8/2017 at am Yes Unknown, Entered By History   clonazePAM (KLONOPIN) 0.5 MG tablet Take 1 mg by mouth 3 times daily  7/8/2017 at Unknown time Yes Unknown, Entered By History   oxyCODONE (ROXICODONE) 10 MG IR tablet Take 10 mg by mouth 3 times daily 7/8/2017 at pm Yes Unknown, Entered By History   gabapentin (NEURONTIN) 300 MG capsule Take 1 capsule (300 mg) by mouth 3 times daily Past Week at Unknown time Yes Gavin Cyr MD   hydrOXYzine (ATARAX) 50 MG tablet Take 1 tablet (50  mg) by mouth 3 times daily as needed for other (back pain) 7/8/2017 at Unknown time Yes Gavin Cyr MD   aspirin EC 81 MG EC tablet Take 1 tablet (81 mg) by mouth daily 7/8/2017 at Unknown time Yes Filemon Mcdonald MD   nitroglycerin (NITROSTAT) 0.4 MG SL tablet Place 1 tablet (0.4 mg) under the tongue every 5 minutes as needed for chest pain if you are still having symptoms after 3 doses (15 minutes) call 911. 7/8/2017 at afternoon Yes Filemon Mcdonald MD   metFORMIN (GLUCOPHAGE) 1000 MG tablet Take 1 tablet (1,000 mg) by mouth 2 times daily (with meals) 7/8/2017 at am Yes Filemon Mcdonald MD   lisinopril (PRINIVIL,ZESTRIL) 40 MG tablet Take 1 tablet (40 mg) by mouth daily 7/8/2017 at am Yes Inderjit Han MD

## 2017-07-09 NOTE — PROGRESS NOTES
Pt is ready to DC.  Bedside RN requested cab ride for patient.  CTS arranged Cab . Pt to be waiting in hosp lobby area.    Mandy Templeton, RN,BSN, CTS  [facility]  Care Coordination  [phone]

## 2017-07-09 NOTE — PLAN OF CARE
Problem: Discharge Planning  Goal: Discharge Planning (Adult, OB, Behavioral, Peds)  PRIMARY DIAGNOSIS: CHEST PAIN  OUTPATIENT/OBSERVATION GOALS TO BE MET BEFORE DISCHARGE:  1. Ruled out acute coronary syndrome (negative or stable Troponin):  Yes  2. Pain Status: Improved-controlled with oral pain medications.  3. Appropriate provocative testing performed: Yes  - Stress Test Procedure: none  - Interpretation of cardiac rhythm per telemetry tech: SR 80     4. Cleared by Consultants (if applicable):No  5. Return to near baseline physical activity: Yes  Discharge Planner Nurse   Safe discharge environment identified: Yes  Barriers to discharge: No       Entered by: Loyd Medina 07/09/2017 3:24 AM     Please review provider order for any additional goals.   Nurse to notify provider when observation goals have been met and patient is ready for discharge.     Pt A&O x4, VSS. Pt states 6/10 hip and back pain, pt states pain is tolerable upon arrival to the floor. Pt SBA to bed, baseline Ind w/ walker at home. Pt oriented to room and floor, will continue to monitor.

## 2017-07-09 NOTE — PLAN OF CARE
Problem: Goal Outcome Summary  Goal: Goal Outcome Summary  Outcome: Improving  PRIMARY DIAGNOSIS: CHEST PAIN  OUTPATIENT/OBSERVATION GOALS TO BE MET BEFORE DISCHARGE:  1. Ruled out acute coronary syndrome (negative or stable Troponin):  Yes  2. Pain Status: Pain free.  3. Appropriate provocative testing performed: Yes  - Stress Test Procedure: Regular  - Interpretation of cardiac rhythm per telemetry tech: NSR     4. Cleared by Consultants (if applicable):No  5. Return to near baseline physical activity: Yes  Discharge Planner Nurse   Safe discharge environment identified: Yes  Barriers to discharge: No       Entered by: Ijeoma Armijo 07/09/2017 9:32 AM     Please review provider order for any additional goals.   Nurse to notify provider when observation goals have been met and patient is ready for discharge.     Patient alert and oriented x4.  VSS.  Denies pain.  Eating and voiding well.

## 2017-07-09 NOTE — ED NOTES
[facility]  ED Nurse Handoff Report    Perico Houston is a 57 year old male   ED Chief complaint: Back Pain; Chest Pain; and Hip Pain  . ED Diagnosis:   Final diagnoses:   Chest pain     Allergies: No Known Allergies    Code Status: Full Code  Activity level - Baseline/Home:  Independent. Activity Level - Current:   Independent. Lift room needed: No. Bariatric: No   Needed: No   Isolation: No. Infection: Not Applicable.     Vital Signs:   Vitals:    07/08/17 2046 07/08/17 2100 07/08/17 2145   BP: (!) 155/113 (!) 148/102 (!) 143/92   Pulse: 88     Resp: 20     Temp: 98.6  F (37  C)     TempSrc: Oral     SpO2: 98% 98% 99%   Weight: 103 kg (227 lb)     Height: 1.829 m (6')         Cardiac Rhythm:  ,      Pain level: 0-10 Pain Scale: 9  Patient confused: No. Patient Falls Risk: Yes.   Elimination Status: Has voided   Patient Report - Initial Complaint: chest pain. Focused Assessment: c/o of continued chest pain inspite of meds  Tests Performed: labsxray Abnormal Results: none   Treatments provided: iv ativan  Nitro, GI cocktail  Family Comments: wife here  OBS brochure/video discussed/provided to patient:  Yes  ED Medications:   Medications   lidocaine 1 % 1 mL (not administered)   lidocaine (LMX4) kit (not administered)   sodium chloride (PF) 0.9% PF flush 3 mL (not administered)   sodium chloride (PF) 0.9% PF flush 3 mL (not administered)   nitroGLYcerin (NITROSTAT) sublingual tablet 0.4 mg (0.4 mg Sublingual Given 7/8/17 2206)   lidocaine (viscous) (XYLOCAINE) 2 % 15 mL, alum & mag hydroxide-simethicone (MYLANTA ES/MAALOX  ES) 15 mL GI Cocktail (not administered)   oxyCODONE (ROXICODONE) IR tablet 10 mg (10 mg Oral Given 7/8/17 2114)   LORazepam (ATIVAN) injection 0.5 mg (0.5 mg Intravenous Given 7/8/17 2214)   0.9% sodium chloride BOLUS (0 mLs Intravenous Stopped 7/8/17 2242)   iopamidol (ISOVUE-370) solution 500 mL (90 mLs Intravenous Given 7/8/17 2240)   LORazepam (ATIVAN) injection 0.5  mg (0.5 mg Intravenous Given 7/8/17 4376)     Drips infusing:  No         ED Nurse Name/Phone Number: Gaby Ceballos,   12:02 AM  RECEIVING UNIT ED HANDOFF REVIEW    Above ED Nurse Handoff Report was reviewed: Yes  Reviewed by: Loyd Medina on July 9, 2017 at 12:29 AM

## 2017-07-09 NOTE — PLAN OF CARE
Problem: Discharge Planning  Goal: Discharge Planning (Adult, OB, Behavioral, Peds)  PRIMARY DIAGNOSIS: CHEST PAIN  OUTPATIENT/OBSERVATION GOALS TO BE MET BEFORE DISCHARGE:  1. Ruled out acute coronary syndrome (negative or stable Troponin):  Yes  2. Pain Status: Improved-controlled with oral pain medications.  3. Appropriate provocative testing performed: Yes  - Stress Test Procedure: none  - Interpretation of cardiac rhythm per telemetry tech: SR 80     4. Cleared by Consultants (if applicable):No  5. Return to near baseline physical activity: Yes  Discharge Planner Nurse   Safe discharge environment identified: Yes  Barriers to discharge: No       Entered by: Loyd Medina 07/09/2017 4:31 AM     Please review provider order for any additional goals.   Nurse to notify provider when observation goals have been met and patient is ready for discharge.     Pt A&O x4, VSS. Pt states 8/10 hip pain that radiates to his lower back and chest, pt received PRN Toradol and clonazepam @ 0219, pt stated moderate relief, pt received PRN hydroxyzine @ 0412 w/ good relief . Pt SBA to bed, baseline Ind w/ walker at home. Will continue to monitor.

## 2017-07-09 NOTE — DISCHARGE SUMMARY
[facility]  Discharge Summary  Name: Perico Houston    MRN: [id number]  YOB: 1960    Age: 57 year old  Date of Discharge:  7/9/2017  Date of Admission: 7/8/2017  Primary Care Provider: [facility], [place]  Discharge Physician:  Joshua Collado MD  Discharging Service:  Hospitalist  Date of Service (when I saw the patient): 07/09/17    Discharge Diagnosis:  Chest pain, anxiety     Other Diagnosis:  CAD (NSTEMI in 1/16), HTN, DM2, GERD and anxiety     Discharge Disposition:  Discharged to home     Allergies:  No Known Allergies     Discharge Medications:   Current Discharge Medication List      CONTINUE these medications which have NOT CHANGED    Details   glipiZIDE (GLUCOTROL XL) 5 MG 24 hr tablet Take 5 mg by mouth daily       clonazePAM (KLONOPIN) 0.5 MG tablet Take 1 mg by mouth 3 times daily       oxyCODONE (ROXICODONE) 10 MG IR tablet Take 10 mg by mouth 3 times daily      gabapentin (NEURONTIN) 300 MG capsule Take 1 capsule (300 mg) by mouth 3 times daily  Qty: 90 capsule, Refills: 1      hydrOXYzine (ATARAX) 50 MG tablet Take 1 tablet (50 mg) by mouth 3 times daily as needed for other (back pain)  Qty: 20 tablet, Refills: 0      aspirin EC 81 MG EC tablet Take 1 tablet (81 mg) by mouth daily  Qty: 30 tablet, Refills: 0    Associated Diagnoses: NSTEMI (non-ST elevated myocardial infarction) (H)      nitroglycerin (NITROSTAT) 0.4 MG SL tablet Place 1 tablet (0.4 mg) under the tongue every 5 minutes as needed for chest pain if you are still having symptoms after 3 doses (15 minutes) call 911.  Qty: 25 tablet, Refills: 0    Associated Diagnoses: NSTEMI (non-ST elevated myocardial infarction) (H)      metFORMIN (GLUCOPHAGE) 1000 MG tablet Take 1 tablet (1,000 mg) by mouth 2 times daily (with meals)  Qty: 60 tablet, Refills: 0    Associated Diagnoses: Diabetes mellitus due to underlying condition with hyperglycemia (H)      lisinopril (PRINIVIL,ZESTRIL) 40 MG tablet Take 1 tablet (40  mg) by mouth daily  Qty: 30 tablet, Refills: 0    Associated Diagnoses: Essential hypertension              Condition on Discharge:  Discharge condition: Stable   Discharge vitals: Blood pressure 151/81, pulse 82, temperature 97.4  F (36.3  C), temperature source Oral, resp. rate 20, height 1.829 m (6'), weight 103.4 kg (228 lb), SpO2 95 %.   Code status on discharge: Full Code     History of Illness:  See detailed admission note for full details. 57 year old male with PMH including CAD (NSTEMI in 1/16), HTN, DM2, GERD and anxiety who was admitted 07/09/17 with persistent chest pain associated with back and hip pain.  History was obtained through patient interview, chart review and discussion with Dr. Cyr in the ER.     The patient states that he had been feeling very well today.  He fell asleep after lunch and woke up around dinner time with severe chest pain.  It was sharp in nature and was so bad that he could barely breathe so he called for his daughter.  He states he was clutching his chest it hurt so bad.  He notes that he is supposed to see cardiology in clinic because over the past few months he has had worsening sharp chest pain.  He also notes that he has hip and back pain but this seems to be chronic.  He states he is going to go to a pain clinic regarding this.  He is quite tangential in his history and it is hard to determine what is actually a new symptom.  He has not had nausea or emesis.  He had been eating fine today.  Denies diaphoresis, lightheadedness, diarrhea, constipation, abdominal pain.    Significant Physical Exam Findings:  Patient sleeping. Wakes to voice. States he feels better today. No pain.  s1s2 rrr, lungs clear, abdo +BS. No chest wall tenderness    Procedures:  none     Imaging:  Results for orders placed or performed during the hospital encounter of 07/08/17   XR Chest 2 Views    Narrative    XR CHEST 2 VW   7/8/2017 9:40 PM     HISTORY: Chest pain    COMPARISON: Film  performed on 7/4/2017.    FINDINGS: The cardiac silhouette is at the upper normal.  The lungs  are clear. The pulmonary vasculature is normal.  The bones and soft  tissues are unremarkable.  Surgical clips are seen in the right upper  abdomen      Impression    IMPRESSION: No active infiltrate.    No significant change.    JUAN ARAYA MD   Chest CT, IV contrast only - PE protocol    Narrative    CT CHEST PULMONARY EMBOLISM W CONTRAST 7/8/2017 10:45 PM    HISTORY: Chest pain, difficulty breathing.    COMPARISON: None.    TECHNIQUE:  The scan was repeated to evaluate for the presence of a  presumed artifact in the left main pulmonary artery. Chest CT was  performed following intravenous administration of 90 cc Isovue-370.   Radiation dose for this scan was reduced using automated exposure  control, adjustment of the mA and/or kV according to patient size, or  iterative reconstruction technique.    FINDINGS: No pulmonary embolus. The area of low attenuation in the  left main pulmonary artery seen on the first scan is artifactual. No  evidence of aortic dissection.    Moderate cardiomegaly. No significant pericardial effusion. No  adenopathy.    Basilar interstitial edema. No lobar consolidation. No pleural  effusion or pneumothorax. No suspicious pulmonary nodules.    No acute abnormality in the visualized upper abdomen.      Impression    IMPRESSION:   1. No pulmonary embolus. No evidence of dissection.  2. Cardiomegaly and mild basilar interstitial edema.    VANITA SHAH MD        Consultations:  No consultations were requested during this admission.     Significant Lab Results:    Recent Labs  Lab 07/08/17  2109 07/04/17  0617   WBC 7.7 11.6*   HGB 13.8 15.7   HCT 43.0 48.8   MCV 85 85    314          Lab Results   Component Value Date     07/08/2017     07/04/2017     06/30/2017    Lab Results   Component Value Date    CHLORIDE 103 07/08/2017    CHLORIDE 100 07/04/2017    CHLORIDE 100  06/30/2017    Lab Results   Component Value Date    BUN 11 07/08/2017    BUN 18 07/04/2017    BUN 10 06/30/2017      Lab Results   Component Value Date    POTASSIUM 4.2 07/08/2017    POTASSIUM 4.9 07/04/2017    POTASSIUM 4.3 06/30/2017    Lab Results   Component Value Date    CO2 24 07/08/2017    CO2 24 07/04/2017    CO2 27 06/30/2017    Lab Results   Component Value Date    CR 0.70 07/08/2017    CR 0.82 07/04/2017    CR 0.78 06/30/2017          Recent Labs  Lab 07/09/17  0509 07/09/17  0120 07/09/17  0015  07/04/17  0845  07/04/17  0616   TROPONIN  --   --   --   --  0.02  --  0.01   TROPI <0.015The 99th percentile for upper reference range is 0.045 ug/L.  Troponin values in the range of 0.045 - 0.120 ug/L may be associated with risks of adverse clinical events. <0.015The 99th percentile for upper reference range is 0.045 ug/L.  Troponin values in the range of 0.045 - 0.120 ug/L may be associated with risks of adverse clinical events. <0.015The 99th percentile for upper reference range is 0.045 ug/L.  Troponin values in the range of 0.045 - 0.120 ug/L may be associated with risks of adverse clinical events.  < >  --   < >  --    < > = values in this interval not displayed.     Pending Results:    Unresulted Labs Ordered in the Past 30 Days of this Admission     No orders found for last 61 day(s).           Discharge Instructions and Follow-Up:   Discharge diet:   Active Diet Order      Combination Diet 7680-2754 Calories: Moderate Consistent CHO (4-6 CHO units/meal); Low Saturated Fat Na <2400mg Diet, No Caffeine Diet      Diet   Discharge activity: Activity as tolerated   Discharge follow-up: Follow up with primary care provider in 7 days   Outpatient therapy: None    Home Care agency: None    Other instructions: outpt stress testing      Hospital Course:  Patient was admitted to the Obs Unit. He has an ED treatment plan for his recurrant chest pain. Work-up here was negative. His symptoms resolved. I have spoke  to him and recommended he speak to his PCP or cardiologist about an oupt stress test, as his last was in June 2016.     Total time spent in face to face contact with the patient and coordinating discharge was:  30 Minutes.    Joshua Collado MD  Pager: [phone]

## 2017-07-22 ENCOUNTER — HOSPITAL ENCOUNTER (EMERGENCY)
Facility: CLINIC | Age: 57
Discharge: HOME | End: 2017-07-22
Attending: EMERGENCY MEDICINE | Admitting: EMERGENCY MEDICINE
Payer: COMMERCIAL

## 2017-07-22 ENCOUNTER — APPOINTMENT (OUTPATIENT)
Dept: GENERAL RADIOLOGY | Facility: CLINIC | Age: 57
End: 2017-07-22
Attending: EMERGENCY MEDICINE
Payer: COMMERCIAL

## 2017-07-22 VITALS
OXYGEN SATURATION: 98 % | BODY MASS INDEX: 30.48 KG/M2 | TEMPERATURE: 97.8 F | DIASTOLIC BLOOD PRESSURE: 97 MMHG | SYSTOLIC BLOOD PRESSURE: 160 MMHG | RESPIRATION RATE: 18 BRPM | HEIGHT: 72 IN | WEIGHT: 225 LBS | HEART RATE: 72 BPM

## 2017-07-22 DIAGNOSIS — R07.9 CHEST PAIN, UNSPECIFIED TYPE: ICD-10-CM

## 2017-07-22 LAB
ALBUMIN SERPL-MCNC: 3.7 G/DL (ref 3.4–5)
ALP SERPL-CCNC: 62 U/L (ref 40–150)
ALT SERPL W P-5'-P-CCNC: 42 U/L (ref 0–70)
ANION GAP SERPL CALCULATED.3IONS-SCNC: 4 MMOL/L (ref 3–14)
AST SERPL W P-5'-P-CCNC: 21 U/L (ref 0–45)
BILIRUB SERPL-MCNC: 0.2 MG/DL (ref 0.2–1.3)
BUN SERPL-MCNC: 16 MG/DL (ref 7–30)
CALCIUM SERPL-MCNC: 8.8 MG/DL (ref 8.5–10.1)
CHLORIDE SERPL-SCNC: 103 MMOL/L (ref 94–109)
CO2 SERPL-SCNC: 26 MMOL/L (ref 20–32)
CREAT SERPL-MCNC: 0.75 MG/DL (ref 0.66–1.25)
ERYTHROCYTE [DISTWIDTH] IN BLOOD BY AUTOMATED COUNT: 12.6 % (ref 10–15)
GFR SERPL CREATININE-BSD FRML MDRD: ABNORMAL ML/MIN/1.7M2
GLUCOSE SERPL-MCNC: 204 MG/DL (ref 70–99)
HCT VFR BLD AUTO: 42.5 % (ref 40–53)
HGB BLD-MCNC: 13.7 G/DL (ref 13.3–17.7)
LIPASE SERPL-CCNC: 119 U/L (ref 73–393)
MCH RBC QN AUTO: 27.3 PG (ref 26.5–33)
MCHC RBC AUTO-ENTMCNC: 32.2 G/DL (ref 31.5–36.5)
MCV RBC AUTO: 85 FL (ref 78–100)
PLATELET # BLD AUTO: 290 10E9/L (ref 150–450)
POTASSIUM SERPL-SCNC: 4.5 MMOL/L (ref 3.4–5.3)
PROT SERPL-MCNC: 7.2 G/DL (ref 6.8–8.8)
RBC # BLD AUTO: 5.02 10E12/L (ref 4.4–5.9)
SODIUM SERPL-SCNC: 133 MMOL/L (ref 133–144)
TROPONIN I SERPL-MCNC: NORMAL UG/L (ref 0–0.04)
TROPONIN I SERPL-MCNC: NORMAL UG/L (ref 0–0.04)
WBC # BLD AUTO: 7.9 10E9/L (ref 4–11)

## 2017-07-22 PROCEDURE — 25000125 ZZHC RX 250: Performed by: EMERGENCY MEDICINE

## 2017-07-22 PROCEDURE — 71020 XR CHEST 2 VW: CPT

## 2017-07-22 PROCEDURE — 96374 THER/PROPH/DIAG INJ IV PUSH: CPT

## 2017-07-22 PROCEDURE — 93005 ELECTROCARDIOGRAM TRACING: CPT

## 2017-07-22 PROCEDURE — 25000128 H RX IP 250 OP 636: Performed by: EMERGENCY MEDICINE

## 2017-07-22 PROCEDURE — 85027 COMPLETE CBC AUTOMATED: CPT | Performed by: EMERGENCY MEDICINE

## 2017-07-22 PROCEDURE — 84484 ASSAY OF TROPONIN QUANT: CPT | Performed by: EMERGENCY MEDICINE

## 2017-07-22 PROCEDURE — 80053 COMPREHEN METABOLIC PANEL: CPT | Performed by: EMERGENCY MEDICINE

## 2017-07-22 PROCEDURE — 83690 ASSAY OF LIPASE: CPT | Performed by: EMERGENCY MEDICINE

## 2017-07-22 PROCEDURE — 25000132 ZZH RX MED GY IP 250 OP 250 PS 637: Performed by: EMERGENCY MEDICINE

## 2017-07-22 PROCEDURE — 99285 EMERGENCY DEPT VISIT HI MDM: CPT | Mod: 25

## 2017-07-22 RX ORDER — LORAZEPAM 2 MG/ML
1 INJECTION INTRAMUSCULAR ONCE
Status: COMPLETED | OUTPATIENT
Start: 2017-07-22 | End: 2017-07-22

## 2017-07-22 RX ORDER — ACETAMINOPHEN 325 MG/1
975 TABLET ORAL ONCE
Status: COMPLETED | OUTPATIENT
Start: 2017-07-22 | End: 2017-07-22

## 2017-07-22 RX ADMIN — LIDOCAINE HYDROCHLORIDE 30 ML: 20 SOLUTION ORAL; TOPICAL at 08:48

## 2017-07-22 RX ADMIN — ACETAMINOPHEN 975 MG: 325 TABLET, FILM COATED ORAL at 11:07

## 2017-07-22 RX ADMIN — LORAZEPAM 1 MG: 2 INJECTION INTRAMUSCULAR; INTRAVENOUS at 08:48

## 2017-07-22 ASSESSMENT — ENCOUNTER SYMPTOMS
NAUSEA: 1
SHORTNESS OF BREATH: 1
ABDOMINAL PAIN: 1
VOMITING: 0

## 2017-07-22 NOTE — ED NOTES
Bed: ED13  Expected date: 7/22/17  Expected time: 8:15 AM  Means of arrival:   Comments:  Carlos Manuel Acuña

## 2017-07-22 NOTE — ED NOTES
"Back from imaging; states GI cocktail did not help.  States, \"I'm having real bad chest pain; chest pain right here,\" pointing to LUQ abd 2-3\" below left breast, \"and my hand hurts\", regarding left hand. While this nurse charting in room moments after asking patient how he is doing patient is dozing off/snoring.  Satting 96% on RA.  "
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
## 2017-07-22 NOTE — ED NOTES
Pt c/o chest pain and sob starting around 0230. Pt has taken 3 nitro (one around 0230, one around 0300, and one around 0800). Per EMS chest pain is worse with palpitations. Pt was given 1 nitro and 324 asa by EMS PTA.

## 2017-07-22 NOTE — ED AVS SNAPSHOT
[facility] Emergency Department    [address] [place] [address]    Phone:  [phone]    Fax:  [phone]                                       Perico [place]   MRN: [id number]    Department:  [facility] Emergency Department   Date of Visit:  7/22/2017           Patient Information     Date Of Birth          1960        Your diagnoses for this visit were:     Chest pain, unspecified type        You were seen by Ramses Pagan MD.      Follow-up Information     Follow up with [facility], [place]. Schedule an appointment as soon as possible for a visit in 2 days.    Specialty:  Family Practice    Contact information:    [address] [facility]   [place] [address]  [phone]          Discharge Instructions       Discharge Instructions  Chest Pain    You have been seen today for chest pain or discomfort.  At this time, your doctor has found no signs that your chest pain is due to a serious or life-threatening condition, (or you have declined more testing and/or admission to the hospital). However, sometimes there is a serious problem that does not show up right away. Your evaluation today may not be complete and you may need further testing and evaluation.     You need to follow-up with your regular doctor within 3 days.    Return to the Emergency Department if:    Your chest pain changes, gets worse, starts to happen more often, or comes with less activity.    You are short of breath.    You get very weak or tired.    You pass out or faint.    You have any new symptoms, like fever, cough, numb legs, or you cough up blood.    You have anything else that worries you.    Until you follow-up with your regular doctor please do the following:    Take one aspirin daily unless you have an allergy or are told not to by your doctor.    If a stress test appointment has been made, go to the appointment.    If you have questions, contact your regular doctor.    If  your doctor today has told you to follow-up with your regular doctor, it is very important that you make an appointment with your clinic and go to the appointment.  If you do not follow-up with your primary doctor, it may result in missing an important development which could result in permanent injury or disability and/or lasting pain.  If there is any problem keeping your appointment, call your doctor or return to the Emergency Department.    If you were given a prescription for medicine here today, be sure to read all of the information (including the package insert) that comes with your prescription.  This will include important information about the medicine, its side effects, and any warnings that you need to know about.  The pharmacist who fills the prescription can provide more information and answer questions you may have about the medicine.  If you have questions or concerns that the pharmacist cannot address, please call or return to the Emergency Department.         24 Hour Appointment Hotline       To make an appointment at any [facility], call [phone] ([phone]). If you don't have a family doctor or clinic, we will help you find one. [facility] clinics are conveniently located to serve the needs of you and your family.             Review of your medicines      Our records show that you are taking the medicines listed below. If these are incorrect, please call your family doctor or clinic.        Dose / Directions Last dose taken    aspirin 81 MG EC tablet   Dose:  81 mg   Quantity:  30 tablet        Take 1 tablet (81 mg) by mouth daily   Refills:  0        clonazePAM 0.5 MG tablet   Commonly known as:  klonoPIN   Dose:  1 mg        Take 1 mg by mouth 3 times daily   Refills:  0        gabapentin 300 MG capsule   Commonly known as:  NEURONTIN   Dose:  300 mg   Quantity:  90 capsule        Take 1 capsule (300 mg) by mouth 3 times daily   Refills:  1        glipiZIDE 5 MG 24 hr tablet    Commonly known as:  GLUCOTROL XL   Dose:  5 mg        Take 5 mg by mouth daily   Refills:  0        hydrOXYzine 50 MG tablet   Commonly known as:  ATARAX   Dose:  50 mg   Quantity:  20 tablet        Take 1 tablet (50 mg) by mouth 3 times daily as needed for other (back pain)   Refills:  0        lisinopril 40 MG tablet   Commonly known as:  PRINIVIL/ZESTRIL   Dose:  40 mg   Quantity:  30 tablet        Take 1 tablet (40 mg) by mouth daily   Refills:  0        metFORMIN 1000 MG tablet   Commonly known as:  GLUCOPHAGE   Dose:  1000 mg   Quantity:  60 tablet        Take 1 tablet (1,000 mg) by mouth 2 times daily (with meals)   Refills:  0        nitroGLYcerin 0.4 MG sublingual tablet   Commonly known as:  NITROSTAT   Dose:  0.4 mg   Quantity:  25 tablet        Place 1 tablet (0.4 mg) under the tongue every 5 minutes as needed for chest pain if you are still having symptoms after 3 doses (15 minutes) call 911.   Refills:  0        oxyCODONE 10 MG IR tablet   Commonly known as:  ROXICODONE   Dose:  10 mg        Take 10 mg by mouth 3 times daily   Refills:  0                Procedures and tests performed during your visit     Procedure/Test Number of Times Performed    CBC (platelets, no diff) 1    Chest XR,  PA & LAT 1    Comprehensive metabolic panel 1    EKG 12 lead 1    Lipase 1    Peripheral IV catheter 1    Troponin I (now) 2      Orders Needing Specimen Collection     None      Pending Results     Date and Time Order Name Status Description    7/22/2017 0838 EKG 12 lead Preliminary             Pending Culture Results     No orders found from 7/20/2017 to 7/23/2017.            Pending Results Instructions     If you had any lab results that were not finalized at the time of your Discharge, you can call the ED Lab Result RN at [phone]. You will be contacted by this team for any positive Lab results or changes in treatment. The nurses are available 7 days a week from 10A to 6:30P.  You can leave a message 24  hours per day and they will return your call.        Test Results From Your Hospital Stay        7/22/2017  8:54 AM      Component Results     Component Value Ref Range & Units Status    WBC 7.9 4.0 - 11.0 10e9/L Final    RBC Count 5.02 4.4 - 5.9 10e12/L Final    Hemoglobin 13.7 13.3 - 17.7 g/dL Final    Hematocrit 42.5 40.0 - 53.0 % Final    MCV 85 78 - 100 fl Final    MCH 27.3 26.5 - 33.0 pg Final    MCHC 32.2 31.5 - 36.5 g/dL Final    RDW 12.6 10.0 - 15.0 % Final    Platelet Count 290 150 - 450 10e9/L Final         7/22/2017  9:13 AM      Component Results     Component Value Ref Range & Units Status    Sodium 133 133 - 144 mmol/L Final    Potassium 4.5 3.4 - 5.3 mmol/L Final    Chloride 103 94 - 109 mmol/L Final    Carbon Dioxide 26 20 - 32 mmol/L Final    Anion Gap 4 3 - 14 mmol/L Final    Glucose 204 (H) 70 - 99 mg/dL Final    Urea Nitrogen 16 7 - 30 mg/dL Final    Creatinine 0.75 0.66 - 1.25 mg/dL Final    GFR Estimate >90  Non  GFR Calc   >60 mL/min/1.7m2 Final    GFR Estimate If Black >90   GFR Calc   >60 mL/min/1.7m2 Final    Calcium 8.8 8.5 - 10.1 mg/dL Final    Bilirubin Total 0.2 0.2 - 1.3 mg/dL Final    Albumin 3.7 3.4 - 5.0 g/dL Final    Protein Total 7.2 6.8 - 8.8 g/dL Final    Alkaline Phosphatase 62 40 - 150 U/L Final    ALT 42 0 - 70 U/L Final    AST 21 0 - 45 U/L Final         7/22/2017  9:13 AM      Component Results     Component Value Ref Range & Units Status    Lipase 119 73 - 393 U/L Final         7/22/2017  9:13 AM      Component Results     Component Value Ref Range & Units Status    Troponin I ES  0.000 - 0.045 ug/L Final    <0.015  The 99th percentile for upper reference range is 0.045 ug/L.  Troponin values in   the range of 0.045 - 0.120 ug/L may be associated with risks of adverse   clinical events.           7/22/2017  9:29 AM      Narrative     XR CHEST 2 VW 7/22/2017 9:03 AM    COMPARISON: 7/8/2017    HISTORY: Chest pain        Impression      IMPRESSION: Cardiac silhouette and pulmonary vasculature are within  normal limits. No focal airspace disease, pleural effusion or  pneumothorax.    TRAE NEGRO         7/22/2017 11:20 AM      Component Results     Component Value Ref Range & Units Status    Troponin I ES  0.000 - 0.045 ug/L Final    <0.015  The 99th percentile for upper reference range is 0.045 ug/L.  Troponin values in   the range of 0.045 - 0.120 ug/L may be associated with risks of adverse   clinical events.                  Clinical Quality Measure: Blood Pressure Screening     Your blood pressure was checked while you were in the emergency department today. The last reading we obtained was  BP: 132/81 . Please read the guidelines below about what these numbers mean and what you should do about them.  If your systolic blood pressure (the top number) is less than 120 and your diastolic blood pressure (the bottom number) is less than 80, then your blood pressure is normal. There is nothing more that you need to do about it.  If your systolic blood pressure (the top number) is 120-139 or your diastolic blood pressure (the bottom number) is 80-89, your blood pressure may be higher than it should be. You should have your blood pressure rechecked within a year by a primary care provider.  If your systolic blood pressure (the top number) is 140 or greater or your diastolic blood pressure (the bottom number) is 90 or greater, you may have high blood pressure. High blood pressure is treatable, but if left untreated over time it can put you at risk for heart attack, stroke, or kidney failure. You should have your blood pressure rechecked by a primary care provider within the next 4 weeks.  If your provider in the emergency department today gave you specific instructions to follow-up with your doctor or provider even sooner than that, you should follow that instruction and not wait for up to 4 weeks for your follow-up visit.        Thank you for choosing  "[facility]       Thank you for choosing [facility] for your care. Our goal is always to provide you with excellent care. Hearing back from our patients is one way we can continue to improve our services. Please take a few minutes to complete the written survey that you may receive in the mail after you visit with us. Thank you!        [employer] Information     [employer] lets you send messages to your doctor, view your test results, renew your prescriptions, schedule appointments and more. To sign up, go to [email or web address] . Click on \"Log in\" on the left side of the screen, which will take you to the Welcome page. Then click on \"Sign up Now\" on the right side of the page.     You will be asked to enter the access code listed below, as well as some personal information. Please follow the directions to create your username and password.     Your access code is: [email or web address]  Expires: 2017 12:18 PM     Your access code will  in 90 days. If you need help or a new code, please call your [facility] clinic or [phone].        Care EveryWhere ID     This is your Care EveryWhere ID. This could be used by other organizations to access your [facility] medical records  [id number]        Equal Access to Services     MILADIS SIMPSON AH: Rosa Maria Marcelino, waana liliada luzeyadadaha, qaybta kaalmada adeloni, fady perez. So [facility] [phone].    ATENCIÓN: Si habla español, tiene a burgos disposición servicios gratuitos de asistencia lingüística. Llame al [phone].    We comply with applicable federal civil rights laws and [place] laws. We do not discriminate on the basis of race, color, national origin, age, disability sex, sexual orientation or gender identity.            After Visit Summary       This is your record. Keep this with you and show to your community pharmacist(s) and doctor(s) at your next visit.                  "

## 2017-07-24 LAB — INTERPRETATION ECG - MUSE: NORMAL

## 2017-07-25 ENCOUNTER — APPOINTMENT (OUTPATIENT)
Dept: GENERAL RADIOLOGY | Facility: CLINIC | Age: 57
End: 2017-07-25
Attending: PHYSICIAN ASSISTANT
Payer: COMMERCIAL

## 2017-07-25 ENCOUNTER — HOSPITAL ENCOUNTER (EMERGENCY)
Facility: CLINIC | Age: 57
Discharge: HOME | End: 2017-07-25
Attending: EMERGENCY MEDICINE | Admitting: EMERGENCY MEDICINE
Payer: COMMERCIAL

## 2017-07-25 VITALS
OXYGEN SATURATION: 97 % | SYSTOLIC BLOOD PRESSURE: 156 MMHG | TEMPERATURE: 98 F | HEART RATE: 81 BPM | WEIGHT: 225.09 LBS | DIASTOLIC BLOOD PRESSURE: 89 MMHG | BODY MASS INDEX: 30.53 KG/M2 | RESPIRATION RATE: 16 BRPM

## 2017-07-25 DIAGNOSIS — G89.29 CHRONIC LOW BACK PAIN, UNSPECIFIED BACK PAIN LATERALITY, WITH SCIATICA PRESENCE UNSPECIFIED: ICD-10-CM

## 2017-07-25 DIAGNOSIS — R10.13 EPIGASTRIC ABDOMINAL PAIN: ICD-10-CM

## 2017-07-25 DIAGNOSIS — R07.9 CHEST PAIN, UNSPECIFIED TYPE: ICD-10-CM

## 2017-07-25 DIAGNOSIS — M54.5 CHRONIC LOW BACK PAIN, UNSPECIFIED BACK PAIN LATERALITY, WITH SCIATICA PRESENCE UNSPECIFIED: ICD-10-CM

## 2017-07-25 LAB
ALBUMIN SERPL-MCNC: 3.7 G/DL (ref 3.4–5)
ALP SERPL-CCNC: 73 U/L (ref 40–150)
ALT SERPL W P-5'-P-CCNC: 40 U/L (ref 0–70)
ANION GAP SERPL CALCULATED.3IONS-SCNC: 8 MMOL/L (ref 3–14)
AST SERPL W P-5'-P-CCNC: 22 U/L (ref 0–45)
BASOPHILS # BLD AUTO: 0 10E9/L (ref 0–0.2)
BASOPHILS NFR BLD AUTO: 0.4 %
BILIRUB SERPL-MCNC: 0.5 MG/DL (ref 0.2–1.3)
BUN SERPL-MCNC: 21 MG/DL (ref 7–30)
CALCIUM SERPL-MCNC: 9.1 MG/DL (ref 8.5–10.1)
CHLORIDE SERPL-SCNC: 102 MMOL/L (ref 94–109)
CO2 SERPL-SCNC: 22 MMOL/L (ref 20–32)
CREAT SERPL-MCNC: 0.73 MG/DL (ref 0.66–1.25)
DIFFERENTIAL METHOD BLD: ABNORMAL
EOSINOPHIL # BLD AUTO: 0.1 10E9/L (ref 0–0.7)
EOSINOPHIL NFR BLD AUTO: 1.8 %
ERYTHROCYTE [DISTWIDTH] IN BLOOD BY AUTOMATED COUNT: 12.9 % (ref 10–15)
ERYTHROCYTE [DISTWIDTH] IN BLOOD BY AUTOMATED COUNT: 13 % (ref 10–15)
GFR SERPL CREATININE-BSD FRML MDRD: ABNORMAL ML/MIN/1.7M2
GLUCOSE SERPL-MCNC: 163 MG/DL (ref 70–99)
HCT VFR BLD AUTO: 45.1 % (ref 40–53)
HCT VFR BLD AUTO: 45.6 % (ref 40–53)
HGB BLD-MCNC: 14.6 G/DL (ref 13.3–17.7)
HGB BLD-MCNC: 14.9 G/DL (ref 13.3–17.7)
IMM GRANULOCYTES # BLD: 0.1 10E9/L (ref 0–0.4)
IMM GRANULOCYTES NFR BLD: 0.9 %
INTERPRETATION ECG - MUSE: NORMAL
LIPASE SERPL-CCNC: 118 U/L (ref 73–393)
LYMPHOCYTES # BLD AUTO: 2.8 10E9/L (ref 0.8–5.3)
LYMPHOCYTES NFR BLD AUTO: 50.8 %
MCH RBC QN AUTO: 27.6 PG (ref 26.5–33)
MCH RBC QN AUTO: 27.9 PG (ref 26.5–33)
MCHC RBC AUTO-ENTMCNC: 32.4 G/DL (ref 31.5–36.5)
MCHC RBC AUTO-ENTMCNC: 32.7 G/DL (ref 31.5–36.5)
MCV RBC AUTO: 85 FL (ref 78–100)
MCV RBC AUTO: 85 FL (ref 78–100)
MONOCYTES # BLD AUTO: 0.3 10E9/L (ref 0–1.3)
MONOCYTES NFR BLD AUTO: 6.1 %
NEUTROPHILS # BLD AUTO: 2.2 10E9/L (ref 1.6–8.3)
NEUTROPHILS NFR BLD AUTO: 40 %
NRBC # BLD AUTO: 0 10*3/UL
NRBC BLD AUTO-RTO: 0 /100
PLATELET # BLD AUTO: 10 10E9/L (ref 150–450)
PLATELET # BLD AUTO: 268 10E9/L (ref 150–450)
PLATELET # BLD EST: ABNORMAL 10*3/UL
POTASSIUM SERPL-SCNC: 4.5 MMOL/L (ref 3.4–5.3)
PROT SERPL-MCNC: 7.1 G/DL (ref 6.8–8.8)
RBC # BLD AUTO: 5.29 10E12/L (ref 4.4–5.9)
RBC # BLD AUTO: 5.34 10E12/L (ref 4.4–5.9)
RBC MORPH BLD: ABNORMAL
SODIUM SERPL-SCNC: 132 MMOL/L (ref 133–144)
TROPONIN I SERPL-MCNC: NORMAL UG/L (ref 0–0.04)
TROPONIN I SERPL-MCNC: NORMAL UG/L (ref 0–0.04)
WBC # BLD AUTO: 5.4 10E9/L (ref 4–11)
WBC # BLD AUTO: 7.2 10E9/L (ref 4–11)

## 2017-07-25 PROCEDURE — 85027 COMPLETE CBC AUTOMATED: CPT | Performed by: EMERGENCY MEDICINE

## 2017-07-25 PROCEDURE — 99285 EMERGENCY DEPT VISIT HI MDM: CPT | Mod: 25

## 2017-07-25 PROCEDURE — 36415 COLL VENOUS BLD VENIPUNCTURE: CPT | Performed by: PHYSICIAN ASSISTANT

## 2017-07-25 PROCEDURE — 85025 COMPLETE CBC W/AUTO DIFF WBC: CPT | Performed by: EMERGENCY MEDICINE

## 2017-07-25 PROCEDURE — 71020 XR CHEST 2 VW: CPT

## 2017-07-25 PROCEDURE — 84484 ASSAY OF TROPONIN QUANT: CPT | Mod: 91 | Performed by: PHYSICIAN ASSISTANT

## 2017-07-25 PROCEDURE — 83690 ASSAY OF LIPASE: CPT | Performed by: EMERGENCY MEDICINE

## 2017-07-25 PROCEDURE — 80053 COMPREHEN METABOLIC PANEL: CPT | Performed by: EMERGENCY MEDICINE

## 2017-07-25 PROCEDURE — 36415 COLL VENOUS BLD VENIPUNCTURE: CPT | Performed by: EMERGENCY MEDICINE

## 2017-07-25 PROCEDURE — 84484 ASSAY OF TROPONIN QUANT: CPT | Performed by: EMERGENCY MEDICINE

## 2017-07-25 PROCEDURE — 93005 ELECTROCARDIOGRAM TRACING: CPT

## 2017-07-25 PROCEDURE — 25000132 ZZH RX MED GY IP 250 OP 250 PS 637: Performed by: PHYSICIAN ASSISTANT

## 2017-07-25 PROCEDURE — 25000125 ZZHC RX 250: Performed by: PHYSICIAN ASSISTANT

## 2017-07-25 RX ORDER — ACETAMINOPHEN 325 MG/1
975 TABLET ORAL ONCE
Status: COMPLETED | OUTPATIENT
Start: 2017-07-25 | End: 2017-07-25

## 2017-07-25 RX ADMIN — LIDOCAINE HYDROCHLORIDE 30 ML: 20 SOLUTION ORAL; TOPICAL at 06:37

## 2017-07-25 RX ADMIN — ACETAMINOPHEN 975 MG: 325 TABLET, FILM COATED ORAL at 06:58

## 2017-07-25 ASSESSMENT — ENCOUNTER SYMPTOMS
CHILLS: 0
ABDOMINAL PAIN: 1
HEADACHES: 1
BLOOD IN STOOL: 0
VOMITING: 0
COUGH: 0
NAUSEA: 1
FEVER: 0
SHORTNESS OF BREATH: 1
DIARRHEA: 0

## 2017-07-25 NOTE — ED AVS SNAPSHOT
[facility] Emergency Department    [address] [place] [address]    Phone:  [phone]    Fax:  [phone]                                       Perico Houston   MRN: [id number]    Department:  [facility] Emergency Department   Date of Visit:  7/25/2017           Patient Information     Date Of Birth          1960        Your diagnoses for this visit were:     Chest pain, unspecified type     Chronic low back pain, unspecified back pain laterality, with sciatica presence unspecified     Epigastric abdominal pain        You were seen by Ramses Pagan MD.      Follow-up Information     Follow up with [facility], [place]. Schedule an appointment as soon as possible for a visit in 2 days.    Specialty:  Family Practice    Contact information:    [address]  [place] [address]  [phone]          Follow up with [facility] Emergency Department.    Specialty:  EMERGENCY MEDICINE    Why:  If symptoms worsen    Contact information:    [address] [place] [address] [phone]        Follow up with pain clinic.    Why:  As needed        Discharge Instructions           Continue at home care measures. Consider starting omeprazole. This will not be effective for a few days, so continue Maalox while taking. Follow up with your primary doctor in 2-3 days for recheck. Retuern to the ED for fever, changes in bowel or bladder, uncontrolled vomiting, bloody stools or other concerning symptoms.  General Neck and Back Pain    Both neck and back pain are usually caused by injury to the muscles or ligaments of the spine. Sometimes the disks that separate each bone of the spine may cause pain by pressing on a nearby nerve. Back and neck pain may appear after a sudden twisting or bending force (such as in a car accident), or sometimes after a simple awkward movement. In either case, muscle spasm is often present and adds  to the pain.  Acute neck and back pain usually gets better in 1 to 2 weeks. Pain related to disk disease, arthritis in the spinal joints or spinal stenosis (narrowing of the spinal canal) can become chronic and last for months or years.  Back and neck pain are common problems. Most people feel better in 1 or 2 weeks, and most of the rest in 1 to 2 months. Most people can remain active.  People experience and describe pain differently.    Pain can be sharp, stabbing, shooting, aching, cramping, or burning    Movement, standing, bending, lifting, sitting, or walking may worsen the pain    Pain can be localized to one spot or area, or it can be more generalized    Pain can spread or radiate upwards, downwards, to the front, or go down your arms    Muscle spasm may occur.  Most of the time mechanical problems with the muscles or spine cause the pain. it is usually caused by an injury, whether known or not, to the muscles or ligaments. While illnesses can cause back pain, it is usually not caused by a serious illness. Pain is usually related to physical activity, whether sports, exercise, work, or normal activity. Sometimes it can occur without an identifiable cause. This can happen simply by stretching or moving wrong, without noting pain at the time. Other causes include:    Overexertion, lifting, pushing, pulling incorrectly or too aggressively.    Sudden twisting, bending or stretching from an accident (car or fall), or accidental movement.    Poor posture    Poor conditioning, lack of regular exercise    Spinal disc disease or arthritis    Stress    Pregnancy, or illness like appendicitis, bladder or kidney infection, pelvic infections   Home care    For neck pain: Use a comfortable pillow that supports the head and keeps the spine in a neutral position. The position of the head should not be tilted forward or backward.    When in bed, try to find a position of comfort. A firm mattress is best. Try lying flat on  your back with pillows under your knees. You can also try lying on your side with your knees bent up towards your chest and a pillow between your knees.    At first, do not try to stretch out the sore spots. If there is a strain, it is not like the good soreness you get after exercising without an injury. In this case, stretching may make it worse.    Avoid prolonged sitting, long car rides or travel. This puts more stress on the lower back than standing or walking.    During the first 24 to 72 hours after an injury, apply an ice pack to the painful area for 20 minutes and then remove it for 20 minutes over a period of 60 to 90 minutes or several times a day.     You can alternate ice and heat therapies. Talk with your healthcare provider about the best treatment for your back or neck pain. As a safety precaution, do not use a heating pad at bedtime. Sleeping with a heating pad can lead to skin burns or tissue damage.    Therapeutic massage can help relax the back and neck muscles without stretching them.    Be aware of safe lifting methods and do not lift anything over 15 pounds until all the pain is gone.  Medications  Talk to your healthcare provider before using medicine, especially if you have other medical problems or are taking other medicines.    You may use over-the-counter medicine to control pain, unless another pain medicine was prescribed. If you have chronic conditions like diabetes, liver or kidney disease, stomach ulcers,  gastrointestinal bleeding, or are taking blood thinner medicines.    Be careful if you are given pain medicines, narcotics, or medicine for muscle spasm. They can cause drowsiness, and can affect your coordination, reflexes, and judgment. Do not drive or operate heavy machinery.  Follow-up care  Follow up with your healthcare provider, or as advised. Physical therapy or further tests may be needed.  If X-rays were taken, you will be notified of any new findings that may affect your  care.  Call 911  Seek emergency medical care if any of the following occur:    Trouble breathing    Confusion    Very drowsy or trouble awakening    Fainting or loss of consciousness    Rapid or very slow heart rate    Loss of bowel or bladder control  When to seek medical advice  Call your healthcare provider right away if any of these occur:    Pain becomes worse or spreads into your arms or legs    Weakness, numbness or pain in one or both arms or legs    Numbness in the groin area    Difficulty walking    Fever of 100.4 F (38 C) or higher, or as directed by your healthcare provider  Date Last Reviewed: 7/1/2016 2000-2017 [employer]. [address] [place] [address]. All rights reserved. This information is not intended as a substitute for professional medical care. Always follow your healthcare professional's instructions.        Epigastric Pain (Uncertain Cause)    Epigastric pain can be a sign of disease in the upper abdomen. Common causes include:    Acid reflux (stomach acid flowing up into the esophagus)    Gastritis (irritation of the stomach lining)    Peptic Ulcer Disease    Inflammation of the pancreas    Gallstone    Infection in the gallbladder  Pain may be dull or burning. It may spread upward to the chest or to the back. There may be other symptoms such as belching, bloating, cramps or hunger pains. There may be weight loss or poor appetite, nausea or vomiting.  Since the diagnosis of your pain is not certain yet, further tests may sometimes be needed. Sometimes the doctor will treat you for the most likely condition to see if there is improvement before doing further tests.  Home care  Medicines    Antacids help neutralize the normal acids in your stomach. Examples are Maalox, Mylanta, Rolaids, and Tums. If you don t like the liquid, you can also try a chewable one. You may find one works better than another for you. Overuse can cause diarrhea or constipation.    Acid  blockers (H2 blockers) decrease acid production. Examples are cimetidine (Tagamet), famotidine (Pepcid) and ranitidine (Zantac).    Acid inhibitors (PPIs) decrease acid production in a different way than the blockers. You may find they work better, but can take a little longer to take effect.  Examples are omeprazole (Prilosec), lansoprazole (Prevacid), pantoprazole (Protonix), rabeprazole (Aciphex), and esomeprazole (Nexium).    Take an antacid 30-60 minutes after eating and at bedtime, but not at the same time as an acid blocker.    Try not to take NSAIDs. Aspirin may also cause problems, but if taking it for your heart or other medical reasons, talk to your doctor before stopping it; you do not want to cause a worse problem, like a heart attack or stroke.  Diet    If certain foods seem to cause your spasm, try to avoid them.     Eat slowly and chew food well before swallowing. Symptoms of gastritis can be worsened by certain foods. Limit or avoid fatty, fried, and spicy foods, as well as coffee, chocolate, mint, and foods with high acid content such as tomatoes and citrus fruit and juices (orange, grapefruit, lemon).    Avoid alcohol, caffeine, and tobacco, which can delay healing and worsen your problem.    Try eating smaller meals with snacks in between  Follow-up care  Follow up with your healthcare provider or as advised.  When to seek medical advice  Call your healthcare provider right away if any of the following occur:    Frequent vomiting (can t keep down liquids)    Blood in the stool or vomit (red or black color)    Feeling weak or dizzy, fainting, or having trouble breathing    Fever of 100.4 F (38 C) or higher, or as directed by your healthcare provider    Abdominal swelling  Date Last Reviewed: 9/25/2015 2000-2017 [employer]. [address] [place] [address], [place] [address]. All rights reserved. This information is not intended as a substitute for professional medical care. Always follow  your healthcare professional's instructions.          24 Hour Appointment Hotline       To make an appointment at any [facility], call [phone] ([phone]). If you don't have a family doctor or clinic, we will help you find one. [facility] are conveniently located to serve the needs of you and your family.             Review of your medicines      START taking        Dose / Directions Last dose taken    omeprazole 20 MG CR capsule   Commonly known as:  priLOSEC   Dose:  40 mg   Quantity:  60 capsule        Take 2 capsules (40 mg) by mouth daily   Refills:  0          Our records show that you are taking the medicines listed below. If these are incorrect, please call your family doctor or clinic.        Dose / Directions Last dose taken    aspirin 81 MG EC tablet   Dose:  81 mg   Quantity:  30 tablet        Take 1 tablet (81 mg) by mouth daily   Refills:  0        clonazePAM 0.5 MG tablet   Commonly known as:  klonoPIN   Dose:  1 mg        Take 1 mg by mouth 3 times daily   Refills:  0        gabapentin 300 MG capsule   Commonly known as:  NEURONTIN   Dose:  300 mg   Quantity:  90 capsule        Take 1 capsule (300 mg) by mouth 3 times daily   Refills:  1        glipiZIDE 5 MG 24 hr tablet   Commonly known as:  GLUCOTROL XL   Dose:  5 mg        Take 5 mg by mouth daily   Refills:  0        hydrOXYzine 50 MG tablet   Commonly known as:  ATARAX   Dose:  50 mg   Quantity:  20 tablet        Take 1 tablet (50 mg) by mouth 3 times daily as needed for other (back pain)   Refills:  0        lisinopril 40 MG tablet   Commonly known as:  PRINIVIL/ZESTRIL   Dose:  40 mg   Quantity:  30 tablet        Take 1 tablet (40 mg) by mouth daily   Refills:  0        metFORMIN 1000 MG tablet   Commonly known as:  GLUCOPHAGE   Dose:  1000 mg   Quantity:  60 tablet        Take 1 tablet (1,000 mg) by mouth 2 times daily (with meals)   Refills:  0        nitroGLYcerin 0.4 MG sublingual tablet   Commonly known as:   NITROSTAT   Dose:  0.4 mg   Quantity:  25 tablet        Place 1 tablet (0.4 mg) under the tongue every 5 minutes as needed for chest pain if you are still having symptoms after 3 doses (15 minutes) call 911.   Refills:  0        oxyCODONE 10 MG IR tablet   Commonly known as:  ROXICODONE   Dose:  10 mg        Take 10 mg by mouth 3 times daily   Refills:  0                Prescriptions were sent or printed at these locations (1 Prescription)                   Other Prescriptions                Printed at Department/Unit printer (1 of 1)         omeprazole (PRILOSEC) 20 MG CR capsule                Procedures and tests performed during your visit     Procedure/Test Number of Times Performed    CBC (platelets, no diff) 1    CBC with platelets differential 1    Comprehensive metabolic panel 1    EKG 12 lead 1    Lipase 1    Troponin I 2    XR Chest 2 Views 1      Orders Needing Specimen Collection     None      Pending Results     Date and Time Order Name Status Description    7/25/2017 0632 XR Chest 2 Views Preliminary             Pending Culture Results     No orders found from 7/23/2017 to 7/26/2017.            Pending Results Instructions     If you had any lab results that were not finalized at the time of your Discharge, you can call the ED Lab Result RN at [phone]. You will be contacted by this team for any positive Lab results or changes in treatment. The nurses are available 7 days a week from 10A to 6:30P.  You can leave a message 24 hours per day and they will return your call.        Test Results From Your Hospital Stay        7/25/2017  6:58 AM      Narrative     XR CHEST 2 VW  7/25/2017 6:50 AM      HISTORY: Chest pain.     COMPARISON: 7/22/2017.    FINDINGS: The heart is enlarged without pulmonary edema. Right  hemidiaphragm is again elevated. The lungs are clear. No pneumothorax  or pleural effusion. Surgical clips in the region of the right  hemidiaphragm.        Impression     IMPRESSION: No acute  abnormality.         7/25/2017  8:39 AM      Component Results     Component Value Ref Range & Units Status    WBC 5.4 4.0 - 11.0 10e9/L Final    RBC Count 5.34 4.4 - 5.9 10e12/L Final    Hemoglobin 14.9 13.3 - 17.7 g/dL Final    Hematocrit 45.6 40.0 - 53.0 % Final    MCV 85 78 - 100 fl Final    MCH 27.9 26.5 - 33.0 pg Final    MCHC 32.7 31.5 - 36.5 g/dL Final    RDW 12.9 10.0 - 15.0 % Final    Platelet Count 10 (LL) 150 - 450 10e9/L Final    .  Consistent with previous critical result      Diff Method Automated Method  Final    % Neutrophils 40.0 % Final    % Lymphocytes 50.8 % Final    % Monocytes 6.1 % Final    % Eosinophils 1.8 % Final    % Basophils 0.4 % Final    % Immature Granulocytes 0.9 % Final    Nucleated RBCs 0 0 /100 Final    Absolute Neutrophil 2.2 1.6 - 8.3 10e9/L Final    Absolute Lymphocytes 2.8 0.8 - 5.3 10e9/L Final    Absolute Monocytes 0.3 0.0 - 1.3 10e9/L Final    Absolute Eosinophils 0.1 0.0 - 0.7 10e9/L Final    Absolute Basophils 0.0 0.0 - 0.2 10e9/L Final    Abs Immature Granulocytes 0.1 0 - 0.4 10e9/L Final    Absolute Nucleated RBC 0.0  Final    RBC Morphology   Final    Consistent with reported results    Platelet Estimate Decreased  Final         7/25/2017  7:56 AM      Component Results     Component Value Ref Range & Units Status    Sodium 132 (L) 133 - 144 mmol/L Final    Potassium 4.5 3.4 - 5.3 mmol/L Final    Specimen slightly hemolyzed, potassium may be falsely elevated    Chloride 102 94 - 109 mmol/L Final    Carbon Dioxide 22 20 - 32 mmol/L Final    Anion Gap 8 3 - 14 mmol/L Final    Glucose 163 (H) 70 - 99 mg/dL Final    Urea Nitrogen 21 7 - 30 mg/dL Final    Creatinine 0.73 0.66 - 1.25 mg/dL Final    GFR Estimate >90  Non  GFR Calc   >60 mL/min/1.7m2 Final    GFR Estimate If Black >90   GFR Calc   >60 mL/min/1.7m2 Final    Calcium 9.1 8.5 - 10.1 mg/dL Final    Bilirubin Total 0.5 0.2 - 1.3 mg/dL Final    Albumin 3.7 3.4 - 5.0 g/dL Final     Protein Total 7.1 6.8 - 8.8 g/dL Final    Alkaline Phosphatase 73 40 - 150 U/L Final    ALT 40 0 - 70 U/L Final    AST 22 0 - 45 U/L Final    Specimen is hemolyzed which can falsely elevate AST. Analysis of a   non-hemolyzed   specimen may result in a lower value.           7/25/2017  7:56 AM      Component Results     Component Value Ref Range & Units Status    Lipase 118 73 - 393 U/L Final         7/25/2017  7:56 AM      Component Results     Component Value Ref Range & Units Status    Troponin I ES  0.000 - 0.045 ug/L Final    <0.015  The 99th percentile for upper reference range is 0.045 ug/L.  Troponin values in   the range of 0.045 - 0.120 ug/L may be associated with risks of adverse   clinical events.           7/25/2017 10:22 AM      Component Results     Component Value Ref Range & Units Status    Troponin I ES  0.000 - 0.045 ug/L Final    <0.015  The 99th percentile for upper reference range is 0.045 ug/L.  Troponin values in   the range of 0.045 - 0.120 ug/L may be associated with risks of adverse   clinical events.           7/25/2017  9:49 AM      Component Results     Component Value Ref Range & Units Status    WBC 7.2 4.0 - 11.0 10e9/L Final    RBC Count 5.29 4.4 - 5.9 10e12/L Final    Hemoglobin 14.6 13.3 - 17.7 g/dL Final    Hematocrit 45.1 40.0 - 53.0 % Final    MCV 85 78 - 100 fl Final    MCH 27.6 26.5 - 33.0 pg Final    MCHC 32.4 31.5 - 36.5 g/dL Final    RDW 13.0 10.0 - 15.0 % Final    Platelet Count 268 150 - 450 10e9/L Final                Clinical Quality Measure: Blood Pressure Screening     Your blood pressure was checked while you were in the emergency department today. The last reading we obtained was  BP: (!) 163/104 . Please read the guidelines below about what these numbers mean and what you should do about them.  If your systolic blood pressure (the top number) is less than 120 and your diastolic blood pressure (the bottom number) is less than 80, then your blood pressure is normal.  "There is nothing more that you need to do about it.  If your systolic blood pressure (the top number) is 120-139 or your diastolic blood pressure (the bottom number) is 80-89, your blood pressure may be higher than it should be. You should have your blood pressure rechecked within a year by a primary care provider.  If your systolic blood pressure (the top number) is 140 or greater or your diastolic blood pressure (the bottom number) is 90 or greater, you may have high blood pressure. High blood pressure is treatable, but if left untreated over time it can put you at risk for heart attack, stroke, or kidney failure. You should have your blood pressure rechecked by a primary care provider within the next 4 weeks.  If your provider in the emergency department today gave you specific instructions to follow-up with your doctor or provider even sooner than that, you should follow that instruction and not wait for up to 4 weeks for your follow-up visit.        Thank you for choosing [facility]       Thank you for choosing [facility] for your care. Our goal is always to provide you with excellent care. Hearing back from our patients is one way we can continue to improve our services. Please take a few minutes to complete the written survey that you may receive in the mail after you visit with us. Thank you!        [employer] Information     [employer] lets you send messages to your doctor, view your test results, renew your prescriptions, schedule appointments and more. To sign up, go to [email or web address] . Click on \"Log in\" on the left side of the screen, which will take you to the Welcome page. Then click on \"Sign up Now\" on the right side of the page.     You will be asked to enter the access code listed below, as well as some personal information. Please follow the directions to create your username and password.     Your access code is: [email or web address]  Expires: 10/23/2017 11:10 AM     Your access code will  in 90 " days. If you need help or a new code, please call your [facility] clinic or [phone].        Care EveryWhere ID     This is your Care EveryWhere ID. This could be used by other organizations to access your [facility] medical records  [id number]        Equal Access to Services     MILADIS SIMPSON : Rosa Maria Marcelino, wajadyn luqadaha, qaagnesta kaalmada alyx, fady perez. So [facility] [phone].    ATENCIÓN: Si habla español, tiene a burgos disposición servicios gratuitos de asistencia lingüística. Llame al [phone].    We comply with applicable federal civil rights laws and [place] laws. We do not discriminate on the basis of race, color, national origin, age, disability sex, sexual orientation or gender identity.            After Visit Summary       This is your record. Keep this with you and show to your community pharmacist(s) and doctor(s) at your next visit.

## 2017-07-25 NOTE — ED PROVIDER NOTES
"  History     Chief Complaint:  Multiple Complaints    HPI   Perico Houston is a 57 year old male with history of NSTEMI who presents with multiple complaints.  He has been evaluated here in the ED many times in the past.  There is an emergency care plan on file.  He states that yesterday during his Lexiscan stress test(results below) at 9am, when they injected dye, he became short of breath and anxious so they \"gave me a reversal agent\".  Since that time, he has felt short of breath, nauseous, has had a headache and chest pain.  He states that he went to bed last night regardless and woke up and continued having symptoms.  He states that his daughter and his wife were leaving for the day and he was anxious about being home alone with his symptoms that he decided to come and be evaluated.  Upon further questioning, he states his chest pain and shortness of breath is not different from baseline and he experiences this intermittently daily. His chest pain is non-exertional, and is actually better with exertion( recent CT chest results below). His headache is generalized and is his normal headache. He denies any head trauma or falls.  He is nauseous, but denies vomiting or diarrhea, but says he \"chokes until I can't breathe\". He states his abdominal pain is epigastric in nature and feels similar to his baseline pain.  He is anxious about this because someone he was close to had similar symptoms noted up having cancer.  His bowel movements have been normal without blood. He aslo comments on several pain clinic visits where they switch his medications around which are not helping.  He is frustrated because they will not reimage his lower back with MRI.  He denies any sick contacts or recent travel.  He denies fever or cough.     CARDIAC RISK FACTORS:  Sex:    male  Tobacco:   positive  Hypertension:   positive  Hyperlipidemia:  negative  Diabetes:   positive  Family History:  negative     PE/DVT RISK FACTORS:  Sex: "    male  Hormones:   negative  Tobacco:   positive  Cancer:   negative  Travel:   negaitive  Surgery:   negative  Other immobilization: negaitive  Personal history:  negaitve  Family history:  Negative    Lexiscan stress test (7/24/2017)  Impressions  Normal study after pharmacologic vasodilation. Myocardial  perfusion imaging was normal at rest and with stress. Left  ventricular systolic function was normal. Based on this  study, the annual cardiovascular mortality rate is low  (less than 1%).  CT chest PE protocol(7/8/2017)  IMPRESSION:   1. No pulmonary embolus. No evidence of dissection.  2. Cardiomegaly and mild basilar interstitial edema.    Allergies:  The patient has no known drug allergies.    Medications:    Glucotrol  Klonopin  Roxicodone  Neurontin  Atarax  Aspirin  Nitrostat  Glucophage  Lisinopril     Past Medical History:    CAD  Depression  DM  GERD  HTN    Past Surgical History:    Cholecystectomy  Coronary angiogram  Clavicle surgery  Partial liver resection    Family History:    DM    Social History:  Relationship status:   Tobacco use: Former smoker (Quit 5/2017)  Alcohol use: Positive    Review of Systems   Constitutional: Negative for chills and fever.   Respiratory: Positive for shortness of breath. Negative for cough.    Cardiovascular: Positive for chest pain.   Gastrointestinal: Positive for abdominal pain and nausea. Negative for blood in stool, diarrhea and vomiting.   Neurological: Positive for headaches.   All other systems reviewed and are negative.      Physical Exam   First Vitals:  BP: (!) 173/100  Pulse: 81  Temp: 98  F (36.7  C)  Resp: 18  Weight: 102.1 kg (225 lb 1.4 oz)  SpO2: 98 %    Physical Exam  General: Resting on bed.  Alert and oriented. Intermittently tearful. Keeps eyes closed through most of interview.  Head:  The scalp, face, and head appear normal   Eyes:  The pupils are equal, round, and reactive to light     Extraocular muscles are intact    Conjunctivae and  sclerae are normal    CV:  Regular rate and rhythm     Normal S1/S2    No pathological murmur detected   Resp:  Lungs are clear to auscultation    Non-labored    No rales or wheezing     Good air entry.    No respiratory distress.  GI:  Abdomen is soft, non-distended    No rebound tenderness     Normal bowel sounds     Mild tenderness to epigastrium without rebound  MS:  Normal muscular tone   Skin:  No rash or acute skin lesions noted   Neuro: Speech is normal and fluent.  Cranial nerves II through XII grossly intact.   strength is normal.  Patient is able to move all four extremities.  Psych:  The patient is anxious appearing.  He keeps circling back to all his negative workups and continued pain. He is frustrated that nothing is ever found.     Emergency Department Course   ECG:       EKG Interpretation:      Interpreted by Whitney Villalobos   Symptoms at time of EKG: chest pain   Rhythm: Normal sinus   Rate: Normal  Axis: Normal  Ectopy: None  Conduction: Normal  ST Segments/ T Waves: No ST-T wave changes and No acute ischemic changes  Q Waves: Nonspecific  Comparison to prior: Unchanged from 7/22/2017    Clinical Impression: normal EKG      Imaging:  XR Chest 2 Views   Preliminary Result   IMPRESSION: No acute abnormality.        Laboratory:  Results for orders placed or performed during the hospital encounter of 07/25/17 (from the past 24 hour(s))   EKG 12 lead   Result Value Ref Range    Interpretation ECG Click View Image link to view waveform and result    XR Chest 2 Views    Narrative    XR CHEST 2 VW  7/25/2017 6:50 AM      HISTORY: Chest pain.     COMPARISON: 7/22/2017.    FINDINGS: The heart is enlarged without pulmonary edema. Right  hemidiaphragm is again elevated. The lungs are clear. No pneumothorax  or pleural effusion. Surgical clips in the region of the right  hemidiaphragm.      Impression    IMPRESSION: No acute abnormality.   CBC with platelets differential   Result Value Ref Range    WBC  5.4 4.0 - 11.0 10e9/L    RBC Count 5.34 4.4 - 5.9 10e12/L    Hemoglobin 14.9 13.3 - 17.7 g/dL    Hematocrit 45.6 40.0 - 53.0 %    MCV 85 78 - 100 fl    MCH 27.9 26.5 - 33.0 pg    MCHC 32.7 31.5 - 36.5 g/dL    RDW 12.9 10.0 - 15.0 %    Platelet Count 10 (LL) 150 - 450 10e9/L    Diff Method Automated Method     % Neutrophils 40.0 %    % Lymphocytes 50.8 %    % Monocytes 6.1 %    % Eosinophils 1.8 %    % Basophils 0.4 %    % Immature Granulocytes 0.9 %    Nucleated RBCs 0 0 /100    Absolute Neutrophil 2.2 1.6 - 8.3 10e9/L    Absolute Lymphocytes 2.8 0.8 - 5.3 10e9/L    Absolute Monocytes 0.3 0.0 - 1.3 10e9/L    Absolute Eosinophils 0.1 0.0 - 0.7 10e9/L    Absolute Basophils 0.0 0.0 - 0.2 10e9/L    Abs Immature Granulocytes 0.1 0 - 0.4 10e9/L    Absolute Nucleated RBC 0.0     RBC Morphology Consistent with reported results     Platelet Estimate Decreased    Comprehensive metabolic panel   Result Value Ref Range    Sodium 132 (L) 133 - 144 mmol/L    Potassium 4.5 3.4 - 5.3 mmol/L    Chloride 102 94 - 109 mmol/L    Carbon Dioxide 22 20 - 32 mmol/L    Anion Gap 8 3 - 14 mmol/L    Glucose 163 (H) 70 - 99 mg/dL    Urea Nitrogen 21 7 - 30 mg/dL    Creatinine 0.73 0.66 - 1.25 mg/dL    GFR Estimate >90  Non  GFR Calc   >60 mL/min/1.7m2    GFR Estimate If Black >90   GFR Calc   >60 mL/min/1.7m2    Calcium 9.1 8.5 - 10.1 mg/dL    Bilirubin Total 0.5 0.2 - 1.3 mg/dL    Albumin 3.7 3.4 - 5.0 g/dL    Protein Total 7.1 6.8 - 8.8 g/dL    Alkaline Phosphatase 73 40 - 150 U/L    ALT 40 0 - 70 U/L    AST 22 0 - 45 U/L   Lipase   Result Value Ref Range    Lipase 118 73 - 393 U/L   Troponin I   Result Value Ref Range    Troponin I ES  0.000 - 0.045 ug/L     <0.015  The 99th percentile for upper reference range is 0.045 ug/L.  Troponin values in   the range of 0.045 - 0.120 ug/L may be associated with risks of adverse   clinical events.     Troponin I   Result Value Ref Range    Troponin I ES  0.000 - 0.045  ug/L     <0.015  The 99th percentile for upper reference range is 0.045 ug/L.  Troponin values in   the range of 0.045 - 0.120 ug/L may be associated with risks of adverse   clinical events.     CBC (platelets, no diff)   Result Value Ref Range    WBC 7.2 4.0 - 11.0 10e9/L    RBC Count 5.29 4.4 - 5.9 10e12/L    Hemoglobin 14.6 13.3 - 17.7 g/dL    Hematocrit 45.1 40.0 - 53.0 %    MCV 85 78 - 100 fl    MCH 27.6 26.5 - 33.0 pg    MCHC 32.4 31.5 - 36.5 g/dL    RDW 13.0 10.0 - 15.0 %    Platelet Count 268 150 - 450 10e9/L       Interventions:  Medications   lidocaine (viscous) (XYLOCAINE) 2 % 15 mL, alum & mag hydroxide-simethicone (MYLANTA ES/MAALOX  ES) 15 mL GI Cocktail (30 mLs Oral Given 7/25/17 0637)   acetaminophen (TYLENOL) tablet 975 mg (975 mg Oral Given 7/25/17 0658)     Some improvement with interventions    Emergency Department Course:    ED Course:  I reviewed the patient's medical record.   The patient was seen and examined by myself and Dr. Pagan. I discussed the course of care with the patient including laboratory and diagnostic studies.    He understands and is agreeable to the plan.  Recheck. The patient feels slightly improved. He is still experiencing some epigastric pain, but he denies any current chest pain   I discussed with the patient the results of the above studies and procedures.   He will be discharged to home with a prescription for omeprazole.    All questions were answered prior to discharge, and the patient was told to follow up per discharge instructions.    Reasons for return as well as follow up were reviewed with the patient. He understands and agrees to this plan.    Impression & Plan    Medical Decision Making:  Perico Houston is a 57 year old male with history of NSTEMI, chronic back pain, HTN, and DMII who presents with multiple complaints.  EKG and initial troponin negative.  Initial CBC remarkable for very low platelet count, but upon recheck this was normal.  CMP remarkable  for slightly decreased sodium but otherwise unremarkable.  Lipase normal.  Delta troponin negative.  The patient just received a cardiac stress test yesterday which was negative, so I do not think this patient needs any additional provocative testing at this time.  He also received a recent CT scan of his chest which is negative and there are no concerning features that suggest an additional CT would be necessary at this time. We tried our best to adhere to the emergency care plan during his stay. The patient's pain did improve with a GI cocktail, but symptoms returned slightly, but tolerable. I do not think this patient needs an abdominal CT at this time given improvement with GI cocktail and non-surgical abdomen on exam. He denied any chest pain after reevaluation.  I doubt there is a serious cause of his symptoms at this time.  The symptoms are most consistent with gastritis. There could be a significant anxiety component. He will be discharged home with a prescription for omeprazole.  He is instructed to take this medication in addition to his Maalox for the next couple of days.  He is instructed to follow with his primary care doctor in 2-3 days for recheck.  Instructed to continue attending pain clinic as needed.    Diagnosis:    ICD-10-CM    1. Chest pain, unspecified type R07.9    2. Chronic low back pain, unspecified back pain laterality, with sciatica presence unspecified M54.5     G89.29    3. Epigastric abdominal pain R10.13        Disposition:  discharged to home    Discharge Medications:  Discharge Medication List as of 7/25/2017 11:10 AM      START taking these medications    Details   omeprazole (PRILOSEC) 20 MG CR capsule Take 2 capsules (40 mg) by mouth daily, Disp-60 capsule, R-0, Local Print           Fidencio Lara  7/25/2017   [facility] EMERGENCY DEPARTMENT       Whitney Villalobos PA-C  07/25/17 6645

## 2017-07-25 NOTE — ED NOTES
"Pt with multiple complaints; states he has chest pain, headache, dizziness, nausea.  Reports he had a stress test yesterday and when they injected the medication he had \"complications\" and felt the same exact way he does now.  Pt states they gave him a \"reversal\" medication to calm him down and he was able to go home.  Pt went to bed last night and felt normal, then woke up with \"the exact same symptoms I had when they gave me that medication.\"      "

## 2017-07-25 NOTE — DISCHARGE INSTRUCTIONS
Continue at home care measures. Consider starting omeprazole. This will not be effective for a few days, so continue Maalox while taking. Follow up with your primary doctor in 2-3 days for recheck. Retuern to the ED for fever, changes in bowel or bladder, uncontrolled vomiting, bloody stools or other concerning symptoms.  General Neck and Back Pain    Both neck and back pain are usually caused by injury to the muscles or ligaments of the spine. Sometimes the disks that separate each bone of the spine may cause pain by pressing on a nearby nerve. Back and neck pain may appear after a sudden twisting or bending force (such as in a car accident), or sometimes after a simple awkward movement. In either case, muscle spasm is often present and adds to the pain.  Acute neck and back pain usually gets better in 1 to 2 weeks. Pain related to disk disease, arthritis in the spinal joints or spinal stenosis (narrowing of the spinal canal) can become chronic and last for months or years.  Back and neck pain are common problems. Most people feel better in 1 or 2 weeks, and most of the rest in 1 to 2 months. Most people can remain active.  People experience and describe pain differently.    Pain can be sharp, stabbing, shooting, aching, cramping, or burning    Movement, standing, bending, lifting, sitting, or walking may worsen the pain    Pain can be localized to one spot or area, or it can be more generalized    Pain can spread or radiate upwards, downwards, to the front, or go down your arms    Muscle spasm may occur.  Most of the time mechanical problems with the muscles or spine cause the pain. it is usually caused by an injury, whether known or not, to the muscles or ligaments. While illnesses can cause back pain, it is usually not caused by a serious illness. Pain is usually related to physical activity, whether sports, exercise, work, or normal activity. Sometimes it can occur without an identifiable cause. This can  happen simply by stretching or moving wrong, without noting pain at the time. Other causes include:    Overexertion, lifting, pushing, pulling incorrectly or too aggressively.    Sudden twisting, bending or stretching from an accident (car or fall), or accidental movement.    Poor posture    Poor conditioning, lack of regular exercise    Spinal disc disease or arthritis    Stress    Pregnancy, or illness like appendicitis, bladder or kidney infection, pelvic infections   Home care    For neck pain: Use a comfortable pillow that supports the head and keeps the spine in a neutral position. The position of the head should not be tilted forward or backward.    When in bed, try to find a position of comfort. A firm mattress is best. Try lying flat on your back with pillows under your knees. You can also try lying on your side with your knees bent up towards your chest and a pillow between your knees.    At first, do not try to stretch out the sore spots. If there is a strain, it is not like the good soreness you get after exercising without an injury. In this case, stretching may make it worse.    Avoid prolonged sitting, long car rides or travel. This puts more stress on the lower back than standing or walking.    During the first 24 to 72 hours after an injury, apply an ice pack to the painful area for 20 minutes and then remove it for 20 minutes over a period of 60 to 90 minutes or several times a day.     You can alternate ice and heat therapies. Talk with your healthcare provider about the best treatment for your back or neck pain. As a safety precaution, do not use a heating pad at bedtime. Sleeping with a heating pad can lead to skin burns or tissue damage.    Therapeutic massage can help relax the back and neck muscles without stretching them.    Be aware of safe lifting methods and do not lift anything over 15 pounds until all the pain is gone.  Medications  Talk to your healthcare provider before using  medicine, especially if you have other medical problems or are taking other medicines.    You may use over-the-counter medicine to control pain, unless another pain medicine was prescribed. If you have chronic conditions like diabetes, liver or kidney disease, stomach ulcers,  gastrointestinal bleeding, or are taking blood thinner medicines.    Be careful if you are given pain medicines, narcotics, or medicine for muscle spasm. They can cause drowsiness, and can affect your coordination, reflexes, and judgment. Do not drive or operate heavy machinery.  Follow-up care  Follow up with your healthcare provider, or as advised. Physical therapy or further tests may be needed.  If X-rays were taken, you will be notified of any new findings that may affect your care.  Call 911  Seek emergency medical care if any of the following occur:    Trouble breathing    Confusion    Very drowsy or trouble awakening    Fainting or loss of consciousness    Rapid or very slow heart rate    Loss of bowel or bladder control  When to seek medical advice  Call your healthcare provider right away if any of these occur:    Pain becomes worse or spreads into your arms or legs    Weakness, numbness or pain in one or both arms or legs    Numbness in the groin area    Difficulty walking    Fever of 100.4 F (38 C) or higher, or as directed by your healthcare provider  Date Last Reviewed: 7/1/2016 2000-2017 [employer]. [address] [place] [address], [place] [address]. All rights reserved. This information is not intended as a substitute for professional medical care. Always follow your healthcare professional's instructions.        Epigastric Pain (Uncertain Cause)    Epigastric pain can be a sign of disease in the upper abdomen. Common causes include:    Acid reflux (stomach acid flowing up into the esophagus)    Gastritis (irritation of the stomach lining)    Peptic Ulcer Disease    Inflammation of the  pancreas    Gallstone    Infection in the gallbladder  Pain may be dull or burning. It may spread upward to the chest or to the back. There may be other symptoms such as belching, bloating, cramps or hunger pains. There may be weight loss or poor appetite, nausea or vomiting.  Since the diagnosis of your pain is not certain yet, further tests may sometimes be needed. Sometimes the doctor will treat you for the most likely condition to see if there is improvement before doing further tests.  Home care  Medicines    Antacids help neutralize the normal acids in your stomach. Examples are Maalox, Mylanta, Rolaids, and Tums. If you don t like the liquid, you can also try a chewable one. You may find one works better than another for you. Overuse can cause diarrhea or constipation.    Acid blockers (H2 blockers) decrease acid production. Examples are cimetidine (Tagamet), famotidine (Pepcid) and ranitidine (Zantac).    Acid inhibitors (PPIs) decrease acid production in a different way than the blockers. You may find they work better, but can take a little longer to take effect.  Examples are omeprazole (Prilosec), lansoprazole (Prevacid), pantoprazole (Protonix), rabeprazole (Aciphex), and esomeprazole (Nexium).    Take an antacid 30-60 minutes after eating and at bedtime, but not at the same time as an acid blocker.    Try not to take NSAIDs. Aspirin may also cause problems, but if taking it for your heart or other medical reasons, talk to your doctor before stopping it; you do not want to cause a worse problem, like a heart attack or stroke.  Diet    If certain foods seem to cause your spasm, try to avoid them.     Eat slowly and chew food well before swallowing. Symptoms of gastritis can be worsened by certain foods. Limit or avoid fatty, fried, and spicy foods, as well as coffee, chocolate, mint, and foods with high acid content such as tomatoes and citrus fruit and juices (orange, grapefruit, lemon).    Avoid  alcohol, caffeine, and tobacco, which can delay healing and worsen your problem.    Try eating smaller meals with snacks in between  Follow-up care  Follow up with your healthcare provider or as advised.  When to seek medical advice  Call your healthcare provider right away if any of the following occur:    Frequent vomiting (can t keep down liquids)    Blood in the stool or vomit (red or black color)    Feeling weak or dizzy, fainting, or having trouble breathing    Fever of 100.4 F (38 C) or higher, or as directed by your healthcare provider    Abdominal swelling  Date Last Reviewed: 9/25/2015 2000-2017 [email or web address]. [address] [place] [address], [place] [address]. All rights reserved. This information is not intended as a substitute for professional medical care. Always follow your healthcare professional's instructions.

## 2017-07-25 NOTE — ED PROVIDER NOTES
Emergency Department Attending Supervision Note  7/25/2017  7:39 AM      I evaluated this patient in conjunction with Whitney SWANSON      Briefly, the patient presented with recurrence of chronic chest pain. Patient states that he had a Linette can stress test done yesterday. The results returned without signs of cardiac ischemia but he developed chest pain and shortness of breath with injection. Since then he has had persistent chest pain and shortness of breath. The chest pain feels similar to his chronic chest pain. He reported that he was given a reversal agent with mild improvement in symptoms but symptoms have persisted. There are no other alleviating or a grainy factors to his pain. He is nauseated without vomiting. He also states that he has his chronic abdominal pain dizziness but not change from baseline.    On my exam:    General:   Pleasant, age appropriate.  HEENT:    Oropharynx is moist, without lesions or trismus.  Eyes:    Conjunctiva normal  Neck:    Supple, no meningismus.     CV:     Regular rate and rhythm.      No murmurs, rubs or gallops.       No JVD or unilateral leg swelling.       2+ radial pulses bilateral.       No lower extremity edema.  PULM:    Clear to auscultation bilateral.       No respiratory distress.      Good air exchange.     No rales or wheezing.  ABD:    Soft, non-tender, non-distended.       No pulsatile masses.       No rebound, guarding or rigidity.  MSK:     No gross deformity to all four extremities.   LYMPH:   No cervical lymphadenopathy.  NEURO:   Alert & oriented x 3. Good muscle tone, no atrophy.     Speech is clear with no aphasia.        Strength and sensation is intact.       Normal muscular tone, no tremor.  Skin:    Warm, dry and intact.    Psych:    Mood is good and affect is appropriate.      EKG and chest x-ray are unremarkable.Laboratory studies are reassuring. Initial troponin and 2 hour Delta troponin are within normal limits. This coupled with his  recent stress test has ruled out acute coronary syndrome in this patient. This appears to be in exacerbations chronic process which clearly needs to be followed up his primary care physician. Patient safe for discharge home.        Diagnosis  (R07.9) Chest pain, unspecified type    (M54.5,  G89.29) Chronic low back pain, unspecified back pain laterality, with sciatica presence unspecified    (R10.13) Epigastric abdominal pain    Ramses Prescott MD, MD  07/25/17 1128

## 2017-07-25 NOTE — ED AVS SNAPSHOT
[facility] Emergency Department    [address]    [facility] [address]    Phone:  [phone]    Fax:  [phone]                                       Perico Houston   MRN: [id number]    Department:  [facility] Emergency Department   Date of Visit:  7/25/2017           After Visit Summary Signature Page     I have received my discharge instructions, and my questions have been answered. I have discussed any challenges I see with this plan with the nurse or doctor.    ..........................................................................................................................................  Patient/Patient Representative Signature      ..........................................................................................................................................  Patient Representative Print Name and Relationship to Patient    ..................................................               ................................................  Date                                            Time    ..........................................................................................................................................  Reviewed by Signature/Title    ...................................................              ..............................................  Date                                                            Time

## 2017-08-03 ENCOUNTER — HOSPITAL ENCOUNTER (EMERGENCY)
Facility: CLINIC | Age: 57
Discharge: HOME | End: 2017-08-03
Attending: EMERGENCY MEDICINE | Admitting: EMERGENCY MEDICINE
Payer: COMMERCIAL

## 2017-08-03 ENCOUNTER — APPOINTMENT (OUTPATIENT)
Dept: GENERAL RADIOLOGY | Facility: CLINIC | Age: 57
End: 2017-08-03
Attending: EMERGENCY MEDICINE
Payer: COMMERCIAL

## 2017-08-03 VITALS
OXYGEN SATURATION: 99 % | TEMPERATURE: 97.4 F | RESPIRATION RATE: 18 BRPM | DIASTOLIC BLOOD PRESSURE: 96 MMHG | SYSTOLIC BLOOD PRESSURE: 139 MMHG

## 2017-08-03 DIAGNOSIS — M25.551 HIP PAIN, RIGHT: ICD-10-CM

## 2017-08-03 PROCEDURE — 99283 EMERGENCY DEPT VISIT LOW MDM: CPT

## 2017-08-03 PROCEDURE — 25000132 ZZH RX MED GY IP 250 OP 250 PS 637: Performed by: EMERGENCY MEDICINE

## 2017-08-03 PROCEDURE — 73502 X-RAY EXAM HIP UNI 2-3 VIEWS: CPT

## 2017-08-03 RX ORDER — IBUPROFEN 800 MG/1
800 TABLET, FILM COATED ORAL ONCE
Status: COMPLETED | OUTPATIENT
Start: 2017-08-03 | End: 2017-08-03

## 2017-08-03 RX ORDER — METHOCARBAMOL 750 MG/1
750 TABLET, FILM COATED ORAL ONCE
Status: COMPLETED | OUTPATIENT
Start: 2017-08-03 | End: 2017-08-03

## 2017-08-03 RX ADMIN — METHOCARBAMOL 750 MG: 750 TABLET ORAL at 06:07

## 2017-08-03 RX ADMIN — IBUPROFEN 800 MG: 800 TABLET, FILM COATED ORAL at 05:40

## 2017-08-03 NOTE — ED AVS SNAPSHOT
[facility] Emergency Department    [address]    [facility] [address]    Phone:  [phone]    Fax:  [phone]                                       Perico Houston   MRN: [id number]    Department:  [facility] Emergency Department   Date of Visit:  8/3/2017           After Visit Summary Signature Page     I have received my discharge instructions, and my questions have been answered. I have discussed any challenges I see with this plan with the nurse or doctor.    ..........................................................................................................................................  Patient/Patient Representative Signature      ..........................................................................................................................................  Patient Representative Print Name and Relationship to Patient    ..................................................               ................................................  Date                                            Time    ..........................................................................................................................................  Reviewed by Signature/Title    ...................................................              ..............................................  Date                                                            Time

## 2017-08-03 NOTE — ED AVS SNAPSHOT
[facility] Emergency Department    [address] [place] [address]    Phone:  [phone]    Fax:  [phone]                                       Perico Houston   MRN: [id number]    Department:  [facility] Emergency Department   Date of Visit:  8/3/2017           Patient Information     Date Of Birth          1960        Your diagnoses for this visit were:     Hip pain, right        You were seen by Evans Aburto MD.      Follow-up Information     Schedule an appointment as soon as possible for a visit with [facility], [place].    Specialty:  Family Practice    Contact information:    [address] [place] [address]  [phone]          Discharge Instructions         Hip Contusion    A contusion is another word for a bruise. It happens when small blood vessels break open and leak blood into the nearby area. A hip contusion can result from a bump, hit, or fall. Symptoms of a contusion often include changes in skin color (bruising), swelling, and pain. It may take several hours for a deep bruise to show up. If the injury is severe, you may need an x-ray to check for broken bones. Swelling should decrease in a few days. Bruising and pain may take several weeks to go away.  Home care    Unless another medicine was prescribed, you may take acetaminophen, ibuprofen, or naproxen to help relieve pain and swelling. If needed, stronger pain medicines may be prescribed. Take all medicines exactly as directed.    Ice the bruised area to help reduce pain and swelling. Wrap a cold source (ice pack or ice cubes in a plastic bag) in a thin towel. Apply the cold source to the bruised area for 20 minutes every 1 to 2 hours the first day. Continue this 3 to 4 times a day until the pain and swelling goes away.    If walking causes pain, use crutches or a walker until you can walk without pain. These items can be rented at most pharmacies and orthopedic supply  stores.    If your injury is keeping you from moving around or caring for yourself properly, you may qualify for services such as home health care. Check with your insurance company to see if this type of care is covered.  Follow-up  Follow up with your healthcare provider, or as advised.  When to seek medical advice   Call your healthcare provider right away if any of these occur:    Increased pain, bruising, or swelling near the injured area    Decreased ability to bear weight on the injured side    Pain or swelling develops below the knee    Chest pain or shortness of breath  Date Last Reviewed: 5/7/2015 2000-2017 [employer]. [address] [place] [address], [place] [address]. All rights reserved. This information is not intended as a substitute for professional medical care. Always follow your healthcare professional's instructions.          Hip Strain    You have a strain of the muscles around the hip joint. A muscle strain is a stretching or tearing of muscle fibers. This causes pain, especially when you move that muscle. There may also be some swelling and bruising.  Home care    Stay off the injured leg as much as possible until you can walk on it without pain. If you have a lot of pain with walking, crutches or a walker may be prescribed. These can be rented or purchased at many pharmacies and surgical or orthopedic supply stores. Follow your healthcare provider's advice regarding when to begin putting weight on that leg.    Apply an ice pack over the injured area for 15 to 20 minutes every 3 to 6 hours. You should do this for the first 24 to 48 hours. You can make an ice pack by filling a plastic bag that seals at the top with ice cubes and then wrapping it with a thin towel. Be careful not to injure your skin with the ice treatments. Ice should never be applied directly to skin. Continue the use of ice packs for relief of pain and swelling as needed. After 48 hours, apply heat (warm shower or  warm bath) for 15 to 20 minutes several times a day, or alternate ice and heat.    You may use over-the-counter pain medicine to control pain, unless another pain medicine was prescribed. If you have chronic liver or kidney disease or ever had a stomach ulcer or GI bleeding, talk with your healthcare provider beforeusing these medicines.    If you play sports, you may resume these activities when you are able to hop and run on the injured leg without pain.  Follow-up care  Follow up with your healthcare provider, or as advised. If your symptoms do not begin to get better after a week, more tests may be needed.  If X-rays were taken, you will be told of any new findings that may affect your care.  When to seek medical advice  Call your healthcare provider right away if any of these occur:    Increased swelling or bruising    Increased pain    Losing the ability to put weight on the injured side  Date Last Reviewed: 11/19/2015 2000-2017 [employer]. [address] [place] [address]. All rights reserved. This information is not intended as a substitute for professional medical care. Always follow your healthcare professional's instructions.          24 Hour Appointment Hotline       To make an appointment at any [facility], call [phone] ([phone]). If you don't have a family doctor or clinic, we will help you find one. [facility] clinics are conveniently located to serve the needs of you and your family.             Review of your medicines      Our records show that you are taking the medicines listed below. If these are incorrect, please call your family doctor or clinic.        Dose / Directions Last dose taken    aspirin 81 MG EC tablet   Dose:  81 mg   Quantity:  30 tablet        Take 1 tablet (81 mg) by mouth daily   Refills:  0        clonazePAM 0.5 MG tablet   Commonly known as:  klonoPIN   Dose:  1 mg        Take 1 mg by mouth 3 times daily   Refills:  0         gabapentin 300 MG capsule   Commonly known as:  NEURONTIN   Dose:  300 mg   Quantity:  90 capsule        Take 1 capsule (300 mg) by mouth 3 times daily   Refills:  1        glipiZIDE 5 MG 24 hr tablet   Commonly known as:  GLUCOTROL XL   Dose:  5 mg        Take 5 mg by mouth daily   Refills:  0        hydrOXYzine 50 MG tablet   Commonly known as:  ATARAX   Dose:  50 mg   Quantity:  20 tablet        Take 1 tablet (50 mg) by mouth 3 times daily as needed for other (back pain)   Refills:  0        lisinopril 40 MG tablet   Commonly known as:  PRINIVIL/ZESTRIL   Dose:  40 mg   Quantity:  30 tablet        Take 1 tablet (40 mg) by mouth daily   Refills:  0        metFORMIN 1000 MG tablet   Commonly known as:  GLUCOPHAGE   Dose:  1000 mg   Quantity:  60 tablet        Take 1 tablet (1,000 mg) by mouth 2 times daily (with meals)   Refills:  0        nitroGLYcerin 0.4 MG sublingual tablet   Commonly known as:  NITROSTAT   Dose:  0.4 mg   Quantity:  25 tablet        Place 1 tablet (0.4 mg) under the tongue every 5 minutes as needed for chest pain if you are still having symptoms after 3 doses (15 minutes) call 911.   Refills:  0        omeprazole 20 MG CR capsule   Commonly known as:  priLOSEC   Dose:  40 mg   Quantity:  60 capsule        Take 2 capsules (40 mg) by mouth daily   Refills:  0        oxyCODONE 10 MG IR tablet   Commonly known as:  ROXICODONE   Dose:  10 mg        Take 10 mg by mouth 3 times daily   Refills:  0                Procedures and tests performed during your visit     XR Pelvis and Hip Right 2 Views      Orders Needing Specimen Collection     None      Pending Results     Date and Time Order Name Status Description    8/3/2017 0532 XR Pelvis and Hip Right 2 Views In process             Pending Culture Results     No orders found from 8/1/2017 to 8/4/2017.            Pending Results Instructions     If you had any lab results that were not finalized at the time of your Discharge, you can call the ED Lab  Result RN at [phone]. You will be contacted by this team for any positive Lab results or changes in treatment. The nurses are available 7 days a week from 10A to 6:30P.  You can leave a message 24 hours per day and they will return your call.        Test Results From Your Hospital Stay        8/3/2017  5:37 AM      Result not yet available     Exam Ended                Clinical Quality Measure: Blood Pressure Screening     Your blood pressure was checked while you were in the emergency department today. The last reading we obtained was  BP: 137/85 . Please read the guidelines below about what these numbers mean and what you should do about them.  If your systolic blood pressure (the top number) is less than 120 and your diastolic blood pressure (the bottom number) is less than 80, then your blood pressure is normal. There is nothing more that you need to do about it.  If your systolic blood pressure (the top number) is 120-139 or your diastolic blood pressure (the bottom number) is 80-89, your blood pressure may be higher than it should be. You should have your blood pressure rechecked within a year by a primary care provider.  If your systolic blood pressure (the top number) is 140 or greater or your diastolic blood pressure (the bottom number) is 90 or greater, you may have high blood pressure. High blood pressure is treatable, but if left untreated over time it can put you at risk for heart attack, stroke, or kidney failure. You should have your blood pressure rechecked by a primary care provider within the next 4 weeks.  If your provider in the emergency department today gave you specific instructions to follow-up with your doctor or provider even sooner than that, you should follow that instruction and not wait for up to 4 weeks for your follow-up visit.        Thank you for choosing [facility]       Thank you for choosing [facility] for your care. Our goal is always to provide you with excellent care. Hearing  "back from our patients is one way we can continue to improve our services. Please take a few minutes to complete the written survey that you may receive in the mail after you visit with us. Thank you!        [facility] Information     [employer] lets you send messages to your doctor, view your test results, renew your prescriptions, schedule appointments and more. To sign up, go to [email or web address] . Click on \"Log in\" on the left side of the screen, which will take you to the Welcome page. Then click on \"Sign up Now\" on the right side of the page.     You will be asked to enter the access code listed below, as well as some personal information. Please follow the directions to create your username and password.     Your access code is: [email or web address]  Expires: 10/23/2017 11:10 AM     Your access code will  in 90 days. If you need help or a new code, please call your [facility] clinic or [phone].        Care EveryWhere ID     This is your Care EveryWhere ID. This could be used by other organizations to access your [facility] medical records  [id number]        Equal Access to Services     MILADIS SIMPSON : Haddeonte Marcelino, pj stephens, brittney wisdom, fady astudillo . So [facility] [phone].    ATENCIÓN: Si habla español, tiene a burgos disposición servicios gratuitos de asistencia lingüística. Artur al [phone].    We comply with applicable federal civil rights laws and [place] laws. We do not discriminate on the basis of race, color, national origin, age, disability sex, sexual orientation or gender identity.            After Visit Summary       This is your record. Keep this with you and show to your community pharmacist(s) and doctor(s) at your next visit.                  "

## 2017-08-03 NOTE — ED NOTES
Pt arrives via EMS for R hip pain. Pt states he fell onto it a few days ago. Has been taking oxycodone prescribed for chronic low back pain to help with pain. Ran out and last took it yesterday morning. Pt states pain is worse and he can not sleep. ABCs intact.

## 2017-08-03 NOTE — ED NOTES
Pt ambulated out to lobby with his own walker. Pt called cab to go home and was informed it will be here in 25 min.

## 2017-08-03 NOTE — ED NOTES
Bed: ED12  Expected date: 8/3/17  Expected time: 5:09 AM  Means of arrival: Ambulance  Comments:  57 yr hip pain
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
## 2017-08-03 NOTE — DISCHARGE INSTRUCTIONS
Hip Contusion    A contusion is another word for a bruise. It happens when small blood vessels break open and leak blood into the nearby area. A hip contusion can result from a bump, hit, or fall. Symptoms of a contusion often include changes in skin color (bruising), swelling, and pain. It may take several hours for a deep bruise to show up. If the injury is severe, you may need an x-ray to check for broken bones. Swelling should decrease in a few days. Bruising and pain may take several weeks to go away.  Home care    Unless another medicine was prescribed, you may take acetaminophen, ibuprofen, or naproxen to help relieve pain and swelling. If needed, stronger pain medicines may be prescribed. Take all medicines exactly as directed.    Ice the bruised area to help reduce pain and swelling. Wrap a cold source (ice pack or ice cubes in a plastic bag) in a thin towel. Apply the cold source to the bruised area for 20 minutes every 1 to 2 hours the first day. Continue this 3 to 4 times a day until the pain and swelling goes away.    If walking causes pain, use crutches or a walker until you can walk without pain. These items can be rented at most pharmacies and orthopedic supply stores.    If your injury is keeping you from moving around or caring for yourself properly, you may qualify for services such as home health care. Check with your insurance company to see if this type of care is covered.  Follow-up  Follow up with your healthcare provider, or as advised.  When to seek medical advice   Call your healthcare provider right away if any of these occur:    Increased pain, bruising, or swelling near the injured area    Decreased ability to bear weight on the injured side    Pain or swelling develops below the knee    Chest pain or shortness of breath  Date Last Reviewed: 5/7/2015 2000-2017 [employer]. [address] [place] [address], [place] [address]. All rights reserved. This information is not intended  as a substitute for professional medical care. Always follow your healthcare professional's instructions.          Hip Strain    You have a strain of the muscles around the hip joint. A muscle strain is a stretching or tearing of muscle fibers. This causes pain, especially when you move that muscle. There may also be some swelling and bruising.  Home care    Stay off the injured leg as much as possible until you can walk on it without pain. If you have a lot of pain with walking, crutches or a walker may be prescribed. These can be rented or purchased at many pharmacies and surgical or orthopedic supply stores. Follow your healthcare provider's advice regarding when to begin putting weight on that leg.    Apply an ice pack over the injured area for 15 to 20 minutes every 3 to 6 hours. You should do this for the first 24 to 48 hours. You can make an ice pack by filling a plastic bag that seals at the top with ice cubes and then wrapping it with a thin towel. Be careful not to injure your skin with the ice treatments. Ice should never be applied directly to skin. Continue the use of ice packs for relief of pain and swelling as needed. After 48 hours, apply heat (warm shower or warm bath) for 15 to 20 minutes several times a day, or alternate ice and heat.    You may use over-the-counter pain medicine to control pain, unless another pain medicine was prescribed. If you have chronic liver or kidney disease or ever had a stomach ulcer or GI bleeding, talk with your healthcare provider beforeusing these medicines.    If you play sports, you may resume these activities when you are able to hop and run on the injured leg without pain.  Follow-up care  Follow up with your healthcare provider, or as advised. If your symptoms do not begin to get better after a week, more tests may be needed.  If X-rays were taken, you will be told of any new findings that may affect your care.  When to seek medical advice  Call your healthcare  provider right away if any of these occur:    Increased swelling or bruising    Increased pain    Losing the ability to put weight on the injured side  Date Last Reviewed: 11/19/2015 2000-2017 [employer]. [address] [place] [address], [place] [address]. All rights reserved. This information is not intended as a substitute for professional medical care. Always follow your healthcare professional's instructions.

## 2017-10-11 ENCOUNTER — APPOINTMENT (OUTPATIENT)
Dept: GENERAL RADIOLOGY | Facility: CLINIC | Age: 57
End: 2017-10-11
Attending: EMERGENCY MEDICINE
Payer: COMMERCIAL

## 2017-10-11 ENCOUNTER — HOSPITAL ENCOUNTER (EMERGENCY)
Facility: CLINIC | Age: 57
Discharge: HOME | End: 2017-10-11
Attending: EMERGENCY MEDICINE | Admitting: EMERGENCY MEDICINE
Payer: COMMERCIAL

## 2017-10-11 VITALS
BODY MASS INDEX: 30.11 KG/M2 | WEIGHT: 222 LBS | TEMPERATURE: 96.9 F | OXYGEN SATURATION: 96 % | SYSTOLIC BLOOD PRESSURE: 131 MMHG | DIASTOLIC BLOOD PRESSURE: 84 MMHG | RESPIRATION RATE: 16 BRPM

## 2017-10-11 DIAGNOSIS — R07.89 ATYPICAL CHEST PAIN: ICD-10-CM

## 2017-10-11 DIAGNOSIS — R10.13 ABDOMINAL PAIN, EPIGASTRIC: ICD-10-CM

## 2017-10-11 LAB
ALBUMIN SERPL-MCNC: 3.9 G/DL (ref 3.4–5)
ALP SERPL-CCNC: 66 U/L (ref 40–150)
ALT SERPL W P-5'-P-CCNC: 34 U/L (ref 0–70)
ANION GAP SERPL CALCULATED.3IONS-SCNC: 3 MMOL/L (ref 3–14)
AST SERPL W P-5'-P-CCNC: 14 U/L (ref 0–45)
BASOPHILS # BLD AUTO: 0.1 10E9/L (ref 0–0.2)
BASOPHILS NFR BLD AUTO: 0.5 %
BILIRUB SERPL-MCNC: 0.4 MG/DL (ref 0.2–1.3)
BUN SERPL-MCNC: 14 MG/DL (ref 7–30)
CALCIUM SERPL-MCNC: 8.4 MG/DL (ref 8.5–10.1)
CHLORIDE SERPL-SCNC: 97 MMOL/L (ref 94–109)
CO2 SERPL-SCNC: 28 MMOL/L (ref 20–32)
CREAT SERPL-MCNC: 0.68 MG/DL (ref 0.66–1.25)
DIFFERENTIAL METHOD BLD: NORMAL
EOSINOPHIL # BLD AUTO: 0.2 10E9/L (ref 0–0.7)
EOSINOPHIL NFR BLD AUTO: 2 %
ERYTHROCYTE [DISTWIDTH] IN BLOOD BY AUTOMATED COUNT: 13.2 % (ref 10–15)
GFR SERPL CREATININE-BSD FRML MDRD: >90 ML/MIN/1.7M2
GLUCOSE SERPL-MCNC: 222 MG/DL (ref 70–99)
HCT VFR BLD AUTO: 44.7 % (ref 40–53)
HGB BLD-MCNC: 14.1 G/DL (ref 13.3–17.7)
IMM GRANULOCYTES # BLD: 0.1 10E9/L (ref 0–0.4)
IMM GRANULOCYTES NFR BLD: 0.7 %
INTERPRETATION ECG - MUSE: NORMAL
INTERPRETATION ECG - MUSE: NORMAL
LIPASE SERPL-CCNC: 111 U/L (ref 73–393)
LYMPHOCYTES # BLD AUTO: 2.6 10E9/L (ref 0.8–5.3)
LYMPHOCYTES NFR BLD AUTO: 26.7 %
MCH RBC QN AUTO: 27.1 PG (ref 26.5–33)
MCHC RBC AUTO-ENTMCNC: 31.5 G/DL (ref 31.5–36.5)
MCV RBC AUTO: 86 FL (ref 78–100)
MONOCYTES # BLD AUTO: 0.8 10E9/L (ref 0–1.3)
MONOCYTES NFR BLD AUTO: 8.2 %
NEUTROPHILS # BLD AUTO: 6 10E9/L (ref 1.6–8.3)
NEUTROPHILS NFR BLD AUTO: 61.9 %
NRBC # BLD AUTO: 0 10*3/UL
NRBC BLD AUTO-RTO: 0 /100
PLATELET # BLD AUTO: 267 10E9/L (ref 150–450)
POTASSIUM SERPL-SCNC: 3.8 MMOL/L (ref 3.4–5.3)
PROT SERPL-MCNC: 7.3 G/DL (ref 6.8–8.8)
RBC # BLD AUTO: 5.21 10E12/L (ref 4.4–5.9)
SODIUM SERPL-SCNC: 128 MMOL/L (ref 133–144)
TROPONIN I BLD-MCNC: 0 UG/L (ref 0–0.1)
TROPONIN I SERPL-MCNC: <0.015 UG/L (ref 0–0.04)
WBC # BLD AUTO: 9.7 10E9/L (ref 4–11)

## 2017-10-11 PROCEDURE — 85025 COMPLETE CBC W/AUTO DIFF WBC: CPT | Performed by: EMERGENCY MEDICINE

## 2017-10-11 PROCEDURE — 93005 ELECTROCARDIOGRAM TRACING: CPT

## 2017-10-11 PROCEDURE — 25000132 ZZH RX MED GY IP 250 OP 250 PS 637: Performed by: EMERGENCY MEDICINE

## 2017-10-11 PROCEDURE — 80053 COMPREHEN METABOLIC PANEL: CPT | Performed by: EMERGENCY MEDICINE

## 2017-10-11 PROCEDURE — 99285 EMERGENCY DEPT VISIT HI MDM: CPT | Mod: 25

## 2017-10-11 PROCEDURE — 83690 ASSAY OF LIPASE: CPT | Performed by: EMERGENCY MEDICINE

## 2017-10-11 PROCEDURE — 25000125 ZZHC RX 250: Performed by: EMERGENCY MEDICINE

## 2017-10-11 PROCEDURE — 84484 ASSAY OF TROPONIN QUANT: CPT

## 2017-10-11 PROCEDURE — 93005 ELECTROCARDIOGRAM TRACING: CPT | Mod: 76

## 2017-10-11 PROCEDURE — 84484 ASSAY OF TROPONIN QUANT: CPT | Performed by: EMERGENCY MEDICINE

## 2017-10-11 PROCEDURE — 71020 XR CHEST 2 VW: CPT

## 2017-10-11 RX ORDER — ASPIRIN 81 MG/1
324 TABLET, CHEWABLE ORAL ONCE
Status: DISCONTINUED | OUTPATIENT
Start: 2017-10-11 | End: 2017-10-11

## 2017-10-11 RX ORDER — OXYCODONE AND ACETAMINOPHEN 5; 325 MG/1; MG/1
1 TABLET ORAL ONCE
Status: COMPLETED | OUTPATIENT
Start: 2017-10-11 | End: 2017-10-11

## 2017-10-11 RX ORDER — NITROGLYCERIN 0.4 MG/1
0.4 TABLET SUBLINGUAL EVERY 5 MIN PRN
Status: DISCONTINUED | OUTPATIENT
Start: 2017-10-11 | End: 2017-10-11 | Stop reason: HOSPADM

## 2017-10-11 RX ORDER — ALUMINA, MAGNESIA, AND SIMETHICONE 2400; 2400; 240 MG/30ML; MG/30ML; MG/30ML
15 SUSPENSION ORAL ONCE
Status: COMPLETED | OUTPATIENT
Start: 2017-10-11 | End: 2017-10-11

## 2017-10-11 RX ADMIN — OXYCODONE HYDROCHLORIDE AND ACETAMINOPHEN 1 TABLET: 5; 325 TABLET ORAL at 05:46

## 2017-10-11 RX ADMIN — LIDOCAINE HYDROCHLORIDE 15 ML: 20 SOLUTION ORAL; TOPICAL at 05:12

## 2017-10-11 RX ADMIN — ALUMINUM HYDROXIDE, MAGNESIUM HYDROXIDE, AND DIMETHICONE 15 ML: 400; 400; 40 SUSPENSION ORAL at 05:11

## 2017-10-11 RX ADMIN — NITROGLYCERIN 0.4 MG: 0.4 TABLET SUBLINGUAL at 05:14

## 2017-10-11 ASSESSMENT — ENCOUNTER SYMPTOMS
HEADACHES: 1
ABDOMINAL PAIN: 1
FEVER: 0

## 2017-10-11 NOTE — ED AVS SNAPSHOT
[facility] Emergency Department    [address] [place] [address]    Phone:  [phone]    Fax:  [phone]                                       Perico Houston   MRN: [id number]    Department:  [facility] Emergency Department   Date of Visit:  10/11/2017           Patient Information     Date Of Birth          1960        Your diagnoses for this visit were:     Atypical chest pain     Abdominal pain, epigastric        You were seen by Mick Mcclain MD.      Follow-up Information     Follow up with [facility], [place]. Call in 3 days.    Specialty:  Family Practice    Contact information:    [address] [facility]   [place] [address]  [phone]          Discharge Instructions       Discharge Instructions  Chest Pain    You have been seen today for chest pain or discomfort.  At this time, your doctor has found no signs that your chest pain is due to a serious or life-threatening condition, (or you have declined more testing and/or admission to the hospital). However, sometimes there is a serious problem that does not show up right away. Your evaluation today may not be complete and you may need further testing and evaluation.     You need to follow-up with your regular doctor within 3 days.    Return to the Emergency Department if:    Your chest pain changes, gets worse, starts to happen more often, or comes with less activity.    You are short of breath.    You get very weak or tired.    You pass out or faint.    You have any new symptoms, like fever, cough, numb legs, or you cough up blood.    You have anything else that worries you.    Until you follow-up with your regular doctor please do the following:    Take one aspirin daily unless you have an allergy or are told not to by your doctor.    If a stress test appointment has been made, go to the appointment.    If you have questions, contact your regular doctor.    If your doctor today has told you to  follow-up with your regular doctor, it is very important that you make an appointment with your clinic and go to the appointment.  If you do not follow-up with your primary doctor, it may result in missing an important development which could result in permanent injury or disability and/or lasting pain.  If there is any problem keeping your appointment, call your doctor or return to the Emergency Department.    If you were given a prescription for medicine here today, be sure to read all of the information (including the package insert) that comes with your prescription.  This will include important information about the medicine, its side effects, and any warnings that you need to know about.  The pharmacist who fills the prescription can provide more information and answer questions you may have about the medicine.  If you have questions or concerns that the pharmacist cannot address, please call or return to the Emergency Department.     Opioid Medication Information    Pain medications are among the most commonly prescribed medicines, so we are including this information for all our patients. If you did not receive pain medication or get a prescription for pain medicine, you can ignore it.     You may have been given a prescription for an opioid (narcotic) pain medicine and/or have received a pain medicine while here in the Emergency Department. These medicines can make you drowsy or impaired. You must not drive, operate dangerous equipment, or engage in any other dangerous activities while taking these medications. If you drive while taking these medications, you could be arrested for DUI, or driving under the influence. Do not drink any alcohol while you are taking these medications.     Opioid pain medications can cause addiction. If you have a history of chemical dependency of any type, you are at a higher risk of becoming addicted to pain medications.  Only take these prescribed medications to treat your  pain when all other options have been tried. Take it for as short a time and as few doses as possible. Store your pain pills in a secure place, as they are frequently stolen and provide a dangerous opportunity for children or visitors in your house to start abusing these powerful medications. We will not replace any lost or stolen medicine.  As soon as your pain is better, you should flush all your remaining medication.     Many prescription pain medications contain Tylenol  (acetaminophen), including Vicodin , Tylenol #3 , Norco , Lortab , and Percocet .  You should not take any extra pills of Tylenol  if you are using these prescription medications or you can get very sick.  Do not ever take more than 3000 mg of acetaminophen in any 24 hour period.    All opioids tend to cause constipation. Drink plenty of water and eat foods that have a lot of fiber, such as fruits, vegetables, prune juice, apple juice and high fiber cereal.  Take a laxative if you don t move your bowels at least every other day. Miralax , Milk of Magnesia, Colace , or Senna  can be used to keep you regular.      Remember that you can always come back to the Emergency Department if you are not able to see your regular doctor in the amount of time listed above, if you get any new symptoms, or if there is anything that worries you.      Discharge Instructions  Abdominal Pain    Abdominal pain can be caused by many things. Your evaluation today does not show the exact cause for your pain. Your doctor today has decided that it is unlikely your pain is due to a life threatening problem, or a problem requiring surgery or hospital admission. Sometimes those problems cannot be found right away, so it is very important that you follow up as directed.  Sometimes only the changes which occur over time allow the cause of your pain to be found.    Return to the Emergency Department for a recheck in 8-12 hours if your pain continues.  If your pain gets worse,  changes in location, or feels different, return to the Emergency Department right away.    ADULTS:  Return to the Emergency Department right away if:      You get an oral temperature above 102oF or as directed by your doctor.    You have blood in your stools (bright red or black, tarry stools).    You keep throwing up or can t drink liquids.    You see blood when you throw up.    You can t have a bowel movement or you can t pass gas.    Your stomach gets bloated or bigger.    Your skin or the whites of your eyes look yellow.    You faint.    You have bloody, frequent or painful urination.    You have new symptoms or anything that worries you.    CHILDREN:  Return to the Emergency Department right away if your child has any of the above-listed symptoms or the following:      Pushes your hand away or screams/cries when his/her belly is touched.    You notice your child is very fussy or weak.    Your child is very tired and is too tired to eat or drink.    Your child is dehydrated.  Signs of dehydration can be:  o Your infant has had no wet diapers in 4-5 hours.  o Your older child has not passed urine in 6-8 hours.  o Your infant or child starts to have dry mouth and lips, or no saliva or tears.    PREGNANT WOMEN:  Return to the Emergency Department right away if you have any of the above-listed symptoms or the following:      You have bleeding, leaking fluid or passing tissue from the vagina.    You have worse pain or cramping, or pain in your shoulder or back.    You have vomiting that will not stop.    You have painful or bloody urination.    You have a temperature of 100oF or more.    Your baby is not moving as much as usual.    You faint.    You get a bad headache with or without eye problems and abdominal pain.    You have a convulsion or seizure.    You have unusual discharge from your vagina and abdominal pain.    Abdominal pain is pretty common during pregnancy.  Your pain may or may not be related to your  "pregnancy. You should follow-up closely with your OB doctor so they can evaluate you and your baby.  Until you follow-up with your regular doctor, do the following:       Avoid sex and do not put anything in your vagina.    Drink clear fluids.    Only take medications approved by your doctor.    MORE INFORMATION:    Appendicitis:  A possible cause of abdominal pain in any person who still has their appendix is acute appendicitis. Appendicitis is often hard to diagnose.  Testing does not always rule out early appendicitis or other causes of abdominal pain. Close follow-up with your doctor and re-evaluations may be needed to figure out the reason for your abdominal pain.    Follow-up:  It is very important that you make an appointment with your clinic and go to the appointment.  If you do not follow-up with your primary doctor, it may result in missing an important development which could result in permanent injury or disability and/or lasting pain.  If there is any problem keeping your appointment, call your doctor or return to the Emergency Department.    Medications:  Take your medications as directed by your doctor today.  Before using over-the-counter medications, ask your doctor and make sure to take the medications as directed.  If you have any questions about medications, ask your doctor.    Diet:  Resume your normal diet as much as possible, but do not eat fried, fatty or spicy foods while you have pain.  Do not drink alcohol or have caffeine.  Do not smoke tobacco.    Probiotics: If you have been given an antibiotic, you may want to also take a probiotic pill or eat yogurt with live cultures. Probiotics have \"good bacteria\" to help your intestines stay healthy. Studies have shown that probiotics help prevent diarrhea and other intestine problems (including C. diff infection) when you take antibiotics. You can buy these without a prescription in the pharmacy section of the store.     If you were given a " prescription for medicine here today, be sure to read all of the information (including the package insert) that comes with your prescription.  This will include important information about the medicine, its side effects, and any warnings that you need to know about.  The pharmacist who fills the prescription can provide more information and answer questions you may have about the medicine.  If you have questions or concerns that the pharmacist cannot address, please call or return to the Emergency Department.         Opioid Medication Information    Pain medications are among the most commonly prescribed medicines, so we are including this information for all our patients. If you did not receive pain medication or get a prescription for pain medicine, you can ignore it.     You may have been given a prescription for an opioid (narcotic) pain medicine and/or have received a pain medicine while here in the Emergency Department. These medicines can make you drowsy or impaired. You must not drive, operate dangerous equipment, or engage in any other dangerous activities while taking these medications. If you drive while taking these medications, you could be arrested for DUI, or driving under the influence. Do not drink any alcohol while you are taking these medications.     Opioid pain medications can cause addiction. If you have a history of chemical dependency of any type, you are at a higher risk of becoming addicted to pain medications.  Only take these prescribed medications to treat your pain when all other options have been tried. Take it for as short a time and as few doses as possible. Store your pain pills in a secure place, as they are frequently stolen and provide a dangerous opportunity for children or visitors in your house to start abusing these powerful medications. We will not replace any lost or stolen medicine.  As soon as your pain is better, you should flush all your remaining medication.     Many  prescription pain medications contain Tylenol  (acetaminophen), including Vicodin , Tylenol #3 , Norco , Lortab , and Percocet .  You should not take any extra pills of Tylenol  if you are using these prescription medications or you can get very sick.  Do not ever take more than 3000 mg of acetaminophen in any 24 hour period.    All opioids tend to cause constipation. Drink plenty of water and eat foods that have a lot of fiber, such as fruits, vegetables, prune juice, apple juice and high fiber cereal.  Take a laxative if you don t move your bowels at least every other day. Miralax , Milk of Magnesia, Colace , or Senna  can be used to keep you regular.      Remember that you can always come back to the Emergency Department if you are not able to see your regular doctor in the amount of time listed above, if you get any new symptoms, or if there is anything that worries you.          24 Hour Appointment Hotline       To make an appointment at any [facility], call [phone] ([phone]). If you don't have a family doctor or clinic, we will help you find one. [facility] clinics are conveniently located to serve the needs of you and your family.             Review of your medicines      Our records show that you are taking the medicines listed below. If these are incorrect, please call your family doctor or clinic.        Dose / Directions Last dose taken    aspirin 81 MG EC tablet   Dose:  81 mg   Quantity:  30 tablet        Take 1 tablet (81 mg) by mouth daily   Refills:  0        clonazePAM 0.5 MG tablet   Commonly known as:  klonoPIN   Dose:  1 mg        Take 1 mg by mouth 3 times daily   Refills:  0        gabapentin 300 MG capsule   Commonly known as:  NEURONTIN   Dose:  300 mg   Quantity:  90 capsule        Take 1 capsule (300 mg) by mouth 3 times daily   Refills:  1        glipiZIDE 5 MG 24 hr tablet   Commonly known as:  GLUCOTROL XL   Dose:  5 mg        Take 5 mg by mouth daily   Refills:  0         hydrOXYzine 50 MG tablet   Commonly known as:  ATARAX   Dose:  50 mg   Quantity:  20 tablet        Take 1 tablet (50 mg) by mouth 3 times daily as needed for other (back pain)   Refills:  0        lisinopril 40 MG tablet   Commonly known as:  PRINIVIL/ZESTRIL   Dose:  40 mg   Quantity:  30 tablet        Take 1 tablet (40 mg) by mouth daily   Refills:  0        metFORMIN 1000 MG tablet   Commonly known as:  GLUCOPHAGE   Dose:  1000 mg   Quantity:  60 tablet        Take 1 tablet (1,000 mg) by mouth 2 times daily (with meals)   Refills:  0        nitroGLYcerin 0.4 MG sublingual tablet   Commonly known as:  NITROSTAT   Dose:  0.4 mg   Quantity:  25 tablet        Place 1 tablet (0.4 mg) under the tongue every 5 minutes as needed for chest pain if you are still having symptoms after 3 doses (15 minutes) call 911.   Refills:  0        oxyCODONE 10 MG IR tablet   Commonly known as:  ROXICODONE   Dose:  10 mg        Take 10 mg by mouth 3 times daily   Refills:  0                Procedures and tests performed during your visit     CBC with platelets differential    Cardiac Continuous Monitoring    Comprehensive metabolic panel    EKG 12 lead    EKG 12-lead, tracing only    ISTAT troponin    Lipase    Peripheral IV: Standard    Pulse oximetry nursing    Troponin I    Troponin POCT    XR Chest 2 Views      Orders Needing Specimen Collection     None      Pending Results     Date and Time Order Name Status Description    10/11/2017 0607 EKG 12 lead Preliminary     10/11/2017 0505 EKG 12-lead, tracing only Preliminary             Pending Culture Results     No orders found from 10/9/2017 to 10/12/2017.            Pending Results Instructions     If you had any lab results that were not finalized at the time of your Discharge, you can call the ED Lab Result RN at [phone]. You will be contacted by this team for any positive Lab results or changes in treatment. The nurses are available 7 days a week from 10A to 6:30P.  You  can leave a message 24 hours per day and they will return your call.        Test Results From Your Hospital Stay        10/11/2017  5:26 AM      Component Results     Component Value Ref Range & Units Status    WBC 9.7 4.0 - 11.0 10e9/L Final    RBC Count 5.21 4.4 - 5.9 10e12/L Final    Hemoglobin 14.1 13.3 - 17.7 g/dL Final    Hematocrit 44.7 40.0 - 53.0 % Final    MCV 86 78 - 100 fl Final    MCH 27.1 26.5 - 33.0 pg Final    MCHC 31.5 31.5 - 36.5 g/dL Final    RDW 13.2 10.0 - 15.0 % Final    Platelet Count 267 150 - 450 10e9/L Final    Diff Method Automated Method  Final    % Neutrophils 61.9 % Final    % Lymphocytes 26.7 % Final    % Monocytes 8.2 % Final    % Eosinophils 2.0 % Final    % Basophils 0.5 % Final    % Immature Granulocytes 0.7 % Final    Nucleated RBCs 0 0 /100 Final    Absolute Neutrophil 6.0 1.6 - 8.3 10e9/L Final    Absolute Lymphocytes 2.6 0.8 - 5.3 10e9/L Final    Absolute Monocytes 0.8 0.0 - 1.3 10e9/L Final    Absolute Eosinophils 0.2 0.0 - 0.7 10e9/L Final    Absolute Basophils 0.1 0.0 - 0.2 10e9/L Final    Abs Immature Granulocytes 0.1 0 - 0.4 10e9/L Final    Absolute Nucleated RBC 0.0  Final         10/11/2017  5:45 AM      Component Results     Component Value Ref Range & Units Status    Troponin I ES <0.015 0.000 - 0.045 ug/L Final    The 99th percentile for upper reference range is 0.045 ug/L.  Troponin values   in the range of 0.045 - 0.120 ug/L may be associated with risks of adverse   clinical events.           10/11/2017  5:45 AM      Component Results     Component Value Ref Range & Units Status    Sodium 128 (L) 133 - 144 mmol/L Final    Potassium 3.8 3.4 - 5.3 mmol/L Final    Chloride 97 94 - 109 mmol/L Final    Carbon Dioxide 28 20 - 32 mmol/L Final    Anion Gap 3 3 - 14 mmol/L Final    Glucose 222 (H) 70 - 99 mg/dL Final    Urea Nitrogen 14 7 - 30 mg/dL Final    Creatinine 0.68 0.66 - 1.25 mg/dL Final    GFR Estimate >90 >60 mL/min/1.7m2 Final    Non  GFR Calc     GFR Estimate If Black >90 >60 mL/min/1.7m2 Final    African American GFR Calc    Calcium 8.4 (L) 8.5 - 10.1 mg/dL Final    Bilirubin Total 0.4 0.2 - 1.3 mg/dL Final    Albumin 3.9 3.4 - 5.0 g/dL Final    Protein Total 7.3 6.8 - 8.8 g/dL Final    Alkaline Phosphatase 66 40 - 150 U/L Final    ALT 34 0 - 70 U/L Final    AST 14 0 - 45 U/L Final         10/11/2017  5:45 AM      Component Results     Component Value Ref Range & Units Status    Lipase 111 73 - 393 U/L Final         10/11/2017  5:43 AM      Narrative     CHEST 2 VIEWS  10/11/2017 5:32 AM     HISTORY: Chest pain.    COMPARISON: 7/25/2017.    FINDINGS: The lungs are clear. Borderline cardiomegaly.        Impression     IMPRESSION: No evidence of active cardiopulmonary disease.    TAWANNA FRANCO MD         10/11/2017  6:51 AM      Component Results     Component Value Ref Range & Units Status    Troponin I 0.00 0.00 - 0.10 ug/L Final                Clinical Quality Measure: Blood Pressure Screening     Your blood pressure was checked while you were in the emergency department today. The last reading we obtained was  BP: 113/77 . Please read the guidelines below about what these numbers mean and what you should do about them.  If your systolic blood pressure (the top number) is less than 120 and your diastolic blood pressure (the bottom number) is less than 80, then your blood pressure is normal. There is nothing more that you need to do about it.  If your systolic blood pressure (the top number) is 120-139 or your diastolic blood pressure (the bottom number) is 80-89, your blood pressure may be higher than it should be. You should have your blood pressure rechecked within a year by a primary care provider.  If your systolic blood pressure (the top number) is 140 or greater or your diastolic blood pressure (the bottom number) is 90 or greater, you may have high blood pressure. High blood pressure is treatable, but if left untreated over time it can put you at  "risk for heart attack, stroke, or kidney failure. You should have your blood pressure rechecked by a primary care provider within the next 4 weeks.  If your provider in the emergency department today gave you specific instructions to follow-up with your doctor or provider even sooner than that, you should follow that instruction and not wait for up to 4 weeks for your follow-up visit.        Thank you for choosing [facility]       Thank you for choosing [facility] for your care. Our goal is always to provide you with excellent care. Hearing back from our patients is one way we can continue to improve our services. Please take a few minutes to complete the written survey that you may receive in the mail after you visit with us. Thank you!        [employer] Information     [employer] lets you send messages to your doctor, view your test results, renew your prescriptions, schedule appointments and more. To sign up, go to [email or web address] . Click on \"Log in\" on the left side of the screen, which will take you to the Welcome page. Then click on \"Sign up Now\" on the right side of the page.     You will be asked to enter the access code listed below, as well as some personal information. Please follow the directions to create your username and password.     Your access code is: [email or web address]  Expires: 10/23/2017 11:10 AM     Your access code will  in 90 days. If you need help or a new code, please call your [facility] clinic or [phone].        Care EveryWhere ID     This is your Care EveryWhere ID. This could be used by other organizations to access your [facility] medical records  [id number]        Equal Access to Services     MILADIS SIMPSON : Hadii jose Marcelino, waaxda luqadaha, qaybta kaalmafady guerrero. So [facility] [phone].    ATENCIÓN: Si habla español, tiene a burgos disposición servicios gratuitos de asistencia lingüística. Llame al [phone].    We comply " with applicable federal civil rights laws and [place] laws. We do not discriminate on the basis of race, color, national origin, age, disability, sex, sexual orientation, or gender identity.            After Visit Summary       This is your record. Keep this with you and show to your community pharmacist(s) and doctor(s) at your next visit.

## 2017-10-11 NOTE — ED AVS SNAPSHOT
[facility] Emergency Department    [address]    [facility] [address]    Phone:  [phone]    Fax:  [phone]                                       Perico Houston   MRN: [id number]    Department:  [facility] Emergency Department   Date of Visit:  10/11/2017           After Visit Summary Signature Page     I have received my discharge instructions, and my questions have been answered. I have discussed any challenges I see with this plan with the nurse or doctor.    ..........................................................................................................................................  Patient/Patient Representative Signature      ..........................................................................................................................................  Patient Representative Print Name and Relationship to Patient    ..................................................               ................................................  Date                                            Time    ..........................................................................................................................................  Reviewed by Signature/Title    ...................................................              ..............................................  Date                                                            Time

## 2017-10-11 NOTE — ED NOTES
Patient comes in by ambulance for evaluation of right sided CP and abdominal pain which started about 2 hours ago. Patient has cardiac history. Patient states that he had a hard time breathing last night. Patient was given ASA by EMS. Took one SL NTG at home which did nothing to improve CP.
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
## 2017-10-31 ENCOUNTER — HOSPITAL ENCOUNTER (EMERGENCY)
Facility: CLINIC | Age: 57
Discharge: HOME | End: 2017-10-31
Attending: EMERGENCY MEDICINE | Admitting: EMERGENCY MEDICINE
Payer: COMMERCIAL

## 2017-10-31 ENCOUNTER — APPOINTMENT (OUTPATIENT)
Dept: GENERAL RADIOLOGY | Facility: CLINIC | Age: 57
End: 2017-10-31
Attending: EMERGENCY MEDICINE
Payer: COMMERCIAL

## 2017-10-31 VITALS
HEART RATE: 79 BPM | DIASTOLIC BLOOD PRESSURE: 91 MMHG | SYSTOLIC BLOOD PRESSURE: 142 MMHG | HEIGHT: 72 IN | TEMPERATURE: 98 F | OXYGEN SATURATION: 97 % | BODY MASS INDEX: 29.8 KG/M2 | RESPIRATION RATE: 16 BRPM | WEIGHT: 220 LBS

## 2017-10-31 DIAGNOSIS — R07.9 CHEST PAIN, UNSPECIFIED TYPE: Primary | ICD-10-CM

## 2017-10-31 DIAGNOSIS — R07.89 CHEST WALL PAIN: ICD-10-CM

## 2017-10-31 LAB
ALBUMIN SERPL-MCNC: 3.8 G/DL (ref 3.4–5)
ALP SERPL-CCNC: 68 U/L (ref 40–150)
ALT SERPL W P-5'-P-CCNC: 46 U/L (ref 0–70)
ANION GAP SERPL CALCULATED.3IONS-SCNC: 8 MMOL/L (ref 3–14)
AST SERPL W P-5'-P-CCNC: 21 U/L (ref 0–45)
BASOPHILS # BLD AUTO: 0.1 10E9/L (ref 0–0.2)
BASOPHILS NFR BLD AUTO: 0.7 %
BILIRUB SERPL-MCNC: 0.5 MG/DL (ref 0.2–1.3)
BUN SERPL-MCNC: 16 MG/DL (ref 7–30)
CALCIUM SERPL-MCNC: 8.5 MG/DL (ref 8.5–10.1)
CHLORIDE SERPL-SCNC: 98 MMOL/L (ref 94–109)
CO2 SERPL-SCNC: 25 MMOL/L (ref 20–32)
CREAT SERPL-MCNC: 0.74 MG/DL (ref 0.66–1.25)
DIFFERENTIAL METHOD BLD: NORMAL
EOSINOPHIL # BLD AUTO: 0.2 10E9/L (ref 0–0.7)
EOSINOPHIL NFR BLD AUTO: 2.7 %
ERYTHROCYTE [DISTWIDTH] IN BLOOD BY AUTOMATED COUNT: 13.3 % (ref 10–15)
GFR SERPL CREATININE-BSD FRML MDRD: >90 ML/MIN/1.7M2
GLUCOSE SERPL-MCNC: 183 MG/DL (ref 70–99)
HCT VFR BLD AUTO: 45.8 % (ref 40–53)
HGB BLD-MCNC: 14.7 G/DL (ref 13.3–17.7)
IMM GRANULOCYTES # BLD: 0.1 10E9/L (ref 0–0.4)
IMM GRANULOCYTES NFR BLD: 0.7 %
INTERPRETATION ECG - MUSE: NORMAL
LIPASE SERPL-CCNC: 75 U/L (ref 73–393)
LYMPHOCYTES # BLD AUTO: 2 10E9/L (ref 0.8–5.3)
LYMPHOCYTES NFR BLD AUTO: 23.3 %
MCH RBC QN AUTO: 27.3 PG (ref 26.5–33)
MCHC RBC AUTO-ENTMCNC: 32.1 G/DL (ref 31.5–36.5)
MCV RBC AUTO: 85 FL (ref 78–100)
MONOCYTES # BLD AUTO: 0.7 10E9/L (ref 0–1.3)
MONOCYTES NFR BLD AUTO: 7.7 %
NEUTROPHILS # BLD AUTO: 5.6 10E9/L (ref 1.6–8.3)
NEUTROPHILS NFR BLD AUTO: 64.9 %
NRBC # BLD AUTO: 0 10*3/UL
NRBC BLD AUTO-RTO: 0 /100
PLATELET # BLD AUTO: 304 10E9/L (ref 150–450)
POTASSIUM SERPL-SCNC: 4.3 MMOL/L (ref 3.4–5.3)
PROT SERPL-MCNC: 7.3 G/DL (ref 6.8–8.8)
RBC # BLD AUTO: 5.38 10E12/L (ref 4.4–5.9)
SODIUM SERPL-SCNC: 131 MMOL/L (ref 133–144)
TROPONIN I SERPL-MCNC: <0.015 UG/L (ref 0–0.04)
TROPONIN I SERPL-MCNC: <0.015 UG/L (ref 0–0.04)
WBC # BLD AUTO: 8.7 10E9/L (ref 4–11)

## 2017-10-31 PROCEDURE — 25000125 ZZHC RX 250: Performed by: EMERGENCY MEDICINE

## 2017-10-31 PROCEDURE — 93005 ELECTROCARDIOGRAM TRACING: CPT

## 2017-10-31 PROCEDURE — 83690 ASSAY OF LIPASE: CPT | Performed by: EMERGENCY MEDICINE

## 2017-10-31 PROCEDURE — 80053 COMPREHEN METABOLIC PANEL: CPT | Performed by: EMERGENCY MEDICINE

## 2017-10-31 PROCEDURE — 85025 COMPLETE CBC W/AUTO DIFF WBC: CPT | Performed by: EMERGENCY MEDICINE

## 2017-10-31 PROCEDURE — 25000132 ZZH RX MED GY IP 250 OP 250 PS 637: Performed by: EMERGENCY MEDICINE

## 2017-10-31 PROCEDURE — 84484 ASSAY OF TROPONIN QUANT: CPT | Performed by: EMERGENCY MEDICINE

## 2017-10-31 PROCEDURE — 99285 EMERGENCY DEPT VISIT HI MDM: CPT | Mod: 25

## 2017-10-31 PROCEDURE — 71020 XR CHEST 2 VW: CPT

## 2017-10-31 RX ORDER — ASPIRIN 325 MG
325 TABLET ORAL ONCE
Status: COMPLETED | OUTPATIENT
Start: 2017-10-31 | End: 2017-10-31

## 2017-10-31 RX ORDER — LEVOFLOXACIN 5 MG/ML
750 INJECTION, SOLUTION INTRAVENOUS ONCE
Status: DISCONTINUED | OUTPATIENT
Start: 2017-10-31 | End: 2017-10-31

## 2017-10-31 RX ORDER — ACETAMINOPHEN 325 MG/1
650 TABLET ORAL ONCE
Status: COMPLETED | OUTPATIENT
Start: 2017-10-31 | End: 2017-10-31

## 2017-10-31 RX ORDER — IBUPROFEN 600 MG/1
600 TABLET, FILM COATED ORAL ONCE
Status: COMPLETED | OUTPATIENT
Start: 2017-10-31 | End: 2017-10-31

## 2017-10-31 RX ADMIN — IBUPROFEN 600 MG: 600 TABLET ORAL at 08:28

## 2017-10-31 RX ADMIN — ASPIRIN 325 MG ORAL TABLET 325 MG: 325 PILL ORAL at 06:17

## 2017-10-31 RX ADMIN — ACETAMINOPHEN 650 MG: 325 TABLET, FILM COATED ORAL at 08:28

## 2017-10-31 RX ADMIN — LIDOCAINE HYDROCHLORIDE 30 ML: 20 SOLUTION ORAL; TOPICAL at 06:18

## 2017-10-31 ASSESSMENT — ENCOUNTER SYMPTOMS
FEVER: 0
COUGH: 1
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
## 2017-10-31 NOTE — ED AVS SNAPSHOT
[facility] Emergency Department    [address]    [facility] [address]    Phone:  [phone]    Fax:  [phone]                                       Perico Houston   MRN: [id number]    Department:  [facility] Emergency Department   Date of Visit:  10/31/2017           After Visit Summary Signature Page     I have received my discharge instructions, and my questions have been answered. I have discussed any challenges I see with this plan with the nurse or doctor.    ..........................................................................................................................................  Patient/Patient Representative Signature      ..........................................................................................................................................  Patient Representative Print Name and Relationship to Patient    ..................................................               ................................................  Date                                            Time    ..........................................................................................................................................  Reviewed by Signature/Title    ...................................................              ..............................................  Date                                                            Time

## 2017-10-31 NOTE — ED AVS SNAPSHOT
[facility] Emergency Department    [address] [place] [address]    Phone:  [phone]    Fax:  [phone]                                       Perico Houston   MRN: [id number]    Department:  [facility] Emergency Department   Date of Visit:  10/31/2017           Patient Information     Date Of Birth          1960        Your diagnoses for this visit were:     Chest wall pain     Chest pain, unspecified type        You were seen by Mario Mchugh MD.      Follow-up Information     Follow up with [facility], [place].    Specialty:  Family Practice    Contact information:    [address]  [place] [address]  [phone]          Follow up with [facility] Emergency Department.    Specialty:  EMERGENCY MEDICINE    Why:  If symptoms worsen    Contact information:    [address] [place] [address]  [phone]        Discharge Instructions       Discharge Instructions  Chest Pain    You have been seen today for chest pain or discomfort.  At this time, your provider has found no signs that your chest pain is due to a serious or life-threatening condition, (or you have declined more testing and/or admission to the hospital). However, sometimes there is a serious problem that does not show up right away. Your evaluation today may not be complete and you may need further testing and evaluation.     Generally, every Emergency Department visit should have a follow-up clinic visit with either a primary or a specialty clinic/provider. Please follow-up as instructed by your emergency provider today.  Return to the Emergency Department if:    Your chest pain changes, gets worse, starts to happen more often, or comes with less activity.    You are newly short of breath.    You get very weak or tired.    You pass out or faint.    You have any new symptoms, like fever, cough, numb legs, or you cough up blood.    You have anything  else that worries you.    Until you follow-up with your regular provider, please do the following:    Take one aspirin daily unless you have an allergy or are told not to by your provider.    If a stress test appointment has been made, go to the appointment.    If you have questions, contact your regular provider.    Follow-up with your regular provider/clinic as directed; this is very important.    If you were given a prescription for medicine here today, be sure to read all of the information (including the package insert) that comes with your prescription.  This will include important information about the medicine, its side effects, and any warnings that you need to know about.  The pharmacist who fills the prescription can provide more information and answer questions you may have about the medicine.  If you have questions or concerns that the pharmacist cannot address, please call or return to the Emergency Department.       Remember that you can always come back to the Emergency Department if you are not able to see your regular provider in the amount of time listed above, if you get any new symptoms, or if there is anything that worries you.      24 Hour Appointment Hotline       To make an appointment at any [facility], call [phone] ([phone]). If you don't have a family doctor or clinic, we will help you find one. [facility] clinics are conveniently located to serve the needs of you and your family.             Review of your medicines      Our records show that you are taking the medicines listed below. If these are incorrect, please call your family doctor or clinic.        Dose / Directions Last dose taken    aspirin 81 MG EC tablet   Dose:  81 mg   Quantity:  30 tablet        Take 1 tablet (81 mg) by mouth daily   Refills:  0        clonazePAM 0.5 MG tablet   Commonly known as:  klonoPIN   Dose:  1 mg        Take 1 mg by mouth 3 times daily   Refills:  0        gabapentin 300 MG capsule    Commonly known as:  NEURONTIN   Dose:  300 mg   Quantity:  90 capsule        Take 1 capsule (300 mg) by mouth 3 times daily   Refills:  1        glipiZIDE 5 MG 24 hr tablet   Commonly known as:  GLUCOTROL XL   Dose:  5 mg        Take 5 mg by mouth daily   Refills:  0        hydrOXYzine 50 MG tablet   Commonly known as:  ATARAX   Dose:  50 mg   Quantity:  20 tablet        Take 1 tablet (50 mg) by mouth 3 times daily as needed for other (back pain)   Refills:  0        lisinopril 40 MG tablet   Commonly known as:  PRINIVIL/ZESTRIL   Dose:  40 mg   Quantity:  30 tablet        Take 1 tablet (40 mg) by mouth daily   Refills:  0        metFORMIN 1000 MG tablet   Commonly known as:  GLUCOPHAGE   Dose:  1000 mg   Quantity:  60 tablet        Take 1 tablet (1,000 mg) by mouth 2 times daily (with meals)   Refills:  0        nitroGLYcerin 0.4 MG sublingual tablet   Commonly known as:  NITROSTAT   Dose:  0.4 mg   Quantity:  25 tablet        Place 1 tablet (0.4 mg) under the tongue every 5 minutes as needed for chest pain if you are still having symptoms after 3 doses (15 minutes) call 911.   Refills:  0        oxyCODONE 10 MG IR tablet   Commonly known as:  ROXICODONE   Dose:  10 mg        Take 10 mg by mouth 3 times daily   Refills:  0                Procedures and tests performed during your visit     CBC with platelets differential    Comprehensive metabolic panel    EKG 12 lead    Lipase    Troponin I    Troponin I (now)    XR Chest 2 Views      Orders Needing Specimen Collection     None      Pending Results     Date and Time Order Name Status Description    10/31/2017 0611 XR Chest 2 Views Preliminary             Pending Culture Results     No orders found from 10/29/2017 to 11/1/2017.            Pending Results Instructions     If you had any lab results that were not finalized at the time of your Discharge, you can call the ED Lab Result RN at [phone]. You will be contacted by this team for any positive Lab  results or changes in treatment. The nurses are available 7 days a week from 10A to 6:30P.  You can leave a message 24 hours per day and they will return your call.        Test Results From Your Hospital Stay        10/31/2017  6:47 AM      Component Results     Component Value Ref Range & Units Status    WBC 8.7 4.0 - 11.0 10e9/L Final    RBC Count 5.38 4.4 - 5.9 10e12/L Final    Hemoglobin 14.7 13.3 - 17.7 g/dL Final    Hematocrit 45.8 40.0 - 53.0 % Final    MCV 85 78 - 100 fl Final    MCH 27.3 26.5 - 33.0 pg Final    MCHC 32.1 31.5 - 36.5 g/dL Final    RDW 13.3 10.0 - 15.0 % Final    Platelet Count 304 150 - 450 10e9/L Final    Diff Method Automated Method  Final    % Neutrophils 64.9 % Final    % Lymphocytes 23.3 % Final    % Monocytes 7.7 % Final    % Eosinophils 2.7 % Final    % Basophils 0.7 % Final    % Immature Granulocytes 0.7 % Final    Nucleated RBCs 0 0 /100 Final    Absolute Neutrophil 5.6 1.6 - 8.3 10e9/L Final    Absolute Lymphocytes 2.0 0.8 - 5.3 10e9/L Final    Absolute Monocytes 0.7 0.0 - 1.3 10e9/L Final    Absolute Eosinophils 0.2 0.0 - 0.7 10e9/L Final    Absolute Basophils 0.1 0.0 - 0.2 10e9/L Final    Abs Immature Granulocytes 0.1 0 - 0.4 10e9/L Final    Absolute Nucleated RBC 0.0  Final         10/31/2017  7:06 AM      Component Results     Component Value Ref Range & Units Status    Sodium 131 (L) 133 - 144 mmol/L Final    Potassium 4.3 3.4 - 5.3 mmol/L Final    Chloride 98 94 - 109 mmol/L Final    Carbon Dioxide 25 20 - 32 mmol/L Final    Anion Gap 8 3 - 14 mmol/L Final    Glucose 183 (H) 70 - 99 mg/dL Final    Urea Nitrogen 16 7 - 30 mg/dL Final    Creatinine 0.74 0.66 - 1.25 mg/dL Final    GFR Estimate >90 >60 mL/min/1.7m2 Final    Non  GFR Calc    GFR Estimate If Black >90 >60 mL/min/1.7m2 Final    African American GFR Calc    Calcium 8.5 8.5 - 10.1 mg/dL Final    Bilirubin Total 0.5 0.2 - 1.3 mg/dL Final    Albumin 3.8 3.4 - 5.0 g/dL Final    Protein Total 7.3 6.8 - 8.8  g/dL Final    Alkaline Phosphatase 68 40 - 150 U/L Final    ALT 46 0 - 70 U/L Final    AST 21 0 - 45 U/L Final         10/31/2017  7:06 AM      Component Results     Component Value Ref Range & Units Status    Lipase 75 73 - 393 U/L Final         10/31/2017  7:06 AM      Component Results     Component Value Ref Range & Units Status    Troponin I ES <0.015 0.000 - 0.045 ug/L Final    The 99th percentile for upper reference range is 0.045 ug/L.  Troponin values   in the range of 0.045 - 0.120 ug/L may be associated with risks of adverse   clinical events.           10/31/2017  6:34 AM      Narrative     XR CHEST 2 VW  10/31/2017 6:29 AM      HISTORY: Chest pain.     COMPARISON: 10/11/2017.    FINDINGS: The heart size is normal. The lungs are clear. No  pneumothorax or pleural effusion. Surgical clips in the right upper  quadrant.        Impression     IMPRESSION: No acute abnormality.         10/31/2017  9:54 AM      Component Results     Component Value Ref Range & Units Status    Troponin I ES <0.015 0.000 - 0.045 ug/L Final    The 99th percentile for upper reference range is 0.045 ug/L.  Troponin values   in the range of 0.045 - 0.120 ug/L may be associated with risks of adverse   clinical events.                  Clinical Quality Measure: Blood Pressure Screening     Your blood pressure was checked while you were in the emergency department today. The last reading we obtained was  BP: (!) 129/91 . Please read the guidelines below about what these numbers mean and what you should do about them.  If your systolic blood pressure (the top number) is less than 120 and your diastolic blood pressure (the bottom number) is less than 80, then your blood pressure is normal. There is nothing more that you need to do about it.  If your systolic blood pressure (the top number) is 120-139 or your diastolic blood pressure (the bottom number) is 80-89, your blood pressure may be higher than it should be. You should have your  "blood pressure rechecked within a year by a primary care provider.  If your systolic blood pressure (the top number) is 140 or greater or your diastolic blood pressure (the bottom number) is 90 or greater, you may have high blood pressure. High blood pressure is treatable, but if left untreated over time it can put you at risk for heart attack, stroke, or kidney failure. You should have your blood pressure rechecked by a primary care provider within the next 4 weeks.  If your provider in the emergency department today gave you specific instructions to follow-up with your doctor or provider even sooner than that, you should follow that instruction and not wait for up to 4 weeks for your follow-up visit.        Thank you for choosing [facility]       Thank you for choosing [facility] for your care. Our goal is always to provide you with excellent care. Hearing back from our patients is one way we can continue to improve our services. Please take a few minutes to complete the written survey that you may receive in the mail after you visit with us. Thank you!        [facility] Information     [facility] lets you send messages to your doctor, view your test results, renew your prescriptions, schedule appointments and more. To sign up, go to [email or web address] . Click on \"Log in\" on the left side of the screen, which will take you to the Welcome page. Then click on \"Sign up Now\" on the right side of the page.     You will be asked to enter the access code listed below, as well as some personal information. Please follow the directions to create your username and password.     Your access code is: [email or web address]  Expires: 2018 10:06 AM     Your access code will  in 90 days. If you need help or a new code, please call your [facility] clinic or [phone].        Care EveryWhere ID     This is your Care EveryWhere ID. This could be used by other organizations to access your [facility] medical records  [id number]      "   Equal Access to Services     MILADIS SIMPSON : Rosa Maria Marcelino, pj stephens, fady maldonado. So [facility] [phone].    ATENCIÓN: Si habla español, tiene a burgos disposición servicios gratuitos de asistencia lingüística. Llame al [phone].    We comply with applicable federal civil rights laws and [place] laws. We do not discriminate on the basis of race, color, national origin, age, disability, sex, sexual orientation, or gender identity.            After Visit Summary       This is your record. Keep this with you and show to your community pharmacist(s) and doctor(s) at your next visit.

## 2017-10-31 NOTE — ED NOTES
Pt to ER with c/o mid sternal CP that he noticed after he woke up this a.m. Pt states that he took 1 nitro at home with no relief,

## 2017-11-06 ENCOUNTER — HOSPITAL ENCOUNTER (EMERGENCY)
Facility: CLINIC | Age: 57
Discharge: HOME | End: 2017-11-06
Attending: EMERGENCY MEDICINE | Admitting: EMERGENCY MEDICINE
Payer: COMMERCIAL

## 2017-11-06 VITALS
TEMPERATURE: 96.7 F | OXYGEN SATURATION: 99 % | RESPIRATION RATE: 18 BRPM | SYSTOLIC BLOOD PRESSURE: 147 MMHG | DIASTOLIC BLOOD PRESSURE: 88 MMHG

## 2017-11-06 DIAGNOSIS — F41.0 ANXIETY ATTACK: ICD-10-CM

## 2017-11-06 DIAGNOSIS — G89.29 CHRONIC HIP PAIN, RIGHT: ICD-10-CM

## 2017-11-06 DIAGNOSIS — M25.551 CHRONIC HIP PAIN, RIGHT: ICD-10-CM

## 2017-11-06 LAB
ALBUMIN SERPL-MCNC: 3.7 G/DL (ref 3.4–5)
ALP SERPL-CCNC: 62 U/L (ref 40–150)
ALT SERPL W P-5'-P-CCNC: 26 U/L (ref 0–70)
ANION GAP SERPL CALCULATED.3IONS-SCNC: 6 MMOL/L (ref 3–14)
APAP SERPL-MCNC: <2 MG/L (ref 10–20)
AST SERPL W P-5'-P-CCNC: 13 U/L (ref 0–45)
BASOPHILS # BLD AUTO: 0.1 10E9/L (ref 0–0.2)
BASOPHILS NFR BLD AUTO: 0.8 %
BILIRUB SERPL-MCNC: 0.3 MG/DL (ref 0.2–1.3)
BUN SERPL-MCNC: 11 MG/DL (ref 7–30)
CALCIUM SERPL-MCNC: 8.2 MG/DL (ref 8.5–10.1)
CHLORIDE SERPL-SCNC: 101 MMOL/L (ref 94–109)
CO2 SERPL-SCNC: 26 MMOL/L (ref 20–32)
CREAT SERPL-MCNC: 0.67 MG/DL (ref 0.66–1.25)
DIFFERENTIAL METHOD BLD: NORMAL
EOSINOPHIL # BLD AUTO: 0.3 10E9/L (ref 0–0.7)
EOSINOPHIL NFR BLD AUTO: 3.6 %
ERYTHROCYTE [DISTWIDTH] IN BLOOD BY AUTOMATED COUNT: 13.1 % (ref 10–15)
GFR SERPL CREATININE-BSD FRML MDRD: >90 ML/MIN/1.7M2
GLUCOSE SERPL-MCNC: 155 MG/DL (ref 70–99)
HCT VFR BLD AUTO: 45.2 % (ref 40–53)
HGB BLD-MCNC: 14.7 G/DL (ref 13.3–17.7)
IMM GRANULOCYTES # BLD: 0.1 10E9/L (ref 0–0.4)
IMM GRANULOCYTES NFR BLD: 0.7 %
INTERPRETATION ECG - MUSE: NORMAL
LIPASE SERPL-CCNC: 89 U/L (ref 73–393)
LYMPHOCYTES # BLD AUTO: 2.2 10E9/L (ref 0.8–5.3)
LYMPHOCYTES NFR BLD AUTO: 30.7 %
MCH RBC QN AUTO: 27.7 PG (ref 26.5–33)
MCHC RBC AUTO-ENTMCNC: 32.5 G/DL (ref 31.5–36.5)
MCV RBC AUTO: 85 FL (ref 78–100)
MONOCYTES # BLD AUTO: 0.5 10E9/L (ref 0–1.3)
MONOCYTES NFR BLD AUTO: 6.4 %
NEUTROPHILS # BLD AUTO: 4.2 10E9/L (ref 1.6–8.3)
NEUTROPHILS NFR BLD AUTO: 57.8 %
NRBC # BLD AUTO: 0 10*3/UL
NRBC BLD AUTO-RTO: 0 /100
PLATELET # BLD AUTO: 298 10E9/L (ref 150–450)
POTASSIUM SERPL-SCNC: 4.2 MMOL/L (ref 3.4–5.3)
PROT SERPL-MCNC: 7 G/DL (ref 6.8–8.8)
RBC # BLD AUTO: 5.31 10E12/L (ref 4.4–5.9)
SALICYLATES SERPL-MCNC: 2 MG/DL
SODIUM SERPL-SCNC: 133 MMOL/L (ref 133–144)
TROPONIN I SERPL-MCNC: <0.015 UG/L (ref 0–0.04)
WBC # BLD AUTO: 7.3 10E9/L (ref 4–11)

## 2017-11-06 PROCEDURE — 84484 ASSAY OF TROPONIN QUANT: CPT | Performed by: EMERGENCY MEDICINE

## 2017-11-06 PROCEDURE — 80329 ANALGESICS NON-OPIOID 1 OR 2: CPT | Performed by: EMERGENCY MEDICINE

## 2017-11-06 PROCEDURE — 25000125 ZZHC RX 250: Performed by: EMERGENCY MEDICINE

## 2017-11-06 PROCEDURE — 96361 HYDRATE IV INFUSION ADD-ON: CPT

## 2017-11-06 PROCEDURE — 99285 EMERGENCY DEPT VISIT HI MDM: CPT | Mod: 25

## 2017-11-06 PROCEDURE — 25000128 H RX IP 250 OP 636: Performed by: EMERGENCY MEDICINE

## 2017-11-06 PROCEDURE — 93005 ELECTROCARDIOGRAM TRACING: CPT

## 2017-11-06 PROCEDURE — 80053 COMPREHEN METABOLIC PANEL: CPT | Performed by: EMERGENCY MEDICINE

## 2017-11-06 PROCEDURE — 96375 TX/PRO/DX INJ NEW DRUG ADDON: CPT

## 2017-11-06 PROCEDURE — 85025 COMPLETE CBC W/AUTO DIFF WBC: CPT | Performed by: EMERGENCY MEDICINE

## 2017-11-06 PROCEDURE — 96374 THER/PROPH/DIAG INJ IV PUSH: CPT

## 2017-11-06 PROCEDURE — 80329 ANALGESICS NON-OPIOID 1 OR 2: CPT | Mod: 91 | Performed by: EMERGENCY MEDICINE

## 2017-11-06 PROCEDURE — 83690 ASSAY OF LIPASE: CPT | Performed by: EMERGENCY MEDICINE

## 2017-11-06 RX ORDER — METOCLOPRAMIDE HYDROCHLORIDE 5 MG/ML
10 INJECTION INTRAMUSCULAR; INTRAVENOUS ONCE
Status: COMPLETED | OUTPATIENT
Start: 2017-11-06 | End: 2017-11-06

## 2017-11-06 RX ORDER — LORAZEPAM 2 MG/ML
1 INJECTION INTRAMUSCULAR ONCE
Status: COMPLETED | OUTPATIENT
Start: 2017-11-06 | End: 2017-11-06

## 2017-11-06 RX ORDER — SODIUM CHLORIDE 9 MG/ML
1000 INJECTION, SOLUTION INTRAVENOUS CONTINUOUS
Status: DISCONTINUED | OUTPATIENT
Start: 2017-11-06 | End: 2017-11-06 | Stop reason: HOSPADM

## 2017-11-06 RX ADMIN — SODIUM CHLORIDE 1000 ML: 9 INJECTION, SOLUTION INTRAVENOUS at 03:47

## 2017-11-06 RX ADMIN — METOCLOPRAMIDE 10 MG: 5 INJECTION, SOLUTION INTRAMUSCULAR; INTRAVENOUS at 03:53

## 2017-11-06 RX ADMIN — PANTOPRAZOLE SODIUM 40 MG: 40 INJECTION, POWDER, FOR SOLUTION INTRAVENOUS at 03:47

## 2017-11-06 RX ADMIN — HYDROMORPHONE HYDROCHLORIDE 1 MG: 1 INJECTION, SOLUTION INTRAMUSCULAR; INTRAVENOUS; SUBCUTANEOUS at 03:52

## 2017-11-06 RX ADMIN — LORAZEPAM 1 MG: 2 INJECTION INTRAMUSCULAR at 03:50

## 2017-11-06 ASSESSMENT — ENCOUNTER SYMPTOMS
VOMITING: 0
NAUSEA: 1
NERVOUS/ANXIOUS: 1
HEADACHES: 1
SHORTNESS OF BREATH: 1
ARTHRALGIAS: 1

## 2017-11-06 NOTE — ED AVS SNAPSHOT
[facility] Emergency Department    [address]    [facility] [address]    Phone:  [phone]    Fax:  [phone]                                       Perico Houston   MRN: [id number]    Department:  [facility] Emergency Department   Date of Visit:  11/6/2017           After Visit Summary Signature Page     I have received my discharge instructions, and my questions have been answered. I have discussed any challenges I see with this plan with the nurse or doctor.    ..........................................................................................................................................  Patient/Patient Representative Signature      ..........................................................................................................................................  Patient Representative Print Name and Relationship to Patient    ..................................................               ................................................  Date                                            Time    ..........................................................................................................................................  Reviewed by Signature/Title    ...................................................              ..............................................  Date                                                            Time
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
## 2017-11-06 NOTE — ED AVS SNAPSHOT
[facility] Emergency Department    [address]    [place] [address]    Phone:  [phone]    Fax:  [phone]                                       Perico [place]   MRN: [id number]    Department:  [facility] Emergency Department   Date of Visit:  11/6/2017           Patient Information     Date Of Birth          1960        Your diagnoses for this visit were:     Anxiety attack     Chronic hip pain, right        You were seen by Torres Crockett MD.      Follow-up Information     Follow up with [facility], [place]. Go in 1 day.    Specialty:  Family Practice    Contact information:    [address] [place] [address]  [phone]          Discharge Instructions       Continue your current medications  No more tylenol today  Get your hip injection today as well      24 Hour Appointment Hotline       To make an appointment at any [facility], call [phone] ([phone]). If you don't have a family doctor or clinic, we will help you find one. [facility] are conveniently located to serve the needs of you and your family.             Review of your medicines      Our records show that you are taking the medicines listed below. If these are incorrect, please call your family doctor or clinic.        Dose / Directions Last dose taken    aspirin 81 MG EC tablet   Dose:  81 mg   Quantity:  30 tablet        Take 1 tablet (81 mg) by mouth daily   Refills:  0        clonazePAM 0.5 MG tablet   Commonly known as:  klonoPIN   Dose:  1 mg        Take 1 mg by mouth 3 times daily   Refills:  0        gabapentin 300 MG capsule   Commonly known as:  NEURONTIN   Dose:  300 mg   Quantity:  90 capsule        Take 1 capsule (300 mg) by mouth 3 times daily   Refills:  1        glipiZIDE 5 MG 24 hr tablet   Commonly known as:  GLUCOTROL XL   Dose:  5 mg        Take 5 mg by mouth daily   Refills:  0        hydrOXYzine 50 MG tablet   Commonly known as:  ATARAX    Dose:  50 mg   Quantity:  20 tablet        Take 1 tablet (50 mg) by mouth 3 times daily as needed for other (back pain)   Refills:  0        lisinopril 40 MG tablet   Commonly known as:  PRINIVIL/ZESTRIL   Dose:  40 mg   Quantity:  30 tablet        Take 1 tablet (40 mg) by mouth daily   Refills:  0        metFORMIN 1000 MG tablet   Commonly known as:  GLUCOPHAGE   Dose:  1000 mg   Quantity:  60 tablet        Take 1 tablet (1,000 mg) by mouth 2 times daily (with meals)   Refills:  0        nitroGLYcerin 0.4 MG sublingual tablet   Commonly known as:  NITROSTAT   Dose:  0.4 mg   Quantity:  25 tablet        Place 1 tablet (0.4 mg) under the tongue every 5 minutes as needed for chest pain if you are still having symptoms after 3 doses (15 minutes) call 911.   Refills:  0        oxyCODONE IR 10 MG tablet   Commonly known as:  ROXICODONE   Dose:  10 mg        Take 10 mg by mouth 3 times daily   Refills:  0                Procedures and tests performed during your visit     Acetaminophen level    CBC with platelets differential    Cardiac Continuous Monitoring    Comprehensive metabolic panel    EKG 12 lead    Lipase    Peripheral IV: Standard    Pulse oximetry nursing    Salicylate level    Troponin I      Orders Needing Specimen Collection     None      Pending Results     No orders found from 11/4/2017 to 11/7/2017.            Pending Culture Results     No orders found from 11/4/2017 to 11/7/2017.            Pending Results Instructions     If you had any lab results that were not finalized at the time of your Discharge, you can call the ED Lab Result RN at [phone]. You will be contacted by this team for any positive Lab results or changes in treatment. The nurses are available 7 days a week from 10A to 6:30P.  You can leave a message 24 hours per day and they will return your call.        Test Results From Your Hospital Stay        11/6/2017  4:04 AM      Component Results     Component Value Ref Range & Units  Status    WBC 7.3 4.0 - 11.0 10e9/L Final    RBC Count 5.31 4.4 - 5.9 10e12/L Final    Hemoglobin 14.7 13.3 - 17.7 g/dL Final    Hematocrit 45.2 40.0 - 53.0 % Final    MCV 85 78 - 100 fl Final    MCH 27.7 26.5 - 33.0 pg Final    MCHC 32.5 31.5 - 36.5 g/dL Final    RDW 13.1 10.0 - 15.0 % Final    Platelet Count 298 150 - 450 10e9/L Final    Diff Method Automated Method  Final    % Neutrophils 57.8 % Final    % Lymphocytes 30.7 % Final    % Monocytes 6.4 % Final    % Eosinophils 3.6 % Final    % Basophils 0.8 % Final    % Immature Granulocytes 0.7 % Final    Nucleated RBCs 0 0 /100 Final    Absolute Neutrophil 4.2 1.6 - 8.3 10e9/L Final    Absolute Lymphocytes 2.2 0.8 - 5.3 10e9/L Final    Absolute Monocytes 0.5 0.0 - 1.3 10e9/L Final    Absolute Eosinophils 0.3 0.0 - 0.7 10e9/L Final    Absolute Basophils 0.1 0.0 - 0.2 10e9/L Final    Abs Immature Granulocytes 0.1 0 - 0.4 10e9/L Final    Absolute Nucleated RBC 0.0  Final         11/6/2017  4:30 AM      Component Results     Component Value Ref Range & Units Status    Sodium 133 133 - 144 mmol/L Final    Potassium 4.2 3.4 - 5.3 mmol/L Final    Chloride 101 94 - 109 mmol/L Final    Carbon Dioxide 26 20 - 32 mmol/L Final    Anion Gap 6 3 - 14 mmol/L Final    Glucose 155 (H) 70 - 99 mg/dL Final    Urea Nitrogen 11 7 - 30 mg/dL Final    Creatinine 0.67 0.66 - 1.25 mg/dL Final    GFR Estimate >90 >60 mL/min/1.7m2 Final    Non  GFR Calc    GFR Estimate If Black >90 >60 mL/min/1.7m2 Final    African American GFR Calc    Calcium 8.2 (L) 8.5 - 10.1 mg/dL Final    Bilirubin Total 0.3 0.2 - 1.3 mg/dL Final    Albumin 3.7 3.4 - 5.0 g/dL Final    Protein Total 7.0 6.8 - 8.8 g/dL Final    Alkaline Phosphatase 62 40 - 150 U/L Final    ALT 26 0 - 70 U/L Final    AST 13 0 - 45 U/L Final         11/6/2017  4:30 AM      Component Results     Component Value Ref Range & Units Status    Lipase 89 73 - 393 U/L Final         11/6/2017  4:32 AM      Component Results      Component Value Ref Range & Units Status    Troponin I ES <0.015 0.000 - 0.045 ug/L Final    The 99th percentile for upper reference range is 0.045 ug/L.  Troponin values   in the range of 0.045 - 0.120 ug/L may be associated with risks of adverse   clinical events.           11/6/2017  4:53 AM      Component Results     Component Value Ref Range & Units Status    Acetaminophen Level <2 mg/L Final    Therapeutic range: 10-20 mg/L         11/6/2017  4:53 AM      Component Results     Component Value Ref Range & Units Status    Salicylate Level 2 mg/dL Final    Therapeutic:        <20  Anti inflammatory:  15-30                  Clinical Quality Measure: Blood Pressure Screening     Your blood pressure was checked while you were in the emergency department today. The last reading we obtained was  BP: 144/84 . Please read the guidelines below about what these numbers mean and what you should do about them.  If your systolic blood pressure (the top number) is less than 120 and your diastolic blood pressure (the bottom number) is less than 80, then your blood pressure is normal. There is nothing more that you need to do about it.  If your systolic blood pressure (the top number) is 120-139 or your diastolic blood pressure (the bottom number) is 80-89, your blood pressure may be higher than it should be. You should have your blood pressure rechecked within a year by a primary care provider.  If your systolic blood pressure (the top number) is 140 or greater or your diastolic blood pressure (the bottom number) is 90 or greater, you may have high blood pressure. High blood pressure is treatable, but if left untreated over time it can put you at risk for heart attack, stroke, or kidney failure. You should have your blood pressure rechecked by a primary care provider within the next 4 weeks.  If your provider in the emergency department today gave you specific instructions to follow-up with your doctor or provider even sooner  "than that, you should follow that instruction and not wait for up to 4 weeks for your follow-up visit.        Thank you for choosing [facility]       Thank you for choosing [facility] for your care. Our goal is always to provide you with excellent care. Hearing back from our patients is one way we can continue to improve our services. Please take a few minutes to complete the written survey that you may receive in the mail after you visit with us. Thank you!        [employer] Information     [employer] lets you send messages to your doctor, view your test results, renew your prescriptions, schedule appointments and more. To sign up, go to [email or web address] . Click on \"Log in\" on the left side of the screen, which will take you to the Welcome page. Then click on \"Sign up Now\" on the right side of the page.     You will be asked to enter the access code listed below, as well as some personal information. Please follow the directions to create your username and password.     Your access code is: [email or web address]  Expires: 2018  9:06 AM     Your access code will  in 90 days. If you need help or a new code, please call your [facility] clinic or [phone].        Care EveryWhere ID     This is your Care EveryWhere ID. This could be used by other organizations to access your [facility] medical records  [id number]        Equal Access to Services     MILADIS SIMPSON : Rosa Maria rivaso Soanay, waaxda luqadaha, qaybta kaalmada alyx, fady perez. So [facility] [phone].    ATENCIÓN: Si habla español, tiene a burgos disposición servicios gratuitos de asistencia lingüística. Artur al [phone].    We comply with applicable federal civil rights laws and [place] laws. We do not discriminate on the basis of race, color, national origin, age, disability, sex, sexual orientation, or gender identity.            After Visit Summary       This is your record. Keep this with you and show to your " community pharmacist(s) and doctor(s) at your next visit.

## 2017-11-06 NOTE — ED NOTES
Patient states he was supposed to have an injection in his hip later this morning but is having too much pain. States he ran out of Percocet. Patient states he is also having chest pain and a headache. Patient is here with wife. Patient also c/o nausea.

## 2017-11-06 NOTE — ED NOTES
"As MD was in examining patient, he voiced to staff that he took \"Seven 500mg Tylenol this morning and Six 500mg Tylenol later in the day.\" No new orders from MD.   "

## 2017-11-06 NOTE — ED NOTES
While placing IV in left upper arm, this RN noted patient had multiple puncture sites over various veins in arm. Patient states he has recently had IVs placed.

## 2017-11-09 ENCOUNTER — APPOINTMENT (OUTPATIENT)
Dept: CT IMAGING | Facility: CLINIC | Age: 57
End: 2017-11-09
Attending: EMERGENCY MEDICINE
Payer: COMMERCIAL

## 2017-11-09 ENCOUNTER — HOSPITAL ENCOUNTER (EMERGENCY)
Facility: CLINIC | Age: 57
Discharge: HOME | End: 2017-11-09
Attending: EMERGENCY MEDICINE | Admitting: EMERGENCY MEDICINE
Payer: COMMERCIAL

## 2017-11-09 VITALS
RESPIRATION RATE: 28 BRPM | BODY MASS INDEX: 29.39 KG/M2 | WEIGHT: 217 LBS | HEIGHT: 72 IN | SYSTOLIC BLOOD PRESSURE: 149 MMHG | TEMPERATURE: 97.5 F | OXYGEN SATURATION: 98 % | DIASTOLIC BLOOD PRESSURE: 102 MMHG

## 2017-11-09 DIAGNOSIS — R51.9 ACUTE NONINTRACTABLE HEADACHE, UNSPECIFIED HEADACHE TYPE: ICD-10-CM

## 2017-11-09 LAB
ANION GAP SERPL CALCULATED.3IONS-SCNC: 7 MMOL/L (ref 3–14)
BASOPHILS # BLD AUTO: 0.1 10E9/L (ref 0–0.2)
BASOPHILS NFR BLD AUTO: 0.6 %
BUN SERPL-MCNC: 18 MG/DL (ref 7–30)
CALCIUM SERPL-MCNC: 8.5 MG/DL (ref 8.5–10.1)
CHLORIDE SERPL-SCNC: 101 MMOL/L (ref 94–109)
CO2 SERPL-SCNC: 25 MMOL/L (ref 20–32)
CREAT SERPL-MCNC: 0.66 MG/DL (ref 0.66–1.25)
DIFFERENTIAL METHOD BLD: NORMAL
EOSINOPHIL # BLD AUTO: 0.4 10E9/L (ref 0–0.7)
EOSINOPHIL NFR BLD AUTO: 3.2 %
ERYTHROCYTE [DISTWIDTH] IN BLOOD BY AUTOMATED COUNT: 13.2 % (ref 10–15)
GFR SERPL CREATININE-BSD FRML MDRD: >90 ML/MIN/1.7M2
GLUCOSE SERPL-MCNC: 175 MG/DL (ref 70–99)
HCT VFR BLD AUTO: 46.1 % (ref 40–53)
HGB BLD-MCNC: 15.1 G/DL (ref 13.3–17.7)
IMM GRANULOCYTES # BLD: 0.1 10E9/L (ref 0–0.4)
IMM GRANULOCYTES NFR BLD: 0.8 %
LYMPHOCYTES # BLD AUTO: 3.6 10E9/L (ref 0.8–5.3)
LYMPHOCYTES NFR BLD AUTO: 32.2 %
MCH RBC QN AUTO: 27.9 PG (ref 26.5–33)
MCHC RBC AUTO-ENTMCNC: 32.8 G/DL (ref 31.5–36.5)
MCV RBC AUTO: 85 FL (ref 78–100)
MONOCYTES # BLD AUTO: 0.8 10E9/L (ref 0–1.3)
MONOCYTES NFR BLD AUTO: 7.3 %
NEUTROPHILS # BLD AUTO: 6.2 10E9/L (ref 1.6–8.3)
NEUTROPHILS NFR BLD AUTO: 55.9 %
NRBC # BLD AUTO: 0 10*3/UL
NRBC BLD AUTO-RTO: 0 /100
PLATELET # BLD AUTO: 336 10E9/L (ref 150–450)
POTASSIUM SERPL-SCNC: 4.5 MMOL/L (ref 3.4–5.3)
RBC # BLD AUTO: 5.42 10E12/L (ref 4.4–5.9)
SODIUM SERPL-SCNC: 133 MMOL/L (ref 133–144)
TROPONIN I SERPL-MCNC: <0.015 UG/L (ref 0–0.04)
WBC # BLD AUTO: 11 10E9/L (ref 4–11)

## 2017-11-09 PROCEDURE — 96375 TX/PRO/DX INJ NEW DRUG ADDON: CPT | Mod: 59

## 2017-11-09 PROCEDURE — 80048 BASIC METABOLIC PNL TOTAL CA: CPT | Performed by: EMERGENCY MEDICINE

## 2017-11-09 PROCEDURE — 70498 CT ANGIOGRAPHY NECK: CPT

## 2017-11-09 PROCEDURE — 96374 THER/PROPH/DIAG INJ IV PUSH: CPT | Mod: 59

## 2017-11-09 PROCEDURE — 70450 CT HEAD/BRAIN W/O DYE: CPT

## 2017-11-09 PROCEDURE — 25000128 H RX IP 250 OP 636: Performed by: EMERGENCY MEDICINE

## 2017-11-09 PROCEDURE — 93005 ELECTROCARDIOGRAM TRACING: CPT

## 2017-11-09 PROCEDURE — 99285 EMERGENCY DEPT VISIT HI MDM: CPT | Mod: 25

## 2017-11-09 PROCEDURE — 84484 ASSAY OF TROPONIN QUANT: CPT | Performed by: EMERGENCY MEDICINE

## 2017-11-09 PROCEDURE — 85025 COMPLETE CBC W/AUTO DIFF WBC: CPT | Performed by: EMERGENCY MEDICINE

## 2017-11-09 RX ORDER — DIPHENHYDRAMINE HYDROCHLORIDE 50 MG/ML
25 INJECTION INTRAMUSCULAR; INTRAVENOUS ONCE
Status: COMPLETED | OUTPATIENT
Start: 2017-11-09 | End: 2017-11-09

## 2017-11-09 RX ORDER — METOCLOPRAMIDE HYDROCHLORIDE 5 MG/ML
10 INJECTION INTRAMUSCULAR; INTRAVENOUS ONCE
Status: COMPLETED | OUTPATIENT
Start: 2017-11-09 | End: 2017-11-09

## 2017-11-09 RX ORDER — IOPAMIDOL 755 MG/ML
500 INJECTION, SOLUTION INTRAVASCULAR ONCE
Status: COMPLETED | OUTPATIENT
Start: 2017-11-09 | End: 2017-11-09

## 2017-11-09 RX ADMIN — SODIUM CHLORIDE 80 ML: 9 INJECTION, SOLUTION INTRAVENOUS at 11:07

## 2017-11-09 RX ADMIN — DIPHENHYDRAMINE HYDROCHLORIDE 25 MG: 50 INJECTION, SOLUTION INTRAMUSCULAR; INTRAVENOUS at 10:47

## 2017-11-09 RX ADMIN — METOCLOPRAMIDE 10 MG: 5 INJECTION, SOLUTION INTRAMUSCULAR; INTRAVENOUS at 10:47

## 2017-11-09 RX ADMIN — IOPAMIDOL 70 ML: 755 INJECTION, SOLUTION INTRAVENOUS at 11:07

## 2017-11-09 ASSESSMENT — ENCOUNTER SYMPTOMS
SHORTNESS OF BREATH: 0
FACIAL ASYMMETRY: 0
HEADACHES: 1
NAUSEA: 0
VOMITING: 0
CONFUSION: 0
WEAKNESS: 0
FEVER: 0
DIZZINESS: 0
ABDOMINAL PAIN: 0
NUMBNESS: 0
SPEECH DIFFICULTY: 0

## 2017-11-09 NOTE — ED AVS SNAPSHOT
[facility] Emergency Department    [address]    [facility] [address]    Phone:  [phone]    Fax:  [phone]                                       Perico [place]   MRN: [id number]    Department:  [facility] Emergency Department   Date of Visit:  11/9/2017           After Visit Summary Signature Page     I have received my discharge instructions, and my questions have been answered. I have discussed any challenges I see with this plan with the nurse or doctor.    ..........................................................................................................................................  Patient/Patient Representative Signature      ..........................................................................................................................................  Patient Representative Print Name and Relationship to Patient    ..................................................               ................................................  Date                                            Time    ..........................................................................................................................................  Reviewed by Signature/Title    ...................................................              ..............................................  Date                                                            Time

## 2017-11-09 NOTE — DISCHARGE INSTRUCTIONS
Please make an appointment to follow up with your primary care provider in 2-3 days if not improving.    Discharge Instructions  Headache    You were seen today for a headache. Headaches may be caused by many different things such as muscle tension, sinus inflammation, anxiety and stress, having too little sleep, too much alcohol, some medical conditions or injury. You may have a migraine, which is caused by changes in the blood vessels in your head.  At this time your provider does not find that your headache is a sign of anything dangerous or life-threatening.  However, sometimes the signs of serious illness do not show up right away.      Generally, every Emergency Department visit should have a follow-up clinic visit with either a primary or a specialty clinic/provider. Please follow-up as instructed by your emergency provider today.    Return to the Emergency Department if:    You get a new fever of 100.4 F or higher.    Your headache gets much worse.    You get a stiff neck with your headache.    You get a new headache that is significantly different or worse than headaches you have had before.    You are vomiting (throwing up) and cannot keep food or water down.    You have blurry or double vision or other problems with your eyes.    You have a new weakness on one side of your body.    You have difficulty with balance which is new.    You or your family thinks you are confused.    You have a seizure.    What can I do to help myself?    Pain medications - You may take a pain medication such as Tylenol  (acetaminophen), Advil , Motrin  (ibuprofen) or Aleve  (naproxen).    Take a pain reliever as soon as you notice symptoms.  Starting medications as soon as you start to have symptoms may lessen the amount of pain you have.    Relaxing in a quiet, dark room may help.    Get enough sleep and eat meals regularly.    You may need to watch for certain foods or other things which may trigger your headaches.  Keeping a  journal of your headaches and possible triggers may help you and your primary provider to identify things which you should avoid which may be causing your headaches.  If you were given a prescription for medicine here today, be sure to read all of the information (including the package insert) that comes with your prescription.  This will include important information about the medicine, its side effects, and any warnings that you need to know about.  The pharmacist who fills the prescription can provide more information and answer questions you may have about the medicine.  If you have questions or concerns that the pharmacist cannot address, please call or return to the Emergency Department.   Remember that you can always come back to the Emergency Department if you are not able to see your regular provider in the amount of time listed above, if you get any new symptoms, or if there is anything that worries you.

## 2017-11-09 NOTE — ED AVS SNAPSHOT
[facility] Emergency Department    [address]    [facility] [address]    Phone:  [phone]    Fax:  [phone]                                       Perico Houston   MRN: [id number]    Department:  [facility] Emergency Department   Date of Visit:  11/9/2017           Patient Information     Date Of Birth          1960        Your diagnoses for this visit were:     Acute nonintractable headache, unspecified headache type        You were seen by Lupillo Motley MD.        Discharge Instructions       Please make an appointment to follow up with your primary care provider in 2-3 days if not improving.    Discharge Instructions  Headache    You were seen today for a headache. Headaches may be caused by many different things such as muscle tension, sinus inflammation, anxiety and stress, having too little sleep, too much alcohol, some medical conditions or injury. You may have a migraine, which is caused by changes in the blood vessels in your head.  At this time your provider does not find that your headache is a sign of anything dangerous or life-threatening.  However, sometimes the signs of serious illness do not show up right away.      Generally, every Emergency Department visit should have a follow-up clinic visit with either a primary or a specialty clinic/provider. Please follow-up as instructed by your emergency provider today.    Return to the Emergency Department if:    You get a new fever of 100.4 F or higher.    Your headache gets much worse.    You get a stiff neck with your headache.    You get a new headache that is significantly different or worse than headaches you have had before.    You are vomiting (throwing up) and cannot keep food or water down.    You have blurry or double vision or other problems with your eyes.    You have a new weakness on one side of your body.    You have difficulty with balance which is new.    You or your family  thinks you are confused.    You have a seizure.    What can I do to help myself?    Pain medications - You may take a pain medication such as Tylenol  (acetaminophen), Advil , Motrin  (ibuprofen) or Aleve  (naproxen).    Take a pain reliever as soon as you notice symptoms.  Starting medications as soon as you start to have symptoms may lessen the amount of pain you have.    Relaxing in a quiet, dark room may help.    Get enough sleep and eat meals regularly.    You may need to watch for certain foods or other things which may trigger your headaches.  Keeping a journal of your headaches and possible triggers may help you and your primary provider to identify things which you should avoid which may be causing your headaches.  If you were given a prescription for medicine here today, be sure to read all of the information (including the package insert) that comes with your prescription.  This will include important information about the medicine, its side effects, and any warnings that you need to know about.  The pharmacist who fills the prescription can provide more information and answer questions you may have about the medicine.  If you have questions or concerns that the pharmacist cannot address, please call or return to the Emergency Department.   Remember that you can always come back to the Emergency Department if you are not able to see your regular provider in the amount of time listed above, if you get any new symptoms, or if there is anything that worries you.          24 Hour Appointment Hotline       To make an appointment at any [facility], call [phone] ([phone]). If you don't have a family doctor or clinic, we will help you find one. [facility] are conveniently located to serve the needs of you and your family.             Review of your medicines      Our records show that you are taking the medicines listed below. If these are incorrect, please call your family doctor or  clinic.        Dose / Directions Last dose taken    aspirin 81 MG EC tablet   Dose:  81 mg   Quantity:  30 tablet        Take 1 tablet (81 mg) by mouth daily   Refills:  0        clonazePAM 0.5 MG tablet   Commonly known as:  klonoPIN   Dose:  1 mg        Take 1 mg by mouth 3 times daily   Refills:  0        gabapentin 300 MG capsule   Commonly known as:  NEURONTIN   Dose:  300 mg   Quantity:  90 capsule        Take 1 capsule (300 mg) by mouth 3 times daily   Refills:  1        glipiZIDE 5 MG 24 hr tablet   Commonly known as:  GLUCOTROL XL   Dose:  5 mg        Take 5 mg by mouth daily   Refills:  0        hydrOXYzine 50 MG tablet   Commonly known as:  ATARAX   Dose:  50 mg   Quantity:  20 tablet        Take 1 tablet (50 mg) by mouth 3 times daily as needed for other (back pain)   Refills:  0        lisinopril 40 MG tablet   Commonly known as:  PRINIVIL/ZESTRIL   Dose:  40 mg   Quantity:  30 tablet        Take 1 tablet (40 mg) by mouth daily   Refills:  0        metFORMIN 1000 MG tablet   Commonly known as:  GLUCOPHAGE   Dose:  1000 mg   Quantity:  60 tablet        Take 1 tablet (1,000 mg) by mouth 2 times daily (with meals)   Refills:  0        nitroGLYcerin 0.4 MG sublingual tablet   Commonly known as:  NITROSTAT   Dose:  0.4 mg   Quantity:  25 tablet        Place 1 tablet (0.4 mg) under the tongue every 5 minutes as needed for chest pain if you are still having symptoms after 3 doses (15 minutes) call 911.   Refills:  0        oxyCODONE IR 10 MG tablet   Commonly known as:  ROXICODONE   Dose:  10 mg        Take 10 mg by mouth 3 times daily   Refills:  0                Procedures and tests performed during your visit     Basic metabolic panel    CBC with platelets differential    EKG 12 lead    Head CT w/o contrast    Head/neck angiogram CT  w & w/o contrast    Troponin I (now)      Orders Needing Specimen Collection     None      Pending Results     Date and Time Order Name Status Description    11/9/2017 1019  Head/neck angiogram CT  w & w/o contrast Preliminary     11/9/2017 1019 Head CT w/o contrast Preliminary             Pending Culture Results     No orders found from 11/7/2017 to 11/10/2017.            Pending Results Instructions     If you had any lab results that were not finalized at the time of your Discharge, you can call the ED Lab Result RN at [phone]. You will be contacted by this team for any positive Lab results or changes in treatment. The nurses are available 7 days a week from 10A to 6:30P.  You can leave a message 24 hours per day and they will return your call.        Test Results From Your Hospital Stay        11/9/2017 11:27 AM      Narrative     CT OF THE HEAD WITHOUT CONTRAST 11/9/2017 11:20 AM     COMPARISON: Head CT 1/24/2017    HISTORY: Acute headache, age > 50, L cranio occipital junction.     TECHNIQUE: 5 mm thick axial CT images of the head were acquired  without IV contrast material.    FINDINGS: There is mild diffuse cerebral volume loss. There are subtle  patchy areas of decreased density in the cerebral white matter  bilaterally that are consistent with sequela of chronic small vessel  ischemic disease.    The ventricles and basal cisterns are within normal limits in  configuration given the degree of cerebral volume loss. There is no  midline shift. There are no extra-axial fluid collections.    No intracranial hemorrhage, mass or recent infarct.    The visualized paranasal sinuses are well-aerated. There is no  mastoiditis. There are no fractures of the visualized bones.        Impression     IMPRESSION: Diffuse cerebral volume loss and cerebral white matter  changes consistent with chronic small vessel ischemic disease. No  evidence for acute intracranial pathology.        Radiation dose for this scan was reduced using automated exposure  control, adjustment of the mA and/or kV according to patient size, or  iterative reconstruction technique.           11/9/2017 11:31 AM       Narrative     CT ANGIOGRAM OF THE HEAD AND NECK WITHOUT AND WITH CONTRAST  11/9/2017  11:22 AM     COMPARISON: CT angiogram of the head and neck 10/21/2015    HISTORY: Left headache abd neck pain.     TECHNIQUE: Precontrast localizing scans were followed by CT  angiography with an injection of 70 mL Isovue-370 nonionic intravenous  contrast material with scans through the head and neck. Images were  transferred to a separate 3-D workstation where multiplanar  reformations and 3-D images were created. Estimates of carotid  stenoses are made relative to the distal internal carotid artery  diameters except as noted.      FINDINGS: Overall, no change from the CT angiogram performed in 2015.    Neck CTA: The common carotid arteries bilaterally remain widely  patent. Minimal calcified and noncalcified atherosclerotic plaque at  the origins of the internal carotid arteries on both sides that does  not result in any narrowing on either side again noted without change.  The cervical internal carotid arteries bilaterally remain widely  patent without stenosis. The vertebral arteries bilaterally remain  widely patent without stenosis.    Head CTA: Calcified atherosclerotic plaque of the intracranial distal  internal carotid arteries on both sides that does not result in  stenosis on either side again noted without change. The basilar,  bilateral anterior cerebral, bilateral middle cerebral and bilateral  posterior cerebral arteries remain patent and unremarkable. The  anterior communicating and bilateral posterior communicating arteries  are again noted to be patent and unremarkable.        Impression     IMPRESSION: Stable head and neck CT angiogram without change from the  2015 study. Atherosclerotic plaque of the proximal and distal internal  carotid arteries on both sides that does not result in any stenosis on  either side again noted. Otherwise, normal neck and head CTA.      Radiation dose for this scan was reduced  using automated exposure  control, adjustment of the mA and/or kV according to patient size, or  iterative reconstruction technique.         11/9/2017 10:52 AM      Component Results     Component Value Ref Range & Units Status    WBC 11.0 4.0 - 11.0 10e9/L Final    RBC Count 5.42 4.4 - 5.9 10e12/L Final    Hemoglobin 15.1 13.3 - 17.7 g/dL Final    Hematocrit 46.1 40.0 - 53.0 % Final    MCV 85 78 - 100 fl Final    MCH 27.9 26.5 - 33.0 pg Final    MCHC 32.8 31.5 - 36.5 g/dL Final    RDW 13.2 10.0 - 15.0 % Final    Platelet Count 336 150 - 450 10e9/L Final    Diff Method Automated Method  Final    % Neutrophils 55.9 % Final    % Lymphocytes 32.2 % Final    % Monocytes 7.3 % Final    % Eosinophils 3.2 % Final    % Basophils 0.6 % Final    % Immature Granulocytes 0.8 % Final    Nucleated RBCs 0 0 /100 Final    Absolute Neutrophil 6.2 1.6 - 8.3 10e9/L Final    Absolute Lymphocytes 3.6 0.8 - 5.3 10e9/L Final    Absolute Monocytes 0.8 0.0 - 1.3 10e9/L Final    Absolute Eosinophils 0.4 0.0 - 0.7 10e9/L Final    Absolute Basophils 0.1 0.0 - 0.2 10e9/L Final    Abs Immature Granulocytes 0.1 0 - 0.4 10e9/L Final    Absolute Nucleated RBC 0.0  Final         11/9/2017 11:10 AM      Component Results     Component Value Ref Range & Units Status    Sodium 133 133 - 144 mmol/L Final    Potassium 4.5 3.4 - 5.3 mmol/L Final    Chloride 101 94 - 109 mmol/L Final    Carbon Dioxide 25 20 - 32 mmol/L Final    Anion Gap 7 3 - 14 mmol/L Final    Glucose 175 (H) 70 - 99 mg/dL Final    Urea Nitrogen 18 7 - 30 mg/dL Final    Creatinine 0.66 0.66 - 1.25 mg/dL Final    GFR Estimate >90 >60 mL/min/1.7m2 Final    Non  GFR Calc    GFR Estimate If Black >90 >60 mL/min/1.7m2 Final    African American GFR Calc    Calcium 8.5 8.5 - 10.1 mg/dL Final         11/9/2017 11:10 AM      Component Results     Component Value Ref Range & Units Status    Troponin I ES <0.015 0.000 - 0.045 ug/L Final    The 99th percentile for upper reference  range is 0.045 ug/L.  Troponin values   in the range of 0.045 - 0.120 ug/L may be associated with risks of adverse   clinical events.                  Clinical Quality Measure: Blood Pressure Screening     Your blood pressure was checked while you were in the emergency department today. The last reading we obtained was  BP: (!) 149/102 . Please read the guidelines below about what these numbers mean and what you should do about them.  If your systolic blood pressure (the top number) is less than 120 and your diastolic blood pressure (the bottom number) is less than 80, then your blood pressure is normal. There is nothing more that you need to do about it.  If your systolic blood pressure (the top number) is 120-139 or your diastolic blood pressure (the bottom number) is 80-89, your blood pressure may be higher than it should be. You should have your blood pressure rechecked within a year by a primary care provider.  If your systolic blood pressure (the top number) is 140 or greater or your diastolic blood pressure (the bottom number) is 90 or greater, you may have high blood pressure. High blood pressure is treatable, but if left untreated over time it can put you at risk for heart attack, stroke, or kidney failure. You should have your blood pressure rechecked by a primary care provider within the next 4 weeks.  If your provider in the emergency department today gave you specific instructions to follow-up with your doctor or provider even sooner than that, you should follow that instruction and not wait for up to 4 weeks for your follow-up visit.        Thank you for choosing [facility]       Thank you for choosing [facility] for your care. Our goal is always to provide you with excellent care. Hearing back from our patients is one way we can continue to improve our services. Please take a few minutes to complete the written survey that you may receive in the mail after you visit with us. Thank you!        Daron  "Information     [employer] lets you send messages to your doctor, view your test results, renew your prescriptions, schedule appointments and more. To sign up, go to [email or web address] . Click on \"Log in\" on the left side of the screen, which will take you to the Welcome page. Then click on \"Sign up Now\" on the right side of the page.     You will be asked to enter the access code listed below, as well as some personal information. Please follow the directions to create your username and password.     Your access code is: [email or web address]  Expires: 2018  9:06 AM     Your access code will  in 90 days. If you need help or a new code, please call your [facility] clinic or [phone].        Care EveryWhere ID     This is your Care EveryWhere ID. This could be used by other organizations to access your [facility] medical records  [id number]        Equal Access to Services     MILADIS SIMPSON AH: Hadii jose rivaso Soanay, waaxda lumary, qaagnesta kaalmada alyx, fday astudillo . So [facility] [phone].    ATENCIÓN: Si habla español, tiene a burgos disposición servicios gratuitos de asistencia lingüística. Llame al [phone].    We comply with applicable federal civil rights laws and [place] laws. We do not discriminate on the basis of race, color, national origin, age, disability, sex, sexual orientation, or gender identity.            After Visit Summary       This is your record. Keep this with you and show to your community pharmacist(s) and doctor(s) at your next visit.                  "

## 2017-11-09 NOTE — ED NOTES
Pt has pain in head behind his left ear since 0200 this AM.  No injury.  Speech is slow and slurred and pt feels sluggish.
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
## 2017-11-10 LAB — INTERPRETATION ECG - MUSE: NORMAL

## 2017-11-25 ENCOUNTER — HOSPITAL ENCOUNTER (EMERGENCY)
Facility: CLINIC | Age: 57
Discharge: HOME | End: 2017-11-25
Attending: EMERGENCY MEDICINE | Admitting: EMERGENCY MEDICINE
Payer: COMMERCIAL

## 2017-11-25 VITALS
RESPIRATION RATE: 18 BRPM | BODY MASS INDEX: 29.84 KG/M2 | TEMPERATURE: 98.2 F | HEART RATE: 81 BPM | WEIGHT: 220 LBS | OXYGEN SATURATION: 98 % | SYSTOLIC BLOOD PRESSURE: 121 MMHG | DIASTOLIC BLOOD PRESSURE: 80 MMHG

## 2017-11-25 DIAGNOSIS — R07.9 CHRONIC CHEST PAIN: ICD-10-CM

## 2017-11-25 DIAGNOSIS — G89.29 CHRONIC CHEST PAIN: ICD-10-CM

## 2017-11-25 LAB
ANION GAP SERPL CALCULATED.3IONS-SCNC: 8 MMOL/L (ref 3–14)
BASOPHILS # BLD AUTO: 0 10E9/L (ref 0–0.2)
BASOPHILS NFR BLD AUTO: 0.6 %
BUN SERPL-MCNC: 17 MG/DL (ref 7–30)
CALCIUM SERPL-MCNC: 8.8 MG/DL (ref 8.5–10.1)
CHLORIDE SERPL-SCNC: 101 MMOL/L (ref 94–109)
CO2 SERPL-SCNC: 26 MMOL/L (ref 20–32)
CREAT SERPL-MCNC: 0.79 MG/DL (ref 0.66–1.25)
DIFFERENTIAL METHOD BLD: NORMAL
EOSINOPHIL # BLD AUTO: 0.2 10E9/L (ref 0–0.7)
EOSINOPHIL NFR BLD AUTO: 2.6 %
ERYTHROCYTE [DISTWIDTH] IN BLOOD BY AUTOMATED COUNT: 13.2 % (ref 10–15)
GFR SERPL CREATININE-BSD FRML MDRD: >90 ML/MIN/1.7M2
GLUCOSE SERPL-MCNC: 111 MG/DL (ref 70–99)
HCT VFR BLD AUTO: 43.1 % (ref 40–53)
HGB BLD-MCNC: 13.7 G/DL (ref 13.3–17.7)
IMM GRANULOCYTES # BLD: 0 10E9/L (ref 0–0.4)
IMM GRANULOCYTES NFR BLD: 0.4 %
LYMPHOCYTES # BLD AUTO: 2.4 10E9/L (ref 0.8–5.3)
LYMPHOCYTES NFR BLD AUTO: 32.8 %
MCH RBC QN AUTO: 27.5 PG (ref 26.5–33)
MCHC RBC AUTO-ENTMCNC: 31.8 G/DL (ref 31.5–36.5)
MCV RBC AUTO: 86 FL (ref 78–100)
MONOCYTES # BLD AUTO: 0.6 10E9/L (ref 0–1.3)
MONOCYTES NFR BLD AUTO: 8.6 %
NEUTROPHILS # BLD AUTO: 4 10E9/L (ref 1.6–8.3)
NEUTROPHILS NFR BLD AUTO: 55 %
NRBC # BLD AUTO: 0 10*3/UL
NRBC BLD AUTO-RTO: 0 /100
PLATELET # BLD AUTO: 292 10E9/L (ref 150–450)
POTASSIUM SERPL-SCNC: 4.1 MMOL/L (ref 3.4–5.3)
RBC # BLD AUTO: 4.99 10E12/L (ref 4.4–5.9)
SODIUM SERPL-SCNC: 135 MMOL/L (ref 133–144)
TROPONIN I SERPL-MCNC: <0.015 UG/L (ref 0–0.04)
WBC # BLD AUTO: 7.2 10E9/L (ref 4–11)

## 2017-11-25 PROCEDURE — 85025 COMPLETE CBC W/AUTO DIFF WBC: CPT | Performed by: EMERGENCY MEDICINE

## 2017-11-25 PROCEDURE — 25000132 ZZH RX MED GY IP 250 OP 250 PS 637: Performed by: EMERGENCY MEDICINE

## 2017-11-25 PROCEDURE — 80048 BASIC METABOLIC PNL TOTAL CA: CPT | Performed by: EMERGENCY MEDICINE

## 2017-11-25 PROCEDURE — 99284 EMERGENCY DEPT VISIT MOD MDM: CPT | Mod: 25

## 2017-11-25 PROCEDURE — 96374 THER/PROPH/DIAG INJ IV PUSH: CPT

## 2017-11-25 PROCEDURE — 93005 ELECTROCARDIOGRAM TRACING: CPT

## 2017-11-25 PROCEDURE — 84484 ASSAY OF TROPONIN QUANT: CPT | Performed by: EMERGENCY MEDICINE

## 2017-11-25 PROCEDURE — 25000128 H RX IP 250 OP 636: Performed by: EMERGENCY MEDICINE

## 2017-11-25 PROCEDURE — 96361 HYDRATE IV INFUSION ADD-ON: CPT

## 2017-11-25 PROCEDURE — 25000125 ZZHC RX 250: Performed by: EMERGENCY MEDICINE

## 2017-11-25 RX ORDER — METOCLOPRAMIDE HYDROCHLORIDE 5 MG/ML
10 INJECTION INTRAMUSCULAR; INTRAVENOUS ONCE
Status: COMPLETED | OUTPATIENT
Start: 2017-11-25 | End: 2017-11-25

## 2017-11-25 RX ORDER — KETOROLAC TROMETHAMINE 30 MG/ML
30 INJECTION, SOLUTION INTRAMUSCULAR; INTRAVENOUS ONCE
Status: DISCONTINUED | OUTPATIENT
Start: 2017-11-25 | End: 2017-11-25

## 2017-11-25 RX ADMIN — LIDOCAINE HYDROCHLORIDE 30 ML: 20 SOLUTION ORAL; TOPICAL at 08:15

## 2017-11-25 RX ADMIN — SODIUM CHLORIDE, POTASSIUM CHLORIDE, SODIUM LACTATE AND CALCIUM CHLORIDE 1000 ML: 600; 310; 30; 20 INJECTION, SOLUTION INTRAVENOUS at 08:14

## 2017-11-25 RX ADMIN — METOCLOPRAMIDE 10 MG: 5 INJECTION, SOLUTION INTRAMUSCULAR; INTRAVENOUS at 08:14

## 2017-11-25 ASSESSMENT — ENCOUNTER SYMPTOMS
PALPITATIONS: 1
CHILLS: 0
NAUSEA: 0
HEADACHES: 1
FEVER: 0
VOMITING: 0
SHORTNESS OF BREATH: 0
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
## 2017-11-25 NOTE — ED AVS SNAPSHOT
[facility] Emergency Department    [address]    [facility] [address]    Phone:  [phone]    Fax:  [phone]                                       Perico Houston   MRN: [id number]    Department:  [facility] Emergency Department   Date of Visit:  11/25/2017           After Visit Summary Signature Page     I have received my discharge instructions, and my questions have been answered. I have discussed any challenges I see with this plan with the nurse or doctor.    ..........................................................................................................................................  Patient/Patient Representative Signature      ..........................................................................................................................................  Patient Representative Print Name and Relationship to Patient    ..................................................               ................................................  Date                                            Time    ..........................................................................................................................................  Reviewed by Signature/Title    ...................................................              ..............................................  Date                                                            Time

## 2017-11-25 NOTE — ED AVS SNAPSHOT
[facility] Emergency Department    [address] [facility] [address]    Phone:  [phone]    Fax:  [phone]                                       Perico Houston   MRN: [id number]    Department:  [facility] Emergency Department   Date of Visit:  11/25/2017           Patient Information     Date Of Birth          1960        Your diagnoses for this visit were:     Chronic chest pain        You were seen by Mick Galloway MD.      Follow-up Information     Follow up with Kianna Silver Schedule an appointment as soon as possible for a visit in 2 days.    Specialty:  Physician Assistant    Contact information:    [employer] [facility]  [address] [facility] [id number] [phone]          Go to [facility] Emergency Department.    Specialty:  EMERGENCY MEDICINE    Why:  If symptoms worsen    Contact information:    [address] [facility] [id number] [phone]        Discharge Instructions       Discharge Instructions  Chest Pain    You have been seen today for chest pain or discomfort.  At this time, your provider has found no signs that your chest pain is due to a serious or life-threatening condition, (or you have declined more testing and/or admission to the hospital). However, sometimes there is a serious problem that does not show up right away. Your evaluation today may not be complete and you may need further testing and evaluation.     Generally, every Emergency Department visit should have a follow-up clinic visit with either a primary or a specialty clinic/provider. Please follow-up as instructed by your emergency provider today.  Return to the Emergency Department if:    Your chest pain changes, gets worse, starts to happen more often, or comes with less activity.    You are newly short of breath.    You get very weak or tired.    You pass out or faint.    You have any new symptoms, like fever, cough,  numb legs, or you cough up blood.    You have anything else that worries you.    Until you follow-up with your regular provider, please do the following:    Take one aspirin daily unless you have an allergy or are told not to by your provider.    If a stress test appointment has been made, go to the appointment.    If you have questions, contact your regular provider.    Follow-up with your regular provider/clinic as directed; this is very important.    If you were given a prescription for medicine here today, be sure to read all of the information (including the package insert) that comes with your prescription.  This will include important information about the medicine, its side effects, and any warnings that you need to know about.  The pharmacist who fills the prescription can provide more information and answer questions you may have about the medicine.  If you have questions or concerns that the pharmacist cannot address, please call or return to the Emergency Department.       Remember that you can always come back to the Emergency Department if you are not able to see your regular provider in the amount of time listed above, if you get any new symptoms, or if there is anything that worries you.      24 Hour Appointment Hotline       To make an appointment at any [facility], call [phone] ([phone]). If you don't have a family doctor or clinic, we will help you find one. [place] clinics are conveniently located to serve the needs of you and your family.             Review of your medicines      Our records show that you are taking the medicines listed below. If these are incorrect, please call your family doctor or clinic.        Dose / Directions Last dose taken    aspirin 81 MG EC tablet   Dose:  81 mg   Quantity:  30 tablet        Take 1 tablet (81 mg) by mouth daily   Refills:  0        clonazePAM 0.5 MG tablet   Commonly known as:  klonoPIN   Dose:  1 mg        Take 1 mg by mouth 3  times daily   Refills:  0        gabapentin 300 MG capsule   Commonly known as:  NEURONTIN   Dose:  300 mg   Quantity:  90 capsule        Take 1 capsule (300 mg) by mouth 3 times daily   Refills:  1        glipiZIDE 5 MG 24 hr tablet   Commonly known as:  GLUCOTROL XL   Dose:  5 mg        Take 5 mg by mouth daily   Refills:  0        hydrOXYzine 50 MG tablet   Commonly known as:  ATARAX   Dose:  50 mg   Quantity:  20 tablet        Take 1 tablet (50 mg) by mouth 3 times daily as needed for other (back pain)   Refills:  0        lisinopril 40 MG tablet   Commonly known as:  PRINIVIL/ZESTRIL   Dose:  40 mg   Quantity:  30 tablet        Take 1 tablet (40 mg) by mouth daily   Refills:  0        metFORMIN 1000 MG tablet   Commonly known as:  GLUCOPHAGE   Dose:  1000 mg   Quantity:  60 tablet        Take 1 tablet (1,000 mg) by mouth 2 times daily (with meals)   Refills:  0        nitroGLYcerin 0.4 MG sublingual tablet   Commonly known as:  NITROSTAT   Dose:  0.4 mg   Quantity:  25 tablet        Place 1 tablet (0.4 mg) under the tongue every 5 minutes as needed for chest pain if you are still having symptoms after 3 doses (15 minutes) call 911.   Refills:  0        oxyCODONE IR 10 MG tablet   Commonly known as:  ROXICODONE   Dose:  10 mg        Take 10 mg by mouth 3 times daily   Refills:  0                Procedures and tests performed during your visit     Basic metabolic panel    CBC with platelets differential    EKG 12 lead    Troponin I (now)      Orders Needing Specimen Collection     None      Pending Results     Date and Time Order Name Status Description    11/25/2017 0645 EKG 12 lead Preliminary             Pending Culture Results     No orders found from 11/23/2017 to 11/26/2017.            Pending Results Instructions     If you had any lab results that were not finalized at the time of your Discharge, you can call the ED Lab Result RN at [phone]. You will be contacted by this team for any positive Lab  results or changes in treatment. The nurses are available 7 days a week from 10A to 6:30P.  You can leave a message 24 hours per day and they will return your call.        Test Results From Your Hospital Stay        11/25/2017  8:28 AM      Component Results     Component Value Ref Range & Units Status    WBC 7.2 4.0 - 11.0 10e9/L Final    RBC Count 4.99 4.4 - 5.9 10e12/L Final    Hemoglobin 13.7 13.3 - 17.7 g/dL Final    Hematocrit 43.1 40.0 - 53.0 % Final    MCV 86 78 - 100 fl Final    MCH 27.5 26.5 - 33.0 pg Final    MCHC 31.8 31.5 - 36.5 g/dL Final    RDW 13.2 10.0 - 15.0 % Final    Platelet Count 292 150 - 450 10e9/L Final    Diff Method Automated Method  Final    % Neutrophils 55.0 % Final    % Lymphocytes 32.8 % Final    % Monocytes 8.6 % Final    % Eosinophils 2.6 % Final    % Basophils 0.6 % Final    % Immature Granulocytes 0.4 % Final    Nucleated RBCs 0 0 /100 Final    Absolute Neutrophil 4.0 1.6 - 8.3 10e9/L Final    Absolute Lymphocytes 2.4 0.8 - 5.3 10e9/L Final    Absolute Monocytes 0.6 0.0 - 1.3 10e9/L Final    Absolute Eosinophils 0.2 0.0 - 0.7 10e9/L Final    Absolute Basophils 0.0 0.0 - 0.2 10e9/L Final    Abs Immature Granulocytes 0.0 0 - 0.4 10e9/L Final    Absolute Nucleated RBC 0.0  Final         11/25/2017  8:47 AM      Component Results     Component Value Ref Range & Units Status    Sodium 135 133 - 144 mmol/L Final    Potassium 4.1 3.4 - 5.3 mmol/L Final    Chloride 101 94 - 109 mmol/L Final    Carbon Dioxide 26 20 - 32 mmol/L Final    Anion Gap 8 3 - 14 mmol/L Final    Glucose 111 (H) 70 - 99 mg/dL Final    Urea Nitrogen 17 7 - 30 mg/dL Final    Creatinine 0.79 0.66 - 1.25 mg/dL Final    GFR Estimate >90 >60 mL/min/1.7m2 Final    Non  GFR Calc    GFR Estimate If Black >90 >60 mL/min/1.7m2 Final    African American GFR Calc    Calcium 8.8 8.5 - 10.1 mg/dL Final         11/25/2017  8:47 AM      Component Results     Component Value Ref Range & Units Status    Troponin I ES  <0.015 0.000 - 0.045 ug/L Final    The 99th percentile for upper reference range is 0.045 ug/L.  Troponin values   in the range of 0.045 - 0.120 ug/L may be associated with risks of adverse   clinical events.                  Clinical Quality Measure: Blood Pressure Screening     Your blood pressure was checked while you were in the emergency department today. The last reading we obtained was  BP: 121/80 . Please read the guidelines below about what these numbers mean and what you should do about them.  If your systolic blood pressure (the top number) is less than 120 and your diastolic blood pressure (the bottom number) is less than 80, then your blood pressure is normal. There is nothing more that you need to do about it.  If your systolic blood pressure (the top number) is 120-139 or your diastolic blood pressure (the bottom number) is 80-89, your blood pressure may be higher than it should be. You should have your blood pressure rechecked within a year by a primary care provider.  If your systolic blood pressure (the top number) is 140 or greater or your diastolic blood pressure (the bottom number) is 90 or greater, you may have high blood pressure. High blood pressure is treatable, but if left untreated over time it can put you at risk for heart attack, stroke, or kidney failure. You should have your blood pressure rechecked by a primary care provider within the next 4 weeks.  If your provider in the emergency department today gave you specific instructions to follow-up with your doctor or provider even sooner than that, you should follow that instruction and not wait for up to 4 weeks for your follow-up visit.        Thank you for choosing [facility]       Thank you for choosing [facility] for your care. Our goal is always to provide you with excellent care. Hearing back from our patients is one way we can continue to improve our services. Please take a few minutes to complete the written survey that you may  "receive in the mail after you visit with us. Thank you!        [email or web address] Information     [employer] lets you send messages to your doctor, view your test results, renew your prescriptions, schedule appointments and more. To sign up, go to [email or web address] . Click on \"Log in\" on the left side of the screen, which will take you to the Welcome page. Then click on \"Sign up Now\" on the right side of the page.     You will be asked to enter the access code listed below, as well as some personal information. Please follow the directions to create your username and password.     Your access code is: [email or web address]  Expires: 2018  9:06 AM     Your access code will  in 90 days. If you need help or a new code, please call your [facility] clinic or [phone].        Care EveryWhere ID     This is your Care EveryWhere ID. This could be used by other organizations to access your [facility] medical records  [id number]        Equal Access to Services     [facility] Center[facility] : Hadii jose rivaso Soanay, waaxda luqadaha, qaybta kaalmada adeegyairineo, fady astudillo . So [facility] [phone].    ATENCIÓN: Si habla español, tiene a burgos disposición servicios gratuitos de asistencia lingüística. Llame al [phone].    We comply with applicable federal civil rights laws and [place] laws. We do not discriminate on the basis of race, color, national origin, age, disability, sex, sexual orientation, or gender identity.            After Visit Summary       This is your record. Keep this with you and show to your community pharmacist(s) and doctor(s) at your next visit.                  "

## 2017-11-26 LAB — INTERPRETATION ECG - MUSE: NORMAL

## 2017-11-29 ENCOUNTER — APPOINTMENT (OUTPATIENT)
Dept: GENERAL RADIOLOGY | Facility: CLINIC | Age: 57
End: 2017-11-29
Attending: EMERGENCY MEDICINE
Payer: COMMERCIAL

## 2017-11-29 ENCOUNTER — HOSPITAL ENCOUNTER (EMERGENCY)
Facility: CLINIC | Age: 57
Discharge: HOME | End: 2017-11-30
Attending: EMERGENCY MEDICINE | Admitting: EMERGENCY MEDICINE
Payer: COMMERCIAL

## 2017-11-29 VITALS
RESPIRATION RATE: 18 BRPM | SYSTOLIC BLOOD PRESSURE: 161 MMHG | HEART RATE: 87 BPM | OXYGEN SATURATION: 98 % | DIASTOLIC BLOOD PRESSURE: 95 MMHG | TEMPERATURE: 98 F

## 2017-11-29 DIAGNOSIS — M25.511 RIGHT SHOULDER PAIN, UNSPECIFIED CHRONICITY: ICD-10-CM

## 2017-11-29 DIAGNOSIS — G89.29 OTHER CHRONIC PAIN: ICD-10-CM

## 2017-11-29 DIAGNOSIS — M25.551 BILATERAL HIP PAIN: ICD-10-CM

## 2017-11-29 DIAGNOSIS — M25.552 BILATERAL HIP PAIN: ICD-10-CM
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
## 2017-11-29 PROCEDURE — 73030 X-RAY EXAM OF SHOULDER: CPT | Mod: RT

## 2017-11-29 PROCEDURE — 99284 EMERGENCY DEPT VISIT MOD MDM: CPT

## 2017-11-29 PROCEDURE — 73522 X-RAY EXAM HIPS BI 3-4 VIEWS: CPT

## 2017-11-29 ASSESSMENT — ENCOUNTER SYMPTOMS: BACK PAIN: 1

## 2017-11-29 NOTE — ED AVS SNAPSHOT
[facility] Emergency Department    [address]    [facility] [address]    Phone:  [phone]    Fax:  [phone]                                       Perico Houston   MRN: [id number]    Department:  [facility] Emergency Department   Date of Visit:  11/29/2017           Patient Information     Date Of Birth          1960        Your diagnoses for this visit were:     Right shoulder pain, unspecified chronicity     Bilateral hip pain     Fall, initial encounter     Other chronic pain        You were seen by Barbi Arceo MD.      Follow-up Information     Follow up with Kianna Silver In 1 day.    Specialty:  Physician Assistant    Contact information:    [employer] [facility]  [address] [facility] [id number] [phone]          Follow up with [facility] Emergency Department.    Specialty:  EMERGENCY MEDICINE    Why:  As needed    Contact information:    [address]  [facility] [id number] [phone]        Discharge Instructions       Discharge Instructions  Extremity Injury    You were seen today for an injury to an extremity (arm, hand, leg, or foot). You may have a bruise, strain, or fracture (broken bone).    Generally, every Emergency Department visit should have a follow-up clinic visit with either a primary or a specialty clinic/provider. Please follow-up as instructed by your emergency provider today.  Return to the Emergency Department right away if:    Your pain seems to change or get worse or there is pain in a new area that wasn t evaluated today.    Your extremity becomes pale, cool, blue, or numb or tingling past the injury.    You have more drainage, redness or pain in the area of the cut or abrasion.    You have pain that you cannot control with the medicine recommended or prescribed here, or you have pain that seems too much for your injury.    Your child (who is injured) will not stop  crying or is much more fussy than normal.    You have new symptoms or anything that worries you.    What to Expect:    Your swelling and pain may be worse the day after your injury, but should not be severe and should start getting better after that. You should not have new symptoms and your pain should not get worse.    You may start to get a bruise over the injured area or below the injured area (bruising can follow gravity).    Your movement and strength should get better with time.    Some injuries may not show up until after you have left the Emergency Department so it is important to follow-up as directed.    Your injury may prevent you from working.  Follow-up with your regular provider to get a work release note.    Pain medications or your injury may make it unsafe to drive or operate machinery.    Home Care:    RICE: Rest, Ice, Compression, Elevation  o Rest: Rest your injured area for at least 1-2 days. After that you may start using your extremity again as long as there is not too much pain.   o Ice: Apply ice your injured area for 15 minutes at a time, at least 3 times a day. Use a cloth between the ice bag and your skin to prevent frostbite. Do not sleep with an ice pack or heating pad on, since this can cause burns or skin injury.  o Compression: You may use an elastic bandage (Ace  Wrap) if it makes you more comfortable. Wrap it just tight enough to provide light compression, like a new pair of socks feels. Loosen the bandage if you have swelling past the bandage.  o Elevation: Raise the injured area above the level of your heart as much as possible in the first 1-2 days.      Use Tylenol  (acetaminophen), Motrin (ibuprofen), or Advil  (ibuprofen) for your pain unless you have an allergy or are told not to use these medications by your provider.  Take the medications as instructed on the package. Tylenol  (acetaminophen) is in many prescription medicines and non-prescription medicines--check all of  your medicines to be sure you aren t taking more than 3000 mg per day.    Please follow any other instructions that were discussed with you by your provider.    Stretching/Exercises:  You may have been provided with instructions for stretching or exercises. If your injury was to your arm or shoulder and your provider put you in a sling or an immobilizer, it is important that you take off your immobilizer within 3 days and stretch/move your shoulder, unless your provider specifically tells you to not move your shoulder.  This is to prevent further injury such as a  frozen shoulder .     If you were given a prescription for medicine here today, be sure to read all of the information (including the package insert) that comes with your prescription.  This will include important information about the medicine, its side effects, and any warnings that you need to know about.  The pharmacist who fills the prescription can provide more information and answer questions you may have about the medicine.  If you have questions or concerns that the pharmacist cannot address, please call or return to the Emergency Department.     Remember that you can always come back to the Emergency Department if you are not able to see your regular provider in the amount of time listed above, if you get any new symptoms, or if there is anything that worries you.  Opioid Medication Discharge Instructions    You have been given a prescription for an opioid (narcotic) pain medicine and/or have   received a pain medicine while here in the emergency department. These medicines can make you drowsy or impaired.     You must not drive, operate dangerous equipment, or   engage in any other dangerous activities while taking these medications. If you drive while taking these medications, you could be arrested for DUI, or driving under the   influence. Do not drink any alcohol while you are taking these medications.     Opioid pain medications can cause  addiction. If you have a history of chemical   dependency of any type, you are at a higher risk of becoming addicted to pain   medications. Only take these prescribed medications to treat your pain when all other   options have been tried. Take it for as short a time and as few doses as possible.     Store your pain pills in a secure place, as they are frequently stolen and provide a dangerous opportunity for children or visitors in your house to start abusing these powerful medications. We will not replace any lost or stolen medicine.     As soon as your pain is better, you should safely dispose of all your remaining medication.     Many prescription pain medications contain Tylenol (acetaminophen), including Vicodin, Tylenol #3, Norco, Lortab, and Percocet. You should not take any extra pills of Tylenol if you are using these prescription medications or you can get very sick. Do not ever take more than 4000 mg of acetaminophen in any 24 hour period.    All opioids tend to cause constipation. Drink plenty of water and eat foods that have   a lot of fiber, such as fruits, vegetables, prune juice, apple juice and high fiber cereal.   Take a laxative if you don t move your bowels at least every other day. Miralax, Milk of   Magnesia, Colace, or Senna can be used to keep you regular.        24 Hour Appointment Hotline       To make an appointment at any [facility], call [phone] ([phone]). If you don't have a family doctor or clinic, we will help you find one. [facility] are conveniently located to serve the needs of you and your family.             Review of your medicines      Our records show that you are taking the medicines listed below. If these are incorrect, please call your family doctor or clinic.        Dose / Directions Last dose taken    aspirin 81 MG EC tablet   Dose:  81 mg   Quantity:  30 tablet        Take 1 tablet (81 mg) by mouth daily   Refills:  0        clonazePAM 0.5 MG  tablet   Commonly known as:  klonoPIN   Dose:  1 mg        Take 1 mg by mouth 3 times daily   Refills:  0        gabapentin 300 MG capsule   Commonly known as:  NEURONTIN   Dose:  300 mg   Quantity:  90 capsule        Take 1 capsule (300 mg) by mouth 3 times daily   Refills:  1        glipiZIDE 5 MG 24 hr tablet   Commonly known as:  GLUCOTROL XL   Dose:  5 mg        Take 5 mg by mouth daily   Refills:  0        hydrOXYzine 50 MG tablet   Commonly known as:  ATARAX   Dose:  50 mg   Quantity:  20 tablet        Take 1 tablet (50 mg) by mouth 3 times daily as needed for other (back pain)   Refills:  0        lisinopril 40 MG tablet   Commonly known as:  PRINIVIL/ZESTRIL   Dose:  40 mg   Quantity:  30 tablet        Take 1 tablet (40 mg) by mouth daily   Refills:  0        metFORMIN 1000 MG tablet   Commonly known as:  GLUCOPHAGE   Dose:  1000 mg   Quantity:  60 tablet        Take 1 tablet (1,000 mg) by mouth 2 times daily (with meals)   Refills:  0        nitroGLYcerin 0.4 MG sublingual tablet   Commonly known as:  NITROSTAT   Dose:  0.4 mg   Quantity:  25 tablet        Place 1 tablet (0.4 mg) under the tongue every 5 minutes as needed for chest pain if you are still having symptoms after 3 doses (15 minutes) call 911.   Refills:  0        oxyCODONE IR 10 MG tablet   Commonly known as:  ROXICODONE   Dose:  10 mg        Take 10 mg by mouth 3 times daily   Refills:  0                Procedures and tests performed during your visit     Shoulder XR, G/E 3 views, right    XR Pelvis w 1 View Each Hip      Orders Needing Specimen Collection     None      Pending Results     Date and Time Order Name Status Description    11/29/2017 2146 XR Pelvis w 1 View Each Hip Preliminary     11/29/2017 2146 Shoulder XR, G/E 3 views, right Preliminary             Pending Culture Results     No orders found for last 3 day(s).            Pending Results Instructions     If you had any lab results that were not finalized at the time of your  Discharge, you can call the ED Lab Result RN at [phone]. You will be contacted by this team for any positive Lab results or changes in treatment. The nurses are available 7 days a week from 10A to 6:30P.  You can leave a message 24 hours per day and they will return your call.        Test Results From Your Hospital Stay        11/29/2017 11:38 PM      Narrative     RIGHT SHOULDER 3 VIEWS  11/29/2017 11:20 PM     HISTORY: Fall. Shoulder pain.    COMPARISON: 10/24/2016.        Impression     IMPRESSION:  1. No visualized acute fracture or malalignment of the right shoulder.  2. Mild degenerative changes in the right acromioclavicular joint.         11/29/2017 11:37 PM      Narrative     PELVIS AND BILATERAL HIPS  11/29/2017 11:20 PM     HISTORY: Fall. Pain.    COMPARISON: 8/3/2017.        Impression     IMPRESSION:  1. No visualized acute fracture or malalignment of the pelvis or  bilateral hips.  2. Mild degenerative changes in bilateral hip joints.                Clinical Quality Measure: Blood Pressure Screening     Your blood pressure was checked while you were in the emergency department today. The last reading we obtained was  BP: (!) 161/95 . Please read the guidelines below about what these numbers mean and what you should do about them.  If your systolic blood pressure (the top number) is less than 120 and your diastolic blood pressure (the bottom number) is less than 80, then your blood pressure is normal. There is nothing more that you need to do about it.  If your systolic blood pressure (the top number) is 120-139 or your diastolic blood pressure (the bottom number) is 80-89, your blood pressure may be higher than it should be. You should have your blood pressure rechecked within a year by a primary care provider.  If your systolic blood pressure (the top number) is 140 or greater or your diastolic blood pressure (the bottom number) is 90 or greater, you may have high blood pressure. High blood  "pressure is treatable, but if left untreated over time it can put you at risk for heart attack, stroke, or kidney failure. You should have your blood pressure rechecked by a primary care provider within the next 4 weeks.  If your provider in the emergency department today gave you specific instructions to follow-up with your doctor or provider even sooner than that, you should follow that instruction and not wait for up to 4 weeks for your follow-up visit.        Thank you for choosing [facility]       Thank you for choosing [facility] for your care. Our goal is always to provide you with excellent care. Hearing back from our patients is one way we can continue to improve our services. Please take a few minutes to complete the written survey that you may receive in the mail after you visit with us. Thank you!        [employer] Information     [employer] lets you send messages to your doctor, view your test results, renew your prescriptions, schedule appointments and more. To sign up, go to [email or web address] . Click on \"Log in\" on the left side of the screen, which will take you to the Welcome page. Then click on \"Sign up Now\" on the right side of the page.     You will be asked to enter the access code listed below, as well as some personal information. Please follow the directions to create your username and password.     Your access code is: [email or web address]  Expires: 2018  9:06 AM     Your access code will  in 90 days. If you need help or a new code, please call your [facility] clinic or [phone].        Care EveryWhere ID     This is your Care EveryWhere ID. This could be used by other organizations to access your [facility] medical records  [id number]        Equal Access to Services     RADHA SIMPSON : Rosa Maria Marcelino, pj stephens, fady maldonado. So [facility] [phone].    ATENCIÓN: Si habla español, tiene a burgos disposición servicios " ziggy de asistencia lingüística. Artur bates [phone].    We comply with applicable federal civil rights laws and [place] laws. We do not discriminate on the basis of race, color, national origin, age, disability, sex, sexual orientation, or gender identity.            After Visit Summary       This is your record. Keep this with you and show to your community pharmacist(s) and doctor(s) at your next visit.

## 2017-11-29 NOTE — ED AVS SNAPSHOT
[facility] Emergency Department    [address]    [facility] [address]    Phone:  [phone]    Fax:  [phone]                                       Perico Houston   MRN: [id number]    Department:  [facility] Emergency Department   Date of Visit:  11/29/2017           After Visit Summary Signature Page     I have received my discharge instructions, and my questions have been answered. I have discussed any challenges I see with this plan with the nurse or doctor.    ..........................................................................................................................................  Patient/Patient Representative Signature      ..........................................................................................................................................  Patient Representative Print Name and Relationship to Patient    ..................................................               ................................................  Date                                            Time    ..........................................................................................................................................  Reviewed by Signature/Title    ...................................................              ..............................................  Date                                                            Time

## 2017-11-30 PROCEDURE — 25000132 ZZH RX MED GY IP 250 OP 250 PS 637: Performed by: EMERGENCY MEDICINE

## 2017-11-30 RX ORDER — ACETAMINOPHEN 500 MG
1000 TABLET ORAL ONCE
Status: COMPLETED | OUTPATIENT
Start: 2017-11-30 | End: 2017-11-30

## 2017-11-30 RX ORDER — OXYCODONE HYDROCHLORIDE 5 MG/1
5 TABLET ORAL ONCE
Status: COMPLETED | OUTPATIENT
Start: 2017-11-30 | End: 2017-11-30

## 2017-11-30 RX ADMIN — ACETAMINOPHEN 1000 MG: 500 TABLET, FILM COATED ORAL at 00:27

## 2017-11-30 RX ADMIN — OXYCODONE HYDROCHLORIDE 5 MG: 5 TABLET ORAL at 00:27

## 2017-11-30 NOTE — DISCHARGE INSTRUCTIONS
Discharge Instructions  Extremity Injury    You were seen today for an injury to an extremity (arm, hand, leg, or foot). You may have a bruise, strain, or fracture (broken bone).    Generally, every Emergency Department visit should have a follow-up clinic visit with either a primary or a specialty clinic/provider. Please follow-up as instructed by your emergency provider today.  Return to the Emergency Department right away if:    Your pain seems to change or get worse or there is pain in a new area that wasn t evaluated today.    Your extremity becomes pale, cool, blue, or numb or tingling past the injury.    You have more drainage, redness or pain in the area of the cut or abrasion.    You have pain that you cannot control with the medicine recommended or prescribed here, or you have pain that seems too much for your injury.    Your child (who is injured) will not stop crying or is much more fussy than normal.    You have new symptoms or anything that worries you.    What to Expect:    Your swelling and pain may be worse the day after your injury, but should not be severe and should start getting better after that. You should not have new symptoms and your pain should not get worse.    You may start to get a bruise over the injured area or below the injured area (bruising can follow gravity).    Your movement and strength should get better with time.    Some injuries may not show up until after you have left the Emergency Department so it is important to follow-up as directed.    Your injury may prevent you from working.  Follow-up with your regular provider to get a work release note.    Pain medications or your injury may make it unsafe to drive or operate machinery.    Home Care:    RICE: Rest, Ice, Compression, Elevation  o Rest: Rest your injured area for at least 1-2 days. After that you may start using your extremity again as long as there is not too much pain.   o Ice: Apply ice your injured area for 15  minutes at a time, at least 3 times a day. Use a cloth between the ice bag and your skin to prevent frostbite. Do not sleep with an ice pack or heating pad on, since this can cause burns or skin injury.  o Compression: You may use an elastic bandage (Ace  Wrap) if it makes you more comfortable. Wrap it just tight enough to provide light compression, like a new pair of socks feels. Loosen the bandage if you have swelling past the bandage.  o Elevation: Raise the injured area above the level of your heart as much as possible in the first 1-2 days.      Use Tylenol  (acetaminophen), Motrin (ibuprofen), or Advil  (ibuprofen) for your pain unless you have an allergy or are told not to use these medications by your provider.  Take the medications as instructed on the package. Tylenol  (acetaminophen) is in many prescription medicines and non-prescription medicines--check all of your medicines to be sure you aren t taking more than 3000 mg per day.    Please follow any other instructions that were discussed with you by your provider.    Stretching/Exercises:  You may have been provided with instructions for stretching or exercises. If your injury was to your arm or shoulder and your provider put you in a sling or an immobilizer, it is important that you take off your immobilizer within 3 days and stretch/move your shoulder, unless your provider specifically tells you to not move your shoulder.  This is to prevent further injury such as a  frozen shoulder .     If you were given a prescription for medicine here today, be sure to read all of the information (including the package insert) that comes with your prescription.  This will include important information about the medicine, its side effects, and any warnings that you need to know about.  The pharmacist who fills the prescription can provide more information and answer questions you may have about the medicine.  If you have questions or concerns that the pharmacist  cannot address, please call or return to the Emergency Department.     Remember that you can always come back to the Emergency Department if you are not able to see your regular provider in the amount of time listed above, if you get any new symptoms, or if there is anything that worries you.  Opioid Medication Discharge Instructions    You have been given a prescription for an opioid (narcotic) pain medicine and/or have   received a pain medicine while here in the emergency department. These medicines can make you drowsy or impaired.     You must not drive, operate dangerous equipment, or   engage in any other dangerous activities while taking these medications. If you drive while taking these medications, you could be arrested for DUI, or driving under the   influence. Do not drink any alcohol while you are taking these medications.     Opioid pain medications can cause addiction. If you have a history of chemical   dependency of any type, you are at a higher risk of becoming addicted to pain   medications. Only take these prescribed medications to treat your pain when all other   options have been tried. Take it for as short a time and as few doses as possible.     Store your pain pills in a secure place, as they are frequently stolen and provide a dangerous opportunity for children or visitors in your house to start abusing these powerful medications. We will not replace any lost or stolen medicine.     As soon as your pain is better, you should safely dispose of all your remaining medication.     Many prescription pain medications contain Tylenol (acetaminophen), including Vicodin, Tylenol #3, Norco, Lortab, and Percocet. You should not take any extra pills of Tylenol if you are using these prescription medications or you can get very sick. Do not ever take more than 4000 mg of acetaminophen in any 24 hour period.    All opioids tend to cause constipation. Drink plenty of water and eat foods that have   a lot  of fiber, such as fruits, vegetables, prune juice, apple juice and high fiber cereal.   Take a laxative if you don t move your bowels at least every other day. Miralax, Milk of   Magnesia, Colace, or Senna can be used to keep you regular.
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
## 2017-12-02 ENCOUNTER — HOSPITAL ENCOUNTER (EMERGENCY)
Facility: CLINIC | Age: 57
Discharge: HOME | End: 2017-12-02
Attending: EMERGENCY MEDICINE | Admitting: EMERGENCY MEDICINE
Payer: COMMERCIAL

## 2017-12-02 VITALS
SYSTOLIC BLOOD PRESSURE: 153 MMHG | DIASTOLIC BLOOD PRESSURE: 99 MMHG | RESPIRATION RATE: 20 BRPM | HEIGHT: 72 IN | OXYGEN SATURATION: 98 % | TEMPERATURE: 97.9 F | WEIGHT: 223 LBS | BODY MASS INDEX: 30.2 KG/M2 | HEART RATE: 74 BPM

## 2017-12-02 DIAGNOSIS — R07.9 ACUTE CHEST PAIN: ICD-10-CM

## 2017-12-02 LAB
ANION GAP SERPL CALCULATED.3IONS-SCNC: 8 MMOL/L (ref 3–14)
BASOPHILS # BLD AUTO: 0 10E9/L (ref 0–0.2)
BASOPHILS NFR BLD AUTO: 0.4 %
BUN SERPL-MCNC: 11 MG/DL (ref 7–30)
CALCIUM SERPL-MCNC: 8.8 MG/DL (ref 8.5–10.1)
CHLORIDE SERPL-SCNC: 101 MMOL/L (ref 94–109)
CO2 SERPL-SCNC: 26 MMOL/L (ref 20–32)
CREAT SERPL-MCNC: 0.67 MG/DL (ref 0.66–1.25)
DIFFERENTIAL METHOD BLD: NORMAL
EOSINOPHIL # BLD AUTO: 0.2 10E9/L (ref 0–0.7)
EOSINOPHIL NFR BLD AUTO: 3 %
ERYTHROCYTE [DISTWIDTH] IN BLOOD BY AUTOMATED COUNT: 13.5 % (ref 10–15)
GFR SERPL CREATININE-BSD FRML MDRD: >90 ML/MIN/1.7M2
GLUCOSE SERPL-MCNC: 143 MG/DL (ref 70–99)
HCT VFR BLD AUTO: 41.7 % (ref 40–53)
HGB BLD-MCNC: 14 G/DL (ref 13.3–17.7)
IMM GRANULOCYTES # BLD: 0 10E9/L (ref 0–0.4)
IMM GRANULOCYTES NFR BLD: 0.4 %
INTERPRETATION ECG - MUSE: NORMAL
LYMPHOCYTES # BLD AUTO: 2.9 10E9/L (ref 0.8–5.3)
LYMPHOCYTES NFR BLD AUTO: 39.7 %
MCH RBC QN AUTO: 28.5 PG (ref 26.5–33)
MCHC RBC AUTO-ENTMCNC: 33.6 G/DL (ref 31.5–36.5)
MCV RBC AUTO: 85 FL (ref 78–100)
MONOCYTES # BLD AUTO: 0.6 10E9/L (ref 0–1.3)
MONOCYTES NFR BLD AUTO: 8.6 %
NEUTROPHILS # BLD AUTO: 3.5 10E9/L (ref 1.6–8.3)
NEUTROPHILS NFR BLD AUTO: 47.9 %
NRBC # BLD AUTO: 0 10*3/UL
NRBC BLD AUTO-RTO: 0 /100
PLATELET # BLD AUTO: 297 10E9/L (ref 150–450)
POTASSIUM SERPL-SCNC: 4.6 MMOL/L (ref 3.4–5.3)
RBC # BLD AUTO: 4.91 10E12/L (ref 4.4–5.9)
SODIUM SERPL-SCNC: 135 MMOL/L (ref 133–144)
TROPONIN I SERPL-MCNC: <0.015 UG/L (ref 0–0.04)
TROPONIN I SERPL-MCNC: <0.015 UG/L (ref 0–0.04)
WBC # BLD AUTO: 7.3 10E9/L (ref 4–11)

## 2017-12-02 PROCEDURE — 25000125 ZZHC RX 250: Performed by: EMERGENCY MEDICINE

## 2017-12-02 PROCEDURE — 80048 BASIC METABOLIC PNL TOTAL CA: CPT | Performed by: EMERGENCY MEDICINE

## 2017-12-02 PROCEDURE — 84484 ASSAY OF TROPONIN QUANT: CPT | Mod: 91 | Performed by: EMERGENCY MEDICINE

## 2017-12-02 PROCEDURE — 93005 ELECTROCARDIOGRAM TRACING: CPT

## 2017-12-02 PROCEDURE — 25000132 ZZH RX MED GY IP 250 OP 250 PS 637: Performed by: EMERGENCY MEDICINE

## 2017-12-02 PROCEDURE — 85025 COMPLETE CBC W/AUTO DIFF WBC: CPT | Performed by: EMERGENCY MEDICINE

## 2017-12-02 PROCEDURE — 99284 EMERGENCY DEPT VISIT MOD MDM: CPT

## 2017-12-02 RX ADMIN — LIDOCAINE HYDROCHLORIDE 30 ML: 20 SOLUTION ORAL; TOPICAL at 15:56

## 2017-12-02 ASSESSMENT — ENCOUNTER SYMPTOMS
FATIGUE: 0
FEVER: 0
SHORTNESS OF BREATH: 0
DIAPHORESIS: 0
CHILLS: 0
GASTROINTESTINAL NEGATIVE: 1
NEUROLOGICAL NEGATIVE: 1
NERVOUS/ANXIOUS: 1
APPETITE CHANGE: 0
COUGH: 0

## 2017-12-02 NOTE — ED NOTES
Bed: ED12  Expected date:   Expected time:   Means of arrival:   Comments:  massimo - 593 -57M chest pain eta 1510

## 2017-12-02 NOTE — ED AVS SNAPSHOT
Emergency Department    [address] [place] [address]    Phone:  [phone]    Fax:  [phone]                                       Perico Houston   MRN: [id number]    Department:   Emergency Department   Date of Visit:  12/2/2017           After Visit Summary Signature Page     I have received my discharge instructions, and my questions have been answered. I have discussed any challenges I see with this plan with the nurse or doctor.    ..........................................................................................................................................  Patient/Patient Representative Signature      ..........................................................................................................................................  Patient Representative Print Name and Relationship to Patient    ..................................................               ................................................  Date                                            Time    ..........................................................................................................................................  Reviewed by Signature/Title    ...................................................              ..............................................  Date                                                            Time
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
## 2017-12-02 NOTE — ED AVS SNAPSHOT
Emergency Department    [address] [facility] [address]    Phone:  [phone]    Fax:  [phone]                                       Perico [place]   MRN: [id number]    Department:   Emergency Department   Date of Visit:  12/2/2017           Patient Information     Date Of Birth          1960        Your diagnoses for this visit were:     Acute chest pain        You were seen by Abram Felipe MD.      Follow-up Information     Follow up with Kianna Silver    Specialty:  Physician Assistant    Contact information:    [facility]  [address] [facility] [id number] [phone]          Discharge Instructions       Discharge Instructions  Chest Pain    You have been seen today for chest pain or discomfort.  At this time, your doctor has found no signs that your chest pain is due to a serious or life-threatening condition, (or you have declined more testing and/or admission to the hospital). However, sometimes there is a serious problem that does not show up right away. Your evaluation today may not be complete and you may need further testing and evaluation.     You need to follow-up with your regular doctor within 3 days.    Return to the Emergency Department if:    Your chest pain changes, gets worse, starts to happen more often, or comes with less activity.    You are short of breath.    You get very weak or tired.    You pass out or faint.    You have any new symptoms, like fever, cough, numb legs, or you cough up blood.    You have anything else that worries you.    Until you follow-up with your regular doctor please do the following:    Take one aspirin daily unless you have an allergy or are told not to by your doctor.    If a stress test appointment has been made, go to the appointment.    If you have questions, contact your regular doctor.    If your doctor today has told you to follow-up with your regular doctor, it is very important that you make  an appointment with your clinic and go to the appointment.  If you do not follow-up with your primary doctor, it may result in missing an important development which could result in permanent injury or disability and/or lasting pain.  If there is any problem keeping your appointment, call your doctor or return to the Emergency Department.    If you were given a prescription for medicine here today, be sure to read all of the information (including the package insert) that comes with your prescription.  This will include important information about the medicine, its side effects, and any warnings that you need to know about.  The pharmacist who fills the prescription can provide more information and answer questions you may have about the medicine.  If you have questions or concerns that the pharmacist cannot address, please call or return to the Emergency Department.     Opioid Medication Information    Pain medications are among the most commonly prescribed medicines, so we are including this information for all our patients. If you did not receive pain medication or get a prescription for pain medicine, you can ignore it.     You may have been given a prescription for an opioid (narcotic) pain medicine and/or have received a pain medicine while here in the Emergency Department. These medicines can make you drowsy or impaired. You must not drive, operate dangerous equipment, or engage in any other dangerous activities while taking these medications. If you drive while taking these medications, you could be arrested for DUI, or driving under the influence. Do not drink any alcohol while you are taking these medications.     Opioid pain medications can cause addiction. If you have a history of chemical dependency of any type, you are at a higher risk of becoming addicted to pain medications.  Only take these prescribed medications to treat your pain when all other options have been tried. Take it for as short a time  and as few doses as possible. Store your pain pills in a secure place, as they are frequently stolen and provide a dangerous opportunity for children or visitors in your house to start abusing these powerful medications. We will not replace any lost or stolen medicine.  As soon as your pain is better, you should flush all your remaining medication.     Many prescription pain medications contain Tylenol  (acetaminophen), including Vicodin , Tylenol #3 , Norco , Lortab , and Percocet .  You should not take any extra pills of Tylenol  if you are using these prescription medications or you can get very sick.  Do not ever take more than 3000 mg of acetaminophen in any 24 hour period.    All opioids tend to cause constipation. Drink plenty of water and eat foods that have a lot of fiber, such as fruits, vegetables, prune juice, apple juice and high fiber cereal.  Take a laxative if you don t move your bowels at least every other day. Miralax , Milk of Magnesia, Colace , or Senna  can be used to keep you regular.      Remember that you can always come back to the Emergency Department if you are not able to see your regular doctor in the amount of time listed above, if you get any new symptoms, or if there is anything that worries you.          24 Hour Appointment Hotline       To make an appointment at any [facility], call [phone] ([phone]). If you don't have a family doctor or clinic, we will help you find one. [facility] clinics are conveniently located to serve the needs of you and your family.             Review of your medicines      Our records show that you are taking the medicines listed below. If these are incorrect, please call your family doctor or clinic.        Dose / Directions Last dose taken    aspirin 81 MG EC tablet   Dose:  81 mg   Quantity:  30 tablet        Take 1 tablet (81 mg) by mouth daily   Refills:  0        clonazePAM 0.5 MG tablet   Commonly known as:  klonoPIN   Dose:  1 mg         Take 1 mg by mouth 3 times daily   Refills:  0        gabapentin 300 MG capsule   Commonly known as:  NEURONTIN   Dose:  300 mg   Quantity:  90 capsule        Take 1 capsule (300 mg) by mouth 3 times daily   Refills:  1        glipiZIDE 5 MG 24 hr tablet   Commonly known as:  GLUCOTROL XL   Dose:  5 mg        Take 5 mg by mouth daily   Refills:  0        hydrOXYzine 50 MG tablet   Commonly known as:  ATARAX   Dose:  50 mg   Quantity:  20 tablet        Take 1 tablet (50 mg) by mouth 3 times daily as needed for other (back pain)   Refills:  0        lisinopril 40 MG tablet   Commonly known as:  PRINIVIL/ZESTRIL   Dose:  40 mg   Quantity:  30 tablet        Take 1 tablet (40 mg) by mouth daily   Refills:  0        metFORMIN 1000 MG tablet   Commonly known as:  GLUCOPHAGE   Dose:  1000 mg   Quantity:  60 tablet        Take 1 tablet (1,000 mg) by mouth 2 times daily (with meals)   Refills:  0        nitroGLYcerin 0.4 MG sublingual tablet   Commonly known as:  NITROSTAT   Dose:  0.4 mg   Quantity:  25 tablet        Place 1 tablet (0.4 mg) under the tongue every 5 minutes as needed for chest pain if you are still having symptoms after 3 doses (15 minutes) call 911.   Refills:  0        oxyCODONE IR 10 MG tablet   Commonly known as:  ROXICODONE   Dose:  10 mg        Take 10 mg by mouth 3 times daily   Refills:  0                Procedures and tests performed during your visit     Procedure/Test Number of Times Performed    Basic metabolic panel 1    CBC with platelets differential 1    EKG 12-lead, tracing only 1    Troponin I 2      Orders Needing Specimen Collection     None      Pending Results     No orders found from 11/30/2017 to 12/3/2017.            Pending Culture Results     No orders found from 11/30/2017 to 12/3/2017.            Pending Results Instructions     If you had any lab results that were not finalized at the time of your Discharge, you can call the ED Lab Result RN at [phone]. You will be  contacted by this team for any positive Lab results or changes in treatment. The nurses are available 7 days a week from 10A to 6:30P.  You can leave a message 24 hours per day and they will return your call.        Test Results From Your Hospital Stay        12/2/2017  3:58 PM      Component Results     Component Value Ref Range & Units Status    WBC 7.3 4.0 - 11.0 10e9/L Final    RBC Count 4.91 4.4 - 5.9 10e12/L Final    Hemoglobin 14.0 13.3 - 17.7 g/dL Final    Hematocrit 41.7 40.0 - 53.0 % Final    MCV 85 78 - 100 fl Final    MCH 28.5 26.5 - 33.0 pg Final    MCHC 33.6 31.5 - 36.5 g/dL Final    RDW 13.5 10.0 - 15.0 % Final    Platelet Count 297 150 - 450 10e9/L Final    Diff Method Automated Method  Final    % Neutrophils 47.9 % Final    % Lymphocytes 39.7 % Final    % Monocytes 8.6 % Final    % Eosinophils 3.0 % Final    % Basophils 0.4 % Final    % Immature Granulocytes 0.4 % Final    Nucleated RBCs 0 0 /100 Final    Absolute Neutrophil 3.5 1.6 - 8.3 10e9/L Final    Absolute Lymphocytes 2.9 0.8 - 5.3 10e9/L Final    Absolute Monocytes 0.6 0.0 - 1.3 10e9/L Final    Absolute Eosinophils 0.2 0.0 - 0.7 10e9/L Final    Absolute Basophils 0.0 0.0 - 0.2 10e9/L Final    Abs Immature Granulocytes 0.0 0 - 0.4 10e9/L Final    Absolute Nucleated RBC 0.0  Final         12/2/2017  4:14 PM      Component Results     Component Value Ref Range & Units Status    Sodium 135 133 - 144 mmol/L Final    Potassium 4.6 3.4 - 5.3 mmol/L Final    Specimen slightly hemolyzed, potassium may be falsely elevated    Chloride 101 94 - 109 mmol/L Final    Carbon Dioxide 26 20 - 32 mmol/L Final    Anion Gap 8 3 - 14 mmol/L Final    Glucose 143 (H) 70 - 99 mg/dL Final    Urea Nitrogen 11 7 - 30 mg/dL Final    Creatinine 0.67 0.66 - 1.25 mg/dL Final    GFR Estimate >90 >60 mL/min/1.7m2 Final    Non  GFR Calc    GFR Estimate If Black >90 >60 mL/min/1.7m2 Final    African American GFR Calc    Calcium 8.8 8.5 - 10.1 mg/dL Final          12/2/2017  4:14 PM      Component Results     Component Value Ref Range & Units Status    Troponin I ES <0.015 0.000 - 0.045 ug/L Final    The 99th percentile for upper reference range is 0.045 ug/L.  Troponin values   in the range of 0.045 - 0.120 ug/L may be associated with risks of adverse   clinical events.           12/2/2017  6:53 PM      Component Results     Component Value Ref Range & Units Status    Troponin I ES <0.015 0.000 - 0.045 ug/L Final    The 99th percentile for upper reference range is 0.045 ug/L.  Troponin values   in the range of 0.045 - 0.120 ug/L may be associated with risks of adverse   clinical events.                  Clinical Quality Measure: Blood Pressure Screening     Your blood pressure was checked while you were in the emergency department today. The last reading we obtained was  BP: 131/87 . Please read the guidelines below about what these numbers mean and what you should do about them.  If your systolic blood pressure (the top number) is less than 120 and your diastolic blood pressure (the bottom number) is less than 80, then your blood pressure is normal. There is nothing more that you need to do about it.  If your systolic blood pressure (the top number) is 120-139 or your diastolic blood pressure (the bottom number) is 80-89, your blood pressure may be higher than it should be. You should have your blood pressure rechecked within a year by a primary care provider.  If your systolic blood pressure (the top number) is 140 or greater or your diastolic blood pressure (the bottom number) is 90 or greater, you may have high blood pressure. High blood pressure is treatable, but if left untreated over time it can put you at risk for heart attack, stroke, or kidney failure. You should have your blood pressure rechecked by a primary care provider within the next 4 weeks.  If your provider in the emergency department today gave you specific instructions to follow-up with your doctor or  "provider even sooner than that, you should follow that instruction and not wait for up to 4 weeks for your follow-up visit.        Thank you for choosing [facility]       Thank you for choosing [facility] for your care. Our goal is always to provide you with excellent care. Hearing back from our patients is one way we can continue to improve our services. Please take a few minutes to complete the written survey that you may receive in the mail after you visit with us. Thank you!        [employer] Information     [employer] lets you send messages to your doctor, view your test results, renew your prescriptions, schedule appointments and more. To sign up, go to [email or web address] . Click on \"Log in\" on the left side of the screen, which will take you to the Welcome page. Then click on \"Sign up Now\" on the right side of the page.     You will be asked to enter the access code listed below, as well as some personal information. Please follow the directions to create your username and password.     Your access code is: [email or web address]  Expires: 2018  9:06 AM     Your access code will  in 90 days. If you need help or a new code, please call your [facility] clinic or [phone].        Care EveryWhere ID     This is your Care EveryWhere ID. This could be used by other organizations to access your [facility] medical records  [id number]        Equal Access to Services     MILADIS SIMPSON : Haddeonte Marcelino, waaxda luzeyadadaha, qaybta kaalmada alyx, fady perez. So [facility] [phone].    ATENCIÓN: Si habla español, tiene a burgos disposición servicios gratuitos de asistencia lingüística. Artur al [phone].    We comply with applicable federal civil rights laws and [place] laws. We do not discriminate on the basis of race, color, national origin, age, disability, sex, sexual orientation, or gender identity.            After Visit Summary       This is your record. Keep this with " you and show to your community pharmacist(s) and doctor(s) at your next visit.

## 2017-12-06 ENCOUNTER — TRANSFERRED RECORDS (OUTPATIENT)
Dept: HEALTH INFORMATION MANAGEMENT | Facility: CLINIC | Age: 57
End: 2017-12-06

## 2017-12-07 ENCOUNTER — TRANSFERRED RECORDS (OUTPATIENT)
Dept: HEALTH INFORMATION MANAGEMENT | Facility: CLINIC | Age: 57
End: 2017-12-07

## 2017-12-07 LAB
ALT SERPL-CCNC: 20 IU/L (ref 2–40)
AST SERPL-CCNC: 20 IU/L (ref 2–40)
CREAT SERPL-MCNC: 0.89 MG/DL (ref 0.72–1.25)
GFR SERPL CREATININE-BSD FRML MDRD: >60 ML/MIN/1.73M2
GLUCOSE SERPL-MCNC: 129 MG/DL (ref 65–100)
POTASSIUM SERPL-SCNC: 5 MMOL/L (ref 3.5–5)

## 2017-12-26 ENCOUNTER — HOSPITAL ENCOUNTER (EMERGENCY)
Facility: CLINIC | Age: 57
Discharge: HOME | End: 2017-12-26
Attending: EMERGENCY MEDICINE | Admitting: EMERGENCY MEDICINE
Payer: COMMERCIAL

## 2017-12-26 VITALS
HEART RATE: 78 BPM | DIASTOLIC BLOOD PRESSURE: 94 MMHG | BODY MASS INDEX: 29.84 KG/M2 | RESPIRATION RATE: 18 BRPM | OXYGEN SATURATION: 98 % | SYSTOLIC BLOOD PRESSURE: 159 MMHG | TEMPERATURE: 97.8 F | WEIGHT: 220 LBS

## 2017-12-26 DIAGNOSIS — F41.0 PANIC ATTACK: ICD-10-CM

## 2017-12-26 PROCEDURE — 99281 EMR DPT VST MAYX REQ PHY/QHP: CPT

## 2017-12-26 ASSESSMENT — ENCOUNTER SYMPTOMS: SHORTNESS OF BREATH: 1

## 2017-12-26 NOTE — ED NOTES
Pt provided with discharge paperwork and educated on recommended follow-up with PCP. Pt educated on how to manage symptoms at home and when to seek medical attention. Pt voiced understanding and denied any questions at discharge.

## 2017-12-26 NOTE — ED AVS SNAPSHOT
[facility] Emergency Department    [address]    [facility] [address]    Phone:  [phone]    Fax:  [phone]                                       Perico Houston   MRN: [id number]    Department:  [facility] Emergency Department   Date of Visit:  12/26/2017           After Visit Summary Signature Page     I have received my discharge instructions, and my questions have been answered. I have discussed any challenges I see with this plan with the nurse or doctor.    ..........................................................................................................................................  Patient/Patient Representative Signature      ..........................................................................................................................................  Patient Representative Print Name and Relationship to Patient    ..................................................               ................................................  Date                                            Time    ..........................................................................................................................................  Reviewed by Signature/Title    ...................................................              ..............................................  Date                                                            Time

## 2017-12-26 NOTE — ED PROVIDER NOTES
History     Chief Complaint:  Anxiety    HPI   Peirco Houston is a 57 year old male with a history of anxiety, heart attack and hypertension who presents to the emergency department for evaluation of anxiety. The patient states that he had been drinking tonight but not in excess. He went to bed normal tonight but woke up feeling like he was losing control and that his body was going to shut down. He was also experiencing some shortness of breath and chest pain at the time. He says that he took his clonazepam soon after waking up and waited with his wife for about 15 minutes. He says after waiting he felt minimal relief and then decided to get evaluated here in the ED. He also complains of pain in his eye and hand. He is not currently experiencing any chest pain or shortness of breath.     Allergies:  Aspirin  Norco    Medications:    glipiZIDE (GLUCOTROL XL) 5 MG 24 hr tablet  clonazePAM (KLONOPIN) 0.5 MG tablet  oxyCODONE (ROXICODONE) 10 MG IR tablet  gabapentin (NEURONTIN) 300 MG capsule  hydrOXYzine (ATARAX) 50 MG tablet  aspirin EC 81 MG EC tablet  nitroglycerin (NITROSTAT) 0.4 MG SL tablet  metFORMIN (GLUCOPHAGE) 1000 MG tablet  lisinopril (PRINIVIL,ZESTRIL) 40 MG tablet    Past Medical History:    Anxiety  CAD  Chronic low back pain  Chronic pain syndrome  Depressive disorder  Type 2 diabetes  GERD  Hypertension    Past Surgical History:    Angiogram  Cholecystectomy   Clavicle surgery  Partial liver resection    Family History:    Father- diabetes  Daughter- diabetes  Paternal uncle- diabetes    Social History:  Marital Status:     Social History   Substance Use Topics     Smoking status: Current Some Day Smoker     Packs/day: 0.25     Years: 20.00     Last attempt to quit: 05/2017     Smokeless tobacco: Never Used     Alcohol use Yes      Comment: twice a month         Review of Systems   Respiratory: Positive for shortness of breath.    Cardiovascular: Positive for chest pain.   All other systems  reviewed and are negative.        Physical Exam   First Vitals:  BP: (!) 159/94  Pulse: 78  Temp: 97.8  F (36.6  C)  Resp: 18  Weight: 99.8 kg (220 lb)  SpO2: 98 %      Physical Exam  Nursing note and vitals reviewed.  Constitutional: Cooperative. Anxious and tearful  HENT:   Mouth/Throat: Mucous membranes are normal.   Eyes: Pupils are equal, round, and reactive to light. EOMI  Cardiovascular: Normal rate, regular rhythm and normal heart sounds.  No murmur.  Pulmonary/Chest: Effort normal and breath sounds normal. No respiratory distress. No wheezes. No rales.   Abdominal: Soft. Normal appearance and bowel sounds are normal. No distension. There is no tenderness. There is no rigidity and no guarding.   Musculoskeletal: Normal range of motion. No edema in legs  Neurological: Alert. Oriented x4.  CN 2-12 intact.  Strength and sensation normal.   Skin: Skin is warm and dry. No rash noted.   Psychiatric: Anxious appearing, no hallucinations.     Emergency Department Course:  Nursing notes and vitals reviewed.  I performed an exam of the patient as documented above.   I discussed the treatment plan with the patient. They expressed understanding of this plan and consented to discharge. They will be discharged home with instructions for care and follow up. In addition, the patient will return to the emergency department if their symptoms persist, worsen, if new symptoms arise or if there is any concern.  All questions were answered.    I personally reviewed the physical exam results with the patient and answered all related questions prior to discharge.  Impression & Plan      Medical Decision Making:  This patient is 57 years old male with a well-documented history of anxiety panic attack complicated by the fact that he has a history of coronary disease with an NSTEMI previously. He was prescribed benzodiazepines and opioids through a pain clinic and his regular mental health physician. He woke up tonight with signs and  symptoms classic for a panic attack. He had feelings of impending doom, anxiety, tearfulness, short lived chest brain and shortness of breath and hand cramping. These symptoms are somewhat improved after clonazepam though he still has the thoughts that his body is going to  shutdown . I have had a long discussion regarding his symptoms, his past medical history, and multiple workups totaling now 26 ER visits in the past year. I have a very low clinical suspicion for cardiac etiologies. We did discuss that work up would include serial troponin's and likely needing him to stay for 6 hours. He has no stroke symptoms or concerns for acute neurologic process. After discussing this he would like to go home and see how he does with his home clonazepam which is very reasonable. Return precautions discussed.       Diagnosis:    ICD-10-CM    1. Panic attack F41.0      Disposition:   Discharged    Scribe Disclosure:  I, Bala Scott, am serving as a scribe at 1:01 AM on 12/26/2017 to document services personally performed by Jaquan Hummel MD, based on my observations and the provider's statements to me.      [facility] EMERGENCY DEPARTMENT        Jaquan Bailey MD Wed Nov 16, 2016 4:11 PM   EMERGENCY CARE PLAN     At each Emergency Department visit, we will evaluate this patient to determine if an emergency medical condition is present.     Acute care plan for the following conditions: Recurrent visits to ED for recurrent epigastric pain, chest pain, and headaches.  Brief History of Condition:   Perico Houston is a 56-year-old male with type II diabetes, who often presents to the ED with epigastric pain and/or chest pain.  An anxiety component often accompanies his symptoms and likely plays a major role in his frequent presentations.  He has had a diagnosis in 01/2106 of NSTEMI (max Trop 0.944), with subsequent heart cath which showed mild-moderate CAD (see below) without need of intervention.  He has  gastritis/duodenitis on resent Upper GI (see below) and has proven with multiple repeat studies to have negative lab tests and imaging studies.   Often he arrives to the ED via EMS and appears to be in extreme pain with associated diaphoresis, which makes differentiating an acute vs. chronic condition difficult and leads to unnecessary testing.     Previous Surgical History:  Cholecystectomy; Partial Liver Resection     Authorized treatments in the Emergency Department (with specific medications and doses):      1.  Chest Pain: Perico often presents with chest pain and usually other associated symptoms including headache, back pain, fatigue, dizziness, and epigastric pain.  He is currently medically managed by Cardiology who believes anxiety is likely playing a role in his recurrent presentations.  There may be a stable angina component as well as he feels nitroglycerin often improves his pain.  If symptomatically improved and has unchanged ECG from baseline, with negative serial troponin's it has been proven he can be safely discharged home with outpatient follow up.      2. Abdominal/Epigastric pain:  He has had a cholecystectomy, and has had multiple lab tests and imaging studies in the ED that have proven to be negative.  No history of pancreatitis or liver disease has been found.  His pain is often resolved with a GI cocktail.  He sees a GI specialist from [facility], and is being treated with omeprazole and Hyoscyamine.  Plans are for future gastric emptying study for further evaluation. (see upper endoscopy results below)                                                                                              *Recommend:  limiting unnecessary CT imaging studies and serum lab tests.     3. Depression:  Evaluate for suicidal ideation or patient safety risk.  DEC evaluation if warranted.      Indications for Admission or Consultation:  Hospitalization should be avoided unless clearly medically indicated.    Hospitalization should be considered: if patient signs of acute organ dysfunction, or suicidal ideation.     Expected home rescue plan:  Call Primary Care Clinic     Follow up plan after an ED visit: Primary MD clinic visit     Restricted Status if restricted to Hospital or Prescriber: none     PCP:  Ivan Patel M.D. ([facility]) [place], [place]  Cardiology: Claudia Melgoza M.D.  Gastroenterology:  Theron Francis M.D.   ED Visits: # 27 ED Visits since 10/2015                  Admissions: # 5 since 10/2015     [place] Prescription Monitoring Program:   Routine Benzodiazepine Rx s for same provider.  No significant narcotic prescriptions during last 12 months  Past Procedural Studies:   Myocardial Perfusion/Lexiscan:                                                                                                        Myocardial perfusion imaging using single isotope technique demonstrated no evidence for myocardial ischemia.  EF 53%  Heart Cath 01/04/2016:  1. Smooth 40-50% distal RCA stenosis.  2. 50% stenosis in a small caliber first diagonal.   3. Otherwise minimal coronary artery disease, no other stenosis greater than 25%.   4. Normal left ventricular function. Estimated ejection fraction of 55%.  EGD 07/19/2016:   Impression: - Normal esophagus.  - Erythematous mucosa in the antrum. - Erythematous duodenopathy.   Imaging:  #8 CT scans since 01/2016:  3 Head CT s, 1 CTA Head/Neck, 2 CT Chest, 2 CT abdomen          (all essentially negative)    Initiated:  November 16, 2016          Jaquan Hummel MD  12/26/17 0206

## 2017-12-26 NOTE — ED AVS SNAPSHOT
[facility] Emergency Department    [address]    [facility] [address]    Phone:  [phone]    Fax:  [phone]                                       Perico Houston   MRN: [id number]    Department:  [facility] Emergency Department   Date of Visit:  12/26/2017           Patient Information     Date Of Birth          1960        Your diagnoses for this visit were:     Panic attack        You were seen by Jaquan Hummel MD.      Follow-up Information     Follow up with Kianna Silver    Specialty:  Physician Assistant    Why:  As needed    Contact information:    [facility]  [address] [facility] [id number] [phone]        Discharge References/Attachments     PANIC ATTACK (ENGLISH)      24 Hour Appointment Hotline       To make an appointment at any [facility] clinic, call [phone] ([phone]). If you don't have a family doctor or clinic, we will help you find one. [facility] clinics are conveniently located to serve the needs of you and your family.             Review of your medicines      Our records show that you are taking the medicines listed below. If these are incorrect, please call your family doctor or clinic.        Dose / Directions Last dose taken    aspirin 81 MG EC tablet   Dose:  81 mg   Quantity:  30 tablet        Take 1 tablet (81 mg) by mouth daily   Refills:  0        clonazePAM 0.5 MG tablet   Commonly known as:  klonoPIN   Dose:  1 mg        Take 1 mg by mouth 3 times daily   Refills:  0        gabapentin 300 MG capsule   Commonly known as:  NEURONTIN   Dose:  300 mg   Quantity:  90 capsule        Take 1 capsule (300 mg) by mouth 3 times daily   Refills:  1        glipiZIDE 5 MG 24 hr tablet   Commonly known as:  GLUCOTROL XL   Dose:  5 mg        Take 5 mg by mouth daily   Refills:  0        hydrOXYzine 50 MG tablet   Commonly known as:  ATARAX   Dose:  50 mg   Quantity:  20 tablet        Take 1 tablet (50 mg)  by mouth 3 times daily as needed for other (back pain)   Refills:  0        lisinopril 40 MG tablet   Commonly known as:  PRINIVIL/ZESTRIL   Dose:  40 mg   Quantity:  30 tablet        Take 1 tablet (40 mg) by mouth daily   Refills:  0        metFORMIN 1000 MG tablet   Commonly known as:  GLUCOPHAGE   Dose:  1000 mg   Quantity:  60 tablet        Take 1 tablet (1,000 mg) by mouth 2 times daily (with meals)   Refills:  0        nitroGLYcerin 0.4 MG sublingual tablet   Commonly known as:  NITROSTAT   Dose:  0.4 mg   Quantity:  25 tablet        Place 1 tablet (0.4 mg) under the tongue every 5 minutes as needed for chest pain if you are still having symptoms after 3 doses (15 minutes) call 911.   Refills:  0        oxyCODONE IR 10 MG tablet   Commonly known as:  ROXICODONE   Dose:  10 mg        Take 10 mg by mouth 3 times daily   Refills:  0                Orders Needing Specimen Collection     None      Pending Results     No orders found from 12/24/2017 to 12/27/2017.            Pending Culture Results     No orders found from 12/24/2017 to 12/27/2017.            Pending Results Instructions     If you had any lab results that were not finalized at the time of your Discharge, you can call the ED Lab Result RN at [phone]. You will be contacted by this team for any positive Lab results or changes in treatment. The nurses are available 7 days a week from 10A to 6:30P.  You can leave a message 24 hours per day and they will return your call.        Test Results From Your Hospital Stay               Clinical Quality Measure: Blood Pressure Screening     Your blood pressure was checked while you were in the emergency department today. The last reading we obtained was  BP: (!) 159/94 . Please read the guidelines below about what these numbers mean and what you should do about them.  If your systolic blood pressure (the top number) is less than 120 and your diastolic blood pressure (the bottom number) is less than 80, then  "your blood pressure is normal. There is nothing more that you need to do about it.  If your systolic blood pressure (the top number) is 120-139 or your diastolic blood pressure (the bottom number) is 80-89, your blood pressure may be higher than it should be. You should have your blood pressure rechecked within a year by a primary care provider.  If your systolic blood pressure (the top number) is 140 or greater or your diastolic blood pressure (the bottom number) is 90 or greater, you may have high blood pressure. High blood pressure is treatable, but if left untreated over time it can put you at risk for heart attack, stroke, or kidney failure. You should have your blood pressure rechecked by a primary care provider within the next 4 weeks.  If your provider in the emergency department today gave you specific instructions to follow-up with your doctor or provider even sooner than that, you should follow that instruction and not wait for up to 4 weeks for your follow-up visit.        Thank you for choosing [facility]       Thank you for choosing [facility] for your care. Our goal is always to provide you with excellent care. Hearing back from our patients is one way we can continue to improve our services. Please take a few minutes to complete the written survey that you may receive in the mail after you visit with us. Thank you!        [facility] Information     [employer] lets you send messages to your doctor, view your test results, renew your prescriptions, schedule appointments and more. To sign up, go to [email or web address] . Click on \"Log in\" on the left side of the screen, which will take you to the Welcome page. Then click on \"Sign up Now\" on the right side of the page.     You will be asked to enter the access code listed below, as well as some personal information. Please follow the directions to create your username and password.     Your access code is: [email or web address]  Expires: 1/29/2018  9:06 AM     Your " access code will  in 90 days. If you need help or a new code, please call your [facility] clinic or [phone].        Care EveryWhere ID     This is your Care EveryWhere ID. This could be used by other organizations to access your [facility] medical records  [id number]        Equal Access to Services     MILADIS SIMPSON : Hadii aad ku hadasho Soanay, waaxda luqadaha, qaybta kaalmada alyx, fady perez. So [facility] [phone].    ATENCIÓN: Si habla español, tiene a burgos disposición servicios gratuitos de asistencia lingüística. Llame al [phone].    We comply with applicable federal civil rights laws and [place] laws. We do not discriminate on the basis of race, color, national origin, age, disability, sex, sexual orientation, or gender identity.            After Visit Summary       This is your record. Keep this with you and show to your community pharmacist(s) and doctor(s) at your next visit.

## 2017-12-26 NOTE — ED NOTES
57-year-old male presents to the ER with complaints of feeling like his body is shutting down. States he was sleeping and woke up feeling like his body isn't right. Wife states this happens frequently and he usually needs anxiety medications. Pt is hard to understand due to whining like sounds when he talks. Wife states this is normal for patient.

## 2017-12-28 ENCOUNTER — HOSPITAL ENCOUNTER (EMERGENCY)
Facility: CLINIC | Age: 57
Discharge: HOME | End: 2017-12-28
Attending: EMERGENCY MEDICINE | Admitting: EMERGENCY MEDICINE
Payer: COMMERCIAL

## 2017-12-28 VITALS
OXYGEN SATURATION: 96 % | TEMPERATURE: 97.4 F | WEIGHT: 217 LBS | SYSTOLIC BLOOD PRESSURE: 126 MMHG | DIASTOLIC BLOOD PRESSURE: 89 MMHG | HEART RATE: 83 BPM | HEIGHT: 72 IN | BODY MASS INDEX: 29.39 KG/M2 | RESPIRATION RATE: 18 BRPM

## 2017-12-28 DIAGNOSIS — R10.84 ABDOMINAL PAIN, GENERALIZED: ICD-10-CM

## 2017-12-28 LAB
ALBUMIN SERPL-MCNC: 3.9 G/DL (ref 3.4–5)
ALP SERPL-CCNC: 66 U/L (ref 40–150)
ALT SERPL W P-5'-P-CCNC: 74 U/L (ref 0–70)
ANION GAP SERPL CALCULATED.3IONS-SCNC: 9 MMOL/L (ref 3–14)
AST SERPL W P-5'-P-CCNC: 23 U/L (ref 0–45)
BILIRUB SERPL-MCNC: 0.4 MG/DL (ref 0.2–1.3)
BUN SERPL-MCNC: 16 MG/DL (ref 7–30)
CALCIUM SERPL-MCNC: 9 MG/DL (ref 8.5–10.1)
CHLORIDE SERPL-SCNC: 98 MMOL/L (ref 94–109)
CO2 SERPL-SCNC: 26 MMOL/L (ref 20–32)
CREAT SERPL-MCNC: 0.74 MG/DL (ref 0.66–1.25)
ERYTHROCYTE [DISTWIDTH] IN BLOOD BY AUTOMATED COUNT: 13 % (ref 10–15)
GFR SERPL CREATININE-BSD FRML MDRD: >90 ML/MIN/1.7M2
GLUCOSE SERPL-MCNC: 92 MG/DL (ref 70–99)
HCT VFR BLD AUTO: 48.2 % (ref 40–53)
HGB BLD-MCNC: 15.7 G/DL (ref 13.3–17.7)
LIPASE SERPL-CCNC: 759 U/L (ref 73–393)
MCH RBC QN AUTO: 28.2 PG (ref 26.5–33)
MCHC RBC AUTO-ENTMCNC: 32.6 G/DL (ref 31.5–36.5)
MCV RBC AUTO: 87 FL (ref 78–100)
PLATELET # BLD AUTO: 307 10E9/L (ref 150–450)
POTASSIUM SERPL-SCNC: 4.2 MMOL/L (ref 3.4–5.3)
PROT SERPL-MCNC: 7.9 G/DL (ref 6.8–8.8)
RBC # BLD AUTO: 5.56 10E12/L (ref 4.4–5.9)
SODIUM SERPL-SCNC: 133 MMOL/L (ref 133–144)
WBC # BLD AUTO: 8 10E9/L (ref 4–11)

## 2017-12-28 PROCEDURE — 25000132 ZZH RX MED GY IP 250 OP 250 PS 637: Performed by: EMERGENCY MEDICINE

## 2017-12-28 PROCEDURE — 99284 EMERGENCY DEPT VISIT MOD MDM: CPT | Mod: 25

## 2017-12-28 PROCEDURE — 80053 COMPREHEN METABOLIC PANEL: CPT | Performed by: EMERGENCY MEDICINE

## 2017-12-28 PROCEDURE — 25000128 H RX IP 250 OP 636: Performed by: EMERGENCY MEDICINE

## 2017-12-28 PROCEDURE — 25000125 ZZHC RX 250: Performed by: EMERGENCY MEDICINE

## 2017-12-28 PROCEDURE — 83690 ASSAY OF LIPASE: CPT | Performed by: EMERGENCY MEDICINE

## 2017-12-28 PROCEDURE — 85027 COMPLETE CBC AUTOMATED: CPT | Performed by: EMERGENCY MEDICINE

## 2017-12-28 PROCEDURE — 96374 THER/PROPH/DIAG INJ IV PUSH: CPT

## 2017-12-28 RX ORDER — DICYCLOMINE HCL 20 MG
20 TABLET ORAL 4 TIMES DAILY PRN
Qty: 15 TABLET | Refills: 0 | Status: SHIPPED | OUTPATIENT
Start: 2017-12-28 | End: 2018-01-07

## 2017-12-28 RX ORDER — KETOROLAC TROMETHAMINE 15 MG/ML
15 INJECTION, SOLUTION INTRAMUSCULAR; INTRAVENOUS ONCE
Status: COMPLETED | OUTPATIENT
Start: 2017-12-28 | End: 2017-12-28

## 2017-12-28 RX ADMIN — LIDOCAINE HYDROCHLORIDE 30 ML: 20 SOLUTION ORAL; TOPICAL at 15:54

## 2017-12-28 RX ADMIN — KETOROLAC TROMETHAMINE 15 MG: 15 INJECTION, SOLUTION INTRAMUSCULAR; INTRAVENOUS at 16:53

## 2017-12-28 ASSESSMENT — ENCOUNTER SYMPTOMS
VOMITING: 0
FEVER: 0
CONSTIPATION: 0
DIARRHEA: 0
DYSURIA: 0
DIFFICULTY URINATING: 0
NAUSEA: 0
HEMATURIA: 0
ABDOMINAL PAIN: 1
BLOOD IN STOOL: 0
BACK PAIN: 0
CHILLS: 0
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
## 2017-12-28 NOTE — ED AVS SNAPSHOT
[facility] Emergency Department    [address]    [facility] [address]    Phone:  [phone]    Fax:  [phone]                                       Perico Houston   MRN: [id number]    Department:  [facility] Emergency Department   Date of Visit:  12/28/2017           Patient Information     Date Of Birth          1960        Your diagnoses for this visit were:     Abdominal pain, generalized        You were seen by Ramses Pagan MD and Cierra Sanchez MD.      Follow-up Information     Follow up with Kianna Silver Schedule an appointment as soon as possible for a visit in 5 days.    Specialty:  Physician Assistant    Contact information:    [facility]  [address] [facility] [id number] [phone]          Discharge Instructions       Discharge Instructions  Abdominal Pain    Abdominal pain (belly pain) can be caused by many things. Your evaluation today does not show the exact cause for your pain. Your provider today has decided that it is unlikely your pain is due to a life threatening problem, or a problem requiring surgery or hospital admission. Sometimes those problems cannot be found right away, so it is very important that you follow up as directed.  Sometimes only the changes which occur over time allow the cause of your pain to be found.    Generally, every Emergency Department visit should have a follow-up clinic visit with either a primary or a specialty clinic/provider. Please follow-up as instructed by your emergency provider today. With abdominal pain, we often recommend very close follow-up, such as the following day.    ADULTS:  Return to the Emergency Department right away if:      You get an oral temperature above 102oF or as directed by your provider.    You have blood in your stools. This may be bright red or appear as black, tarry stools.      You keep vomiting (throwing up) or cannot drink liquids.    You  see blood when you vomit.     You cannot have a bowel movement or you cannot pass gas.    Your stomach gets bloated or bigger.    Your skin or the whites of your eyes look yellow.    You faint.    You have bloody, frequent or painful urination (peeing).    You have new symptoms or anything that worries you.    CHILDREN:  Return to the Emergency Department right away if your child has any of the above-listed symptoms or the following:      Pushes your hand away or screams/cries when his/her belly is touched.    You notice your child is very fussy or weak.    Your child is very tired and is too tired to eat or drink.    Your child is dehydrated.  Signs of dehydration can be:  o Significant change in the amount of wet diapers/urine.  o Your infant or child starts to have dry mouth and lips, or no saliva (spit) or tears.    PREGNANT WOMEN:  Return to the Emergency Department right away if you have any of the above-listed symptoms or the following:      You have bleeding, leaking fluid or passing tissue from the vagina.    You have worse pain or cramping, or pain in your shoulder or back.    You have vomiting that will not stop.    You have a temperature of 100oF or more.    Your baby is not moving as much as usual.    You faint.    You get a bad headache with or without eye problems and abdominal pain.    You have a seizure.    You have unusual discharge from your vagina and abdominal pain.    Abdominal pain is pretty common during pregnancy.  Your pain may or may not be related to your pregnancy. You should follow-up closely with your OB provider so they can evaluate you and your baby.  Until you follow-up with your regular provider, do the following:       Avoid sex and do not put anything in your vagina.    Drink clear fluids.    Only take medications approved by your provider.    MORE INFORMATION:    Appendicitis:  A possible cause of abdominal pain in any person who still has their appendix is acute appendicitis.  "Appendicitis is often hard to diagnose.  Testing does not always rule out early appendicitis or other causes of abdominal pain. Close follow-up with your provider and re-evaluations may be needed to figure out the reason for your abdominal pain.    Follow-up:  It is very important that you make an appointment with your clinic and go to the appointment.  If you do not follow-up with your primary provider, it may result in missing an important development which could result in permanent injury or disability and/or lasting pain.  If there is any problem keeping your appointment, call your provider or return to the Emergency Department.    Medications:  Take your medications as directed by your provider today.  Before using over-the-counter medications, ask your provider and make sure to take the medications as directed.  If you have any questions about medications, ask your provider.    Diet:  Resume your normal diet as much as possible, but do not eat fried, fatty or spicy foods while you have pain.  Do not drink alcohol or have caffeine.  Do not smoke tobacco.    Probiotics: If you have been given an antibiotic, you may want to also take a probiotic pill or eat yogurt with live cultures. Probiotics have \"good bacteria\" to help your intestines stay healthy. Studies have shown that probiotics help prevent diarrhea (loose stools) and other intestine problems (including C. diff infection) when you take antibiotics. You can buy these without a prescription in the pharmacy section of the store.     If you were given a prescription for medicine here today, be sure to read all of the information (including the package insert) that comes with your prescription.  This will include important information about the medicine, its side effects, and any warnings that you need to know about.  The pharmacist who fills the prescription can provide more information and answer questions you may have about the medicine.  If you have " questions or concerns that the pharmacist cannot address, please call or return to the Emergency Department.       Remember that you can always come back to the Emergency Department if you are not able to see your regular provider in the amount of time listed above, if you get any new symptoms, or if there is anything that worries you.      24 Hour Appointment Hotline       To make an appointment at any [facility], call [phone] ([phone]). If you don't have a family doctor or clinic, we will help you find one. [facility] clinics are conveniently located to serve the needs of you and your family.             Review of your medicines      START taking        Dose / Directions Last dose taken    dicyclomine 20 MG tablet   Commonly known as:  BENTYL   Dose:  20 mg   Quantity:  15 tablet        Take 1 tablet (20 mg) by mouth 4 times daily as needed   Refills:  0          Our records show that you are taking the medicines listed below. If these are incorrect, please call your family doctor or clinic.        Dose / Directions Last dose taken    aspirin 81 MG EC tablet   Dose:  81 mg   Quantity:  30 tablet        Take 1 tablet (81 mg) by mouth daily   Refills:  0        clonazePAM 0.5 MG tablet   Commonly known as:  klonoPIN   Dose:  1 mg        Take 1 mg by mouth 3 times daily   Refills:  0        gabapentin 300 MG capsule   Commonly known as:  NEURONTIN   Dose:  300 mg   Quantity:  90 capsule        Take 1 capsule (300 mg) by mouth 3 times daily   Refills:  1        glipiZIDE 5 MG 24 hr tablet   Commonly known as:  GLUCOTROL XL   Dose:  5 mg        Take 5 mg by mouth daily   Refills:  0        hydrOXYzine 50 MG tablet   Commonly known as:  ATARAX   Dose:  50 mg   Quantity:  20 tablet        Take 1 tablet (50 mg) by mouth 3 times daily as needed for other (back pain)   Refills:  0        lisinopril 40 MG tablet   Commonly known as:  PRINIVIL/ZESTRIL   Dose:  40 mg   Quantity:  30 tablet        Take 1 tablet  (40 mg) by mouth daily   Refills:  0        metFORMIN 1000 MG tablet   Commonly known as:  GLUCOPHAGE   Dose:  1000 mg   Quantity:  60 tablet        Take 1 tablet (1,000 mg) by mouth 2 times daily (with meals)   Refills:  0        nitroGLYcerin 0.4 MG sublingual tablet   Commonly known as:  NITROSTAT   Dose:  0.4 mg   Quantity:  25 tablet        Place 1 tablet (0.4 mg) under the tongue every 5 minutes as needed for chest pain if you are still having symptoms after 3 doses (15 minutes) call 911.   Refills:  0        oxyCODONE IR 10 MG tablet   Commonly known as:  ROXICODONE   Dose:  10 mg        Take 10 mg by mouth 3 times daily   Refills:  0                Prescriptions were sent or printed at these locations (1 Prescription)                   Other Prescriptions                Printed at Department/Unit printer (1 of 1)         dicyclomine (BENTYL) 20 MG tablet                Procedures and tests performed during your visit     CBC (platelets, no diff)    Comprehensive metabolic panel    Lipase      Orders Needing Specimen Collection     None      Pending Results     No orders found from 12/26/2017 to 12/29/2017.            Pending Culture Results     No orders found from 12/26/2017 to 12/29/2017.            Pending Results Instructions     If you had any lab results that were not finalized at the time of your Discharge, you can call the ED Lab Result RN at [phone]. You will be contacted by this team for any positive Lab results or changes in treatment. The nurses are available 7 days a week from 10A to 6:30P.  You can leave a message 24 hours per day and they will return your call.        Test Results From Your Hospital Stay        12/28/2017  5:23 PM      Component Results     Component Value Ref Range & Units Status    Sodium 133 133 - 144 mmol/L Final    Potassium 4.2 3.4 - 5.3 mmol/L Final    Chloride 98 94 - 109 mmol/L Final    Carbon Dioxide 26 20 - 32 mmol/L Final    Anion Gap 9 3 - 14 mmol/L Final     Glucose 92 70 - 99 mg/dL Final    Urea Nitrogen 16 7 - 30 mg/dL Final    Creatinine 0.74 0.66 - 1.25 mg/dL Final    GFR Estimate >90 >60 mL/min/1.7m2 Final    Non  GFR Calc    GFR Estimate If Black >90 >60 mL/min/1.7m2 Final    African American GFR Calc    Calcium 9.0 8.5 - 10.1 mg/dL Final    Bilirubin Total 0.4 0.2 - 1.3 mg/dL Final    Albumin 3.9 3.4 - 5.0 g/dL Final    Protein Total 7.9 6.8 - 8.8 g/dL Final    Alkaline Phosphatase 66 40 - 150 U/L Final    ALT 74 (H) 0 - 70 U/L Final    AST 23 0 - 45 U/L Final         12/28/2017  5:03 PM      Component Results     Component Value Ref Range & Units Status    WBC 8.0 4.0 - 11.0 10e9/L Final    RBC Count 5.56 4.4 - 5.9 10e12/L Final    Hemoglobin 15.7 13.3 - 17.7 g/dL Final    Hematocrit 48.2 40.0 - 53.0 % Final    MCV 87 78 - 100 fl Final    MCH 28.2 26.5 - 33.0 pg Final    MCHC 32.6 31.5 - 36.5 g/dL Final    RDW 13.0 10.0 - 15.0 % Final    Platelet Count 307 150 - 450 10e9/L Final         12/28/2017  5:23 PM      Component Results     Component Value Ref Range & Units Status    Lipase 759 (H) 73 - 393 U/L Final                Clinical Quality Measure: Blood Pressure Screening     Your blood pressure was checked while you were in the emergency department today. The last reading we obtained was  BP: (!) 139/96 . Please read the guidelines below about what these numbers mean and what you should do about them.  If your systolic blood pressure (the top number) is less than 120 and your diastolic blood pressure (the bottom number) is less than 80, then your blood pressure is normal. There is nothing more that you need to do about it.  If your systolic blood pressure (the top number) is 120-139 or your diastolic blood pressure (the bottom number) is 80-89, your blood pressure may be higher than it should be. You should have your blood pressure rechecked within a year by a primary care provider.  If your systolic blood pressure (the top number) is 140 or  "greater or your diastolic blood pressure (the bottom number) is 90 or greater, you may have high blood pressure. High blood pressure is treatable, but if left untreated over time it can put you at risk for heart attack, stroke, or kidney failure. You should have your blood pressure rechecked by a primary care provider within the next 4 weeks.  If your provider in the emergency department today gave you specific instructions to follow-up with your doctor or provider even sooner than that, you should follow that instruction and not wait for up to 4 weeks for your follow-up visit.        Thank you for choosing [facility]       Thank you for choosing [facility] for your care. Our goal is always to provide you with excellent care. Hearing back from our patients is one way we can continue to improve our services. Please take a few minutes to complete the written survey that you may receive in the mail after you visit with us. Thank you!        [employer] Information     Fetch It lets you send messages to your doctor, view your test results, renew your prescriptions, schedule appointments and more. To sign up, go to [email or web address] . Click on \"Log in\" on the left side of the screen, which will take you to the Welcome page. Then click on \"Sign up Now\" on the right side of the page.     You will be asked to enter the access code listed below, as well as some personal information. Please follow the directions to create your username and password.     Your access code is: [email or web address]  Expires: 2018  9:06 AM     Your access code will  in 90 days. If you need help or a new code, please call your [facility] clinic or [phone].        Care EveryWhere ID     This is your Care EveryWhere ID. This could be used by other organizations to access your [facility] medical records  [id number]        Equal Access to Services     MILADIS SIMPSON AH: pj Mccann qaybta kaalmada adeegyada, waxay " mendel astudillo ah. So [facility] [phone].    ATENCIÓN: Si habla español, tiene a burgos disposición servicios gratuitos de asistencia lingüística. Llame al [phone].    We comply with applicable federal civil rights laws and [place] laws. We do not discriminate on the basis of race, color, national origin, age, disability, sex, sexual orientation, or gender identity.            After Visit Summary       This is your record. Keep this with you and show to your community pharmacist(s) and doctor(s) at your next visit.

## 2017-12-28 NOTE — ED NOTES
"Patient presents with complaints of abdomen pain which increases with eating. Patient states he was diagnosed with a \"colon infection\" two weeks ago and was started on antibiotics. Patient states that he has been taking his pain medication at home and is not getting any relief. He also states that he now developed left arm pain as well. ABC intact without need for intervention. Patient states he took two Percocet PTA. ABC intact without need for intervention.   " Patient Information     Patient Name MRN Sex Cielo Rob [id number] Female 1968      Telephone Encounter by Alesha Reynaga at 2017  3:47 PM     Author:  Alesha Reynaga Service:  (none) Author Type:  (none)     Filed:  2017  3:47 PM Encounter Date:  2017 Status:  Signed     :  Alesha Reynaga            Patient notified  Alesha Reynaga LPN........2017  3:47 PM

## 2017-12-28 NOTE — ED AVS SNAPSHOT
[facility] Emergency Department    [address]    [facility] [address]    Phone:  [phone]    Fax:  [phone]                                       Perico oHuston   MRN: [id number]    Department:  [facility] Emergency Department   Date of Visit:  12/28/2017           After Visit Summary Signature Page     I have received my discharge instructions, and my questions have been answered. I have discussed any challenges I see with this plan with the nurse or doctor.    ..........................................................................................................................................  Patient/Patient Representative Signature      ..........................................................................................................................................  Patient Representative Print Name and Relationship to Patient    ..................................................               ................................................  Date                                            Time    ..........................................................................................................................................  Reviewed by Signature/Title    ...................................................              ..............................................  Date                                                            Time

## 2018-01-30 ENCOUNTER — APPOINTMENT (OUTPATIENT)
Dept: GENERAL RADIOLOGY | Facility: CLINIC | Age: 58
End: 2018-01-30
Attending: EMERGENCY MEDICINE
Payer: MEDICAID

## 2018-01-30 ENCOUNTER — HOSPITAL ENCOUNTER (EMERGENCY)
Facility: CLINIC | Age: 58
Discharge: HOME | End: 2018-01-30
Attending: EMERGENCY MEDICINE | Admitting: EMERGENCY MEDICINE
Payer: MEDICAID

## 2018-01-30 VITALS
SYSTOLIC BLOOD PRESSURE: 133 MMHG | OXYGEN SATURATION: 98 % | RESPIRATION RATE: 16 BRPM | TEMPERATURE: 97.8 F | DIASTOLIC BLOOD PRESSURE: 77 MMHG

## 2018-01-30 DIAGNOSIS — R07.9 ACUTE CHEST PAIN: ICD-10-CM

## 2018-01-30 DIAGNOSIS — R51.9 NONINTRACTABLE EPISODIC HEADACHE, UNSPECIFIED HEADACHE TYPE: ICD-10-CM

## 2018-01-30 LAB
ALBUMIN SERPL-MCNC: 4 G/DL (ref 3.4–5)
ALP SERPL-CCNC: 65 U/L (ref 40–150)
ALT SERPL W P-5'-P-CCNC: 32 U/L (ref 0–70)
ANION GAP SERPL CALCULATED.3IONS-SCNC: 7 MMOL/L (ref 3–14)
AST SERPL W P-5'-P-CCNC: 16 U/L (ref 0–45)
BASOPHILS # BLD AUTO: 0.1 10E9/L (ref 0–0.2)
BASOPHILS NFR BLD AUTO: 0.6 %
BILIRUB SERPL-MCNC: 0.3 MG/DL (ref 0.2–1.3)
BUN SERPL-MCNC: 15 MG/DL (ref 7–30)
CALCIUM SERPL-MCNC: 8.7 MG/DL (ref 8.5–10.1)
CHLORIDE SERPL-SCNC: 98 MMOL/L (ref 94–109)
CO2 SERPL-SCNC: 27 MMOL/L (ref 20–32)
CREAT SERPL-MCNC: 0.7 MG/DL (ref 0.66–1.25)
DIFFERENTIAL METHOD BLD: NORMAL
EOSINOPHIL # BLD AUTO: 0.2 10E9/L (ref 0–0.7)
EOSINOPHIL NFR BLD AUTO: 1.7 %
ERYTHROCYTE [DISTWIDTH] IN BLOOD BY AUTOMATED COUNT: 12.6 % (ref 10–15)
ETHANOL SERPL-MCNC: <0.01 G/DL
GFR SERPL CREATININE-BSD FRML MDRD: >90 ML/MIN/1.7M2
GLUCOSE BLDC GLUCOMTR-MCNC: 157 MG/DL (ref 70–99)
GLUCOSE SERPL-MCNC: 153 MG/DL (ref 70–99)
HCT VFR BLD AUTO: 47.9 % (ref 40–53)
HGB BLD-MCNC: 15.2 G/DL (ref 13.3–17.7)
IMM GRANULOCYTES # BLD: 0.1 10E9/L (ref 0–0.4)
IMM GRANULOCYTES NFR BLD: 0.6 %
INTERPRETATION ECG - MUSE: NORMAL
LYMPHOCYTES # BLD AUTO: 2.7 10E9/L (ref 0.8–5.3)
LYMPHOCYTES NFR BLD AUTO: 29.4 %
MCH RBC QN AUTO: 27.1 PG (ref 26.5–33)
MCHC RBC AUTO-ENTMCNC: 31.7 G/DL (ref 31.5–36.5)
MCV RBC AUTO: 86 FL (ref 78–100)
MONOCYTES # BLD AUTO: 0.7 10E9/L (ref 0–1.3)
MONOCYTES NFR BLD AUTO: 7.2 %
NEUTROPHILS # BLD AUTO: 5.5 10E9/L (ref 1.6–8.3)
NEUTROPHILS NFR BLD AUTO: 60.5 %
NRBC # BLD AUTO: 0 10*3/UL
NRBC BLD AUTO-RTO: 0 /100
PLATELET # BLD AUTO: 316 10E9/L (ref 150–450)
POTASSIUM SERPL-SCNC: 4 MMOL/L (ref 3.4–5.3)
PROT SERPL-MCNC: 7.5 G/DL (ref 6.8–8.8)
RBC # BLD AUTO: 5.6 10E12/L (ref 4.4–5.9)
SODIUM SERPL-SCNC: 132 MMOL/L (ref 133–144)
TROPONIN I SERPL-MCNC: <0.015 UG/L (ref 0–0.04)
TROPONIN I SERPL-MCNC: <0.015 UG/L (ref 0–0.04)
WBC # BLD AUTO: 9.1 10E9/L (ref 4–11)

## 2018-01-30 PROCEDURE — 99285 EMERGENCY DEPT VISIT HI MDM: CPT | Mod: 25

## 2018-01-30 PROCEDURE — 00000146 ZZHCL STATISTIC GLUCOSE BY METER IP

## 2018-01-30 PROCEDURE — 25000128 H RX IP 250 OP 636: Performed by: EMERGENCY MEDICINE

## 2018-01-30 PROCEDURE — 93005 ELECTROCARDIOGRAM TRACING: CPT

## 2018-01-30 PROCEDURE — 71046 X-RAY EXAM CHEST 2 VIEWS: CPT

## 2018-01-30 PROCEDURE — 84484 ASSAY OF TROPONIN QUANT: CPT | Mod: 91 | Performed by: EMERGENCY MEDICINE

## 2018-01-30 PROCEDURE — 80053 COMPREHEN METABOLIC PANEL: CPT | Performed by: EMERGENCY MEDICINE

## 2018-01-30 PROCEDURE — 80320 DRUG SCREEN QUANTALCOHOLS: CPT | Performed by: EMERGENCY MEDICINE

## 2018-01-30 PROCEDURE — 96375 TX/PRO/DX INJ NEW DRUG ADDON: CPT

## 2018-01-30 PROCEDURE — 85025 COMPLETE CBC W/AUTO DIFF WBC: CPT | Performed by: EMERGENCY MEDICINE

## 2018-01-30 PROCEDURE — 96374 THER/PROPH/DIAG INJ IV PUSH: CPT

## 2018-01-30 PROCEDURE — 96361 HYDRATE IV INFUSION ADD-ON: CPT

## 2018-01-30 RX ORDER — KETOROLAC TROMETHAMINE 30 MG/ML
30 INJECTION, SOLUTION INTRAMUSCULAR; INTRAVENOUS ONCE
Status: COMPLETED | OUTPATIENT
Start: 2018-01-30 | End: 2018-01-30

## 2018-01-30 RX ORDER — ASPIRIN 81 MG/1
TABLET, CHEWABLE ORAL
Status: DISCONTINUED
Start: 2018-01-30 | End: 2018-01-30 | Stop reason: HOSPADM

## 2018-01-30 RX ORDER — DIPHENHYDRAMINE HYDROCHLORIDE 50 MG/ML
25 INJECTION INTRAMUSCULAR; INTRAVENOUS ONCE
Status: COMPLETED | OUTPATIENT
Start: 2018-01-30 | End: 2018-01-30

## 2018-01-30 RX ADMIN — KETOROLAC TROMETHAMINE 30 MG: 30 INJECTION, SOLUTION INTRAMUSCULAR at 15:08

## 2018-01-30 RX ADMIN — PROCHLORPERAZINE EDISYLATE 10 MG: 5 INJECTION INTRAMUSCULAR; INTRAVENOUS at 15:10

## 2018-01-30 RX ADMIN — DIPHENHYDRAMINE HYDROCHLORIDE 25 MG: 50 INJECTION, SOLUTION INTRAMUSCULAR; INTRAVENOUS at 15:09

## 2018-01-30 RX ADMIN — SODIUM CHLORIDE 1000 ML: 9 INJECTION, SOLUTION INTRAVENOUS at 15:07

## 2018-01-30 ASSESSMENT — ENCOUNTER SYMPTOMS
DIZZINESS: 1
VOMITING: 0
NUMBNESS: 0
WEAKNESS: 0
NAUSEA: 0
SHORTNESS OF BREATH: 1
HEADACHES: 1
FEVER: 0
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
## 2018-01-30 NOTE — ED AVS SNAPSHOT
[facility]    [address]    [facility] [address]    Phone:  [phone]    Fax:  [phone]                                       Perico [place]   MRN: [id number]    Department:  [facility] Department   Date of Visit:  1/30/2018           Patient Information     Date Of Birth          1960        Your diagnoses for this visit were:     Acute chest pain     Nonintractable episodic headache, unspecified headache type        You were seen by Milla Silva MD.      Follow-up Information     Follow up with Kianna Silver Schedule an appointment as soon as possible for a visit in 3 days.    Specialty:  Physician Assistant    Contact information:    [facility]  [address] [facility] [id number] [phone]          Go to [facility] Emergency Department.    Specialty:  EMERGENCY MEDICINE    Why:  If symptoms worsen    Contact information:    [address] [facility] [address] [phone]        Discharge Instructions       Discharge Instructions  Chest Pain    You have been seen today for chest pain or discomfort.  At this time, your provider has found no signs that your chest pain is due to a serious or life-threatening condition, (or you have declined more testing and/or admission to the hospital). However, sometimes there is a serious problem that does not show up right away. Your evaluation today may not be complete and you may need further testing and evaluation.     Generally, every Emergency Department visit should have a follow-up clinic visit with either a primary or a specialty clinic/provider. Please follow-up as instructed by your emergency provider today.  Return to the Emergency Department if:    Your chest pain changes, gets worse, starts to happen more often, or comes with less activity.    You are newly short of breath.    You get very weak or tired.    You  pass out or faint.    You have any new symptoms, like fever, cough, numb legs, or you cough up blood.    You have anything else that worries you.    Until you follow-up with your regular provider, please do the following:    Take one aspirin daily unless you have an allergy or are told not to by your provider.    If a stress test appointment has been made, go to the appointment.    If you have questions, contact your regular provider.    Follow-up with your regular provider/clinic as directed; this is very important.    If you were given a prescription for medicine here today, be sure to read all of the information (including the package insert) that comes with your prescription.  This will include important information about the medicine, its side effects, and any warnings that you need to know about.  The pharmacist who fills the prescription can provide more information and answer questions you may have about the medicine.  If you have questions or concerns that the pharmacist cannot address, please call or return to the Emergency Department.       Remember that you can always come back to the Emergency Department if you are not able to see your regular provider in the amount of time listed above, if you get any new symptoms, or if there is anything that worries you.      Discharge Instructions  Headache    You were seen today for a headache. Headaches may be caused by many different things such as muscle tension, sinus inflammation, anxiety and stress, having too little sleep, too much alcohol, some medical conditions or injury. You may have a migraine, which is caused by changes in the blood vessels in your head.  At this time your provider does not find that your headache is a sign of anything dangerous or life-threatening.  However, sometimes the signs of serious illness do not show up right away.      Generally, every Emergency Department visit should have a follow-up clinic visit with either a primary or a  specialty clinic/provider. Please follow-up as instructed by your emergency provider today.    Return to the Emergency Department if:    You get a new fever of 100.4 F or higher.    Your headache gets much worse.    You get a stiff neck with your headache.    You get a new headache that is significantly different or worse than headaches you have had before.    You are vomiting (throwing up) and cannot keep food or water down.    You have blurry or double vision or other problems with your eyes.    You have a new weakness on one side of your body.    You have difficulty with balance which is new.    You or your family thinks you are confused.    You have a seizure.    What can I do to help myself?    Pain medications - You may take a pain medication such as Tylenol  (acetaminophen), Advil , Motrin  (ibuprofen) or Aleve  (naproxen).    Take a pain reliever as soon as you notice symptoms.  Starting medications as soon as you start to have symptoms may lessen the amount of pain you have.    Relaxing in a quiet, dark room may help.    Get enough sleep and eat meals regularly.    You may need to watch for certain foods or other things which may trigger your headaches.  Keeping a journal of your headaches and possible triggers may help you and your primary provider to identify things which you should avoid which may be causing your headaches.  If you were given a prescription for medicine here today, be sure to read all of the information (including the package insert) that comes with your prescription.  This will include important information about the medicine, its side effects, and any warnings that you need to know about.  The pharmacist who fills the prescription can provide more information and answer questions you may have about the medicine.  If you have questions or concerns that the pharmacist cannot address, please call or return to the Emergency Department.   Remember that you can always come back to the  Emergency Department if you are not able to see your regular provider in the amount of time listed above, if you get any new symptoms, or if there is anything that worries you.      24 Hour Appointment Hotline       To make an appointment at any [facility], call [phone] ([phone]). If you don't have a family doctor or clinic, we will help you find one. [facility] are conveniently located to serve the needs of you and your family.             Review of your medicines      Our records show that you are taking the medicines listed below. If these are incorrect, please call your family doctor or clinic.        Dose / Directions Last dose taken    aspirin 81 MG EC tablet   Dose:  81 mg   Quantity:  30 tablet        Take 1 tablet (81 mg) by mouth daily   Refills:  0        clonazePAM 0.5 MG tablet   Commonly known as:  klonoPIN   Dose:  1 mg        Take 1 mg by mouth 3 times daily   Refills:  0        gabapentin 300 MG capsule   Commonly known as:  NEURONTIN   Dose:  300 mg   Quantity:  90 capsule        Take 1 capsule (300 mg) by mouth 3 times daily   Refills:  1        glipiZIDE 5 MG 24 hr tablet   Commonly known as:  GLUCOTROL XL   Dose:  5 mg        Take 5 mg by mouth daily   Refills:  0        hydrOXYzine 50 MG tablet   Commonly known as:  ATARAX   Dose:  50 mg   Quantity:  20 tablet        Take 1 tablet (50 mg) by mouth 3 times daily as needed for other (back pain)   Refills:  0        lisinopril 40 MG tablet   Commonly known as:  PRINIVIL/ZESTRIL   Dose:  40 mg   Quantity:  30 tablet        Take 1 tablet (40 mg) by mouth daily   Refills:  0        metFORMIN 1000 MG tablet   Commonly known as:  GLUCOPHAGE   Dose:  1000 mg   Quantity:  60 tablet        Take 1 tablet (1,000 mg) by mouth 2 times daily (with meals)   Refills:  0        nitroGLYcerin 0.4 MG sublingual tablet   Commonly known as:  NITROSTAT   Dose:  0.4 mg   Quantity:  25 tablet        Place 1 tablet (0.4 mg) under the tongue every  5 minutes as needed for chest pain if you are still having symptoms after 3 doses (15 minutes) call 911.   Refills:  0        oxyCODONE IR 10 MG tablet   Commonly known as:  ROXICODONE   Dose:  10 mg        Take 10 mg by mouth 3 times daily   Refills:  0                Procedures and tests performed during your visit     Alcohol ethyl    CBC with platelets differential    Comprehensive metabolic panel    EKG 12 lead    Glucose by meter    Troponin I    Troponin I (now)    XR Chest 2 Views      Orders Needing Specimen Collection     None      Pending Results     No orders found from 1/28/2018 to 1/31/2018.            Pending Culture Results     No orders found from 1/28/2018 to 1/31/2018.            Pending Results Instructions     If you had any lab results that were not finalized at the time of your Discharge, you can call the ED Lab Result RN at [phone]. You will be contacted by this team for any positive Lab results or changes in treatment. The nurses are available 7 days a week from 10A to 6:30P.  You can leave a message 24 hours per day and they will return your call.        Test Results From Your Hospital Stay        1/30/2018  3:26 PM      Component Results     Component Value Ref Range & Units Status    WBC 9.1 4.0 - 11.0 10e9/L Final    RBC Count 5.60 4.4 - 5.9 10e12/L Final    Hemoglobin 15.2 13.3 - 17.7 g/dL Final    Hematocrit 47.9 40.0 - 53.0 % Final    MCV 86 78 - 100 fl Final    MCH 27.1 26.5 - 33.0 pg Final    MCHC 31.7 31.5 - 36.5 g/dL Final    RDW 12.6 10.0 - 15.0 % Final    Platelet Count 316 150 - 450 10e9/L Final    Diff Method Automated Method  Final    % Neutrophils 60.5 % Final    % Lymphocytes 29.4 % Final    % Monocytes 7.2 % Final    % Eosinophils 1.7 % Final    % Basophils 0.6 % Final    % Immature Granulocytes 0.6 % Final    Nucleated RBCs 0 0 /100 Final    Absolute Neutrophil 5.5 1.6 - 8.3 10e9/L Final    Absolute Lymphocytes 2.7 0.8 - 5.3 10e9/L Final    Absolute Monocytes 0.7 0.0  - 1.3 10e9/L Final    Absolute Eosinophils 0.2 0.0 - 0.7 10e9/L Final    Absolute Basophils 0.1 0.0 - 0.2 10e9/L Final    Abs Immature Granulocytes 0.1 0 - 0.4 10e9/L Final    Absolute Nucleated RBC 0.0  Final         1/30/2018  3:54 PM      Component Results     Component Value Ref Range & Units Status    Sodium 132 (L) 133 - 144 mmol/L Final    Potassium 4.0 3.4 - 5.3 mmol/L Final    Chloride 98 94 - 109 mmol/L Final    Carbon Dioxide 27 20 - 32 mmol/L Final    Anion Gap 7 3 - 14 mmol/L Final    Glucose 153 (H) 70 - 99 mg/dL Final    Urea Nitrogen 15 7 - 30 mg/dL Final    Creatinine 0.70 0.66 - 1.25 mg/dL Final    GFR Estimate >90 >60 mL/min/1.7m2 Final    Non  GFR Calc    GFR Estimate If Black >90 >60 mL/min/1.7m2 Final    African American GFR Calc    Calcium 8.7 8.5 - 10.1 mg/dL Final    Bilirubin Total 0.3 0.2 - 1.3 mg/dL Final    Albumin 4.0 3.4 - 5.0 g/dL Final    Protein Total 7.5 6.8 - 8.8 g/dL Final    Alkaline Phosphatase 65 40 - 150 U/L Final    ALT 32 0 - 70 U/L Final    AST 16 0 - 45 U/L Final         1/30/2018  3:54 PM      Component Results     Component Value Ref Range & Units Status    Troponin I ES <0.015 0.000 - 0.045 ug/L Final    The 99th percentile for upper reference range is 0.045 ug/L.  Troponin values   in the range of 0.045 - 0.120 ug/L may be associated with risks of adverse   clinical events.           1/30/2018  2:25 PM      Component Results     Component Value Ref Range & Units Status    Glucose 157 (H) 70 - 99 mg/dL Final    Dr/RN Notified         1/30/2018  3:44 PM      Narrative     XR CHEST 2 VW 1/30/2018 3:43 PM    HISTORY: CP, SOB;         Impression     IMPRESSION: Negative.    PACO MORRIS MD         1/30/2018  3:54 PM      Component Results     Component Value Ref Range & Units Status    Ethanol g/dL <0.01 <0.01 g/dL Final         1/30/2018  6:48 PM      Component Results     Component Value Ref Range & Units Status    Troponin I ES <0.015 0.000 - 0.045  ug/L Final    The 99th percentile for upper reference range is 0.045 ug/L.  Troponin values   in the range of 0.045 - 0.120 ug/L may be associated with risks of adverse   clinical events.                  Clinical Quality Measure: Blood Pressure Screening     Your blood pressure was checked while you were in the emergency department today. The last reading we obtained was  BP: 133/77 . Please read the guidelines below about what these numbers mean and what you should do about them.  If your systolic blood pressure (the top number) is less than 120 and your diastolic blood pressure (the bottom number) is less than 80, then your blood pressure is normal. There is nothing more that you need to do about it.  If your systolic blood pressure (the top number) is 120-139 or your diastolic blood pressure (the bottom number) is 80-89, your blood pressure may be higher than it should be. You should have your blood pressure rechecked within a year by a primary care provider.  If your systolic blood pressure (the top number) is 140 or greater or your diastolic blood pressure (the bottom number) is 90 or greater, you may have high blood pressure. High blood pressure is treatable, but if left untreated over time it can put you at risk for heart attack, stroke, or kidney failure. You should have your blood pressure rechecked by a primary care provider within the next 4 weeks.  If your provider in the emergency department today gave you specific instructions to follow-up with your doctor or provider even sooner than that, you should follow that instruction and not wait for up to 4 weeks for your follow-up visit.        Thank you for choosing [facility]       Thank you for choosing [facility] for your care. Our goal is always to provide you with excellent care. Hearing back from our patients is one way we can continue to improve our services. Please take a few minutes to complete the written survey that you may receive in the mail after  "you visit with us. Thank you!        [employer] Information     [employer] lets you send messages to your doctor, view your test results, renew your prescriptions, schedule appointments and more. To sign up, go to [email or web address] . Click on \"Log in\" on the left side of the screen, which will take you to the Welcome page. Then click on \"Sign up Now\" on the right side of the page.     You will be asked to enter the access code listed below, as well as some personal information. Please follow the directions to create your username and password.     Your access code is: [email or web address]  Expires: 2018  7:17 PM     Your access code will  in 90 days. If you need help or a new code, please call your [facility] clinic or [phone].        Care EveryWhere ID     This is your Care EveryWhere ID. This could be used by other organizations to access your [facility] medical records  [id number]        Equal Access to Services     MILADIS SIMPSON : Hadii aad ku hadasho Sogregali, waaxda luqadaha, qaybta kaalmada adeegyairineo, fady astudillo . So [facility] [phone].    ATENCIÓN: Si habla español, tiene a burgos disposición servicios gratuitos de asistencia lingüística. Llame al [phone].    We comply with applicable federal civil rights laws and [place] laws. We do not discriminate on the basis of race, color, national origin, age, disability, sex, sexual orientation, or gender identity.            After Visit Summary       This is your record. Keep this with you and show to your community pharmacist(s) and doctor(s) at your next visit.                  "

## 2018-01-30 NOTE — ED NOTES
Pt comes via EMS from [facility]. C/o chest gradually coming on over the last couple days, also c/o headache and back pain.  Rates chest pain 2/10, headache 8/10.Denies N/V, fevers. States he usually takes his percocet and sleeps but today didn't get better.

## 2018-01-30 NOTE — ED AVS SNAPSHOT
[facility] Emergency Department    [address]    [facility] [address]    Phone:  [phone]    Fax:  [phone]                                       Perico Houston   MRN: [id number]    Department:  [facility] Emergency Department   Date of Visit:  1/30/2018           After Visit Summary Signature Page     I have received my discharge instructions, and my questions have been answered. I have discussed any challenges I see with this plan with the nurse or doctor.    ..........................................................................................................................................  Patient/Patient Representative Signature      ..........................................................................................................................................  Patient Representative Print Name and Relationship to Patient    ..................................................               ................................................  Date                                            Time    ..........................................................................................................................................  Reviewed by Signature/Title    ...................................................              ..............................................  Date                                                            Time

## 2018-01-31 NOTE — DISCHARGE INSTRUCTIONS
Discharge Instructions  Chest Pain    You have been seen today for chest pain or discomfort.  At this time, your provider has found no signs that your chest pain is due to a serious or life-threatening condition, (or you have declined more testing and/or admission to the hospital). However, sometimes there is a serious problem that does not show up right away. Your evaluation today may not be complete and you may need further testing and evaluation.     Generally, every Emergency Department visit should have a follow-up clinic visit with either a primary or a specialty clinic/provider. Please follow-up as instructed by your emergency provider today.  Return to the Emergency Department if:    Your chest pain changes, gets worse, starts to happen more often, or comes with less activity.    You are newly short of breath.    You get very weak or tired.    You pass out or faint.    You have any new symptoms, like fever, cough, numb legs, or you cough up blood.    You have anything else that worries you.    Until you follow-up with your regular provider, please do the following:    Take one aspirin daily unless you have an allergy or are told not to by your provider.    If a stress test appointment has been made, go to the appointment.    If you have questions, contact your regular provider.    Follow-up with your regular provider/clinic as directed; this is very important.    If you were given a prescription for medicine here today, be sure to read all of the information (including the package insert) that comes with your prescription.  This will include important information about the medicine, its side effects, and any warnings that you need to know about.  The pharmacist who fills the prescription can provide more information and answer questions you may have about the medicine.  If you have questions or concerns that the pharmacist cannot address, please call or return to the Emergency Department.       Remember that  you can always come back to the Emergency Department if you are not able to see your regular provider in the amount of time listed above, if you get any new symptoms, or if there is anything that worries you.      Discharge Instructions  Headache    You were seen today for a headache. Headaches may be caused by many different things such as muscle tension, sinus inflammation, anxiety and stress, having too little sleep, too much alcohol, some medical conditions or injury. You may have a migraine, which is caused by changes in the blood vessels in your head.  At this time your provider does not find that your headache is a sign of anything dangerous or life-threatening.  However, sometimes the signs of serious illness do not show up right away.      Generally, every Emergency Department visit should have a follow-up clinic visit with either a primary or a specialty clinic/provider. Please follow-up as instructed by your emergency provider today.    Return to the Emergency Department if:    You get a new fever of 100.4 F or higher.    Your headache gets much worse.    You get a stiff neck with your headache.    You get a new headache that is significantly different or worse than headaches you have had before.    You are vomiting (throwing up) and cannot keep food or water down.    You have blurry or double vision or other problems with your eyes.    You have a new weakness on one side of your body.    You have difficulty with balance which is new.    You or your family thinks you are confused.    You have a seizure.    What can I do to help myself?    Pain medications - You may take a pain medication such as Tylenol  (acetaminophen), Advil , Motrin  (ibuprofen) or Aleve  (naproxen).    Take a pain reliever as soon as you notice symptoms.  Starting medications as soon as you start to have symptoms may lessen the amount of pain you have.    Relaxing in a quiet, dark room may help.    Get enough sleep and eat meals  regularly.    You may need to watch for certain foods or other things which may trigger your headaches.  Keeping a journal of your headaches and possible triggers may help you and your primary provider to identify things which you should avoid which may be causing your headaches.  If you were given a prescription for medicine here today, be sure to read all of the information (including the package insert) that comes with your prescription.  This will include important information about the medicine, its side effects, and any warnings that you need to know about.  The pharmacist who fills the prescription can provide more information and answer questions you may have about the medicine.  If you have questions or concerns that the pharmacist cannot address, please call or return to the Emergency Department.   Remember that you can always come back to the Emergency Department if you are not able to see your regular provider in the amount of time listed above, if you get any new symptoms, or if there is anything that worries you.

## 2018-02-14 ENCOUNTER — TRANSFERRED RECORDS (OUTPATIENT)
Dept: HEALTH INFORMATION MANAGEMENT | Facility: CLINIC | Age: 58
End: 2018-02-14

## 2018-03-02 ENCOUNTER — HOSPITAL ENCOUNTER (EMERGENCY)
Facility: CLINIC | Age: 58
Discharge: ANOTHER HOSPITAL | End: 2018-03-02
Attending: EMERGENCY MEDICINE | Admitting: EMERGENCY MEDICINE
Payer: COMMERCIAL

## 2018-03-02 VITALS
RESPIRATION RATE: 18 BRPM | OXYGEN SATURATION: 97 % | SYSTOLIC BLOOD PRESSURE: 139 MMHG | DIASTOLIC BLOOD PRESSURE: 81 MMHG | TEMPERATURE: 98.9 F | HEART RATE: 101 BPM

## 2018-03-02 DIAGNOSIS — G89.18 ACUTE POST-OPERATIVE PAIN: ICD-10-CM

## 2018-03-02 LAB
ANION GAP SERPL CALCULATED.3IONS-SCNC: 10 MMOL/L (ref 3–14)
BASOPHILS # BLD AUTO: 0.1 10E9/L (ref 0–0.2)
BASOPHILS NFR BLD AUTO: 0.4 %
BUN SERPL-MCNC: 17 MG/DL (ref 7–30)
CALCIUM SERPL-MCNC: 9 MG/DL (ref 8.5–10.1)
CHLORIDE SERPL-SCNC: 92 MMOL/L (ref 94–109)
CO2 SERPL-SCNC: 26 MMOL/L (ref 20–32)
CREAT SERPL-MCNC: 0.75 MG/DL (ref 0.66–1.25)
DIFFERENTIAL METHOD BLD: ABNORMAL
EOSINOPHIL # BLD AUTO: 0.1 10E9/L (ref 0–0.7)
EOSINOPHIL NFR BLD AUTO: 0.4 %
ERYTHROCYTE [DISTWIDTH] IN BLOOD BY AUTOMATED COUNT: 12.3 % (ref 10–15)
GFR SERPL CREATININE-BSD FRML MDRD: >90 ML/MIN/1.7M2
GLUCOSE SERPL-MCNC: 151 MG/DL (ref 70–99)
HCT VFR BLD AUTO: 43.4 % (ref 40–53)
HGB BLD-MCNC: 13.8 G/DL (ref 13.3–17.7)
IMM GRANULOCYTES # BLD: 0.1 10E9/L (ref 0–0.4)
IMM GRANULOCYTES NFR BLD: 0.8 %
LYMPHOCYTES # BLD AUTO: 1.6 10E9/L (ref 0.8–5.3)
LYMPHOCYTES NFR BLD AUTO: 12 %
MCH RBC QN AUTO: 27 PG (ref 26.5–33)
MCHC RBC AUTO-ENTMCNC: 31.8 G/DL (ref 31.5–36.5)
MCV RBC AUTO: 85 FL (ref 78–100)
MONOCYTES # BLD AUTO: 1.1 10E9/L (ref 0–1.3)
MONOCYTES NFR BLD AUTO: 7.8 %
NEUTROPHILS # BLD AUTO: 10.7 10E9/L (ref 1.6–8.3)
NEUTROPHILS NFR BLD AUTO: 78.6 %
NRBC # BLD AUTO: 0 10*3/UL
NRBC BLD AUTO-RTO: 0 /100
PLATELET # BLD AUTO: 321 10E9/L (ref 150–450)
POTASSIUM SERPL-SCNC: 5 MMOL/L (ref 3.4–5.3)
RBC # BLD AUTO: 5.12 10E12/L (ref 4.4–5.9)
SODIUM SERPL-SCNC: 128 MMOL/L (ref 133–144)
WBC # BLD AUTO: 13.6 10E9/L (ref 4–11)

## 2018-03-02 PROCEDURE — 25000132 ZZH RX MED GY IP 250 OP 250 PS 637: Performed by: EMERGENCY MEDICINE

## 2018-03-02 PROCEDURE — 25000128 H RX IP 250 OP 636: Performed by: EMERGENCY MEDICINE

## 2018-03-02 PROCEDURE — 85025 COMPLETE CBC W/AUTO DIFF WBC: CPT | Performed by: EMERGENCY MEDICINE

## 2018-03-02 PROCEDURE — 96374 THER/PROPH/DIAG INJ IV PUSH: CPT

## 2018-03-02 PROCEDURE — 80048 BASIC METABOLIC PNL TOTAL CA: CPT | Performed by: EMERGENCY MEDICINE

## 2018-03-02 PROCEDURE — 99285 EMERGENCY DEPT VISIT HI MDM: CPT | Mod: 25

## 2018-03-02 RX ORDER — CLONAZEPAM 0.5 MG/1
0.5 TABLET ORAL ONCE
Status: COMPLETED | OUTPATIENT
Start: 2018-03-02 | End: 2018-03-02

## 2018-03-02 RX ORDER — AMOXICILLIN 250 MG
1 CAPSULE ORAL 2 TIMES DAILY PRN
COMMUNITY
End: 2022-09-30

## 2018-03-02 RX ORDER — HYDROMORPHONE HYDROCHLORIDE 1 MG/ML
0.5 INJECTION, SOLUTION INTRAMUSCULAR; INTRAVENOUS; SUBCUTANEOUS ONCE
Status: COMPLETED | OUTPATIENT
Start: 2018-03-02 | End: 2018-03-02

## 2018-03-02 RX ADMIN — CLONAZEPAM 0.5 MG: 0.5 TABLET ORAL at 21:13

## 2018-03-02 RX ADMIN — Medication 0.5 MG: at 21:13

## 2018-03-02 ASSESSMENT — ENCOUNTER SYMPTOMS
WOUND: 0
ARTHRALGIAS: 1
WEAKNESS: 0
MYALGIAS: 1
NUMBNESS: 0
JOINT SWELLING: 0

## 2018-03-03 NOTE — ED NOTES
Patient here for right hip pain, recent surgery on Monday at [facility]. Takes oxycodone for pain. Last dose was 3-4 hours ago. Here due to no relief from pain medication. Received Fentanyl 25mcg by ems. Also feels anxious and has not taken anxiety medication per patient. ABCs intact, gcs 15, AA&Ox3.

## 2018-03-03 NOTE — ED NOTES
Bed: ED09  Expected date: 3/2/18  Expected time: 7:41 PM  Means of arrival: Ambulance  Comments:  massimo [phone]  56yo M

## 2018-03-03 NOTE — ED PROVIDER NOTES
History     Chief Complaint:  Right hip pain    HPI   Perico Houston is a 57 year old male with a history of diabetes, chronic pain, and anxiety who presents with right hip pain. The patient has a history of multiple and recurrent visits to the ED for chronic abdominal and chest pain. He has a care plan in place please review SnapShot for details. However, the patient presents today for right hip pain status post surgery on 2/26 at [facility]. He was discharged 2 days later on 2/28 with a prescription for Oxycodone for pain control at home. Despite this, the patient states he has had uncontrolled pain at home even with the narcotics and was having uncontrolled pain. He called his orthopedist today about his ongoing and uncontrolled pain and was told to go to the ED if he continued to have uncontrolled pain prompting him to call EMS to be brought here for evaluation. En route he received 25 mcg Fentanyl intranasal; he last had Oxycodone 4 hours prior to arrival. He similarly complains of worsening anxiety given the pain and reports he has not had any his medications for anxiety. The patient complains of localized right hip pain currently. He denies any other symptoms such as fevers or numbness/weakness or calf pain.    Allergies:  Aspirin  Hydrocodone-Acetaminophen      Medications:    Cyclobenzaprine  Lorazepam  Prevacid  Glipizide  Clonazepam  Aspirin  Neurontin  Nitroglycerin  Metformin  Lisinopril  Atarax      Past Medical History:    Depression  Anxiety  Type 2 diabetes  CAD  Chronic pain syndrome  NSTEMI  Hypertension  Cervical disc herniation    Past Surgical History:    Angiogram  Cholecystectomy  Clavicle surgery  Right hip surgery  Partial liver resection    Family History:    Diabetes     Social History:  Smoking Status: Current Smoker  Alcohol Use: Yes, twice a month  Patient presents via EMS.   Marital Status:        Review of Systems   Musculoskeletal: Positive for arthralgias and  myalgias. Negative for joint swelling.   Skin: Negative for wound.   Neurological: Negative for weakness and numbness.   All other systems reviewed and are negative.      Physical Exam   First Vitals:  BP: 143/83  Pulse: 101  Heart Rate: 101  Temp: 98.9  F (37.2  C)  Resp: 18  SpO2: 96 %    Physical Exam  Nursing note and vitals reviewed.  Constitutional: Cooperative. Appears uncomfortable.   HENT:   Mouth/Throat: Moist mucous membranes.   Eyes: EOMI, nonicteric sclera  Cardiovascular: Normal rate, regular rhythm, no murmurs, rubs, or gallops  Pulmonary/Chest: Effort normal and breath sounds normal. No respiratory distress. No wheezes. No rales.   Abdominal: Soft. Nontender, nondistended, no guarding or rigidity. BS present.   Musculoskeletal: Right hip with wound vac over top of incision. No erythema/purulent drainage. No calf tenderness/redness/swelling.  Pain with mild ROM.  Neurological: Alert. Moves all extremities spontaneously.   Skin: Skin is warm and dry. No rash noted.   Psychiatric: anxious mood and affect.       Emergency Department Course     Laboratory:  CBC: WBC 13.6 (H), o/w WNL (HGB 13.8, )    BMP: Sodium 128 (L), chloride 92 (L), Glucose 151 (H), o/w WNL (Creatinine 0.75)     Interventions:  2113 - Dilaudid 0.5 mg IV  2113 - Klonopin 0.5 mg PO  The patient's symptoms were partially improved with parenteral narcotics.    Emergency Department Course:  The patient arrived in the emergency department via EMS.   Past medical records, nursing notes, and vitals reviewed.  2031: I performed an exam of the patient and obtained history, as documented above.  An IV was inserted to administer the above interventions.      2100: I discussed the case with Dr. Reynlods the patient's surgeon regarding the patient.     Findings and plan explained to the Patient.    2110: Patient will be transferred to [facility] via EMS. Discussed the case with Dr. Correa, who will admit the patient to a monitored  bed for further monitoring, evaluation, and treatment.      Impression & Plan      Medical Decision Making:  Perico Houston is a 57 year old male who presents with right hip pain. The patient is well known to our ED for chronic pain related concerns. He had a hip replacement performed several days ago at [facility]. The patient has had difficulty controlling his pain at home and he was instructed to return to their ED tonight, but he came here instead. Here, the patient does appear to be in pain but also anxious. He was given his home dose of Klonopin here as well as Dilaudid for pain relief.  I do not see any signs of complications related to his surgery including cellulitis or DVT. I discussed him with his surgeon at [facility], Dr. Reynolds, who states that he would recommend a hospitalist admission for pain control to his facility. The patient is in agreement. Dr. Correa of the [facility] hospitalist graciously accepts. All of the patient's questions were answered and he is in agreement with the plan. He is transferred in improved condition.    Diagnosis:    ICD-10-CM    1. Acute post-operative pain G89.18        Lonny Fenton  3/2/2018   [facility] EMERGENCY DEPARTMENT  I, Lonny Fenton, am serving as a scribe at 8:31 PM on 3/2/2018 to document services personally performed by Evans Beckham MD based on my observations and the provider's statements to me.       Evasn Beckham MD  03/04/18 0423

## 2018-05-20 ENCOUNTER — HOSPITAL ENCOUNTER (EMERGENCY)
Facility: CLINIC | Age: 58
Discharge: HOME | End: 2018-05-20
Attending: EMERGENCY MEDICINE | Admitting: EMERGENCY MEDICINE
Payer: COMMERCIAL

## 2018-05-20 VITALS
DIASTOLIC BLOOD PRESSURE: 90 MMHG | SYSTOLIC BLOOD PRESSURE: 139 MMHG | WEIGHT: 220 LBS | TEMPERATURE: 98 F | OXYGEN SATURATION: 100 % | BODY MASS INDEX: 29.84 KG/M2 | RESPIRATION RATE: 16 BRPM

## 2018-05-20 DIAGNOSIS — F41.9 ANXIETY: ICD-10-CM

## 2018-05-20 PROCEDURE — 99284 EMERGENCY DEPT VISIT MOD MDM: CPT

## 2018-05-20 PROCEDURE — 25000132 ZZH RX MED GY IP 250 OP 250 PS 637: Performed by: EMERGENCY MEDICINE

## 2018-05-20 PROCEDURE — 93005 ELECTROCARDIOGRAM TRACING: CPT

## 2018-05-20 RX ORDER — LORAZEPAM 1 MG/1
1 TABLET ORAL ONCE
Status: COMPLETED | OUTPATIENT
Start: 2018-05-20 | End: 2018-05-20

## 2018-05-20 RX ADMIN — LORAZEPAM 1 MG: 1 TABLET ORAL at 13:12

## 2018-05-20 ASSESSMENT — ENCOUNTER SYMPTOMS
NERVOUS/ANXIOUS: 1
ABDOMINAL PAIN: 1
COUGH: 0
NAUSEA: 0
SHORTNESS OF BREATH: 1
DIARRHEA: 0
VOMITING: 0

## 2018-05-20 NOTE — ED TRIAGE NOTES
"Pt presents via EMS with chest pain, sob for 4 days. Pt also c/o \"tummy pain.\" Pt has hx of anxiety that is not well controlled. Pt has hx MI several years ago. ABC intact. A/O x4.   "

## 2018-05-20 NOTE — ED NOTES
Bed: ED19  Expected date: 5/20/18  Expected time: 12:21 PM  Means of arrival: Ambulance  Comments:  Carlos Manuel Armas

## 2018-05-20 NOTE — ED AVS SNAPSHOT
[facility] Emergency Department    [address]    [facility] [address]    Phone:  [phone]    Fax:  [phone]                                       Perico Houston   MRN: [id number]    Department:  [facility] Emergency Department   Date of Visit:  5/20/2018           After Visit Summary Signature Page     I have received my discharge instructions, and my questions have been answered. I have discussed any challenges I see with this plan with the nurse or doctor.    ..........................................................................................................................................  Patient/Patient Representative Signature      ..........................................................................................................................................  Patient Representative Print Name and Relationship to Patient    ..................................................               ................................................  Date                                            Time    ..........................................................................................................................................  Reviewed by Signature/Title    ...................................................              ..............................................  Date                                                            Time

## 2018-05-20 NOTE — ED PROVIDER NOTES
History     Chief Complaint:  Chest pain    HPI   Perico Houston is a 58 year old male with a history of MI, anxiety, hypertension, hyperlipidemia and type 2 diabetes, with a care plan in place who presents to the Emergency Department for evaluation of chest pain. Per EMS the patient complained of chest pain and appeared very worked up. After paramedics talked the patient down his symptoms resolved. The patient reports one week of chest pain, shortness of breath, abdominal pain and worsening anxiety. Shortness of breath is worse while laying down and resolves upon standing. The patient has been seen in the ED numerous times secondary to recurrent chest pain and shortness of breath, therefore care plan was established. Here in the ED the patient complains primarily of anxiety. He has been taking Clonazepam and Ativan and to help deal with his anxiety however he began having problems with the combination of these two medications and was advised to stop taking them. He is scheduled to follow up with his psychiatrist at the beginning of June. He denies any nausea, vomiting, diarrhea, cough or leg swelling.    Allergies:  Aspirin  Hydrocodone-Acetaminophen       Medications:    Cyclobenzaprine  Lorazepam  Prevacid  Glipizide  Clonazepam  Aspirin  Neurontin  Nitroglycerin  Metformin  Lisinopril  Atarax       Past Medical History:    Depression  Anxiety  Type 2 diabetes  CAD  Chronic pain syndrome  NSTEMI  Hypertension  Cervical disc herniation     Past Surgical History:    Angiogram  Cholecystectomy  Clavicle surgery  Right hip surgery  Partial liver resection     Family History:    Diabetes      Social History:  Smoking Status: Current Smoker  Alcohol Use: Yes, twice a month  Patient presents via EMS.   Marital Status:        Review of Systems   Respiratory: Positive for shortness of breath. Negative for cough.    Cardiovascular: Positive for chest pain. Negative for leg swelling.   Gastrointestinal: Positive  for abdominal pain. Negative for diarrhea, nausea and vomiting.   Psychiatric/Behavioral: The patient is nervous/anxious.    All other systems reviewed and are negative.    Physical Exam   First Vitals:  Patient Vitals for the past 24 hrs:   BP Temp Temp src Heart Rate Resp SpO2 Weight   05/20/18 1330 - - - 74 - 97 % -   05/20/18 1315 - - - 76 - 97 % -   05/20/18 1300 - - - 84 - 97 % -   05/20/18 1244 - 98  F (36.7  C) Oral - - - -   05/20/18 1241 (!) 144/99 - - 89 16 100 % 99.8 kg (220 lb)     Physical Exam  General: Patient is alert and cooperative.  HENT:  Normal nose, oropharynx. Moist oral mucosa.  Eyes: EOMI. Normal conjunctiva.  Neck:  Normal range of motion and appearance.   Cardiovascular:  Normal rate, regular rhythm and normal heart sounds.   Pulmonary/Chest:  Effort normal. No wheezing or crackles.  Abdominal: Soft. No distension or tenderness.     Musculoskeletal: Normal range of motion. No edema or tenderness.   Neurological: oriented, normal strength, sensation, and coordination.   Skin: Warm and dry. No rash or bruising.   Psychiatric: anxious;  Sober.  No hallucinations.    Emergency Department Course   ECG:  Indication: CP & SOB  Time: 1243  Vent. Rate 81 bpm. MS interval 176. QRS duration 92. QT/QTc 370/429. P-R-T axis 30 16 -7.   normal sinus rhythm. Septal infarct, age undetermined. Abnormal ECG. Read time: 1250.    Interventions:  1312 Ativan 1 mg PO    Emergency Department Course:  Nursing notes and vitals reviewed. I performed an exam of the patient as documented above.     EKG obtained in the ED, see results above.     I reassessed the patient. Patient is feeling much better.    Findings and plan explained to the Patient. Patient discharged home with instructions regarding supportive care, medications, and reasons to return. The importance of close follow-up was reviewed.     Impression & Plan    Medical Decision Making:  This 58-year-old male returns to the emergency department by ambulance  with complaints of escalating anxiety.  The original call went out for chest pain and shortness of breath.  He is well-known to the paramedics who responded him to this emergency department where he has an emergency care plan in place with a documented history of frequent visits related to his anxiety and chest and abdominal pain.  On arrival here he is talking almost exclusively about his anxiety which is causing him to feel short of breath.  He is hemodynamically stable with normal oxygen saturations in the normal cardiopulmonary exam.  Testing was limited to an EKG which simply depicts a sinus rhythm with no ectopy or sign of ischemia.  Pulse oximeter is demonstrating oxygen saturations in the % range.  He was medicated with lorazepam 1 mg orally and eventually reported feeling better.  There is no clinical indication for any additional testing at this time as symptoms clearly are attributable to his long-standing anxiety.  I recommended no new changes to his medical regimen and that he contact his primary care provider tomorrow for assistance with further management of his chronic established anxiety.    Diagnosis:    ICD-10-CM    1. Anxiety F41.9      Disposition:  discharged to home    I, Carmen Nixon, am serving as a scribe on 5/20/2018 at 12:41 PM to personally document services performed by Gavin Bhakta MD based on my observations and the provider's statements to me.     Carmen Nixon  5/20/2018   [facility] EMERGENCY DEPARTMENT       Gavin Bhakta MD  05/20/18 [id number]

## 2018-05-20 NOTE — ED AVS SNAPSHOT
[facility] Emergency Department    [address]    [facility] [address]    Phone:  [phone]    Fax:  [phone]                                       [place]   MRN: [id number]    Department:  [facility] Emergency Department   Date of Visit:  5/20/2018           Patient Information     Date Of Birth          1960        Your diagnoses for this visit were:     Anxiety        You were seen by Gavin Bhakta MD.      Follow-up Information     Follow up with Kianna Silver    Specialty:  Physician Assistant    Why:  for re-evaluation of your symptoms    Contact information:    [facility]  [address] [facility] [id number] [phone]          Discharge Instructions       .e    Treating Anxiety Disorders with Therapy    If you have an anxiety disorder, you don t have to suffer anymore. Treatment is available. Therapy (also called counseling) is often a helpful treatment for anxiety disorders. With therapy, a specially trained professional (therapist) helps you face and learn to manage your anxiety. Therapy can be short-term or long-term depending on your needs. In some cases, medicine may also be prescribed with therapy. It may take time before you notice how much therapy is helping, but stick with it. With therapy, you can feel better.  Cognitive behavioral therapy (CBT)  Cognitive behavioral therapy (CBT) teaches you to manage anxiety. It does this by helping you understand how you think and act when you re anxious. Research has shown CBT to be a very effective treatment for anxiety disorders. How CBT is run is almost like a class. It involves homework and activities to build skills that teach you to cope with anxiety step by step. It can be done in a group or one-on-one, and often takes place for a set number of sessions. CBT has two main parts:    Cognitive therapy helps you identify the negative, irrational thoughts that occur  with your anxiety. You ll learn to replace these with more positive, realistic thoughts.    Behavioral therapy helps you change how you react to anxiety. You ll learn coping skills and methods for relaxing to help you better deal with anxiety.  Other forms of therapy  Other therapy methods may work better for you than CBT. Or, you may move from CBT to another form of therapy as your treatment needs change. This may mean meeting with a therapist by yourself or in a group. Therapy can also help you work through problems in your life, such as drug or alcohol dependence, that may be making your anxiety worse.  Getting better takes time  Therapy will help you feel better and teach you skills to help manage anxiety long term. But change doesn t happen right away. It takes a commitment from you. And treatment only works if you learn to face the causes of your anxiety. So, you might feel worse before you feel better. This can sometimes make it hard to stick with it. But remember: Therapy is a very effective treatment. The results will be well worth it.  Helping yourself  If anxiety is wearing you down, here are some things you can do to cope:    Check with your doctor and rule out any physical problems that may be causing the anxiety symptoms.    If an anxiety disorder is diagnosed, seek mental healthcare. This is an illness and it can respond to treatment. Most types of anxiety disorders will respond to talk therapy and medicine.    Educate yourself about anxiety disorders. Keep track of helpful online resources and books you can use during stressful periods.    Try stress management techniques such as meditation.    Consider online or in-person support groups.    Don t fight your feelings. Anxiety feeds itself. The more you worry about it, the worse it gets. Instead, try to identify what might have triggered your anxiety. Then try to put this threat in perspective.    Keep in mind that you can t control everything about a  situation. Change what you can and let the rest take its course.    Exercise -- it s a great way to relieve tension and help your body feel relaxed.    Examine your life for stress, and try to find ways to reduce it.    Avoid caffeine and nicotine, which can make anxiety symptoms worse.    Fight the temptation to turn to alcohol or unprescribed drugs for relief. They only make things worse in the long run.   Date Last Reviewed: 1/1/2017 2000-2017 [employer]. [address] [place] [address]. All rights reserved. This information is not intended as a substitute for professional medical care. Always follow your healthcare professional's instructions.          Treating Anxiety Disorders with Medicine  An anxiety disorder can make you feel nervous or apprehensive, even without a clear reason. In people age 65 and older, generalized anxiety disorder is one of the most commonly diagnosed anxiety disorders. Many times it occurs with depression. Certain anxiety disorders can cause intense feelings of fear or panic. You may even have physical symptoms such as a racing heartbeat, sweating, or dizziness. If you have these feelings, you don t have to suffer anymore. Treatment to help you overcome your fears will likely include therapy (also called counseling). Medicine may also be prescribed to help control your symptoms.    Medicines  Certain medicines may be prescribed to help control your symptoms. So you may feel less anxious. You may also feel able to move forward with therapy. At first, medicines and dosages may need to be adjusted to find what works best for you. Try to be patient. Tell your healthcare provider how a medicine makes you feel. This way, you can work together to find the treatment that s best for you. Keep in mind that medicines can have side effects. Talk with your provider about any side effects that are bothering you. Changing the dose or type of medicine may help. Don t stop  taking medicine on your own. That can cause symptoms to come back.    Anti-anxiety medicine. This medicine eases symptoms and helps you relax. Your healthcare provider will explain when and how to use it. It may be prescribed for use before situations that make you anxious. You may also be told to take medicine on a regular schedule. Anti-anxiety medicine may make you feel a little sleepy or  out of it.  Don t drive a car or operate machinery while on this medicine, until you know how it affects you.  Caution  Never use alcohol or other drugs with anti-anxiety medicines. This could result in loss of muscular control, sedation, coma, or death. Also, use only the amount of medicine prescribed for you. If you think you may have taken too much, get emergency care right away.     Antidepressant medicine. This kind of medicine is often used to treat anxiety, even if you aren t depressed. An antidepressant helps balance out brain chemicals. This helps keep anxiety under control. This medicine is taken on a schedule. It takes a few weeks to start working. If you don t notice a change at first, you may just need more time. But if you don t notice results after the first few weeks, tell your provider.  Keep taking medicines as prescribed  Never change your dosage, share or use another person's medicine, or stop taking your medicines without talking to your healthcare provider first. Keep the following in mind:    Some medicines must be taken on a schedule. Make this part of your daily routine. For instance, always take your pill before brushing your teeth. A pillbox can help you remember if you ve taken your medicine each day.    Medicines are often taken for 6 to 12 months. Your healthcare provider will then evaluate whether you need to stay on them. Many people who have also had therapy may no longer need medicine to manage anxiety.    You may need to stop taking medicine slowly to give your body time to adjust. When it s  time to stop, your healthcare provider will tell you more. Remember: Never stop taking your medicine without talking to your provider first.    If symptoms return, you may need to start taking medicines again. This isn t your fault. It s just the nature of your anxiety disorder.  Special concerns    Side effects. Medicines may cause side effects. Ask your healthcare provider or pharmacist what you can expect. They may have ideas for avoiding some side effects.    Sexual problems. Some antidepressants can affect your desire for sex or your ability to have an orgasm. A change in dosage or medicine often solves the problem. If you have a sexual side effect that concerns you, tell your healthcare provider.    Addiction. If you ve never had a problem with drugs or alcohol, you may not have a problem with medicines used to treat anxiety disorders. But always discuss the medicines with your healthcare provider before taking them. If you have a history of addiction, you may not be able to use certain medicines used to treat anxiety disorders.    Medicine interactions. Always check with your pharmacist before using any over-the-counter medicines, including herbal supplements.   Date Last Reviewed: 5/1/2017 2000-2017 [employer]. [address] [place] [address]. All rights reserved. This information is not intended as a substitute for professional medical care. Always follow your healthcare professional's instructions.          24 Hour Appointment Hotline       To make an appointment at any [facility], call [phone] ([phone]). If you don't have a family doctor or clinic, we will help you find one. [facility] clinics are conveniently located to serve the needs of you and your family.             Review of your medicines      Our records show that you are taking the medicines listed below. If these are incorrect, please call your family doctor or clinic.        Dose / Directions Last  dose taken    aspirin 81 MG EC tablet   Dose:  81 mg   Quantity:  30 tablet        Take 1 tablet (81 mg) by mouth daily   Refills:  0        canaliflozin tablet   Commonly known as:  INVOKANA   Dose:  150 mg        Take 150 mg by mouth every morning (before breakfast)   Refills:  0        clonazePAM 0.5 MG tablet   Commonly known as:  klonoPIN   Dose:  1 mg        Take 1 mg by mouth 3 times daily as needed for anxiety   Refills:  0        CYCLOBENZAPRINE HCL PO   Dose:  5 mg        Take 5 mg by mouth 3 times daily as needed for muscle spasms (back pain)   Refills:  0        gabapentin 300 MG capsule   Commonly known as:  NEURONTIN   Dose:  300 mg   Quantity:  90 capsule        Take 1 capsule (300 mg) by mouth 3 times daily   Refills:  1        glipiZIDE 5 MG 24 hr tablet   Commonly known as:  GLUCOTROL XL   Dose:  10 mg        Take 10 mg by mouth daily   Refills:  0        hydrOXYzine 50 MG tablet   Commonly known as:  ATARAX   Dose:  50 mg   Quantity:  20 tablet        Take 1 tablet (50 mg) by mouth 3 times daily as needed for other (back pain)   Refills:  0        lisinopril 40 MG tablet   Commonly known as:  PRINIVIL/ZESTRIL   Dose:  40 mg   Quantity:  30 tablet        Take 1 tablet (40 mg) by mouth daily   Refills:  0        LORAZEPAM PO   Dose:  0.5 mg        Take 0.5 mg by mouth every 8 hours as needed for anxiety   Refills:  0        metFORMIN 1000 MG tablet   Commonly known as:  GLUCOPHAGE   Dose:  1000 mg   Quantity:  60 tablet        Take 1 tablet (1,000 mg) by mouth 2 times daily (with meals)   Refills:  0        nitroGLYcerin 0.4 MG sublingual tablet   Commonly known as:  NITROSTAT   Dose:  0.4 mg   Quantity:  25 tablet        Place 1 tablet (0.4 mg) under the tongue every 5 minutes as needed for chest pain if you are still having symptoms after 3 doses (15 minutes) call 911.   Refills:  0        oxyCODONE IR 10 MG tablet   Commonly known as:  ROXICODONE   Dose:  10 mg        Take 10 mg by mouth 3 times  daily   Refills:  0        PREVACID PO   Dose:  30 mg        Take 30 mg by mouth daily   Refills:  0        senna-docusate 8.6-50 MG per tablet   Commonly known as:  SENOKOT-S;PERICOLACE   Dose:  1 tablet        Take 1 tablet by mouth 2 times daily as needed for constipation (primary osteoarthritis of right hip, status post right hip replacement)   Refills:  0                Orders Needing Specimen Collection     None      Pending Results     No orders found from 5/18/2018 to 5/21/2018.            Pending Culture Results     No orders found from 5/18/2018 to 5/21/2018.            Pending Results Instructions     If you had any lab results that were not finalized at the time of your Discharge, you can call the ED Lab Result RN at [phone]. You will be contacted by this team for any positive Lab results or changes in treatment. The nurses are available 7 days a week from 10A to 6:30P.  You can leave a message 24 hours per day and they will return your call.        Test Results From Your Hospital Stay               Clinical Quality Measure: Blood Pressure Screening     Your blood pressure was checked while you were in the emergency department today. The last reading we obtained was  BP: (!) 144/99 . Please read the guidelines below about what these numbers mean and what you should do about them.  If your systolic blood pressure (the top number) is less than 120 and your diastolic blood pressure (the bottom number) is less than 80, then your blood pressure is normal. There is nothing more that you need to do about it.  If your systolic blood pressure (the top number) is 120-139 or your diastolic blood pressure (the bottom number) is 80-89, your blood pressure may be higher than it should be. You should have your blood pressure rechecked within a year by a primary care provider.  If your systolic blood pressure (the top number) is 140 or greater or your diastolic blood pressure (the bottom number) is 90 or greater,  "you may have high blood pressure. High blood pressure is treatable, but if left untreated over time it can put you at risk for heart attack, stroke, or kidney failure. You should have your blood pressure rechecked by a primary care provider within the next 4 weeks.  If your provider in the emergency department today gave you specific instructions to follow-up with your doctor or provider even sooner than that, you should follow that instruction and not wait for up to 4 weeks for your follow-up visit.        Thank you for choosing [facility]       Thank you for choosing [facility] for your care. Our goal is always to provide you with excellent care. Hearing back from our patients is one way we can continue to improve our services. Please take a few minutes to complete the written survey that you may receive in the mail after you visit with us. Thank you!        [employer] Information     [employer] lets you send messages to your doctor, view your test results, renew your prescriptions, schedule appointments and more. To sign up, go to [email or web address] . Click on \"Log in\" on the left side of the screen, which will take you to the Welcome page. Then click on \"Sign up Now\" on the right side of the page.     You will be asked to enter the access code listed below, as well as some personal information. Please follow the directions to create your username and password.     Your access code is: [email or web address]  Expires: 2018  1:37 PM     Your access code will  in 90 days. If you need help or a new code, please call your [facility] clinic or [phone].        Care EveryWhere ID     This is your Care EveryWhere ID. This could be used by other organizations to access your [facility] medical records  [id number]        Equal Access to Services     MILADIS SIMPSON : Rosa Maria Marcelino, pj stephens, fady maldonado. So [facility] [phone].    ATENCIÓN: Si habla " español, tiene a burgos disposición servicios gratuitos de asistencia lingüística. Artur al [phone].    We comply with applicable federal civil rights laws and [place] laws. We do not discriminate on the basis of race, color, national origin, age, disability, sex, sexual orientation, or gender identity.            After Visit Summary       This is your record. Keep this with you and show to your community pharmacist(s) and doctor(s) at your next visit.

## 2018-05-20 NOTE — DISCHARGE INSTRUCTIONS
.e    Treating Anxiety Disorders with Therapy    If you have an anxiety disorder, you don t have to suffer anymore. Treatment is available. Therapy (also called counseling) is often a helpful treatment for anxiety disorders. With therapy, a specially trained professional (therapist) helps you face and learn to manage your anxiety. Therapy can be short-term or long-term depending on your needs. In some cases, medicine may also be prescribed with therapy. It may take time before you notice how much therapy is helping, but stick with it. With therapy, you can feel better.  Cognitive behavioral therapy (CBT)  Cognitive behavioral therapy (CBT) teaches you to manage anxiety. It does this by helping you understand how you think and act when you re anxious. Research has shown CBT to be a very effective treatment for anxiety disorders. How CBT is run is almost like a class. It involves homework and activities to build skills that teach you to cope with anxiety step by step. It can be done in a group or one-on-one, and often takes place for a set number of sessions. CBT has two main parts:    Cognitive therapy helps you identify the negative, irrational thoughts that occur with your anxiety. You ll learn to replace these with more positive, realistic thoughts.    Behavioral therapy helps you change how you react to anxiety. You ll learn coping skills and methods for relaxing to help you better deal with anxiety.  Other forms of therapy  Other therapy methods may work better for you than CBT. Or, you may move from CBT to another form of therapy as your treatment needs change. This may mean meeting with a therapist by yourself or in a group. Therapy can also help you work through problems in your life, such as drug or alcohol dependence, that may be making your anxiety worse.  Getting better takes time  Therapy will help you feel better and teach you skills to help manage anxiety long term. But change doesn t happen right  away. It takes a commitment from you. And treatment only works if you learn to face the causes of your anxiety. So, you might feel worse before you feel better. This can sometimes make it hard to stick with it. But remember: Therapy is a very effective treatment. The results will be well worth it.  Helping yourself  If anxiety is wearing you down, here are some things you can do to cope:    Check with your doctor and rule out any physical problems that may be causing the anxiety symptoms.    If an anxiety disorder is diagnosed, seek mental healthcare. This is an illness and it can respond to treatment. Most types of anxiety disorders will respond to talk therapy and medicine.    Educate yourself about anxiety disorders. Keep track of helpful online resources and books you can use during stressful periods.    Try stress management techniques such as meditation.    Consider online or in-person support groups.    Don t fight your feelings. Anxiety feeds itself. The more you worry about it, the worse it gets. Instead, try to identify what might have triggered your anxiety. Then try to put this threat in perspective.    Keep in mind that you can t control everything about a situation. Change what you can and let the rest take its course.    Exercise -- it s a great way to relieve tension and help your body feel relaxed.    Examine your life for stress, and try to find ways to reduce it.    Avoid caffeine and nicotine, which can make anxiety symptoms worse.    Fight the temptation to turn to alcohol or unprescribed drugs for relief. They only make things worse in the long run.   Date Last Reviewed: 1/1/2017 2000-2017 [employer]. [address] [place] [address], [place] [address]. All rights reserved. This information is not intended as a substitute for professional medical care. Always follow your healthcare professional's instructions.          Treating Anxiety Disorders with Medicine  An anxiety disorder can  make you feel nervous or apprehensive, even without a clear reason. In people age 65 and older, generalized anxiety disorder is one of the most commonly diagnosed anxiety disorders. Many times it occurs with depression. Certain anxiety disorders can cause intense feelings of fear or panic. You may even have physical symptoms such as a racing heartbeat, sweating, or dizziness. If you have these feelings, you don t have to suffer anymore. Treatment to help you overcome your fears will likely include therapy (also called counseling). Medicine may also be prescribed to help control your symptoms.    Medicines  Certain medicines may be prescribed to help control your symptoms. So you may feel less anxious. You may also feel able to move forward with therapy. At first, medicines and dosages may need to be adjusted to find what works best for you. Try to be patient. Tell your healthcare provider how a medicine makes you feel. This way, you can work together to find the treatment that s best for you. Keep in mind that medicines can have side effects. Talk with your provider about any side effects that are bothering you. Changing the dose or type of medicine may help. Don t stop taking medicine on your own. That can cause symptoms to come back.    Anti-anxiety medicine. This medicine eases symptoms and helps you relax. Your healthcare provider will explain when and how to use it. It may be prescribed for use before situations that make you anxious. You may also be told to take medicine on a regular schedule. Anti-anxiety medicine may make you feel a little sleepy or  out of it.  Don t drive a car or operate machinery while on this medicine, until you know how it affects you.  Caution  Never use alcohol or other drugs with anti-anxiety medicines. This could result in loss of muscular control, sedation, coma, or death. Also, use only the amount of medicine prescribed for you. If you think you may have taken too much, get  emergency care right away.     Antidepressant medicine. This kind of medicine is often used to treat anxiety, even if you aren t depressed. An antidepressant helps balance out brain chemicals. This helps keep anxiety under control. This medicine is taken on a schedule. It takes a few weeks to start working. If you don t notice a change at first, you may just need more time. But if you don t notice results after the first few weeks, tell your provider.  Keep taking medicines as prescribed  Never change your dosage, share or use another person's medicine, or stop taking your medicines without talking to your healthcare provider first. Keep the following in mind:    Some medicines must be taken on a schedule. Make this part of your daily routine. For instance, always take your pill before brushing your teeth. A pillbox can help you remember if you ve taken your medicine each day.    Medicines are often taken for 6 to 12 months. Your healthcare provider will then evaluate whether you need to stay on them. Many people who have also had therapy may no longer need medicine to manage anxiety.    You may need to stop taking medicine slowly to give your body time to adjust. When it s time to stop, your healthcare provider will tell you more. Remember: Never stop taking your medicine without talking to your provider first.    If symptoms return, you may need to start taking medicines again. This isn t your fault. It s just the nature of your anxiety disorder.  Special concerns    Side effects. Medicines may cause side effects. Ask your healthcare provider or pharmacist what you can expect. They may have ideas for avoiding some side effects.    Sexual problems. Some antidepressants can affect your desire for sex or your ability to have an orgasm. A change in dosage or medicine often solves the problem. If you have a sexual side effect that concerns you, tell your healthcare provider.    Addiction. If you ve never had a problem  with drugs or alcohol, you may not have a problem with medicines used to treat anxiety disorders. But always discuss the medicines with your healthcare provider before taking them. If you have a history of addiction, you may not be able to use certain medicines used to treat anxiety disorders.    Medicine interactions. Always check with your pharmacist before using any over-the-counter medicines, including herbal supplements.   Date Last Reviewed: 5/1/2017 2000-2017 [employer]. [address] [place] [address], [place] [address]. All rights reserved. This information is not intended as a substitute for professional medical care. Always follow your healthcare professional's instructions.

## 2018-05-21 LAB — INTERPRETATION ECG - MUSE: NORMAL

## 2018-05-25 ENCOUNTER — APPOINTMENT (OUTPATIENT)
Dept: CT IMAGING | Facility: CLINIC | Age: 58
End: 2018-05-25
Attending: EMERGENCY MEDICINE
Payer: COMMERCIAL

## 2018-05-25 ENCOUNTER — HOSPITAL ENCOUNTER (EMERGENCY)
Facility: CLINIC | Age: 58
Discharge: HOME | End: 2018-05-25
Attending: EMERGENCY MEDICINE | Admitting: EMERGENCY MEDICINE
Payer: COMMERCIAL

## 2018-05-25 VITALS
BODY MASS INDEX: 29.84 KG/M2 | HEART RATE: 77 BPM | DIASTOLIC BLOOD PRESSURE: 100 MMHG | OXYGEN SATURATION: 100 % | TEMPERATURE: 98.2 F | WEIGHT: 220 LBS | RESPIRATION RATE: 18 BRPM | SYSTOLIC BLOOD PRESSURE: 155 MMHG

## 2018-05-25 DIAGNOSIS — R10.84 ABDOMINAL PAIN, GENERALIZED: ICD-10-CM

## 2018-05-25 LAB
ANION GAP SERPL CALCULATED.3IONS-SCNC: 6 MMOL/L (ref 3–14)
BASOPHILS # BLD AUTO: 0.1 10E9/L (ref 0–0.2)
BASOPHILS NFR BLD AUTO: 0.7 %
BUN SERPL-MCNC: 13 MG/DL (ref 7–30)
CALCIUM SERPL-MCNC: 8.7 MG/DL (ref 8.5–10.1)
CHLORIDE SERPL-SCNC: 98 MMOL/L (ref 94–109)
CO2 SERPL-SCNC: 28 MMOL/L (ref 20–32)
CREAT SERPL-MCNC: 0.7 MG/DL (ref 0.66–1.25)
DIFFERENTIAL METHOD BLD: NORMAL
EOSINOPHIL # BLD AUTO: 0.2 10E9/L (ref 0–0.7)
EOSINOPHIL NFR BLD AUTO: 2.9 %
ERYTHROCYTE [DISTWIDTH] IN BLOOD BY AUTOMATED COUNT: 13.4 % (ref 10–15)
GFR SERPL CREATININE-BSD FRML MDRD: >90 ML/MIN/1.7M2
GLUCOSE SERPL-MCNC: 176 MG/DL (ref 70–99)
HCT VFR BLD AUTO: 44.7 % (ref 40–53)
HGB BLD-MCNC: 14.5 G/DL (ref 13.3–17.7)
IMM GRANULOCYTES # BLD: 0 10E9/L (ref 0–0.4)
IMM GRANULOCYTES NFR BLD: 0.3 %
INTERPRETATION ECG - MUSE: NORMAL
LYMPHOCYTES # BLD AUTO: 2.6 10E9/L (ref 0.8–5.3)
LYMPHOCYTES NFR BLD AUTO: 34.4 %
MCH RBC QN AUTO: 26.7 PG (ref 26.5–33)
MCHC RBC AUTO-ENTMCNC: 32.4 G/DL (ref 31.5–36.5)
MCV RBC AUTO: 82 FL (ref 78–100)
MONOCYTES # BLD AUTO: 0.6 10E9/L (ref 0–1.3)
MONOCYTES NFR BLD AUTO: 8.2 %
NEUTROPHILS # BLD AUTO: 4.1 10E9/L (ref 1.6–8.3)
NEUTROPHILS NFR BLD AUTO: 53.5 %
NRBC # BLD AUTO: 0 10*3/UL
NRBC BLD AUTO-RTO: 0 /100
PLATELET # BLD AUTO: 305 10E9/L (ref 150–450)
POTASSIUM SERPL-SCNC: 4.3 MMOL/L (ref 3.4–5.3)
RBC # BLD AUTO: 5.43 10E12/L (ref 4.4–5.9)
SODIUM SERPL-SCNC: 132 MMOL/L (ref 133–144)
TROPONIN I SERPL-MCNC: <0.015 UG/L (ref 0–0.04)
WBC # BLD AUTO: 7.7 10E9/L (ref 4–11)

## 2018-05-25 PROCEDURE — 25000128 H RX IP 250 OP 636: Performed by: EMERGENCY MEDICINE

## 2018-05-25 PROCEDURE — 25000132 ZZH RX MED GY IP 250 OP 250 PS 637: Performed by: EMERGENCY MEDICINE

## 2018-05-25 PROCEDURE — 96360 HYDRATION IV INFUSION INIT: CPT

## 2018-05-25 PROCEDURE — 93005 ELECTROCARDIOGRAM TRACING: CPT

## 2018-05-25 PROCEDURE — 25000125 ZZHC RX 250: Performed by: EMERGENCY MEDICINE

## 2018-05-25 PROCEDURE — 85025 COMPLETE CBC W/AUTO DIFF WBC: CPT | Performed by: EMERGENCY MEDICINE

## 2018-05-25 PROCEDURE — 84484 ASSAY OF TROPONIN QUANT: CPT | Performed by: EMERGENCY MEDICINE

## 2018-05-25 PROCEDURE — 74176 CT ABD & PELVIS W/O CONTRAST: CPT

## 2018-05-25 PROCEDURE — 99285 EMERGENCY DEPT VISIT HI MDM: CPT | Mod: 25

## 2018-05-25 PROCEDURE — 80048 BASIC METABOLIC PNL TOTAL CA: CPT | Performed by: EMERGENCY MEDICINE

## 2018-05-25 RX ORDER — IOPAMIDOL 755 MG/ML
100 INJECTION, SOLUTION INTRAVASCULAR ONCE
Status: DISCONTINUED | OUTPATIENT
Start: 2018-05-25 | End: 2018-05-25 | Stop reason: CLARIF

## 2018-05-25 RX ORDER — SODIUM CHLORIDE 9 MG/ML
1000 INJECTION, SOLUTION INTRAVENOUS CONTINUOUS
Status: DISCONTINUED | OUTPATIENT
Start: 2018-05-25 | End: 2018-05-25 | Stop reason: HOSPADM

## 2018-05-25 RX ORDER — IOPAMIDOL 755 MG/ML
500 INJECTION, SOLUTION INTRAVASCULAR ONCE
Status: DISCONTINUED | OUTPATIENT
Start: 2018-05-25 | End: 2018-05-25 | Stop reason: CLARIF

## 2018-05-25 RX ADMIN — SODIUM CHLORIDE 1000 ML: 9 INJECTION, SOLUTION INTRAVENOUS at 03:44

## 2018-05-25 RX ADMIN — LIDOCAINE HYDROCHLORIDE 30 ML: 20 SOLUTION ORAL; TOPICAL at 03:44

## 2018-05-25 ASSESSMENT — ENCOUNTER SYMPTOMS
NAUSEA: 0
ABDOMINAL PAIN: 1
VOMITING: 0
CONSTIPATION: 1
FEVER: 0
BLOOD IN STOOL: 0
DIARRHEA: 0
SHORTNESS OF BREATH: 0
CHILLS: 0

## 2018-05-25 NOTE — DISCHARGE INSTRUCTIONS

## 2018-05-25 NOTE — ED PROVIDER NOTES
History     Chief Complaint:  Abdominal Pain    HPI   Perico Houston is a 58 year old male who presents with abdominal pain. The patient has a history significant for Type 2 diabetes, CAD, NSTEMI, as well as chronic and recurrent chest pain; he has an Emergency Care Plan in place please refer to Snap Shot for details. The patient also has a recent hip surgery in February and is taking Percocet ongoing for pain due to this. Over the past several weeks the patient has been experiencing his intermittent abdominal pain that is fairly typical for his chronic abdominal pain. He was seen on 5/20 here at [facility] for anxiety as well as his chest and abdominal pain. He had a normal work up at that time. Over the past week, however, the patient has had more persistent and unrelenting pain which is atypical for the symptoms he is accustomed to. He attempted OTC laxatives and stool softeners as well as antacids and was having little to no relief. Tonight, his pain again returned and was not resolving prompting him to come here for evaluation. He notes he is still passing gas and moving his bowels albeit less and denies any bloody stools. He otherwise denies fevers, chills, nausea, vomiting, shortness of breath, or chest pain regarding symptoms tonight. Symptoms would resolve when he would take his scheduled Percocet.     Allergies:  Aspirin  Hydrocodone-Acetaminophen      Medications:    Aspirin  Invokana  Clonazepam  Cyclobenzaprine  Neurontin  Glipizide  Atarax  Lorazepam  Lisinopril  Nitroglycerin  Oxycodone      Past Medical History:    Anxiety  CAD  Chronic low back pain  Chronic pain syndrome  NSTEMI  Type 2 diabetes mellitus  Hypertension  Cervical disc herniation  GERD    Past Surgical History:    Angiogram  Cholecystectomy  Clavicle surgery  Partial liver resection   Hip surgery     Family History:    Diabetes     Social History:  Smoking Status: Former Smoker  Alcohol Use: Yes, twice a month  Patient  presents with significant other.   Marital Status:        Review of Systems   Constitutional: Negative for chills and fever.   Respiratory: Negative for shortness of breath.    Cardiovascular: Negative for chest pain.   Gastrointestinal: Positive for abdominal pain and constipation. Negative for blood in stool, diarrhea, nausea and vomiting.   All other systems reviewed and are negative.      Physical Exam     Patient Vitals for the past 24 hrs:   BP Temp Temp src Pulse Resp SpO2 Weight   05/25/18 0304 (!) 192/102 98.2  F (36.8  C) Temporal 75 18 100 % 99.8 kg (220 lb)        Physical Exam  Constitutional: Patient is well appearing. No distress.  Head: Atraumatic.  Mouth/Throat: Oropharynx is clear and moist. No oropharyngeal exudate.  Eyes: Conjunctivae and EOM are normal. No scleral icterus.  Neck: Normal range of motion. Neck supple.   Cardiovascular: Normal rate, regular rhythm, normal heart sounds and intact distal pulses.   Pulmonary/Chest: Breath sounds normal. No respiratory distress.  Abdominal: Soft. Bowel sounds are normal. No distension. No tenderness. No rebound or guarding.   Musculoskeletal: Normal range of motion. No edema or tenderness.   Neurological: Alert and orientated to person, place, and time. No observable focal neuro deficit  Skin: Warm and dry. No rash noted. Not diaphoretic.      Emergency Department Course     ECG (3:17:53):  Rate 76 bpm. AR interval 170. QRS duration 94. QT/QTc 414/465. P-R-T axes 33 9 -7. normal sinus rhythm. Septal infarct, age undetermined. Abnormal ECG. No significant change when compared to EKG dated 5/20/18.  Interpreted at 0320 by Barden Owen MD.     Imaging:  Radiographic findings were communicated with the patient who voiced understanding of the findings.    CT-scan Abdomen/Pelvis w/o contrast:  No acute abnormality. No bowel obstruction or  inflammation.  Preliminary result per radiology.      Laboratory:  0332 - Troponin: <0.015   CBC: WNL (WBC  7.7, HGB 14.5, )    BMP: Glucose 176 (H), Sodium 132 (L), o/w WNL (Creatinine 0.70)     Interventions:  0344 - GI Cocktail - Maalox 15 mL, Viscous Lidocaine 15 mL, 30 mL suspension PO   0344 - NS 1L IV Bolus     Emergency Department Course:  Past medical records, nursing notes, and vitals reviewed.  0308: I performed an exam of the patient and obtained history, as documented above.  IV inserted and blood drawn.     The patient was sent for a CT-scan while in the emergency department, findings above.      0509: I rechecked the patient. Findings and plan explained to the Patient. Patient discharged home with instructions regarding supportive care, medications, and reasons to return. The importance of close follow-up was reviewed.      Impression & Plan      Medical Decision Making:  Perico Houston is a 58 year old male with a history of chronic recurrent abdominal and chest pain as well as anxiety and is well known to both EMS as well as this particular ED. He has a care plan in place addressing these chronic complaints.  He states tonight his abdominal is much different than previous presentations.  Patient presents tonight for similar characteristic abdominal pain but this has been more protracted over the past week which is what prompted his visit tonight. Given the changes in his abdominal pain I did proceed with CT-scan which revealed No acute abnormality. Basic blood work is likewise unrevealing and appears to be at the patient's baseline. He was given a GI cocktail which did reduce his pain so I believe this is consistent with a recurrence of his chronic pain. We discussed treatment options and reasons to return. Patient verbalized understanding of the plan and agrees with discharge.    Diagnosis:    ICD-10-CM    1. Abdominal pain, generalized R10.84        Lonny Fenton  5/25/2018   [facility] EMERGENCY DEPARTMENT  Lonny TALAVERA, am serving as a scribe at 3:08 AM on 5/25/2018 to  document services personally performed by Braden Owen MD based on my observations and the provider's statements to me.       Braden Owen MD  05/25/18 0600

## 2018-05-25 NOTE — ED TRIAGE NOTES
58-year-old male presents to the ER with complaints abd pain for over the last week. Pt states his narcotic pain medications for his hip isn't helping his abd pain. Pt states he has been drinking Pepto bismol for the last week to help the pain but that isn't helping either.

## 2018-05-25 NOTE — ED AVS SNAPSHOT
[facility] Emergency Department    [address]    [facility] [address]    Phone:  [phone]    Fax:  [phone]                                       Perico Houston   MRN: [id number]    Department:  [facility] Emergency Department   Date of Visit:  5/25/2018           After Visit Summary Signature Page     I have received my discharge instructions, and my questions have been answered. I have discussed any challenges I see with this plan with the nurse or doctor.    ..........................................................................................................................................  Patient/Patient Representative Signature      ..........................................................................................................................................  Patient Representative Print Name and Relationship to Patient    ..................................................               ................................................  Date                                            Time    ..........................................................................................................................................  Reviewed by Signature/Title    ...................................................              ..............................................  Date                                                            Time

## 2018-05-25 NOTE — ED AVS SNAPSHOT
[facility] Emergency Department    [address]    [facility] [address]    Phone:  [phone]    Fax:  [phone]                                       Perico Houston   MRN: [id number]    Department:  [facility] Emergency Department   Date of Visit:  5/25/2018           Patient Information     Date Of Birth          1960        Your diagnoses for this visit were:     Abdominal pain, generalized        You were seen by Braden Owen MD.      Follow-up Information     Follow up with Kianna Silver Schedule an appointment as soon as possible for a visit in 1 day.    Specialty:  Physician Assistant    Why:  As needed, If symptoms worsen    Contact information:    [facility]  [address] [facility] [id number] [phone]          Discharge Instructions         Abdominal Pain    Abdominal pain is pain in the stomach or belly area. Everyone has this pain from time to time. In many cases it goes away on its own. But abdominal pain can sometimes be due to a serious problem, such as appendicitis. So it s important to know when to seek help.  Causes of abdominal pain  There are many possible causes of abdominal pain. Common causes in adults include:    Constipation, diarrhea, or gas    Stomach acid flowing back up into the esophagus (acid reflux or heartburn)    Severe acid reflux, called GERD (gastroesophageal reflux disease)    A sore in the lining of the stomach or small intestine (peptic ulcer)    Inflammation of the gallbladder, liver, or pancreas    Gallstones or kidney stones    Appendicitis     Intestinal blockage     An internal organ pushing through a muscle or other tissue (hernia)    Urinary tract infections    In women, menstrual cramps, fibroids, or endometriosis    Inflammation or infection of the intestines  Diagnosing the cause of abdominal pain  Your healthcare provider will do a physical exam help find the cause of your pain. If  needed, tests will be ordered. Belly pain has many possible causes. So it can be hard to find the reason for your pain. Giving details about your pain can help. Tell your provider where and when you feel the pain, and what makes it better or worse. Also let your provider know if you have other symptoms such as:    Fever    Tiredness    Upset stomach (nausea)    Vomiting    Changes in bathroom habits  Treating abdominal pain  Some causes of pain need emergency medical treatment right away. These include appendicitis or a bowel blockage. Other problems can be treated with rest, fluids, or medicines. Your healthcare provider can give you specific instructions for treatment or self-care based on what is causing your pain.  If you have vomiting or diarrhea, sip water or other clear fluids. When you are ready to eat solid foods again, start with small amounts of easy-to-digest, low-fat foods. These include apple sauce, toast, or crackers.   When to seek medical care  Call 911 or go to the hospital right away if you:    Can t pass stool and are vomiting    Are vomiting blood or have bloody diarrhea or black, tarry diarrhea    Have chest, neck, or shoulder pain    Feel like you might pass out    Have pain in your shoulder blades with nausea    Have sudden, severe belly pain    Have new, severe pain unlike any you have felt before    Have a belly that is rigid, hard, and tender to touch  Call your healthcare provider if you have:    Pain for more than 5 days    Bloating for more than 2 days    Diarrhea for more than 5 days    A fever of 100.4 F (38 C) or higher, or as directed by your healthcare provider    Pain that gets worse    Weight loss for no reason    Continued lack of appetite    Blood in your stool  How to prevent abdominal pain  Here are some tips to help prevent abdominal pain:    Eat smaller amounts of food at one time.    Avoid greasy, fried, or other high-fat foods.    Avoid foods that give you  gas.    Exercise regularly.    Drink plenty of fluids.  To help prevent GERD symptoms:    Quit smoking.    Reduce alcohol and certain foods that increase stomach acid.    Avoid aspirin and over-the-counter pain and fever medicines (NSAIDS or nonsteroidal anti-inflammatory drugs), if possible    Lose extra weight.    Finish eating at least 2 hours before you go to bed or lie down.    Raise the head of your bed.  Date Last Reviewed: 7/1/2016 2000-2017 [employer]. [address] [place] [address], [place] [address]. All rights reserved. This information is not intended as a substitute for professional medical care. Always follow your healthcare professional's instructions.          24 Hour Appointment Hotline       To make an appointment at any [facility], call [phone] ([phone]). If you don't have a family doctor or clinic, we will help you find one. [facility] are conveniently located to serve the needs of you and your family.             Review of your medicines      Our records show that you are taking the medicines listed below. If these are incorrect, please call your family doctor or clinic.        Dose / Directions Last dose taken    aspirin 81 MG EC tablet   Dose:  81 mg   Quantity:  30 tablet        Take 1 tablet (81 mg) by mouth daily   Refills:  0        canaliflozin tablet   Commonly known as:  INVOKANA   Dose:  150 mg        Take 150 mg by mouth every morning (before breakfast)   Refills:  0        clonazePAM 0.5 MG tablet   Commonly known as:  klonoPIN   Dose:  1 mg        Take 1 mg by mouth 3 times daily as needed for anxiety   Refills:  0        CYCLOBENZAPRINE HCL PO   Dose:  5 mg        Take 5 mg by mouth 3 times daily as needed for muscle spasms (back pain)   Refills:  0        gabapentin 300 MG capsule   Commonly known as:  NEURONTIN   Dose:  300 mg   Quantity:  90 capsule        Take 1 capsule (300 mg) by mouth 3 times daily   Refills:  1        glipiZIDE 5 MG 24 hr  tablet   Commonly known as:  GLUCOTROL XL   Dose:  10 mg        Take 10 mg by mouth daily   Refills:  0        hydrOXYzine 50 MG tablet   Commonly known as:  ATARAX   Dose:  50 mg   Quantity:  20 tablet        Take 1 tablet (50 mg) by mouth 3 times daily as needed for other (back pain)   Refills:  0        lisinopril 40 MG tablet   Commonly known as:  PRINIVIL/ZESTRIL   Dose:  40 mg   Quantity:  30 tablet        Take 1 tablet (40 mg) by mouth daily   Refills:  0        LORAZEPAM PO   Dose:  0.5 mg        Take 0.5 mg by mouth every 8 hours as needed for anxiety   Refills:  0        metFORMIN 1000 MG tablet   Commonly known as:  GLUCOPHAGE   Dose:  1000 mg   Quantity:  60 tablet        Take 1 tablet (1,000 mg) by mouth 2 times daily (with meals)   Refills:  0        nitroGLYcerin 0.4 MG sublingual tablet   Commonly known as:  NITROSTAT   Dose:  0.4 mg   Quantity:  25 tablet        Place 1 tablet (0.4 mg) under the tongue every 5 minutes as needed for chest pain if you are still having symptoms after 3 doses (15 minutes) call 911.   Refills:  0        oxyCODONE IR 10 MG tablet   Commonly known as:  ROXICODONE   Dose:  10 mg        Take 10 mg by mouth 3 times daily   Refills:  0        PREVACID PO   Dose:  30 mg        Take 30 mg by mouth daily   Refills:  0        senna-docusate 8.6-50 MG per tablet   Commonly known as:  SENOKOT-S;PERICOLACE   Dose:  1 tablet        Take 1 tablet by mouth 2 times daily as needed for constipation (primary osteoarthritis of right hip, status post right hip replacement)   Refills:  0                Procedures and tests performed during your visit     Basic metabolic panel    CBC with platelets differential    CT Abdomen Pelvis w/o Contrast    EKG 12-lead, tracing only    Troponin I      Orders Needing Specimen Collection     None      Pending Results     Date and Time Order Name Status Description    5/25/2018 0313 CT Abdomen Pelvis w/o Contrast Preliminary     5/25/2018 0313 EKG 12-lead,  tracing only Preliminary             Pending Culture Results     No orders found from 5/23/2018 to 5/26/2018.            Pending Results Instructions     If you had any lab results that were not finalized at the time of your Discharge, you can call the ED Lab Result RN at [phone]. You will be contacted by this team for any positive Lab results or changes in treatment. The nurses are available 7 days a week from 10A to 6:30P.  You can leave a message 24 hours per day and they will return your call.        Test Results From Your Hospital Stay        5/25/2018  3:59 AM      Component Results     Component Value Ref Range & Units Status    WBC 7.7 4.0 - 11.0 10e9/L Final    RBC Count 5.43 4.4 - 5.9 10e12/L Final    Hemoglobin 14.5 13.3 - 17.7 g/dL Final    Hematocrit 44.7 40.0 - 53.0 % Final    MCV 82 78 - 100 fl Final    MCH 26.7 26.5 - 33.0 pg Final    MCHC 32.4 31.5 - 36.5 g/dL Final    RDW 13.4 10.0 - 15.0 % Final    Platelet Count 305 150 - 450 10e9/L Final    Diff Method Automated Method  Final    % Neutrophils 53.5 % Final    % Lymphocytes 34.4 % Final    % Monocytes 8.2 % Final    % Eosinophils 2.9 % Final    % Basophils 0.7 % Final    % Immature Granulocytes 0.3 % Final    Nucleated RBCs 0 0 /100 Final    Absolute Neutrophil 4.1 1.6 - 8.3 10e9/L Final    Absolute Lymphocytes 2.6 0.8 - 5.3 10e9/L Final    Absolute Monocytes 0.6 0.0 - 1.3 10e9/L Final    Absolute Eosinophils 0.2 0.0 - 0.7 10e9/L Final    Absolute Basophils 0.1 0.0 - 0.2 10e9/L Final    Abs Immature Granulocytes 0.0 0 - 0.4 10e9/L Final    Absolute Nucleated RBC 0.0  Final         5/25/2018  4:00 AM      Component Results     Component Value Ref Range & Units Status    Sodium 132 (L) 133 - 144 mmol/L Final    Potassium 4.3 3.4 - 5.3 mmol/L Final    Chloride 98 94 - 109 mmol/L Final    Carbon Dioxide 28 20 - 32 mmol/L Final    Anion Gap 6 3 - 14 mmol/L Final    Glucose 176 (H) 70 - 99 mg/dL Final    Urea Nitrogen 13 7 - 30 mg/dL Final     Creatinine 0.70 0.66 - 1.25 mg/dL Final    GFR Estimate >90 >60 mL/min/1.7m2 Final    Non  GFR Calc    GFR Estimate If Black >90 >60 mL/min/1.7m2 Final    African American GFR Calc    Calcium 8.7 8.5 - 10.1 mg/dL Final         5/25/2018  4:00 AM      Component Results     Component Value Ref Range & Units Status    Troponin I ES <0.015 0.000 - 0.045 ug/L Final    The 99th percentile for upper reference range is 0.045 ug/L.  Troponin values   in the range of 0.045 - 0.120 ug/L may be associated with risks of adverse   clinical events.                 5/25/2018  4:58 AM      Narrative     CT ABDOMEN PELVIS W/O CONTRAST  5/25/2018 4:34 AM     HISTORY: Abdominal pain severe and constant for one week.    TECHNIQUE: Noncontrast CT abdomen and pelvis was performed. Radiation  dose for this scan was reduced using automated exposure control,  adjustment of the mA and/or kV according to patient size, or iterative  reconstruction technique.    COMPARISON: 7/26/2016.    FINDINGS:  Abdomen: There is scarring at the right lung base. Evaluation of the  solid abdominal organs is limited by the lack of intravenous contrast.  There are postoperative changes in the posterior right lobe of the  liver. The spleen, pancreas, adrenal glands and kidneys are normal in  appearance. The gallbladder is absent. There is no abdominal or pelvic  lymph node enlargement. There is atherosclerotic calcification of the  aorta and its branches. No aneurysm.    Pelvis: A right hip arthroplasty causes streak artifact through the  pelvis. There is no bowel obstruction or inflammation. The appendix is  normal. No free intraperitoneal gas or fluid.        Impression     IMPRESSION: No acute abnormality. No bowel obstruction or  inflammation.                Clinical Quality Measure: Blood Pressure Screening     Your blood pressure was checked while you were in the emergency department today. The last reading we obtained was  BP: (!) 192/102 .  "Please read the guidelines below about what these numbers mean and what you should do about them.  If your systolic blood pressure (the top number) is less than 120 and your diastolic blood pressure (the bottom number) is less than 80, then your blood pressure is normal. There is nothing more that you need to do about it.  If your systolic blood pressure (the top number) is 120-139 or your diastolic blood pressure (the bottom number) is 80-89, your blood pressure may be higher than it should be. You should have your blood pressure rechecked within a year by a primary care provider.  If your systolic blood pressure (the top number) is 140 or greater or your diastolic blood pressure (the bottom number) is 90 or greater, you may have high blood pressure. High blood pressure is treatable, but if left untreated over time it can put you at risk for heart attack, stroke, or kidney failure. You should have your blood pressure rechecked by a primary care provider within the next 4 weeks.  If your provider in the emergency department today gave you specific instructions to follow-up with your doctor or provider even sooner than that, you should follow that instruction and not wait for up to 4 weeks for your follow-up visit.        Thank you for choosing [facility]       Thank you for choosing [facility] for your care. Our goal is always to provide you with excellent care. Hearing back from our patients is one way we can continue to improve our services. Please take a few minutes to complete the written survey that you may receive in the mail after you visit with us. Thank you!        [employer] Information     [email or web address] lets you send messages to your doctor, view your test results, renew your prescriptions, schedule appointments and more. To sign up, go to [email or web address] . Click on \"Log in\" on the left side of the screen, which will take you to the Welcome page. Then click on \"Sign up Now\" on the right side of the page. "     You will be asked to enter the access code listed below, as well as some personal information. Please follow the directions to create your username and password.     Your access code is: [email or web address]  Expires: 2018  1:37 PM     Your access code will  in 90 days. If you need help or a new code, please call your [facility] clinic or [phone].        Care EveryWhere ID     This is your Care EveryWhere ID. This could be used by other organizations to access your [facility] medical records  [id number]        Equal Access to Services     [facility]: Rosa Maria Marcelino, pj stephens, brittney wisdom, fady astudillo . So [facility] [phone].    ATENCIÓN: Si habla español, tiene a burgos disposición servicios gratuitos de asistencia lingüística. Llame al [phone].    We comply with applicable federal civil rights laws and [place] laws. We do not discriminate on the basis of race, color, national origin, age, disability, sex, sexual orientation, or gender identity.            After Visit Summary       This is your record. Keep this with you and show to your community pharmacist(s) and doctor(s) at your next visit.

## 2018-06-26 ENCOUNTER — HOSPITAL ENCOUNTER (EMERGENCY)
Facility: CLINIC | Age: 58
Discharge: HOME | End: 2018-06-26
Attending: EMERGENCY MEDICINE | Admitting: EMERGENCY MEDICINE
Payer: COMMERCIAL

## 2018-06-26 ENCOUNTER — APPOINTMENT (OUTPATIENT)
Dept: GENERAL RADIOLOGY | Facility: CLINIC | Age: 58
End: 2018-06-26
Attending: EMERGENCY MEDICINE
Payer: COMMERCIAL

## 2018-06-26 VITALS
SYSTOLIC BLOOD PRESSURE: 191 MMHG | OXYGEN SATURATION: 97 % | DIASTOLIC BLOOD PRESSURE: 115 MMHG | RESPIRATION RATE: 16 BRPM | TEMPERATURE: 98.6 F

## 2018-06-26 DIAGNOSIS — R10.13 ABDOMINAL PAIN, EPIGASTRIC: ICD-10-CM

## 2018-06-26 LAB
ALBUMIN SERPL-MCNC: 4.2 G/DL (ref 3.4–5)
ALP SERPL-CCNC: 106 U/L (ref 40–150)
ALT SERPL W P-5'-P-CCNC: 28 U/L (ref 0–70)
ANION GAP SERPL CALCULATED.3IONS-SCNC: 6 MMOL/L (ref 3–14)
AST SERPL W P-5'-P-CCNC: 11 U/L (ref 0–45)
BASOPHILS # BLD AUTO: 0 10E9/L (ref 0–0.2)
BASOPHILS NFR BLD AUTO: 0.4 %
BILIRUB SERPL-MCNC: 0.5 MG/DL (ref 0.2–1.3)
BUN SERPL-MCNC: 16 MG/DL (ref 7–30)
CALCIUM SERPL-MCNC: 8.7 MG/DL (ref 8.5–10.1)
CHLORIDE SERPL-SCNC: 97 MMOL/L (ref 94–109)
CO2 SERPL-SCNC: 26 MMOL/L (ref 20–32)
CREAT SERPL-MCNC: 0.55 MG/DL (ref 0.66–1.25)
DIFFERENTIAL METHOD BLD: ABNORMAL
EOSINOPHIL # BLD AUTO: 0.1 10E9/L (ref 0–0.7)
EOSINOPHIL NFR BLD AUTO: 1.2 %
ERYTHROCYTE [DISTWIDTH] IN BLOOD BY AUTOMATED COUNT: 14.2 % (ref 10–15)
GFR SERPL CREATININE-BSD FRML MDRD: >90 ML/MIN/1.7M2
GLUCOSE SERPL-MCNC: 148 MG/DL (ref 70–99)
HCT VFR BLD AUTO: 46.6 % (ref 40–53)
HGB BLD-MCNC: 15.1 G/DL (ref 13.3–17.7)
IMM GRANULOCYTES # BLD: 0 10E9/L (ref 0–0.4)
IMM GRANULOCYTES NFR BLD: 0.2 %
LACTATE BLD-SCNC: 1 MMOL/L (ref 0.7–2)
LIPASE SERPL-CCNC: 91 U/L (ref 73–393)
LYMPHOCYTES # BLD AUTO: 2.9 10E9/L (ref 0.8–5.3)
LYMPHOCYTES NFR BLD AUTO: 30.7 %
MCH RBC QN AUTO: 26.3 PG (ref 26.5–33)
MCHC RBC AUTO-ENTMCNC: 32.4 G/DL (ref 31.5–36.5)
MCV RBC AUTO: 81 FL (ref 78–100)
MONOCYTES # BLD AUTO: 0.7 10E9/L (ref 0–1.3)
MONOCYTES NFR BLD AUTO: 7.7 %
NEUTROPHILS # BLD AUTO: 5.5 10E9/L (ref 1.6–8.3)
NEUTROPHILS NFR BLD AUTO: 59.8 %
NRBC # BLD AUTO: 0 10*3/UL
NRBC BLD AUTO-RTO: 0 /100
PLATELET # BLD AUTO: 320 10E9/L (ref 150–450)
POTASSIUM SERPL-SCNC: 4.1 MMOL/L (ref 3.4–5.3)
PROT SERPL-MCNC: 7.4 G/DL (ref 6.8–8.8)
RBC # BLD AUTO: 5.75 10E12/L (ref 4.4–5.9)
SODIUM SERPL-SCNC: 129 MMOL/L (ref 133–144)
WBC # BLD AUTO: 9.3 10E9/L (ref 4–11)

## 2018-06-26 PROCEDURE — 93005 ELECTROCARDIOGRAM TRACING: CPT

## 2018-06-26 PROCEDURE — 96374 THER/PROPH/DIAG INJ IV PUSH: CPT

## 2018-06-26 PROCEDURE — 25000125 ZZHC RX 250: Performed by: EMERGENCY MEDICINE

## 2018-06-26 PROCEDURE — 25000128 H RX IP 250 OP 636: Performed by: EMERGENCY MEDICINE

## 2018-06-26 PROCEDURE — 96376 TX/PRO/DX INJ SAME DRUG ADON: CPT

## 2018-06-26 PROCEDURE — 83605 ASSAY OF LACTIC ACID: CPT | Performed by: EMERGENCY MEDICINE

## 2018-06-26 PROCEDURE — 74019 RADEX ABDOMEN 2 VIEWS: CPT

## 2018-06-26 PROCEDURE — 83690 ASSAY OF LIPASE: CPT | Performed by: EMERGENCY MEDICINE

## 2018-06-26 PROCEDURE — 96361 HYDRATE IV INFUSION ADD-ON: CPT

## 2018-06-26 PROCEDURE — 85025 COMPLETE CBC W/AUTO DIFF WBC: CPT | Performed by: EMERGENCY MEDICINE

## 2018-06-26 PROCEDURE — 96375 TX/PRO/DX INJ NEW DRUG ADDON: CPT

## 2018-06-26 PROCEDURE — 99285 EMERGENCY DEPT VISIT HI MDM: CPT | Mod: 25

## 2018-06-26 PROCEDURE — 80053 COMPREHEN METABOLIC PANEL: CPT | Performed by: EMERGENCY MEDICINE

## 2018-06-26 RX ORDER — ONDANSETRON 4 MG/1
4 TABLET, ORALLY DISINTEGRATING ORAL EVERY 8 HOURS PRN
Qty: 10 TABLET | Refills: 0 | Status: SHIPPED | OUTPATIENT
Start: 2018-06-26 | End: 2018-06-29

## 2018-06-26 RX ORDER — ONDANSETRON 2 MG/ML
4 INJECTION INTRAMUSCULAR; INTRAVENOUS EVERY 30 MIN PRN
Status: DISCONTINUED | OUTPATIENT
Start: 2018-06-26 | End: 2018-06-26 | Stop reason: HOSPADM

## 2018-06-26 RX ORDER — SODIUM CHLORIDE 9 MG/ML
1000 INJECTION, SOLUTION INTRAVENOUS CONTINUOUS
Status: DISCONTINUED | OUTPATIENT
Start: 2018-06-26 | End: 2018-06-26 | Stop reason: HOSPADM

## 2018-06-26 RX ORDER — LORAZEPAM 2 MG/ML
1 INJECTION INTRAMUSCULAR ONCE
Status: COMPLETED | OUTPATIENT
Start: 2018-06-26 | End: 2018-06-26

## 2018-06-26 RX ADMIN — LORAZEPAM 1 MG: 2 INJECTION INTRAMUSCULAR; INTRAVENOUS at 19:29

## 2018-06-26 RX ADMIN — ONDANSETRON 4 MG: 2 INJECTION INTRAMUSCULAR; INTRAVENOUS at 19:30

## 2018-06-26 RX ADMIN — SODIUM CHLORIDE 1000 ML: 9 INJECTION, SOLUTION INTRAVENOUS at 19:31

## 2018-06-26 RX ADMIN — PANTOPRAZOLE SODIUM 40 MG: 40 INJECTION, POWDER, FOR SOLUTION INTRAVENOUS at 19:28

## 2018-06-26 RX ADMIN — ONDANSETRON 4 MG: 2 INJECTION INTRAMUSCULAR; INTRAVENOUS at 20:44

## 2018-06-26 ASSESSMENT — ENCOUNTER SYMPTOMS
APPETITE CHANGE: 1
ABDOMINAL PAIN: 1
VOMITING: 1

## 2018-06-26 NOTE — ED AVS SNAPSHOT
[facility] Emergency Department    [address]    [facility] [address]    Phone:  [phone]    Fax:  [phone]                                       Perico Houston   MRN: [id number]    Department:  [facility] Emergency Department   Date of Visit:  6/26/2018           After Visit Summary Signature Page     I have received my discharge instructions, and my questions have been answered. I have discussed any challenges I see with this plan with the nurse or doctor.    ..........................................................................................................................................  Patient/Patient Representative Signature      ..........................................................................................................................................  Patient Representative Print Name and Relationship to Patient    ..................................................               ................................................  Date                                            Time    ..........................................................................................................................................  Reviewed by Signature/Title    ...................................................              ..............................................  Date                                                            Time

## 2018-06-26 NOTE — ED AVS SNAPSHOT
[facility] Emergency Department    [address]    [facility] [address]    Phone:  [phone]    Fax:  [phone]                                       Perico Houston   MRN: [id number]    Department:  [facility] Emergency Department   Date of Visit:  6/26/2018           Patient Information     Date Of Birth          1960        Your diagnoses for this visit were:     Abdominal pain, epigastric        You were seen by Torres Crockett MD.      Follow-up Information     Follow up with Kianna Silver in 2 days.    Specialty:  Physician Assistant    Contact information:    [facility]  [address] [facility] [id number] [phone]          Discharge Instructions         Clear Liquid Diet    Clear liquids are any liquid that you can see through. They are also very easy to digest. You may be put on a clear liquid diet if you are recovering from irritation or infection of the stomach or intestinal tract. This diet may also be used before surgery or special procedures such as a colonoscopy. You should not be on this diet for more than 3 days. Below are some clear liquids you can have on this diet.  Adults and children over 2 years old  Adults should drink a total of 2 to 3 quarts of liquid per day. It may be easier to drink small frequent servings rather than a few large ones. Liquids can include:    Fruit juices. Strained orange juice or lemonade (no pulp); apple, grape, and cranberry juice; clear fruit drinks    Beverages. Sport drinks, sodas, mineral water (plain or flavored), tea, black coffee, liquid gelatin (add twice the recommended amount of water)    Soups. Clear broth    Desserts. Plain gelatin, popsicles, fruit juice bars  Children under 2 years old  Oral rehydration fluids are available at drug stores and most grocery stores. You don t need a prescription.  Date Last Reviewed: 8/1/2016 2000-2017 [employer]. [address] [place] [address]  [place] [address]. All rights reserved. This information is not intended as a substitute for professional medical care. Always follow your healthcare professional's instructions.          24 Hour Appointment Hotline       To make an appointment at any [facility], call [phone]). If you don't have a family doctor or clinic, we will help you find one. [place] clinics are conveniently located to serve the needs of you and your family.             Review of your medicines      START taking        Dose / Directions Last dose taken    ondansetron 4 MG ODT tab   Commonly known as:  ZOFRAN ODT   Dose:  4 mg   Quantity:  10 tablet        Take 1 tablet (4 mg) by mouth every 8 hours as needed   Refills:  0          Our records show that you are taking the medicines listed below. If these are incorrect, please call your family doctor or clinic.        Dose / Directions Last dose taken    aspirin 81 MG EC tablet   Dose:  81 mg   Quantity:  30 tablet        Take 1 tablet (81 mg) by mouth daily   Refills:  0        canaliflozin tablet   Commonly known as:  INVOKANA   Dose:  150 mg        Take 150 mg by mouth every morning (before breakfast)   Refills:  0        clonazePAM 0.5 MG tablet   Commonly known as:  klonoPIN   Dose:  1 mg        Take 1 mg by mouth 3 times daily as needed for anxiety   Refills:  0        CYCLOBENZAPRINE HCL PO   Dose:  5 mg        Take 5 mg by mouth 3 times daily as needed for muscle spasms (back pain)   Refills:  0        gabapentin 300 MG capsule   Commonly known as:  NEURONTIN   Dose:  300 mg   Quantity:  90 capsule        Take 1 capsule (300 mg) by mouth 3 times daily   Refills:  1        glipiZIDE 5 MG 24 hr tablet   Commonly known as:  GLUCOTROL XL   Dose:  10 mg        Take 10 mg by mouth daily   Refills:  0        hydrOXYzine 50 MG tablet   Commonly known as:  ATARAX   Dose:  50 mg   Quantity:  20 tablet        Take 1 tablet (50 mg) by mouth 3 times daily as  needed for other (back pain)   Refills:  0        lisinopril 40 MG tablet   Commonly known as:  PRINIVIL/ZESTRIL   Dose:  40 mg   Quantity:  30 tablet        Take 1 tablet (40 mg) by mouth daily   Refills:  0        LORAZEPAM PO   Dose:  0.5 mg        Take 0.5 mg by mouth every 8 hours as needed for anxiety   Refills:  0        metFORMIN 1000 MG tablet   Commonly known as:  GLUCOPHAGE   Dose:  1000 mg   Quantity:  60 tablet        Take 1 tablet (1,000 mg) by mouth 2 times daily (with meals)   Refills:  0        nitroGLYcerin 0.4 MG sublingual tablet   Commonly known as:  NITROSTAT   Dose:  0.4 mg   Quantity:  25 tablet        Place 1 tablet (0.4 mg) under the tongue every 5 minutes as needed for chest pain if you are still having symptoms after 3 doses (15 minutes) call 911.   Refills:  0        oxyCODONE IR 10 MG tablet   Commonly known as:  ROXICODONE   Dose:  10 mg        Take 10 mg by mouth 3 times daily   Refills:  0        PREVACID PO   Dose:  30 mg        Take 30 mg by mouth daily   Refills:  0        senna-docusate 8.6-50 MG per tablet   Commonly known as:  SENOKOT-S;PERICOLACE   Dose:  1 tablet        Take 1 tablet by mouth 2 times daily as needed for constipation (primary osteoarthritis of right hip, status post right hip replacement)   Refills:  0                Prescriptions were sent or printed at these locations (1 Prescription)                   Other Prescriptions                Printed at Department/Unit printer (1 of 1)         ondansetron (ZOFRAN ODT) 4 MG ODT tab                Procedures and tests performed during your visit     Abdomen XR, 2 vw, flat and upright    CBC with platelets differential    Cardiac Continuous Monitoring    Comprehensive metabolic panel    EKG 12 lead    EKG 12-lead, tracing only    Lactic acid whole blood    Lipase    Peripheral IV: Standard    Pulse oximetry nursing      Orders Needing Specimen Collection     None      Pending Results     Date and Time Order Name  Status Description    6/26/2018 1848 EKG 12-lead, tracing only Preliminary             Pending Culture Results     No orders found from 6/24/2018 to 6/27/2018.            Pending Results Instructions     If you had any lab results that were not finalized at the time of your Discharge, you can call the ED Lab Result RN at [phone]. You will be contacted by this team for any positive Lab results or changes in treatment. The nurses are available 7 days a week from 10A to 6:30P.  You can leave a message 24 hours per day and they will return your call.        Test Results From Your Hospital Stay        6/26/2018  7:39 PM      Component Results     Component Value Ref Range & Units Status    WBC 9.3 4.0 - 11.0 10e9/L Final    RBC Count 5.75 4.4 - 5.9 10e12/L Final    Hemoglobin 15.1 13.3 - 17.7 g/dL Final    Hematocrit 46.6 40.0 - 53.0 % Final    MCV 81 78 - 100 fl Final    MCH 26.3 (L) 26.5 - 33.0 pg Final    MCHC 32.4 31.5 - 36.5 g/dL Final    RDW 14.2 10.0 - 15.0 % Final    Platelet Count 320 150 - 450 10e9/L Final    Diff Method Automated Method  Final    % Neutrophils 59.8 % Final    % Lymphocytes 30.7 % Final    % Monocytes 7.7 % Final    % Eosinophils 1.2 % Final    % Basophils 0.4 % Final    % Immature Granulocytes 0.2 % Final    Nucleated RBCs 0 0 /100 Final    Absolute Neutrophil 5.5 1.6 - 8.3 10e9/L Final    Absolute Lymphocytes 2.9 0.8 - 5.3 10e9/L Final    Absolute Monocytes 0.7 0.0 - 1.3 10e9/L Final    Absolute Eosinophils 0.1 0.0 - 0.7 10e9/L Final    Absolute Basophils 0.0 0.0 - 0.2 10e9/L Final    Abs Immature Granulocytes 0.0 0 - 0.4 10e9/L Final    Absolute Nucleated RBC 0.0  Final         6/26/2018  7:55 PM      Component Results     Component Value Ref Range & Units Status    Sodium 129 (L) 133 - 144 mmol/L Final    Potassium 4.1 3.4 - 5.3 mmol/L Final    Chloride 97 94 - 109 mmol/L Final    Carbon Dioxide 26 20 - 32 mmol/L Final    Anion Gap 6 3 - 14 mmol/L Final    Glucose 148 (H) 70 - 99 mg/dL  Final    Urea Nitrogen 16 7 - 30 mg/dL Final    Creatinine 0.55 (L) 0.66 - 1.25 mg/dL Final    GFR Estimate >90 >60 mL/min/1.7m2 Final    Non  GFR Calc    GFR Estimate If Black >90 >60 mL/min/1.7m2 Final    African American GFR Calc    Calcium 8.7 8.5 - 10.1 mg/dL Final    Bilirubin Total 0.5 0.2 - 1.3 mg/dL Final    Albumin 4.2 3.4 - 5.0 g/dL Final    Protein Total 7.4 6.8 - 8.8 g/dL Final    Alkaline Phosphatase 106 40 - 150 U/L Final    ALT 28 0 - 70 U/L Final    AST 11 0 - 45 U/L Final         6/26/2018  7:42 PM      Component Results     Component Value Ref Range & Units Status    Lactic Acid 1.0 0.7 - 2.0 mmol/L Final         6/26/2018  7:55 PM      Component Results     Component Value Ref Range & Units Status    Lipase 91 73 - 393 U/L Final         6/26/2018  8:45 PM      Narrative     ABDOMEN TWO VIEWS 6/26/2018 8:31 PM     HISTORY: Pain     COMPARISON: None.        Impression     IMPRESSION: Moderate amount of stool in colon and rectum. No evidence  for obstruction or free air. Surgical clips are seen projecting in  right hemidiaphragm region.    DHEERAJ WEISS MD                Clinical Quality Measure: Blood Pressure Screening     Your blood pressure was checked while you were in the emergency department today. The last reading we obtained was  BP: (!) 191/115 . Please read the guidelines below about what these numbers mean and what you should do about them.  If your systolic blood pressure (the top number) is less than 120 and your diastolic blood pressure (the bottom number) is less than 80, then your blood pressure is normal. There is nothing more that you need to do about it.  If your systolic blood pressure (the top number) is 120-139 or your diastolic blood pressure (the bottom number) is 80-89, your blood pressure may be higher than it should be. You should have your blood pressure rechecked within a year by a primary care provider.  If your systolic blood pressure (the top number)  "is 140 or greater or your diastolic blood pressure (the bottom number) is 90 or greater, you may have high blood pressure. High blood pressure is treatable, but if left untreated over time it can put you at risk for heart attack, stroke, or kidney failure. You should have your blood pressure rechecked by a primary care provider within the next 4 weeks.  If your provider in the emergency department today gave you specific instructions to follow-up with your doctor or provider even sooner than that, you should follow that instruction and not wait for up to 4 weeks for your follow-up visit.        Thank you for choosing [facility]       Thank you for choosing [facility] for your care. Our goal is always to provide you with excellent care. Hearing back from our patients is one way we can continue to improve our services. Please take a few minutes to complete the written survey that you may receive in the mail after you visit with us. Thank you!        [employer] Information     [employer] lets you send messages to your doctor, view your test results, renew your prescriptions, schedule appointments and more. To sign up, go to [email or web address] . Click on \"Log in\" on the left side of the screen, which will take you to the Welcome page. Then click on \"Sign up Now\" on the right side of the page.     You will be asked to enter the access code listed below, as well as some personal information. Please follow the directions to create your username and password.     Your access code is: [email or web address]  Expires: 2018  1:37 PM     Your access code will  in 90 days. If you need help or a new code, please call your [facility] clinic or [phone].        Care EveryWhere ID     This is your Care EveryWhere ID. This could be used by other organizations to access your [facility] medical records  [id number]        Equal Access to Services     MILADIS SIMPSON AH: pj Mccann qaybta kaalmada adeegyada, " fady rahman'aan ah. So [facility] [phone].    ATENCIÓN: Si habla español, tiene a burgos disposición servicios gratuitos de asistencia lingüística. Llame al [phone].    We comply with applicable federal civil rights laws and [place] laws. We do not discriminate on the basis of race, color, national origin, age, disability, sex, sexual orientation, or gender identity.            After Visit Summary       This is your record. Keep this with you and show to your community pharmacist(s) and doctor(s) at your next visit.

## 2018-06-26 NOTE — ED TRIAGE NOTES
Patient here c/o abdominal and chest pain. He states he has anxiety and has ran out of medications. He is scheduled to see a psychiatrist next week but is unable to get medications filled. Patient reports he has vomited today

## 2018-06-26 NOTE — ED PROVIDER NOTES
"  History     Chief Complaint:  Abdominal pain     HPI   Perico Houston is a 58 year old male who presents with abdominal pain.  Patient says that he was prescribed Cymbalta and after taking his first dose yesterday he began having abdominal pain.  Patient says that the abdominal pain is located near his stomach and says that the pain has persisted into today prompting ED visit.  In the ED now, the patient says that he has had episodes of vomiting which is \"yellowish in color\".  Patient says that he has decreased appetite and therefore was not able to take his daily medications.  He says that Zantac has helped relieve symptoms.    Allergies:  Aspirin  Hydrocodone-Acetaminophen    Medications:    aspirin EC 81 MG EC tablet  canaliflozin (INVOKANA) tablet  clonazePAM (KLONOPIN) 0.5 MG tablet  CYCLOBENZAPRINE HCL PO  gabapentin (NEURONTIN) 300 MG capsule  glipiZIDE (GLUCOTROL XL) 5 MG 24 hr tablet  hydrOXYzine (ATARAX) 50 MG tablet  Lansoprazole (PREVACID PO)  lisinopril (PRINIVIL,ZESTRIL) 40 MG tablet  LORAZEPAM PO  metFORMIN (GLUCOPHAGE) 1000 MG tablet  nitroglycerin (NITROSTAT) 0.4 MG SL tablet  oxyCODONE (ROXICODONE) 10 MG IR tablet  senna-docusate (SENOKOT-S;PERICOLACE) 8.6-50 MG per tablet    Past Medical History:    Anxiety   CAD (coronary artery disease)   Chronic low back pain   Chronic pain syndrome   Depressive disorder   Diabetes mellitus, type 2 (H)   Gastro-oesophageal reflux disease   Hypertension   NSTEMI     Past Surgical History:    Angiogram  Cholecystectomy   Clavicle surgery     Family History:    Diabetes    Social History:  Smoking Status: Former Smoker  Alcohol Use: Yes  Patient presents with family member.  Marital Status:   [2]    Review of Systems   Constitutional: Positive for appetite change.   Gastrointestinal: Positive for abdominal pain and vomiting.   All other systems reviewed and are negative.      Physical Exam     Patient Vitals for the past 24 hrs:   BP Temp Temp src Heart " Rate Resp SpO2   06/26/18 2100 (!) 179/99 - - - - -   06/26/18 2045 (!) 171/103 - - 72 - 97 %   06/26/18 2015 (!) 152/106 - - 80 - 96 %   06/26/18 2000 (!) 162/104 - - 75 - 97 %   06/26/18 1945 (!) 164/102 - - 80 - 97 %   06/26/18 1941 - 98.6  F (37  C) Oral - - -   06/26/18 1900 (!) 174/104 - - - - 97 %   06/26/18 1845 (!) 190/113 - - - - 98 %   06/26/18 1839 (!) 210/124 - - 86 16 100 %       Physical Exam   Constitutional: He appears well-developed and well-nourished.   HENT:   Right Ear: External ear normal.   Left Ear: External ear normal.   Mouth/Throat: Oropharynx is clear and moist. No oropharyngeal exudate.   Eyes: Conjunctivae are normal. Pupils are equal, round, and reactive to light. No scleral icterus.   Neck: Normal range of motion. Neck supple.   Cardiovascular: Normal rate, regular rhythm, normal heart sounds and intact distal pulses.  Exam reveals no gallop and no friction rub.    No murmur heard.  Pulmonary/Chest: Effort normal and breath sounds normal. No respiratory distress. He has no wheezes. He has no rales.   Abdominal: Soft. Bowel sounds are normal. He exhibits no distension and no mass. There is tenderness.   Mild epigastric tenderness.  Pain is distractible.   Musculoskeletal: He exhibits no edema.   Neurological: He is alert.   Skin: Skin is warm and dry. No rash noted.   Psychiatric: He has a normal mood and affect.         Emergency Department Course   ECG (19:01:37):  Rate 78 bpm. TN interval 174. QRS duration 92. QT/QTc 390/444. P-R-T axes 35 23 -8.  Normal sinus rhythm, septal infarct, age undetermined, T-wave abnormality, consider inferior ischemia, abnormal ECG interpreted at 1902 by Torres Crockett MD.    Imaging:  Radiographic findings were communicated with the patient who voiced understanding of the findings.  X-ray abdomen, 2 views:  Moderate amount of stool in colon and rectum. No evidence  for obstruction or free air. Surgical clips are seen projecting in  right hemidiaphragm  region.  Result per radiology.     Laboratory:  CBC with platelets differential: WBC 9.3, Hgb 15.1,   Comprehensive metabolic panel: Glucose 148 high, sodium 129 low, creatinine 0.55 low otherwise within normal limits  Lactic acid whole blood: 1.0  Lipase: 91    Interventions:  1928 Protonix 40 mg IV  1929 Ativan 1 mg IV  1930 Zofran 4 mg IV  1931 NS 1L IV  2044 Zofran 4 mg IV    Emergency Department Course:  Nursing notes and vitals reviewed.  I performed an exam of the patient as documented above.     IV inserted. Medicine administered as documented above. Blood drawn. This was sent to the lab for further testing, results above.    Patient had an EKG, results above.     The patient was sent for a XR while in the emergency department, findings above.     2050 I rechecked the patient and discussed the results of his workup thus far. Feels better.    The patient passed a PO challenge prior to discharge from the ED.    Findings and plan explained to the Patient. Patient discharged home with instructions regarding supportive care, medications, and reasons to return. The importance of close follow-up was reviewed. The patient was prescribed Zofran.    I personally reviewed the laboratory results with the Patient and answered all related questions prior to discharge.       Impression & Plan      Medical Decision Making:  Patient is a 58-year-old patient who is well-known to the ED for chronic abdominal pain.  Here for evaluation, he was apparently started on Cymbalta yesterday by his doctor and had worsening abdominal pain after taking the pill.  He was quite dramatic and complained of diffuse pain.  However, on exam, he was quiet distractible and did not have pain to palpation as I was talking to him.  We did get labs and IV fluids and controlled his nausea because he did have vomiting at home.  Sodium is a bit on the low side but apparently has had that before and it is not new.  He is now hydrated with a  negative workup.  No evidence of obstruction on abdominal x-ray.  He is now feeling better.  We discussed starting him on Zofran and he should follow-up with his primary care provider if he needs to discuss starting a different medication.  He is comfortable doing this.  Patient is discharged in stable condition.    Diagnosis:    ICD-10-CM    1. Abdominal pain, epigastric R10.13        Disposition:  discharged to home    Discharge Medications:  New Prescriptions    ONDANSETRON (ZOFRAN ODT) 4 MG ODT TAB    Take 1 tablet (4 mg) by mouth every 8 hours as needed         Brayden Rodriguez  6/26/2018   [facility] EMERGENCY DEPARTMENT  IBrayden, carlota serving as a scribe at 6:40 PM on 6/26/2018 to document services personally performed by Torres Crockett MD based on my observations and the provider's statements to me.        Torres Crockett MD  06/26/18 8819

## 2018-06-27 LAB — INTERPRETATION ECG - MUSE: NORMAL

## 2018-06-27 NOTE — DISCHARGE INSTRUCTIONS
Clear Liquid Diet    Clear liquids are any liquid that you can see through. They are also very easy to digest. You may be put on a clear liquid diet if you are recovering from irritation or infection of the stomach or intestinal tract. This diet may also be used before surgery or special procedures such as a colonoscopy. You should not be on this diet for more than 3 days. Below are some clear liquids you can have on this diet.  Adults and children over 2 years old  Adults should drink a total of 2 to 3 quarts of liquid per day. It may be easier to drink small frequent servings rather than a few large ones. Liquids can include:    Fruit juices. Strained orange juice or lemonade (no pulp); apple, grape, and cranberry juice; clear fruit drinks    Beverages. Sport drinks, sodas, mineral water (plain or flavored), tea, black coffee, liquid gelatin (add twice the recommended amount of water)    Soups. Clear broth    Desserts. Plain gelatin, popsicles, fruit juice bars  Children under 2 years old  Oral rehydration fluids are available at drug stores and most grocery stores. You don t need a prescription.  Date Last Reviewed: 8/1/2016 2000-2017 [employer]. [address] [place] [address], [place] [address]. All rights reserved. This information is not intended as a substitute for professional medical care. Always follow your healthcare professional's instructions.

## 2018-09-24 ENCOUNTER — HOSPITAL ENCOUNTER (EMERGENCY)
Facility: CLINIC | Age: 58
Discharge: HOME | End: 2018-09-24
Attending: EMERGENCY MEDICINE | Admitting: EMERGENCY MEDICINE
Payer: COMMERCIAL

## 2018-09-24 VITALS
OXYGEN SATURATION: 99 % | DIASTOLIC BLOOD PRESSURE: 107 MMHG | RESPIRATION RATE: 20 BRPM | SYSTOLIC BLOOD PRESSURE: 185 MMHG | HEART RATE: 75 BPM | TEMPERATURE: 97.7 F

## 2018-09-24 DIAGNOSIS — R10.13 ABDOMINAL PAIN, EPIGASTRIC: ICD-10-CM

## 2018-09-24 DIAGNOSIS — F41.9 ANXIETY: ICD-10-CM

## 2018-09-24 DIAGNOSIS — R07.89 OTHER CHEST PAIN: ICD-10-CM

## 2018-09-24 LAB
ALBUMIN SERPL-MCNC: 3.7 G/DL (ref 3.4–5)
ALP SERPL-CCNC: 94 U/L (ref 40–150)
ALT SERPL W P-5'-P-CCNC: 26 U/L (ref 0–70)
ANION GAP SERPL CALCULATED.3IONS-SCNC: 6 MMOL/L (ref 3–14)
AST SERPL W P-5'-P-CCNC: 15 U/L (ref 0–45)
BILIRUB SERPL-MCNC: 0.3 MG/DL (ref 0.2–1.3)
BUN SERPL-MCNC: 15 MG/DL (ref 7–30)
CALCIUM SERPL-MCNC: 8.9 MG/DL (ref 8.5–10.1)
CHLORIDE SERPL-SCNC: 99 MMOL/L (ref 94–109)
CO2 SERPL-SCNC: 25 MMOL/L (ref 20–32)
CREAT SERPL-MCNC: 0.72 MG/DL (ref 0.66–1.25)
GFR SERPL CREATININE-BSD FRML MDRD: >90 ML/MIN/1.7M2
GLUCOSE SERPL-MCNC: 276 MG/DL (ref 70–99)
INTERPRETATION ECG - MUSE: NORMAL
LIPASE SERPL-CCNC: 82 U/L (ref 73–393)
POTASSIUM SERPL-SCNC: 5.2 MMOL/L (ref 3.4–5.3)
PROT SERPL-MCNC: 7.2 G/DL (ref 6.8–8.8)
SODIUM SERPL-SCNC: 130 MMOL/L (ref 133–144)
TROPONIN I SERPL-MCNC: <0.015 UG/L (ref 0–0.04)
TROPONIN I SERPL-MCNC: <0.015 UG/L (ref 0–0.04)

## 2018-09-24 PROCEDURE — 93005 ELECTROCARDIOGRAM TRACING: CPT

## 2018-09-24 PROCEDURE — 25000132 ZZH RX MED GY IP 250 OP 250 PS 637: Performed by: EMERGENCY MEDICINE

## 2018-09-24 PROCEDURE — 25000125 ZZHC RX 250: Performed by: EMERGENCY MEDICINE

## 2018-09-24 PROCEDURE — 84484 ASSAY OF TROPONIN QUANT: CPT | Performed by: EMERGENCY MEDICINE

## 2018-09-24 PROCEDURE — 80053 COMPREHEN METABOLIC PANEL: CPT | Performed by: EMERGENCY MEDICINE

## 2018-09-24 PROCEDURE — 99284 EMERGENCY DEPT VISIT MOD MDM: CPT

## 2018-09-24 PROCEDURE — 83690 ASSAY OF LIPASE: CPT | Performed by: EMERGENCY MEDICINE

## 2018-09-24 PROCEDURE — 36415 COLL VENOUS BLD VENIPUNCTURE: CPT | Performed by: EMERGENCY MEDICINE

## 2018-09-24 RX ORDER — NITROGLYCERIN 0.4 MG/1
0.4 TABLET SUBLINGUAL EVERY 5 MIN PRN
Status: DISCONTINUED | OUTPATIENT
Start: 2018-09-24 | End: 2018-09-24 | Stop reason: HOSPADM

## 2018-09-24 RX ADMIN — LIDOCAINE HYDROCHLORIDE 30 ML: 20 SOLUTION ORAL; TOPICAL at 14:20

## 2018-09-24 ASSESSMENT — ENCOUNTER SYMPTOMS
NAUSEA: 1
FEVER: 0
VOMITING: 0
SHORTNESS OF BREATH: 1
BLOOD IN STOOL: 0
COUGH: 0
FREQUENCY: 1
CHILLS: 0
ABDOMINAL PAIN: 1

## 2018-09-24 NOTE — ED NOTES
"RN went into patient room to administer nitroglycerin for chest pain and patient was sleeping. When patient woke up, pt stated, \"oh my head is hurting now.\" MD notified and no medications administered at this time.   "

## 2018-09-24 NOTE — DISCHARGE INSTRUCTIONS
Your EKG and labs are normal.    Please follow up with your psychiatrist and primary care doctor regarding today's visit.    Return to the emergency department if you develop any new or worsening symptoms      Discharge Instructions  Chest Pain    You have been seen today for chest pain or discomfort.  At this time, your provider has found no signs that your chest pain is due to a serious or life-threatening condition, (or you have declined more testing and/or admission to the hospital). However, sometimes there is a serious problem that does not show up right away. Your evaluation today may not be complete and you may need further testing and evaluation.     Generally, every Emergency Department visit should have a follow-up clinic visit with either a primary or a specialty clinic/provider. Please follow-up as instructed by your emergency provider today.  Return to the Emergency Department if:    Your chest pain changes, gets worse, starts to happen more often, or comes with less activity.    You are newly short of breath.    You get very weak or tired.    You pass out or faint.    You have any new symptoms, like fever, cough, numb legs, or you cough up blood.    You have anything else that worries you.    Until you follow-up with your regular provider, please do the following:    Take one aspirin daily unless you have an allergy or are told not to by your provider.    If a stress test appointment has been made, go to the appointment.    If you have questions, contact your regular provider.    Follow-up with your regular provider/clinic as directed; this is very important.    If you were given a prescription for medicine here today, be sure to read all of the information (including the package insert) that comes with your prescription.  This will include important information about the medicine, its side effects, and any warnings that you need to know about.  The pharmacist who fills the prescription can provide  more information and answer questions you may have about the medicine.  If you have questions or concerns that the pharmacist cannot address, please call or return to the Emergency Department.       Remember that you can always come back to the Emergency Department if you are not able to see your regular provider in the amount of time listed above, if you get any new symptoms, or if there is anything that worries you.      Discharge Instructions  Abdominal Pain    Abdominal pain (belly pain) can be caused by many things. Your evaluation today does not show the exact cause for your pain. Your provider today has decided that it is unlikely your pain is due to a life threatening problem, or a problem requiring surgery or hospital admission. Sometimes those problems cannot be found right away, so it is very important that you follow up as directed.  Sometimes only the changes which occur over time allow the cause of your pain to be found.    Generally, every Emergency Department visit should have a follow-up clinic visit with either a primary or a specialty clinic/provider. Please follow-up as instructed by your emergency provider today. With abdominal pain, we often recommend very close follow-up, such as the following day.    ADULTS:  Return to the Emergency Department right away if:      You get an oral temperature above 102oF or as directed by your provider.    You have blood in your stools. This may be bright red or appear as black, tarry stools.      You keep vomiting (throwing up) or cannot drink liquids.    You see blood when you vomit.     You cannot have a bowel movement or you cannot pass gas.    Your stomach gets bloated or bigger.    Your skin or the whites of your eyes look yellow.    You faint.    You have bloody, frequent or painful urination (peeing).    You have new symptoms or anything that worries you.    CHILDREN:  Return to the Emergency Department right away if your child has any of the  above-listed symptoms or the following:      Pushes your hand away or screams/cries when his/her belly is touched.    You notice your child is very fussy or weak.    Your child is very tired and is too tired to eat or drink.    Your child is dehydrated.  Signs of dehydration can be:  o Significant change in the amount of wet diapers/urine.  o Your infant or child starts to have dry mouth and lips, or no saliva (spit) or tears.    PREGNANT WOMEN:  Return to the Emergency Department right away if you have any of the above-listed symptoms or the following:      You have bleeding, leaking fluid or passing tissue from the vagina.    You have worse pain or cramping, or pain in your shoulder or back.    You have vomiting that will not stop.    You have a temperature of 100oF or more.    Your baby is not moving as much as usual.    You faint.    You get a bad headache with or without eye problems and abdominal pain.    You have a seizure.    You have unusual discharge from your vagina and abdominal pain.    Abdominal pain is pretty common during pregnancy.  Your pain may or may not be related to your pregnancy. You should follow-up closely with your OB provider so they can evaluate you and your baby.  Until you follow-up with your regular provider, do the following:       Avoid sex and do not put anything in your vagina.    Drink clear fluids.    Only take medications approved by your provider.    MORE INFORMATION:    Appendicitis:  A possible cause of abdominal pain in any person who still has their appendix is acute appendicitis. Appendicitis is often hard to diagnose.  Testing does not always rule out early appendicitis or other causes of abdominal pain. Close follow-up with your provider and re-evaluations may be needed to figure out the reason for your abdominal pain.    Follow-up:  It is very important that you make an appointment with your clinic and go to the appointment.  If you do not follow-up with your primary  "provider, it may result in missing an important development which could result in permanent injury or disability and/or lasting pain.  If there is any problem keeping your appointment, call your provider or return to the Emergency Department.    Medications:  Take your medications as directed by your provider today.  Before using over-the-counter medications, ask your provider and make sure to take the medications as directed.  If you have any questions about medications, ask your provider.    Diet:  Resume your normal diet as much as possible, but do not eat fried, fatty or spicy foods while you have pain.  Do not drink alcohol or have caffeine.  Do not smoke tobacco.    Probiotics: If you have been given an antibiotic, you may want to also take a probiotic pill or eat yogurt with live cultures. Probiotics have \"good bacteria\" to help your intestines stay healthy. Studies have shown that probiotics help prevent diarrhea (loose stools) and other intestine problems (including C. diff infection) when you take antibiotics. You can buy these without a prescription in the pharmacy section of the store.     If you were given a prescription for medicine here today, be sure to read all of the information (including the package insert) that comes with your prescription.  This will include important information about the medicine, its side effects, and any warnings that you need to know about.  The pharmacist who fills the prescription can provide more information and answer questions you may have about the medicine.  If you have questions or concerns that the pharmacist cannot address, please call or return to the Emergency Department.       Remember that you can always come back to the Emergency Department if you are not able to see your regular provider in the amount of time listed above, if you get any new symptoms, or if there is anything that worries you.    "

## 2018-09-24 NOTE — ED PROVIDER NOTES
History     Chief Complaint:  Abdominal Pain      HPI   Perico Houston is a 58 year old male with a history of CAD, NSTEMI, diabetes, anxiety, and hypertension who presents to the emergency department via EMS for evaluation of abdominal pain. The patient reports of chest pain, nausea, shortness of breath, and lower abdominal pain after he ate pizza. He also has increased urinary frequency and states his blood sugar was elevated. He took 5 mg Percocet for his back and hip which did not improve his pain after 45 minutes so he decided to come to the ED. He states that four days ago he was started on three new medications, one of which was Klonopin from a different  that he has not used in the past and is concerned this could be a cause for his symptoms. He is also taking glipizide which is reportedly from a different  as well.  He has not had pain like this before. The patient denies vomiting, fevers, chills, blood in stool, or cough.    Cardiac Risk Factors   Sex: Male   Tobacco: Negative   Hypertension: Positive  Diabetes: Positive  Hyperlipidemia: Negative  Family History: Negative    Allergies:  Aspirin  Hydrocodone-Acetaminophen     Medications:    Aspirin  Invokana  Klonopin  Cyclobenzaprine  Neurontin  Glucotrol  Atarax  Prevacid  Lisinopril  Lorazepam  Glucophage  Nitrostat  Roxicodone  Senna-docusate     Past Medical History:    Anxiety  CAD  Chronic low back pain  Depressive disorder  Diabetes   GERD  Hypertension   NSTEMI  Cervical disc herniation     Past Surgical History:    Angiogram  Cholecystectomy  Clavicle surgery  Partial liver resection     Family History:    Diabetes    Social History:  Former smoker: 5/1/2017  Positive for alcohol use.   Marital Status:       Review of Systems   Constitutional: Negative for chills and fever.   Respiratory: Positive for shortness of breath. Negative for cough.    Cardiovascular: Positive for chest pain.   Gastrointestinal: Positive  for abdominal pain and nausea. Negative for blood in stool and vomiting.   Genitourinary: Positive for frequency.   All other systems reviewed and are negative.    Physical Exam     Patient Vitals for the past 24 hrs:   BP Temp Temp src Pulse Heart Rate Resp SpO2   09/24/18 1830 (!) 185/107 - - 75 75 - 99 %   09/24/18 1815 (!) 186/105 - - - 73 - 98 %   09/24/18 1800 (!) 164/115 - - - 71 - 99 %   09/24/18 1745 (!) 155/102 - - - 69 - 97 %   09/24/18 1730 - - - 68 68 - 97 %   09/24/18 1715 (!) 138/91 - - - - - -   09/24/18 1700 (!) 137/96 - - - 75 - 97 %   09/24/18 1645 (!) 144/94 - - 72 72 - 96 %   09/24/18 1630 (!) 138/95 - - - 71 - 95 %   09/24/18 1615 (!) 154/94 - - - 74 - 96 %   09/24/18 1600 (!) 122/105 - - - 71 - 97 %   09/24/18 1545 (!) 146/95 - - 73 73 - 95 %   09/24/18 1530 (!) 141/99 - - - 71 - 94 %   09/24/18 1515 (!) 154/103 - - - - - 96 %   09/24/18 1500 145/88 - - 74 74 - 96 %   09/24/18 1445 135/88 - - - - - 96 %   09/24/18 1430 129/69 - - - - - 97 %   09/24/18 1415 (!) 152/94 - - - - - 97 %   09/24/18 1400 (!) 137/93 97.7  F (36.5  C) Oral 74 74 20 96 %       Physical Exam  General: Alert, in acute distress 2/2 chest and back pain  Neuro:  PERRL.  EOMI.  Gait stable, no focal deficits  HEENT:  Moist mucous membranes.  Posterior oropharynx clear, no exudates.  Conjunctiva normal.  CV:  RRR, no m/r/g, skin warm and well perfused  Pulm:  CTAB, no wheezes/rhonchi/rales.  No acute distress, breathing comfortably  GI:  Soft, nontender, nondistended.  No rebound or guarding.  Normal bowel sounds  MSK:  Moving all extremities.  No focal areas of edema, erythema, or tenderness  Skin:  WWP, no rashes, no lower extremity edema, skin color normal, no diaphoresis  Psych:  Well-appearing, normal affect, regular speech, organized and appropriate thought content    Emergency Department Course   ECG:  @ 1404  Indication: Chest pain  Vent. Rate 82 bpm. CA interval 174 ms. QRS duration 92 ms. QT/QTc 376/439 ms. P-R-T  axis 39 51 -10.   Normal sinus rhythm. Septal infarct, age undetermined. T wave abnormality, consider inferior ischemia. Abnormal ECG.   Read @ 1404 by Dr. Weaver.    Laboratory:  CMP: Glucose 276 (H), NA: 130 (L), o/w WNL (Creatinine: 0.72)    Lipase: 82    1530 Troponin I: <0.015  1735 Troponin I: <0.015    Interventions:  1420 Xylocaine GI cocktail 30 mLs PO    Emergency Department Course:  1403 Nursing notes and vitals reviewed. I performed an exam of the patient as documented above.     EKG was done, interpretation as above.    Medicine administered as documented above. Blood drawn. This was sent to the lab for further testing, results above.    1820 I rechecked the patient and discussed the results of his workup thus far. He is feeling improved at this time.     Findings and plan explained to the Patient. Patient discharged home with instructions regarding supportive care, medications, and reasons to return. The importance of close follow-up was reviewed.     I personally reviewed the laboratory results with the Patient and answered all related questions prior to discharge.     Impression & Plan    Medical Decision Making:  Perico Houston is a 58 year old male with a history significant for anxiety, CAD, diabetes, here for evaluation of chest pain and abdominal pain as noted in the HPI. He denies any fevers and is afebrile here. Noted to be slightly hypertensive but the rest of his vital signs are stable. Abdominal exam is completely benign without any peritoneal signs or focal tenderness. Workup included EKG with normal sinus rhythm without any acute ST changes without concern for ischemia or infarct. Troponin and delta troponin were within normal limits. Chemistries were otherwise unremarkable except for mild hyponatremia. Lipase was normal. Very low suspicion for ACS, PE, dissection, colitis, diverticulitis, appendicitis or other. Symptoms could be from gastritis as he has had evidence of this on EGD in the  past; no concern at this time for bleeding ulcer. Repeat abdominal exam is completely benign and I do not feel any advanced imaging is indicated. Patient is concerned that symptoms may be secondary to having recent medication changes as noted in the HPI. With otherwise normal workup with reasonable clinical certainty I feel patient is safe for discharge home with close follow up with his PCP and psychiatrist regarding the medication changes noted. He is in agreement with this and will return for any new or worsening symptoms discussed at bedside.     Diagnosis:    ICD-10-CM    1. Other chest pain R07.89    2. Abdominal pain, epigastric R10.13    3. Anxiety F41.9        Disposition:  discharged to home    Scribe Disclosure:  I, Moses Kaur, am serving as a scribe on 9/24/2018 at 2:03 PM to personally document services performed by Dr. Meg MD based on my observations and the provider's statements to me.       Moses Kaur  9/24/2018   [facility] EMERGENCY DEPARTMENT       Dima Weaver MD  09/24/18 2005

## 2018-09-24 NOTE — ED NOTES
Bed: ED01  Expected date: 9/24/18  Expected time:   Means of arrival: Ambulance  Comments:  Carlos Manuel Ramos

## 2018-09-24 NOTE — ED TRIAGE NOTES
A&O x4, ABCDs intact. Pt arrives via EMS for complaint of abdominal pain and midsternal chest pain. Pt has had pain for 4 days. Pt states that his BG is elevated. EMS states that his BG is 319.

## 2018-12-09 ENCOUNTER — HOSPITAL ENCOUNTER (EMERGENCY)
Facility: CLINIC | Age: 58
End: 2018-12-09
Payer: COMMERCIAL

## 2018-12-09 ENCOUNTER — HOSPITAL ENCOUNTER (EMERGENCY)
Facility: CLINIC | Age: 58
Discharge: HOME | End: 2018-12-09
Attending: EMERGENCY MEDICINE | Admitting: EMERGENCY MEDICINE
Payer: MEDICAID

## 2018-12-09 VITALS — OXYGEN SATURATION: 99 % | SYSTOLIC BLOOD PRESSURE: 143 MMHG | DIASTOLIC BLOOD PRESSURE: 81 MMHG

## 2018-12-09 DIAGNOSIS — F10.920 ALCOHOLIC INTOXICATION WITHOUT COMPLICATION (H): ICD-10-CM
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
## 2018-12-09 PROCEDURE — 99283 EMERGENCY DEPT VISIT LOW MDM: CPT

## 2018-12-09 PROCEDURE — 93005 ELECTROCARDIOGRAM TRACING: CPT

## 2018-12-09 ASSESSMENT — ENCOUNTER SYMPTOMS
NERVOUS/ANXIOUS: 1
NECK PAIN: 0

## 2018-12-09 NOTE — ED PROVIDER NOTES
"  History     Chief Complaint:  Alcohol Intoxication     History limited due to alcohol intoxication.     HPI   Perico Houston is a 58 year old male with a history of hypertension, coronary artery disease, anxiety, and type 2 diabetes who presents to the emergency department today for evaluation of alcohol intoxication. Patient reports that he was drinking with some friends last night, roughly half a pint, when he got up and felt \"wobbly.\" His son came to pick him up and he describes falling down. He did not pass out, hit his head, or have chest pain prior. He also endorses having a panic attack. He is feeling improved now, and denies neck pain, recent illness, history of heart problems, and says he does not drink everyday.     Allergies:  Aspirin  Hydrocodone-acetaminophen      Medications:    Invokana   Klonopin    Neurontin   Glucotrol   Atarax  Lisinopril   Metformin   Nitrostat    Past Medical History:    Anxiety   CAD (coronary artery disease)    Chronic pain syndrome   Depressive disorder   Diabetes mellitus, type 2   Gastro-oesophageal reflux disease   Hypertension     Past Surgical History:    Angiogram  Cholecystectomy  Clavicle surgery  Partial liver resection     Family History:    Father: Diabetes  Daughter: Diabetes  Paternal Uncle: Diabetes  No family history of coronary artery disease.     Social History:  The patient was accompanied to the ED by wife.  Smoking Status: Former Smoker   Packs/day: 0.25   Yeats: 20.00   Quit roughly 1 year ago  Smokeless Tobacco: Never Used  Alcohol Use: Positive   Rare   Marital Status:       Review of Systems   Cardiovascular: Negative for chest pain.   Musculoskeletal: Negative for neck pain.   Neurological: Negative for syncope.        [place] \"wobbly\"   Psychiatric/Behavioral: The patient is nervous/anxious.    All other systems reviewed and are negative.    Physical Exam     Patient Vitals for the past 24 hrs:   BP SpO2   12/09/18 0700 143/81 99 %   12/09/18 " 0630 129/83 98 %     Physical Exam  General: Patient is alert and interactive when I enter the room  Head:  The scalp, face, and head appear normal  Eyes:  Conjunctivae are normal  ENT:    The nose is normal    Pinnae are normal    External acoustic canals are normal  Neck:  Trachea midline  CV:  Pulses are normal, RRR, no murmurs   Resp:  No respiratory distress, CTAB   Abdomen:      Soft, non-tender, non-distended  Musc:  Normal muscular tone    No major joint effusions    No asymmetric leg swelling  Skin:  No rash or lesions noted  Neuro: Mildly slurred speech. Face is symmetric.     Moving all extremities well.   Psych:  Awake. Alert.  Normal affect.  Appropriate interactions.    Emergency Department Course     ECG:  ECG taken at 0450, ECG read at 0450  Normal sinus rhythm  Septal infarct, age undetermined  T wave abnormality, consider inferior ischemia   Rate 93 bpm. VT interval 176 ms. QRS duration 92 ms. QT/QTc 388/482 ms. P-R-T axes 30 31 -14.    Emergency Department Course:    0450 EKG taken as noted above.     0651 Nursing notes and vitals reviewed.    0700 I performed an exam of the patient as documented above.     0720 Patient eloped.     Impression & Plan      Medical Decision Making:  Perico Houston is a 58 year old male who presents to the emergency department today for evaluation of possible syncope and alcohol intoxication. Patient was in the lobby for about 3 hours prior to me seeing him. He has mild intoxication although is able to converse with me without difficulty. His EKG revealed some mild T wave flattening but otherwise unremarkable. I discussed with him and his wife that we would likely do a little bit more workup including blood alcohol and a sugar. Patient's wife clearly wanted to go home. He felt back to baseline. Before all this was done, patient had already eloped. He was not on a hold so this was okay and he seems to be alert and oriented and able to care for himself. Per patient,  there was never any syncope however there was initial concern but based on his EKG there was no arrhythmia. Patient eloped before any other further conversations could be had.     Diagnosis:    ICD-10-CM    1. Alcoholic intoxication without complication (H)Active F10.920    2. Fall, initial encounterActive W19.XXXA      Disposition:   Patient eloped.    Scribe Disclosure:  Esperanza TALAVERA, am serving as a scribe at 7:31 AM on 12/9/2018 to document services personally performed by Mitzi Covarrubias MD based on my observations and the provider's statements to me.       Mitzi Covarrubias MD  12/11/18 0725

## 2018-12-10 LAB — INTERPRETATION ECG - MUSE: NORMAL

## 2019-12-18 ENCOUNTER — HOSPITAL ENCOUNTER (EMERGENCY)
Facility: CLINIC | Age: 59
Discharge: HOME | End: 2019-12-19
Attending: EMERGENCY MEDICINE | Admitting: EMERGENCY MEDICINE

## 2019-12-18 DIAGNOSIS — R07.9 CHEST PAIN, UNSPECIFIED TYPE: ICD-10-CM

## 2019-12-18 PROCEDURE — 93005 ELECTROCARDIOGRAM TRACING: CPT

## 2019-12-18 PROCEDURE — 99284 EMERGENCY DEPT VISIT MOD MDM: CPT

## 2019-12-18 PROCEDURE — 84484 ASSAY OF TROPONIN QUANT: CPT | Performed by: EMERGENCY MEDICINE

## 2019-12-18 ASSESSMENT — MIFFLIN-ST. JEOR: SCORE: 1810.55

## 2019-12-18 NOTE — ED AVS SNAPSHOT
Emergency Department  [address] [place] [address]  Phone:  [phone]  Fax:  [phone]                                    Perico Houston   MRN: [id number]    Department:   Emergency Department   Date of Visit:  12/18/2019           After Visit Summary Signature Page    I have received my discharge instructions, and my questions have been answered. I have discussed any challenges I see with this plan with the nurse or doctor.    ..........................................................................................................................................  Patient/Patient Representative Signature      ..........................................................................................................................................  Patient Representative Print Name and Relationship to Patient    ..................................................               ................................................  Date                                   Time    ..........................................................................................................................................  Reviewed by Signature/Title    ...................................................              ..............................................  Date                                               Time          22EPIC Rev 08/18

## 2019-12-19 VITALS
HEART RATE: 86 BPM | BODY MASS INDEX: 28.44 KG/M2 | TEMPERATURE: 98.4 F | RESPIRATION RATE: 12 BRPM | DIASTOLIC BLOOD PRESSURE: 79 MMHG | SYSTOLIC BLOOD PRESSURE: 122 MMHG | WEIGHT: 210 LBS | HEIGHT: 72 IN | OXYGEN SATURATION: 95 %

## 2019-12-19 LAB
INTERPRETATION ECG - MUSE: NORMAL
TROPONIN I SERPL-MCNC: <0.015 UG/L (ref 0–0.04)
TROPONIN I SERPL-MCNC: <0.015 UG/L (ref 0–0.04)

## 2019-12-19 PROCEDURE — 84484 ASSAY OF TROPONIN QUANT: CPT | Performed by: EMERGENCY MEDICINE

## 2019-12-19 PROCEDURE — 25000132 ZZH RX MED GY IP 250 OP 250 PS 637: Performed by: EMERGENCY MEDICINE

## 2019-12-19 PROCEDURE — 25000125 ZZHC RX 250: Performed by: EMERGENCY MEDICINE

## 2019-12-19 RX ORDER — LORAZEPAM 0.5 MG/1
0.5 TABLET ORAL ONCE
Status: COMPLETED | OUTPATIENT
Start: 2019-12-19 | End: 2019-12-19

## 2019-12-19 RX ADMIN — LIDOCAINE HYDROCHLORIDE 30 ML: 20 SOLUTION ORAL; TOPICAL at 00:43

## 2019-12-19 RX ADMIN — LORAZEPAM 0.5 MG: 0.5 TABLET ORAL at 00:43
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
## 2019-12-19 NOTE — ED NOTES
Bed: ED19  Expected date: 12/18/19  Expected time: 11:40 PM  Means of arrival: Ambulance  Comments:  Cong 2 59M CP; elevation

## 2019-12-19 NOTE — ED TRIAGE NOTES
EMS arrival:    Patient arrives via EMS with complaints of chest pain.  EMS gave 5 nitroglycerin,  324 mg aspirin, 50 mcg fentanyl.  Patient currently moaning, whimpering in pain.  20 G IV to left hand.

## 2019-12-26 ENCOUNTER — HOSPITAL ENCOUNTER (EMERGENCY)
Facility: CLINIC | Age: 59
Discharge: HOME | End: 2019-12-26
Attending: EMERGENCY MEDICINE | Admitting: EMERGENCY MEDICINE

## 2019-12-26 ENCOUNTER — APPOINTMENT (OUTPATIENT)
Dept: GENERAL RADIOLOGY | Facility: CLINIC | Age: 59
End: 2019-12-26
Attending: EMERGENCY MEDICINE

## 2019-12-26 VITALS
BODY MASS INDEX: 28.99 KG/M2 | DIASTOLIC BLOOD PRESSURE: 117 MMHG | HEIGHT: 72 IN | SYSTOLIC BLOOD PRESSURE: 154 MMHG | WEIGHT: 214 LBS | RESPIRATION RATE: 18 BRPM | OXYGEN SATURATION: 97 % | TEMPERATURE: 98.7 F | HEART RATE: 86 BPM

## 2019-12-26 DIAGNOSIS — M62.838 MUSCLE SPASM: ICD-10-CM

## 2019-12-26 DIAGNOSIS — I10 HYPERTENSION, UNSPECIFIED TYPE: ICD-10-CM

## 2019-12-26 DIAGNOSIS — M79.622 PAIN OF LEFT UPPER ARM: ICD-10-CM

## 2019-12-26 PROCEDURE — 99285 EMERGENCY DEPT VISIT HI MDM: CPT

## 2019-12-26 PROCEDURE — 73030 X-RAY EXAM OF SHOULDER: CPT | Mod: LT

## 2019-12-26 PROCEDURE — 73080 X-RAY EXAM OF ELBOW: CPT | Mod: LT

## 2019-12-26 PROCEDURE — 73060 X-RAY EXAM OF HUMERUS: CPT | Mod: LT

## 2019-12-26 PROCEDURE — 25000132 ZZH RX MED GY IP 250 OP 250 PS 637: Performed by: EMERGENCY MEDICINE

## 2019-12-26 RX ORDER — ACETAMINOPHEN 500 MG
1000 TABLET ORAL ONCE
Status: COMPLETED | OUTPATIENT
Start: 2019-12-26 | End: 2019-12-26

## 2019-12-26 RX ORDER — CYCLOBENZAPRINE HCL 10 MG
10 TABLET ORAL 3 TIMES DAILY PRN
Qty: 20 TABLET | Refills: 0 | Status: SHIPPED | OUTPATIENT
Start: 2019-12-26 | End: 2020-01-01

## 2019-12-26 RX ADMIN — ACETAMINOPHEN 1000 MG: 500 TABLET, FILM COATED ORAL at 06:28

## 2019-12-26 ASSESSMENT — MIFFLIN-ST. JEOR: SCORE: 1828.7

## 2019-12-26 NOTE — ED AVS SNAPSHOT
[facility] Emergency Department  [address]  [facility] [address]  Phone:  [phone]  Fax:  [phone]                                    Perico Houston   MRN: [id number]    Department:  [facility] Emergency Department   Date of Visit:  12/26/2019           After Visit Summary Signature Page    I have received my discharge instructions, and my questions have been answered. I have discussed any challenges I see with this plan with the nurse or doctor.    ..........................................................................................................................................  Patient/Patient Representative Signature      ..........................................................................................................................................  Patient Representative Print Name and Relationship to Patient    ..................................................               ................................................  Date                                   Time    ..........................................................................................................................................  Reviewed by Signature/Title    ...................................................              ..............................................  Date                                               Time          22EPIC Rev 08/18

## 2019-12-26 NOTE — ED TRIAGE NOTES
Pt to ER with c/o left arm pain , pt states that he fell on Monday and landed on left arm, pain from elbow up to shoulder

## 2019-12-26 NOTE — DISCHARGE INSTRUCTIONS
Discharge Instructions  Extremity Injury    You were seen today for an injury to an extremity (arm, hand, leg, or foot). You may have a bruise, strain, or fracture (broken bone).    Generally, every Emergency Department visit should have a follow-up clinic visit with either a primary or a specialty clinic/provider. Please follow-up as instructed by your emergency provider today.  Return to the Emergency Department right away if:  Your pain seems to change or get worse or there is pain in a new area that wasn t evaluated today.  Your extremity becomes pale, cool, blue, or numb or tingling past the injury.  You have more drainage, redness or pain in the area of the cut or abrasion.  You have pain that you cannot control with the medicine recommended or prescribed here, or you have pain that seems too much for your injury.  Your child (who is injured) will not stop crying or is much more fussy than normal.  You have new symptoms or anything that worries you.    What to Expect:  Your swelling and pain may be worse the day after your injury, but should not be severe and should start getting better after that. You should not have new symptoms and your pain should not get worse.  You may start to get a bruise over the injured area or below the injured area (bruising can follow gravity).  Your movement and strength should get better with time.  Some injuries may not show up until after you have left the Emergency Department so it is important to follow-up as directed.  Your injury may prevent you from working.  Follow-up with your regular provider to get a work release note.  Pain medications or your injury may make it unsafe to drive or operate machinery.    Home Care:  RICE: Rest, Ice, Compression, Elevation  Rest: Rest your injured area for at least 1-2 days. After that you may start using your extremity again as long as there is not too much pain.   Ice: Apply ice your injured area for 15 minutes at a time, at least 3  times a day. Use a cloth between the ice bag and your skin to prevent frostbite. Do not sleep with an ice pack or heating pad on, since this can cause burns or skin injury.  Compression: You may use an elastic bandage (Ace  Wrap) if it makes you more comfortable. Wrap it just tight enough to provide light compression, like a new pair of socks feels. Loosen the bandage if you have swelling past the bandage.  Elevation: Raise the injured area above the level of your heart as much as possible in the first 1-2 days.    Use Tylenol  (acetaminophen), Motrin (ibuprofen), or Advil  (ibuprofen) for your pain unless you have an allergy or are told not to use these medications by your provider.  Take the medications as instructed on the package. Tylenol  (acetaminophen) is in many prescription medicines and non-prescription medicines--check all of your medicines to be sure you aren t taking more than 3000 mg per day.  Please follow any other instructions that were discussed with you by your provider.    Stretching/Exercises:  You may have been provided with instructions for stretching or exercises. If your injury was to your arm or shoulder and your provider put you in a sling or an immobilizer, it is important that you take off your immobilizer within 3 days and stretch/move your shoulder, unless your provider specifically tells you to not move your shoulder.  This is to prevent further injury such as a  frozen shoulder .     If you were given a prescription for medicine here today, be sure to read all of the information (including the package insert) that comes with your prescription.  This will include important information about the medicine, its side effects, and any warnings that you need to know about.  The pharmacist who fills the prescription can provide more information and answer questions you may have about the medicine.  If you have questions or concerns that the pharmacist cannot address, please call or return to  the Emergency Department.     Remember that you can always come back to the Emergency Department if you are not able to see your regular provider in the amount of time listed above, if you get any new symptoms, or if there is anything that worries you.     Discharge Instructions  Hypertension - High Blood Pressure    During you visit to the Emergency Department, your blood pressure was higher than the recommended blood pressure.  This may be related to stress, pain, medication or other temporary conditions. In these cases, your blood pressure may return to normal on its own. If you have a history of high blood pressure, you may need to have your provider adjust your medications. Sometimes, your high measurement here may indicate that you have developed high blood pressure that will stay high unless it is treated. As a general rule, high blood pressure causes problems over years rather than days, weeks, or months. So, while it is important to treat blood pressure, it is rarely important to treat blood pressure immediately. Occasionally we will begin a medication in the Emergency Department; more often we will recommend close follow-up for medications with a primary doctor/clinic.    Generally, every Emergency Department visit should have a follow-up clinic visit with either a primary or a specialty clinic/provider. Please follow-up as instructed by your emergency provider today.    Return to the Emergency Department if you start to have:  A severe headache.  Chest pain.  Shortness of breath.  Weakness or numbness that affects one part of the body.  Confusion.  Vision changes.  Significant swelling of legs and/or eyes.  A reaction to any medication started in the Emergency Department.    What can I do to help myself?  Avoid alcohol.  Take any blood pressure medicine that you are prescribed.  Get a good night s sleep.  Lower your salt intake.  Exercise.  Lose weight.  Manage stress.  See your doctor regularly    If  blood pressure medication was started in the Emergency Department:  The medicine may not have an immediate effect. The body and brain determine what blood pressure you have. The medicine s job is to retrain the body s  thermostat  to a lower blood pressure.  You will need to follow up with your provider to see how this medicine is working for you.  If you were given a prescription for medicine here today, be sure to read all of the information (including the package insert) that comes with your prescription.  This will include important information about the medicine, its side effects, and any warnings that you need to know about.  The pharmacist who fills the prescription can provide more information and answer questions you may have about the medicine.  If you have questions or concerns that the pharmacist cannot address, please call or return to the Emergency Department.   Remember that you can always come back to the Emergency Department if you are not able to see your regular provider in the amount of time listed above, if you get any new symptoms, or if there is anything that worries you.

## 2019-12-26 NOTE — ED PROVIDER NOTES
Visit Date:   12/26/2019      CHIEF COMPLAINT:  Left arm pain.      HISTORY OF PRESENT ILLNESS:  Perico Houston is a 59-year-old male who notes he has had left arm pain since falling on Monday while putting up decorations.  He says he stepped onto a swivel stool and did not realize it would swivel and fell backwards, hit his left arm on the bar and then fell down to the floor.  He notes initially he had some left elbow pain that seemed to radiate upward into his shoulder and neck, but seems to be localized to the left upper arm.  He denies any associated swelling.  He denies any numbness or tingling.  He has had decreased use the left arm due to pain.  When he tries to bend it fully, feeling as though the muscle spasms.  He denies any head injury or neck pain aside from pain that radiates.  He denies any blood thinning medications.  He denies any other concerns on today's visit.        ALLERGIES:  ASPIRIN, HYDROCODONE, ACETAMINOPHEN.      PAST MEDICAL HISTORY:  Hypertension, depressive disorder, CAD, chronic pain syndrome, GERD, anxiety, chronic low back pain, type 2 diabetes mellitus.      PAST SURGICAL HISTORY:  Reviewed in [facility].      SOCIAL HISTORY:  Former smoker.  He uses alcohol occasionally.  He is here with his significant other.      REVIEW OF SYSTEMS:  As noted in history of present illness.  All other systems are reviewed and negative.      PHYSICAL EXAMINATION:   VITAL SIGNS:  Blood pressure was 174/107, temp 98.7 Fahrenheit, pulse is 81, respiratory rate is 18, oxygen saturation 98% on room air.   GENERAL:  This is an adult male sitting upright.   CARDIOVASCULAR:  Radial pulses palpable in the left wrist.   MUSCULOSKELETAL:  The patient is tender diffusely in the left upper arm including the left elbow and left lateral shoulder.  There is no palpable crepitus or deformity.  The patient is able to range at the left elbow and shoulder, but is limited due to pain with even minor abduction or flexion.  No  palpable edema.  Nontender over the neck and back.   SKIN:  Warm and dry.  No rash, lesions or ecchymoses.   NEUROLOGIC:  Alert and oriented x3.  No focal neurologic findings.  Sensation intact to light touch over all dermatomes left upper extremity.  4/5 finger abduction, thumb opposition, wrist extension, strength in left upper extremity.   PSYCHIATRIC:  Normal affect.      LABORATORY AND DIAGNOSTICS:  X-ray elbow, 3 views left.  Preliminary result.  Impression:  No evidence of fracture or effusion.  Large olecranon spur.      Humerus x-ray, 2 views, left.  Preliminary result.  Impression:  No evidence of acute fracture or subluxation.  Large olecranon spur.      X-ray shoulder left, 3 views.  Preliminary result.  Impression:  It appears that there has been some resection of distal clavicle.  No evidence of acute fracture or subluxation.      INTERVENTIONS:  Tylenol 1000 mg p.o.      EMERGENCY DEPARTMENT COURSE:      The patient was roomed and examined by me.      The patient sent for x-ray.      Medications administered as above.      I reviewed x-ray findings with the patient.  He requested a sling for comfort and this seems reasonable.  Given appropriate range of motion exercises as recommendation.  All questions were answered prior to discharge.      MEDICAL DECISION MAKING:  Perico Houston is a 59-year-old male who presents to the Emergency Department with concerns for pain in his left upper arm after a fall days ago.  He has no neurovascular abnormalities on examination.  Mildly hypertensive, but has a history of hypertension.  X-ray did not show any acute fracture or dislocation.  He was placed in a sling for comfort.  Recommend ibuprofen, Tylenol and Flexeril as needed for pain and muscle spasm.  Recommend follow up with his orthopedist, who he has reportedly seen before for different issues for reassessment should his pain persist in the next 3-5 days.  Return immediately to the Emergency Department with  worsening.  All questions were answered prior to discharge.      DIAGNOSES:   1.  Left upper arm pain.   2.  Left arm spasm.         JORDEN OCHOA MD             D: 2019   T: 2019   MT: JAJA      Name:     DARRON KAUR   MRN:      [id number]        Account:      [id number]   :      1960           Visit Date:   2019      Document: [id number]

## 2020-01-02 ENCOUNTER — HOSPITAL ENCOUNTER (EMERGENCY)
Facility: CLINIC | Age: 60
Discharge: HOME | End: 2020-01-02
Attending: EMERGENCY MEDICINE | Admitting: EMERGENCY MEDICINE
Payer: COMMERCIAL

## 2020-01-02 VITALS
HEART RATE: 92 BPM | TEMPERATURE: 98.2 F | RESPIRATION RATE: 20 BRPM | SYSTOLIC BLOOD PRESSURE: 162 MMHG | DIASTOLIC BLOOD PRESSURE: 88 MMHG | OXYGEN SATURATION: 90 %

## 2020-01-02 DIAGNOSIS — F41.9 ANXIETY: ICD-10-CM

## 2020-01-02 LAB
ANION GAP SERPL CALCULATED.3IONS-SCNC: 6 MMOL/L (ref 3–14)
BASOPHILS # BLD AUTO: 0.1 10E9/L (ref 0–0.2)
BASOPHILS NFR BLD AUTO: 0.6 %
BUN SERPL-MCNC: 16 MG/DL (ref 7–30)
CALCIUM SERPL-MCNC: 9.1 MG/DL (ref 8.5–10.1)
CHLORIDE SERPL-SCNC: 97 MMOL/L (ref 94–109)
CO2 SERPL-SCNC: 27 MMOL/L (ref 20–32)
CREAT SERPL-MCNC: 0.71 MG/DL (ref 0.66–1.25)
DIFFERENTIAL METHOD BLD: ABNORMAL
EOSINOPHIL # BLD AUTO: 0.3 10E9/L (ref 0–0.7)
EOSINOPHIL NFR BLD AUTO: 2.9 %
ERYTHROCYTE [DISTWIDTH] IN BLOOD BY AUTOMATED COUNT: 12.4 % (ref 10–15)
ETHANOL SERPL-MCNC: <0.01 G/DL
GFR SERPL CREATININE-BSD FRML MDRD: >90 ML/MIN/{1.73_M2}
GLUCOSE BLDC GLUCOMTR-MCNC: 260 MG/DL (ref 70–99)
GLUCOSE SERPL-MCNC: 287 MG/DL (ref 70–99)
HCT VFR BLD AUTO: 49.9 % (ref 40–53)
HGB BLD-MCNC: 15.6 G/DL (ref 13.3–17.7)
IMM GRANULOCYTES # BLD: 0.1 10E9/L (ref 0–0.4)
IMM GRANULOCYTES NFR BLD: 0.6 %
INTERPRETATION ECG - MUSE: NORMAL
LYMPHOCYTES # BLD AUTO: 2.7 10E9/L (ref 0.8–5.3)
LYMPHOCYTES NFR BLD AUTO: 29.2 %
MCH RBC QN AUTO: 26.5 PG (ref 26.5–33)
MCHC RBC AUTO-ENTMCNC: 31.3 G/DL (ref 31.5–36.5)
MCV RBC AUTO: 85 FL (ref 78–100)
MONOCYTES # BLD AUTO: 0.9 10E9/L (ref 0–1.3)
MONOCYTES NFR BLD AUTO: 9.7 %
NEUTROPHILS # BLD AUTO: 5.2 10E9/L (ref 1.6–8.3)
NEUTROPHILS NFR BLD AUTO: 57 %
NRBC # BLD AUTO: 0 10*3/UL
NRBC BLD AUTO-RTO: 0 /100
PLATELET # BLD AUTO: 257 10E9/L (ref 150–450)
POTASSIUM SERPL-SCNC: 4.1 MMOL/L (ref 3.4–5.3)
RBC # BLD AUTO: 5.88 10E12/L (ref 4.4–5.9)
SODIUM SERPL-SCNC: 130 MMOL/L (ref 133–144)
TROPONIN I SERPL-MCNC: <0.015 UG/L (ref 0–0.04)
WBC # BLD AUTO: 9.1 10E9/L (ref 4–11)

## 2020-01-02 PROCEDURE — 99284 EMERGENCY DEPT VISIT MOD MDM: CPT

## 2020-01-02 PROCEDURE — 80048 BASIC METABOLIC PNL TOTAL CA: CPT | Performed by: EMERGENCY MEDICINE

## 2020-01-02 PROCEDURE — 85025 COMPLETE CBC W/AUTO DIFF WBC: CPT | Performed by: EMERGENCY MEDICINE

## 2020-01-02 PROCEDURE — 80320 DRUG SCREEN QUANTALCOHOLS: CPT | Performed by: EMERGENCY MEDICINE

## 2020-01-02 PROCEDURE — 25000132 ZZH RX MED GY IP 250 OP 250 PS 637: Performed by: EMERGENCY MEDICINE

## 2020-01-02 PROCEDURE — 25000125 ZZHC RX 250: Performed by: EMERGENCY MEDICINE

## 2020-01-02 PROCEDURE — 93005 ELECTROCARDIOGRAM TRACING: CPT

## 2020-01-02 PROCEDURE — 84484 ASSAY OF TROPONIN QUANT: CPT | Performed by: EMERGENCY MEDICINE

## 2020-01-02 PROCEDURE — 00000146 ZZHCL STATISTIC GLUCOSE BY METER IP

## 2020-01-02 RX ORDER — LORAZEPAM 0.5 MG/1
0.5 TABLET ORAL ONCE
Status: COMPLETED | OUTPATIENT
Start: 2020-01-02 | End: 2020-01-02

## 2020-01-02 RX ADMIN — LORAZEPAM 0.5 MG: 0.5 TABLET ORAL at 03:11

## 2020-01-02 RX ADMIN — LIDOCAINE HYDROCHLORIDE 30 ML: 20 SOLUTION ORAL; TOPICAL at 03:11

## 2020-01-02 RX ADMIN — LORAZEPAM 0.5 MG: 0.5 TABLET ORAL at 02:34

## 2020-01-02 ASSESSMENT — ENCOUNTER SYMPTOMS: NERVOUS/ANXIOUS: 1

## 2020-01-02 NOTE — ED TRIAGE NOTES
"Here for panic attack associated with feeling off balance, \"don't feel right,\" blood sugar concern. Per patient, have not taken his anxiety medication for 2 months due insurance issues. ABCs intact.   "

## 2020-01-02 NOTE — ED AVS SNAPSHOT
[facility] Emergency Department  [address]  [facility] [address]  Phone:  [phone]  Fax:  [phone]                                    Perico Houston   MRN: [id number]    Department:  [facility] Emergency Department   Date of Visit:  1/2/2020           After Visit Summary Signature Page    I have received my discharge instructions, and my questions have been answered. I have discussed any challenges I see with this plan with the nurse or doctor.    ..........................................................................................................................................  Patient/Patient Representative Signature      ..........................................................................................................................................  Patient Representative Print Name and Relationship to Patient    ..................................................               ................................................  Date                                   Time    ..........................................................................................................................................  Reviewed by Signature/Title    ...................................................              ..............................................  Date                                               Time          22EPIC Rev 08/18
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
## 2020-03-13 ENCOUNTER — HOSPITAL ENCOUNTER (EMERGENCY)
Facility: CLINIC | Age: 60
Discharge: HOME | End: 2020-03-13
Attending: EMERGENCY MEDICINE | Admitting: EMERGENCY MEDICINE
Payer: COMMERCIAL

## 2020-03-13 ENCOUNTER — APPOINTMENT (OUTPATIENT)
Dept: GENERAL RADIOLOGY | Facility: CLINIC | Age: 60
End: 2020-03-13
Attending: EMERGENCY MEDICINE
Payer: COMMERCIAL

## 2020-03-13 VITALS
SYSTOLIC BLOOD PRESSURE: 145 MMHG | TEMPERATURE: 96.7 F | OXYGEN SATURATION: 98 % | RESPIRATION RATE: 18 BRPM | HEART RATE: 86 BPM | DIASTOLIC BLOOD PRESSURE: 89 MMHG

## 2020-03-13 DIAGNOSIS — R07.9 CHEST PAIN, UNSPECIFIED TYPE: ICD-10-CM

## 2020-03-13 DIAGNOSIS — F41.1 ANXIETY REACTION: ICD-10-CM

## 2020-03-13 LAB
ALBUMIN SERPL-MCNC: 3.9 G/DL (ref 3.4–5)
ALP SERPL-CCNC: 63 U/L (ref 40–150)
ALT SERPL W P-5'-P-CCNC: 44 U/L (ref 0–70)
ANION GAP SERPL CALCULATED.3IONS-SCNC: 9 MMOL/L (ref 3–14)
AST SERPL W P-5'-P-CCNC: 27 U/L (ref 0–45)
BASOPHILS # BLD AUTO: 0 10E9/L (ref 0–0.2)
BASOPHILS NFR BLD AUTO: 0.4 %
BILIRUB SERPL-MCNC: 0.5 MG/DL (ref 0.2–1.3)
BUN SERPL-MCNC: 30 MG/DL (ref 7–30)
CALCIUM SERPL-MCNC: 8.7 MG/DL (ref 8.5–10.1)
CHLORIDE SERPL-SCNC: 103 MMOL/L (ref 94–109)
CO2 SERPL-SCNC: 19 MMOL/L (ref 20–32)
CREAT SERPL-MCNC: 0.92 MG/DL (ref 0.66–1.25)
DIFFERENTIAL METHOD BLD: ABNORMAL
EOSINOPHIL # BLD AUTO: 0.3 10E9/L (ref 0–0.7)
EOSINOPHIL NFR BLD AUTO: 2.9 %
ERYTHROCYTE [DISTWIDTH] IN BLOOD BY AUTOMATED COUNT: 13.6 % (ref 10–15)
GFR SERPL CREATININE-BSD FRML MDRD: >90 ML/MIN/{1.73_M2}
GLUCOSE SERPL-MCNC: 164 MG/DL (ref 70–99)
HCT VFR BLD AUTO: 47.6 % (ref 40–53)
HGB BLD-MCNC: 15.8 G/DL (ref 13.3–17.7)
IMM GRANULOCYTES # BLD: 0 10E9/L (ref 0–0.4)
IMM GRANULOCYTES NFR BLD: 0.4 %
INTERPRETATION ECG - MUSE: NORMAL
INTERPRETATION ECG - MUSE: NORMAL
LIPASE SERPL-CCNC: 147 U/L (ref 73–393)
LYMPHOCYTES # BLD AUTO: 3.8 10E9/L (ref 0.8–5.3)
LYMPHOCYTES NFR BLD AUTO: 39.6 %
MCH RBC QN AUTO: 26.6 PG (ref 26.5–33)
MCHC RBC AUTO-ENTMCNC: 33.2 G/DL (ref 31.5–36.5)
MCV RBC AUTO: 80 FL (ref 78–100)
MONOCYTES # BLD AUTO: 1 10E9/L (ref 0–1.3)
MONOCYTES NFR BLD AUTO: 9.9 %
NEUTROPHILS # BLD AUTO: 4.5 10E9/L (ref 1.6–8.3)
NEUTROPHILS NFR BLD AUTO: 46.8 %
PLATELET # BLD AUTO: 337 10E9/L (ref 150–450)
POTASSIUM SERPL-SCNC: 4.8 MMOL/L (ref 3.4–5.3)
PROT SERPL-MCNC: 7.4 G/DL (ref 6.8–8.8)
RBC # BLD AUTO: 5.94 10E12/L (ref 4.4–5.9)
SODIUM SERPL-SCNC: 131 MMOL/L (ref 133–144)
TROPONIN I BLD-MCNC: 0 UG/L (ref 0–0.08)
TROPONIN I SERPL-MCNC: <0.015 UG/L (ref 0–0.04)
TROPONIN I SERPL-MCNC: <0.015 UG/L (ref 0–0.04)
WBC # BLD AUTO: 9.6 10E9/L (ref 4–11)

## 2020-03-13 PROCEDURE — 25800030 ZZH RX IP 258 OP 636: Performed by: EMERGENCY MEDICINE

## 2020-03-13 PROCEDURE — 85025 COMPLETE CBC W/AUTO DIFF WBC: CPT | Performed by: EMERGENCY MEDICINE

## 2020-03-13 PROCEDURE — 25000128 H RX IP 250 OP 636: Performed by: EMERGENCY MEDICINE

## 2020-03-13 PROCEDURE — 84484 ASSAY OF TROPONIN QUANT: CPT | Performed by: EMERGENCY MEDICINE

## 2020-03-13 PROCEDURE — 83690 ASSAY OF LIPASE: CPT | Performed by: EMERGENCY MEDICINE

## 2020-03-13 PROCEDURE — 93005 ELECTROCARDIOGRAM TRACING: CPT

## 2020-03-13 PROCEDURE — 96376 TX/PRO/DX INJ SAME DRUG ADON: CPT

## 2020-03-13 PROCEDURE — 80053 COMPREHEN METABOLIC PANEL: CPT | Performed by: EMERGENCY MEDICINE

## 2020-03-13 PROCEDURE — 84484 ASSAY OF TROPONIN QUANT: CPT | Mod: 91

## 2020-03-13 PROCEDURE — 25000125 ZZHC RX 250: Performed by: EMERGENCY MEDICINE

## 2020-03-13 PROCEDURE — 93005 ELECTROCARDIOGRAM TRACING: CPT | Mod: 76

## 2020-03-13 PROCEDURE — 71046 X-RAY EXAM CHEST 2 VIEWS: CPT

## 2020-03-13 PROCEDURE — 96361 HYDRATE IV INFUSION ADD-ON: CPT

## 2020-03-13 PROCEDURE — 99285 EMERGENCY DEPT VISIT HI MDM: CPT | Mod: 25

## 2020-03-13 PROCEDURE — 96374 THER/PROPH/DIAG INJ IV PUSH: CPT

## 2020-03-13 PROCEDURE — 96375 TX/PRO/DX INJ NEW DRUG ADDON: CPT

## 2020-03-13 RX ORDER — LORAZEPAM 2 MG/ML
1 INJECTION INTRAMUSCULAR ONCE
Status: DISCONTINUED | OUTPATIENT
Start: 2020-03-13 | End: 2020-03-13

## 2020-03-13 RX ORDER — LORAZEPAM 2 MG/ML
0.5 INJECTION INTRAMUSCULAR ONCE
Status: COMPLETED | OUTPATIENT
Start: 2020-03-13 | End: 2020-03-13

## 2020-03-13 RX ADMIN — LORAZEPAM 0.5 MG: 2 INJECTION INTRAMUSCULAR; INTRAVENOUS at 02:49

## 2020-03-13 RX ADMIN — SODIUM CHLORIDE 1000 ML: 9 INJECTION, SOLUTION INTRAVENOUS at 02:40

## 2020-03-13 RX ADMIN — FAMOTIDINE 20 MG: 10 INJECTION, SOLUTION INTRAVENOUS at 02:39

## 2020-03-13 RX ADMIN — LORAZEPAM 0.5 MG: 2 INJECTION INTRAMUSCULAR; INTRAVENOUS at 03:48

## 2020-03-13 ASSESSMENT — ENCOUNTER SYMPTOMS: SHORTNESS OF BREATH: 1
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
## 2020-03-13 NOTE — ED AVS SNAPSHOT
Emergency Department  [address] [place] [address]  Phone:  [phone]  Fax:  [phone]                                    Perico Houston   MRN: [id number]    Department:   Emergency Department   Date of Visit:  3/13/2020           After Visit Summary Signature Page    I have received my discharge instructions, and my questions have been answered. I have discussed any challenges I see with this plan with the nurse or doctor.    ..........................................................................................................................................  Patient/Patient Representative Signature      ..........................................................................................................................................  Patient Representative Print Name and Relationship to Patient    ..................................................               ................................................  Date                                   Time    ..........................................................................................................................................  Reviewed by Signature/Title    ...................................................              ..............................................  Date                                               Time          22EPIC Rev 08/18

## 2020-03-14 ENCOUNTER — HOSPITAL ENCOUNTER (EMERGENCY)
Facility: CLINIC | Age: 60
Discharge: HOME | End: 2020-03-14
Admitting: SURGERY
Payer: COMMERCIAL

## 2020-03-14 VITALS
DIASTOLIC BLOOD PRESSURE: 122 MMHG | RESPIRATION RATE: 20 BRPM | OXYGEN SATURATION: 98 % | SYSTOLIC BLOOD PRESSURE: 172 MMHG | TEMPERATURE: 98.1 F

## 2020-03-14 PROCEDURE — 40000268 ZZH STATISTIC NO CHARGES

## 2020-03-14 NOTE — ED TRIAGE NOTES
Pt states seen within past 12 hours at [facility] ED for anxiety and chest pain. Pt states began vomiting and having headache after discharge from [facility]. Pt states hx of anxiety. Pt moaning continuously in triage unless asked a question when moaning pauses before the patient answers. ABCs intact GCS 15

## 2020-03-14 NOTE — ED NOTES
Delayed entry:    Call and no answer at 0432  Call and no answer at 0450  Call and no answer at 0501  Call and no answer at 0532

## 2020-03-17 ASSESSMENT — ENCOUNTER SYMPTOMS
VOMITING: 0
ABDOMINAL PAIN: 0
NERVOUS/ANXIOUS: 1
FEVER: 0
COUGH: 0

## 2020-05-05 ENCOUNTER — APPOINTMENT (OUTPATIENT)
Dept: CT IMAGING | Facility: CLINIC | Age: 60
End: 2020-05-05
Attending: EMERGENCY MEDICINE
Payer: COMMERCIAL

## 2020-05-05 ENCOUNTER — HOSPITAL ENCOUNTER (EMERGENCY)
Facility: CLINIC | Age: 60
Discharge: HOME | End: 2020-05-05
Attending: EMERGENCY MEDICINE | Admitting: EMERGENCY MEDICINE
Payer: COMMERCIAL

## 2020-05-05 VITALS
HEART RATE: 71 BPM | OXYGEN SATURATION: 97 % | TEMPERATURE: 97.9 F | SYSTOLIC BLOOD PRESSURE: 145 MMHG | DIASTOLIC BLOOD PRESSURE: 89 MMHG | RESPIRATION RATE: 18 BRPM

## 2020-05-05 DIAGNOSIS — M54.2 NECK PAIN: ICD-10-CM

## 2020-05-05 DIAGNOSIS — R10.13 EPIGASTRIC PAIN: ICD-10-CM

## 2020-05-05 LAB
ALBUMIN SERPL-MCNC: 3.8 G/DL (ref 3.4–5)
ALP SERPL-CCNC: 74 U/L (ref 40–150)
ALT SERPL W P-5'-P-CCNC: 30 U/L (ref 0–70)
ANION GAP SERPL CALCULATED.3IONS-SCNC: 6 MMOL/L (ref 3–14)
AST SERPL W P-5'-P-CCNC: 23 U/L (ref 0–45)
BASOPHILS # BLD AUTO: 0 10E9/L (ref 0–0.2)
BASOPHILS NFR BLD AUTO: 0.6 %
BILIRUB SERPL-MCNC: 0.3 MG/DL (ref 0.2–1.3)
BUN SERPL-MCNC: 16 MG/DL (ref 7–30)
CALCIUM SERPL-MCNC: 8.8 MG/DL (ref 8.5–10.1)
CHLORIDE SERPL-SCNC: 100 MMOL/L (ref 94–109)
CO2 SERPL-SCNC: 25 MMOL/L (ref 20–32)
CREAT SERPL-MCNC: 0.71 MG/DL (ref 0.66–1.25)
DIFFERENTIAL METHOD BLD: ABNORMAL
EOSINOPHIL # BLD AUTO: 0.3 10E9/L (ref 0–0.7)
EOSINOPHIL NFR BLD AUTO: 3.8 %
ERYTHROCYTE [DISTWIDTH] IN BLOOD BY AUTOMATED COUNT: 13.1 % (ref 10–15)
GFR SERPL CREATININE-BSD FRML MDRD: >90 ML/MIN/{1.73_M2}
GLUCOSE SERPL-MCNC: 143 MG/DL (ref 70–99)
HCT VFR BLD AUTO: 51.1 % (ref 40–53)
HGB BLD-MCNC: 15.9 G/DL (ref 13.3–17.7)
IMM GRANULOCYTES # BLD: 0 10E9/L (ref 0–0.4)
IMM GRANULOCYTES NFR BLD: 0.4 %
INTERPRETATION ECG - MUSE: NORMAL
LIPASE SERPL-CCNC: 75 U/L (ref 73–393)
LYMPHOCYTES # BLD AUTO: 1.9 10E9/L (ref 0.8–5.3)
LYMPHOCYTES NFR BLD AUTO: 27.3 %
MCH RBC QN AUTO: 26.9 PG (ref 26.5–33)
MCHC RBC AUTO-ENTMCNC: 31.1 G/DL (ref 31.5–36.5)
MCV RBC AUTO: 87 FL (ref 78–100)
MONOCYTES # BLD AUTO: 0.5 10E9/L (ref 0–1.3)
MONOCYTES NFR BLD AUTO: 7.2 %
NEUTROPHILS # BLD AUTO: 4.2 10E9/L (ref 1.6–8.3)
NEUTROPHILS NFR BLD AUTO: 60.7 %
NRBC # BLD AUTO: 0 10*3/UL
NRBC BLD AUTO-RTO: 0 /100
PLATELET # BLD AUTO: 306 10E9/L (ref 150–450)
POTASSIUM SERPL-SCNC: 4.2 MMOL/L (ref 3.4–5.3)
PROT SERPL-MCNC: 7.9 G/DL (ref 6.8–8.8)
RBC # BLD AUTO: 5.9 10E12/L (ref 4.4–5.9)
SODIUM SERPL-SCNC: 131 MMOL/L (ref 133–144)
TROPONIN I SERPL-MCNC: <0.015 UG/L (ref 0–0.04)
WBC # BLD AUTO: 6.9 10E9/L (ref 4–11)

## 2020-05-05 PROCEDURE — 96361 HYDRATE IV INFUSION ADD-ON: CPT

## 2020-05-05 PROCEDURE — 84484 ASSAY OF TROPONIN QUANT: CPT | Performed by: EMERGENCY MEDICINE

## 2020-05-05 PROCEDURE — 25000128 H RX IP 250 OP 636: Performed by: EMERGENCY MEDICINE

## 2020-05-05 PROCEDURE — 99285 EMERGENCY DEPT VISIT HI MDM: CPT | Mod: 25

## 2020-05-05 PROCEDURE — 83690 ASSAY OF LIPASE: CPT | Performed by: EMERGENCY MEDICINE

## 2020-05-05 PROCEDURE — 93005 ELECTROCARDIOGRAM TRACING: CPT

## 2020-05-05 PROCEDURE — 96375 TX/PRO/DX INJ NEW DRUG ADDON: CPT

## 2020-05-05 PROCEDURE — 25000125 ZZHC RX 250: Performed by: EMERGENCY MEDICINE

## 2020-05-05 PROCEDURE — 72125 CT NECK SPINE W/O DYE: CPT

## 2020-05-05 PROCEDURE — 96374 THER/PROPH/DIAG INJ IV PUSH: CPT

## 2020-05-05 PROCEDURE — 80053 COMPREHEN METABOLIC PANEL: CPT | Performed by: EMERGENCY MEDICINE

## 2020-05-05 PROCEDURE — 85025 COMPLETE CBC W/AUTO DIFF WBC: CPT | Performed by: EMERGENCY MEDICINE

## 2020-05-05 PROCEDURE — 25800030 ZZH RX IP 258 OP 636: Performed by: EMERGENCY MEDICINE

## 2020-05-05 PROCEDURE — 25000132 ZZH RX MED GY IP 250 OP 250 PS 637: Performed by: EMERGENCY MEDICINE

## 2020-05-05 RX ORDER — ONDANSETRON 2 MG/ML
4 INJECTION INTRAMUSCULAR; INTRAVENOUS EVERY 30 MIN PRN
Status: DISCONTINUED | OUTPATIENT
Start: 2020-05-05 | End: 2020-05-05 | Stop reason: HOSPADM

## 2020-05-05 RX ADMIN — SODIUM CHLORIDE 1000 ML: 9 INJECTION, SOLUTION INTRAVENOUS at 07:45

## 2020-05-05 RX ADMIN — FAMOTIDINE 20 MG: 10 INJECTION, SOLUTION INTRAVENOUS at 07:45

## 2020-05-05 RX ADMIN — LIDOCAINE HYDROCHLORIDE 30 ML: 20 SOLUTION ORAL; TOPICAL at 08:01

## 2020-05-05 RX ADMIN — ONDANSETRON 4 MG: 2 INJECTION INTRAMUSCULAR; INTRAVENOUS at 07:45

## 2020-05-05 ASSESSMENT — ENCOUNTER SYMPTOMS
ABDOMINAL PAIN: 1
NECK PAIN: 1
DIARRHEA: 0
NAUSEA: 0
BLOOD IN STOOL: 0
HEADACHES: 1

## 2020-05-05 NOTE — ED AVS SNAPSHOT
[facility] Emergency Department  [address]  [facility] [address]  Phone:  [phone]  Fax:  [phone]                                    Perico Houston   MRN: [id number]    Department:  [facility] Emergency Department   Date of Visit:  5/5/2020           After Visit Summary Signature Page    I have received my discharge instructions, and my questions have been answered. I have discussed any challenges I see with this plan with the nurse or doctor.    ..........................................................................................................................................  Patient/Patient Representative Signature      ..........................................................................................................................................  Patient Representative Print Name and Relationship to Patient    ..................................................               ................................................  Date                                   Time    ..........................................................................................................................................  Reviewed by Signature/Title    ...................................................              ..............................................  Date                                               Time          22EPIC Rev 08/18
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
## 2020-06-28 NOTE — ED AVS SNAPSHOT
[facility] Emergency Department    [address]    [facility] [address]    Phone:  [phone]    Fax:  [phone]                                       Perico Houston   MRN: [id number]    Department:  [facility] Emergency Department   Date of Visit:  7/22/2017           After Visit Summary Signature Page     I have received my discharge instructions, and my questions have been answered. I have discussed any challenges I see with this plan with the nurse or doctor.    ..........................................................................................................................................  Patient/Patient Representative Signature      ..........................................................................................................................................  Patient Representative Print Name and Relationship to Patient    ..................................................               ................................................  Date                                            Time    ..........................................................................................................................................  Reviewed by Signature/Title    ...................................................              ..............................................  Date                                                            Time           negative...

## 2020-07-26 ENCOUNTER — APPOINTMENT (OUTPATIENT)
Dept: GENERAL RADIOLOGY | Facility: CLINIC | Age: 60
End: 2020-07-26
Attending: EMERGENCY MEDICINE
Payer: COMMERCIAL

## 2020-07-26 ENCOUNTER — HOSPITAL ENCOUNTER (EMERGENCY)
Facility: CLINIC | Age: 60
Discharge: HOME | End: 2020-07-27
Attending: EMERGENCY MEDICINE | Admitting: EMERGENCY MEDICINE
Payer: COMMERCIAL

## 2020-07-26 DIAGNOSIS — R07.9 CHEST PAIN, UNSPECIFIED TYPE: ICD-10-CM

## 2020-07-26 LAB
ALBUMIN SERPL-MCNC: 3.9 G/DL (ref 3.4–5)
ALP SERPL-CCNC: 73 U/L (ref 40–150)
ALT SERPL W P-5'-P-CCNC: 36 U/L (ref 0–70)
ANION GAP SERPL CALCULATED.3IONS-SCNC: 8 MMOL/L (ref 3–14)
AST SERPL W P-5'-P-CCNC: 23 U/L (ref 0–45)
BASOPHILS # BLD AUTO: 0 10E9/L (ref 0–0.2)
BASOPHILS NFR BLD AUTO: 0.5 %
BILIRUB DIRECT SERPL-MCNC: 0.1 MG/DL (ref 0–0.2)
BILIRUB SERPL-MCNC: 0.5 MG/DL (ref 0.2–1.3)
BUN SERPL-MCNC: 19 MG/DL (ref 7–30)
CALCIUM SERPL-MCNC: 8.8 MG/DL (ref 8.5–10.1)
CHLORIDE SERPL-SCNC: 99 MMOL/L (ref 94–109)
CO2 SERPL-SCNC: 25 MMOL/L (ref 20–32)
CREAT SERPL-MCNC: 0.78 MG/DL (ref 0.66–1.25)
DIFFERENTIAL METHOD BLD: ABNORMAL
EOSINOPHIL # BLD AUTO: 0.2 10E9/L (ref 0–0.7)
EOSINOPHIL NFR BLD AUTO: 2.3 %
ERYTHROCYTE [DISTWIDTH] IN BLOOD BY AUTOMATED COUNT: 13 % (ref 10–15)
ETHANOL SERPL-MCNC: <0.01 G/DL
GFR SERPL CREATININE-BSD FRML MDRD: >90 ML/MIN/{1.73_M2}
GLUCOSE SERPL-MCNC: 217 MG/DL (ref 70–99)
HCT VFR BLD AUTO: 51.4 % (ref 40–53)
HGB BLD-MCNC: 16.2 G/DL (ref 13.3–17.7)
IMM GRANULOCYTES # BLD: 0.1 10E9/L (ref 0–0.4)
IMM GRANULOCYTES NFR BLD: 0.6 %
LIPASE SERPL-CCNC: 117 U/L (ref 73–393)
LYMPHOCYTES # BLD AUTO: 2.7 10E9/L (ref 0.8–5.3)
LYMPHOCYTES NFR BLD AUTO: 31.9 %
MCH RBC QN AUTO: 26.8 PG (ref 26.5–33)
MCHC RBC AUTO-ENTMCNC: 31.5 G/DL (ref 31.5–36.5)
MCV RBC AUTO: 85 FL (ref 78–100)
MONOCYTES # BLD AUTO: 0.7 10E9/L (ref 0–1.3)
MONOCYTES NFR BLD AUTO: 7.8 %
NEUTROPHILS # BLD AUTO: 4.7 10E9/L (ref 1.6–8.3)
NEUTROPHILS NFR BLD AUTO: 56.9 %
NRBC # BLD AUTO: 0 10*3/UL
NRBC BLD AUTO-RTO: 0 /100
PLATELET # BLD AUTO: 297 10E9/L (ref 150–450)
POTASSIUM SERPL-SCNC: 4.8 MMOL/L (ref 3.4–5.3)
PROT SERPL-MCNC: 7.5 G/DL (ref 6.8–8.8)
RBC # BLD AUTO: 6.04 10E12/L (ref 4.4–5.9)
SODIUM SERPL-SCNC: 132 MMOL/L (ref 133–144)
TROPONIN I SERPL-MCNC: <0.015 UG/L (ref 0–0.04)
TROPONIN I SERPL-MCNC: <0.015 UG/L (ref 0–0.04)
WBC # BLD AUTO: 8.3 10E9/L (ref 4–11)

## 2020-07-26 PROCEDURE — 99285 EMERGENCY DEPT VISIT HI MDM: CPT | Mod: 25

## 2020-07-26 PROCEDURE — 80320 DRUG SCREEN QUANTALCOHOLS: CPT | Performed by: EMERGENCY MEDICINE

## 2020-07-26 PROCEDURE — 96374 THER/PROPH/DIAG INJ IV PUSH: CPT

## 2020-07-26 PROCEDURE — 80076 HEPATIC FUNCTION PANEL: CPT | Performed by: EMERGENCY MEDICINE

## 2020-07-26 PROCEDURE — 25000128 H RX IP 250 OP 636: Performed by: EMERGENCY MEDICINE

## 2020-07-26 PROCEDURE — 25000125 ZZHC RX 250: Performed by: EMERGENCY MEDICINE

## 2020-07-26 PROCEDURE — 80048 BASIC METABOLIC PNL TOTAL CA: CPT | Performed by: EMERGENCY MEDICINE

## 2020-07-26 PROCEDURE — 25000132 ZZH RX MED GY IP 250 OP 250 PS 637: Performed by: EMERGENCY MEDICINE

## 2020-07-26 PROCEDURE — 84484 ASSAY OF TROPONIN QUANT: CPT | Performed by: EMERGENCY MEDICINE

## 2020-07-26 PROCEDURE — 71045 X-RAY EXAM CHEST 1 VIEW: CPT

## 2020-07-26 PROCEDURE — 93005 ELECTROCARDIOGRAM TRACING: CPT

## 2020-07-26 PROCEDURE — 83690 ASSAY OF LIPASE: CPT | Performed by: EMERGENCY MEDICINE

## 2020-07-26 PROCEDURE — 85025 COMPLETE CBC W/AUTO DIFF WBC: CPT | Performed by: EMERGENCY MEDICINE

## 2020-07-26 RX ORDER — LORAZEPAM 2 MG/ML
0.5 INJECTION INTRAMUSCULAR ONCE
Status: COMPLETED | OUTPATIENT
Start: 2020-07-26 | End: 2020-07-26

## 2020-07-26 RX ADMIN — LORAZEPAM 0.5 MG: 2 INJECTION INTRAMUSCULAR; INTRAVENOUS at 21:44

## 2020-07-26 RX ADMIN — LIDOCAINE HYDROCHLORIDE 30 ML: 20 SOLUTION ORAL; TOPICAL at 21:13

## 2020-07-26 ASSESSMENT — ENCOUNTER SYMPTOMS
FEVER: 0
ABDOMINAL PAIN: 1
SHORTNESS OF BREATH: 1

## 2020-07-26 NOTE — ED AVS SNAPSHOT
[facility] Emergency Department  [address]  [facility], [place] [address]  Phone:  [phone]  Fax:  [phone]                                    Perico Houston   MRN: [id number]    Department:  [facility] Emergency Department   Date of Visit:  7/26/2020           After Visit Summary Signature Page    I have received my discharge instructions, and my questions have been answered. I have discussed any challenges I see with this plan with the nurse or doctor.    ..........................................................................................................................................  Patient/Patient Representative Signature      ..........................................................................................................................................  Patient Representative Print Name and Relationship to Patient    ..................................................               ................................................  Date                                   Time    ..........................................................................................................................................  Reviewed by Signature/Title    ...................................................              ..............................................  Date                                               Time          22EPIC Rev 08/18

## 2020-07-27 VITALS
RESPIRATION RATE: 16 BRPM | TEMPERATURE: 96.6 F | DIASTOLIC BLOOD PRESSURE: 93 MMHG | OXYGEN SATURATION: 97 % | HEART RATE: 80 BPM | SYSTOLIC BLOOD PRESSURE: 155 MMHG

## 2020-07-27 LAB — INTERPRETATION ECG - MUSE: NORMAL

## 2020-07-27 NOTE — ED PROVIDER NOTES
History     Chief Complaint:  Shortness of Breath and Abdominal Pain       HPI   Perico Houston is a 60 year old male who presents with chest pain.  This happened a few days ago and then self resolved as he did breathing exercises.  And then 1520 minutes prior to arrival he said he started to have chest pain again.  It is top of his abdomen and into his chest.  He has had this in the past.  He reports shortness of breath.  He denies any fevers.  He is quite uncomfortable with this pain.  He took his benzodiazepine anxiety meds in the morning.  Denies any leg swelling.    Allergies:  Aspirin  Hydrocodone-Acetaminophen     Medications:    Senokot  Roxicodone  Nitrostat  Metformin  Lorazepam  Lisinopril   Prevacid  Atarax  Glucotrol  Gabapentin  Klonopin  Invokana  Aspirin     Past Medical History:    Hypertension   GERD  Diabetes  Depressive disorder  Chronic pain syndrome  CAD  Anxiety     Past Surgical History:    Partial liver resection  Clavicle surgery  Cholecystectomy  Angiogram      Family History:    Diabetes     Social History:  Smoking Status: Current Smoker  Smokeless Tobacco: Never Used  Alcohol Use: Yes  Drug Use: No  PCP: Kianna Silver     Review of Systems   Constitutional: Negative for fever.   Respiratory: Positive for shortness of breath.    Cardiovascular: Positive for chest pain.   Gastrointestinal: Positive for abdominal pain.   All other systems reviewed and are negative.    Physical Exam     Patient Vitals for the past 24 hrs:   BP Temp Temp src Pulse Heart Rate Resp SpO2   07/26/20 2115 (!) 150/84 -- -- 75 79 11 93 %   07/26/20 2100 (!) 167/93 -- -- 77 77 16 95 %   07/26/20 2039 (!) 181/95 96.6  F (35.9  C) Temporal -- 82 14 96 %        Physical Exam    General: Patient is alert and interactive when I enter the room, moaning in room, eyes closed    Head:  The scalp, face, and head appear normal  Eyes:  Conjunctivae are normal  ENT:    The nose is normal    Pinnae are normal    External  acoustic canals are normal  Neck:  Trachea midline  CV:  Pulses are normal, RRR.    Resp:  No respiratory distress, CTAB   Abdomen:      Soft, epigastric tenderness, non-distended  Musc:  Normal muscular tone    No major joint effusions    No asymmetric leg swelling  Skin:  No rash or lesions noted  Neuro:  Speech is normal and fluent. Face is symmetric.     Moving all extremities well.   Psych: Awake. Alert.  Normal affect.  Appropriate interactions.   Emergency Department Course     ECG:  ECG taken at 2113  Sinus rhythm  Septal infarct (cited on or before 05-MAY-2020)  Abnormal ECG  When compared with ECG of 05-MAY-2020 08:10,  No significant change was found  Vent. rate 78 BPM  OH interval 180 ms  QRS duration 94 ms  QT/QTc 386/440 ms  P-R-T axes 39 -3 -15    Imaging:  Radiology findings were communicated with the patient who voiced understanding of the findings.    XR Chest Port 1 View   Heart size and pulmonary vascularity normal. Elevated right hemidiaphragm with surgical clips right upper quadrant, unchanged. Lungs otherwise clear.  Reading per radiology     Laboratory:  Laboratory findings were communicated with the patient who voiced understanding of the findings.    CBC: WBC 8.3, HGB 16.2,   BMP:  (L),  (H) o/w WNL (Creatinine 0.78)  Troponin (Collected 2104): <0.015  Hepatic Panel: WNL  Lipase: 117  Alcohol ethyl: <0.01    Procedures    Interventions:  2113 GI cocktail 30 mL Oral  2144 Ativan 0.5 mg IV     Emergency Department Course:    2102 Nursing notes and vitals reviewed.     I performed an exam of the patient as documented above.     2104 IV was inserted and blood was drawn for laboratory testing, results above.     2111 The patient was sent for a XR while in the emergency department, results above.      2141 Patient rechecked and updated.       Patient is signed out to Dr. Mcclain pending repeat troponin.     Impression & Plan      Medical Decision Making:  Perico blue a  60 year old male who presents to the emergency department today for evaluation of chest pain.  Patient was quite anxious on arrival to the room.  He would barely speak and had his eyes closed the entire time.  With encouragement I was able to get a history.  Reviewing his chart he has been here multiple times for chest pain and abdominal pain.  He has a care plan in place that includes a delta troponin if his EKG and initial troponin are normal.  His EKG here is unchanged from prior.  Patient was given a GI cocktail Ativan and he felt improved.  His blood work was otherwise unremarkable including troponin.  Based on his care plan we will do a delta troponin.  Patient signed out to Dr. Matta for repeat troponin and plan for discharge.      Diagnosis:    ICD-10-CM    1. Chest pain, unspecified type  R07.9      Disposition:   Patient is signed out to Dr. Mcclain pending repeat troponin.      Scribe Disclosure:  ILAN, Sal Sheffield, am serving as a scribe at 9:05 PM on 7/26/2020 to document services personally performed by Mitzi Covarrubias MD based on my observations and the provider's statements to me.        Mitzi Covarrubias MD  07/29/20 1122

## 2020-07-27 NOTE — ED PROVIDER NOTES
Repeat troponin.Plan is to discharge as per Dr. Covarrubias's discussion      Mick Mcclain MD  07/26/20 5063

## 2021-05-06 ENCOUNTER — APPOINTMENT (OUTPATIENT)
Dept: CT IMAGING | Facility: CLINIC | Age: 61
End: 2021-05-06
Attending: EMERGENCY MEDICINE
Payer: COMMERCIAL

## 2021-05-06 ENCOUNTER — HOSPITAL ENCOUNTER (EMERGENCY)
Facility: CLINIC | Age: 61
Discharge: HOME | End: 2021-05-06
Attending: EMERGENCY MEDICINE | Admitting: EMERGENCY MEDICINE
Payer: COMMERCIAL

## 2021-05-06 VITALS — DIASTOLIC BLOOD PRESSURE: 68 MMHG | SYSTOLIC BLOOD PRESSURE: 110 MMHG | OXYGEN SATURATION: 95 % | HEART RATE: 73 BPM

## 2021-05-06 DIAGNOSIS — G44.209 ACUTE NON INTRACTABLE TENSION-TYPE HEADACHE: ICD-10-CM

## 2021-05-06 LAB
ANION GAP SERPL CALCULATED.3IONS-SCNC: 4 MMOL/L (ref 3–14)
BASOPHILS # BLD AUTO: 0.1 10E9/L (ref 0–0.2)
BASOPHILS NFR BLD AUTO: 0.8 %
BUN SERPL-MCNC: 28 MG/DL (ref 7–30)
CALCIUM SERPL-MCNC: 9.2 MG/DL (ref 8.5–10.1)
CHLORIDE SERPL-SCNC: 102 MMOL/L (ref 94–109)
CO2 SERPL-SCNC: 27 MMOL/L (ref 20–32)
CREAT SERPL-MCNC: 0.94 MG/DL (ref 0.66–1.25)
DIFFERENTIAL METHOD BLD: ABNORMAL
EOSINOPHIL # BLD AUTO: 0.6 10E9/L (ref 0–0.7)
EOSINOPHIL NFR BLD AUTO: 5.5 %
ERYTHROCYTE [DISTWIDTH] IN BLOOD BY AUTOMATED COUNT: 12.9 % (ref 10–15)
GFR SERPL CREATININE-BSD FRML MDRD: 88 ML/MIN/{1.73_M2}
GLUCOSE SERPL-MCNC: 141 MG/DL (ref 70–99)
HCT VFR BLD AUTO: 49.1 % (ref 40–53)
HGB BLD-MCNC: 15.3 G/DL (ref 13.3–17.7)
IMM GRANULOCYTES # BLD: 0.1 10E9/L (ref 0–0.4)
IMM GRANULOCYTES NFR BLD: 0.7 %
LYMPHOCYTES # BLD AUTO: 2.2 10E9/L (ref 0.8–5.3)
LYMPHOCYTES NFR BLD AUTO: 21.7 %
MCH RBC QN AUTO: 26.9 PG (ref 26.5–33)
MCHC RBC AUTO-ENTMCNC: 31.2 G/DL (ref 31.5–36.5)
MCV RBC AUTO: 86 FL (ref 78–100)
MONOCYTES # BLD AUTO: 0.7 10E9/L (ref 0–1.3)
MONOCYTES NFR BLD AUTO: 7.1 %
NEUTROPHILS # BLD AUTO: 6.6 10E9/L (ref 1.6–8.3)
NEUTROPHILS NFR BLD AUTO: 64.2 %
NRBC # BLD AUTO: 0 10*3/UL
NRBC BLD AUTO-RTO: 0 /100
PLATELET # BLD AUTO: 306 10E9/L (ref 150–450)
POTASSIUM SERPL-SCNC: 4 MMOL/L (ref 3.4–5.3)
RBC # BLD AUTO: 5.68 10E12/L (ref 4.4–5.9)
SODIUM SERPL-SCNC: 133 MMOL/L (ref 133–144)
WBC # BLD AUTO: 10.2 10E9/L (ref 4–11)

## 2021-05-06 PROCEDURE — 85025 COMPLETE CBC W/AUTO DIFF WBC: CPT | Performed by: EMERGENCY MEDICINE

## 2021-05-06 PROCEDURE — 99284 EMERGENCY DEPT VISIT MOD MDM: CPT | Mod: 25

## 2021-05-06 PROCEDURE — 80048 BASIC METABOLIC PNL TOTAL CA: CPT | Performed by: EMERGENCY MEDICINE

## 2021-05-06 PROCEDURE — 70450 CT HEAD/BRAIN W/O DYE: CPT

## 2021-05-06 PROCEDURE — 250N000013 HC RX MED GY IP 250 OP 250 PS 637: Performed by: EMERGENCY MEDICINE

## 2021-05-06 RX ORDER — OLANZAPINE 5 MG/1
5 TABLET, ORALLY DISINTEGRATING ORAL ONCE
Status: COMPLETED | OUTPATIENT
Start: 2021-05-06 | End: 2021-05-06

## 2021-05-06 RX ADMIN — OLANZAPINE 5 MG: 5 TABLET, ORALLY DISINTEGRATING ORAL at 03:51

## 2021-05-06 ASSESSMENT — ENCOUNTER SYMPTOMS
VOMITING: 0
PHOTOPHOBIA: 1
NAUSEA: 0
HEADACHES: 1

## 2021-05-06 NOTE — DISCHARGE INSTRUCTIONS
We have done head imaging and lab work which are all normal.  Please follow-up with your regular doctor if headaches continue to be an issue your CT scan shows no signs of bleeding or swelling of the brain.  There are many causes of headaches that are not image aanswerable.  If you have recurrent headaches please discuss further evaluation with your regular doctor.
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
## 2021-05-06 NOTE — ED TRIAGE NOTES
Pt in with C/O headache onset 1930 tonight. Pt reports he took ibuprofen to no relief. Pt reports headache is on the L side of his head. Pt A&Ox4 ABCD's intact

## 2021-09-08 ENCOUNTER — HOSPITAL ENCOUNTER (EMERGENCY)
Facility: CLINIC | Age: 61
Discharge: HOME | End: 2021-09-08
Attending: NURSE PRACTITIONER | Admitting: NURSE PRACTITIONER
Payer: COMMERCIAL

## 2021-09-08 VITALS
OXYGEN SATURATION: 98 % | SYSTOLIC BLOOD PRESSURE: 148 MMHG | TEMPERATURE: 96.6 F | RESPIRATION RATE: 18 BRPM | DIASTOLIC BLOOD PRESSURE: 93 MMHG | HEART RATE: 75 BPM

## 2021-09-08 DIAGNOSIS — G89.29 CHRONIC EPIGASTRIC PAIN: ICD-10-CM

## 2021-09-08 DIAGNOSIS — R10.13 CHRONIC EPIGASTRIC PAIN: ICD-10-CM

## 2021-09-08 LAB
ALBUMIN SERPL-MCNC: 3.7 G/DL (ref 3.4–5)
ALP SERPL-CCNC: 69 U/L (ref 40–150)
ALT SERPL W P-5'-P-CCNC: 36 U/L (ref 0–70)
ANION GAP SERPL CALCULATED.3IONS-SCNC: 8 MMOL/L (ref 3–14)
AST SERPL W P-5'-P-CCNC: 15 U/L (ref 0–45)
BILIRUB SERPL-MCNC: 0.3 MG/DL (ref 0.2–1.3)
BUN SERPL-MCNC: 15 MG/DL (ref 7–30)
CALCIUM SERPL-MCNC: 8.9 MG/DL (ref 8.5–10.1)
CHLORIDE BLD-SCNC: 98 MMOL/L (ref 94–109)
CO2 SERPL-SCNC: 25 MMOL/L (ref 20–32)
CREAT SERPL-MCNC: 0.73 MG/DL (ref 0.66–1.25)
ERYTHROCYTE [DISTWIDTH] IN BLOOD BY AUTOMATED COUNT: 12.6 % (ref 10–15)
GFR SERPL CREATININE-BSD FRML MDRD: >90 ML/MIN/1.73M2
GLUCOSE BLD-MCNC: 217 MG/DL (ref 70–99)
HCT VFR BLD AUTO: 46.5 % (ref 40–53)
HGB BLD-MCNC: 15 G/DL (ref 13.3–17.7)
HOLD SPECIMEN: NORMAL
HOLD SPECIMEN: NORMAL
LIPASE SERPL-CCNC: 51 U/L (ref 73–393)
MCH RBC QN AUTO: 26.7 PG (ref 26.5–33)
MCHC RBC AUTO-ENTMCNC: 32.3 G/DL (ref 31.5–36.5)
MCV RBC AUTO: 83 FL (ref 78–100)
PLATELET # BLD AUTO: 277 10E3/UL (ref 150–450)
POTASSIUM BLD-SCNC: 3.9 MMOL/L (ref 3.4–5.3)
PROT SERPL-MCNC: 7 G/DL (ref 6.8–8.8)
RBC # BLD AUTO: 5.61 10E6/UL (ref 4.4–5.9)
SODIUM SERPL-SCNC: 131 MMOL/L (ref 133–144)
WBC # BLD AUTO: 10.3 10E3/UL (ref 4–11)

## 2021-09-08 PROCEDURE — 85027 COMPLETE CBC AUTOMATED: CPT | Performed by: NURSE PRACTITIONER

## 2021-09-08 PROCEDURE — 99284 EMERGENCY DEPT VISIT MOD MDM: CPT

## 2021-09-08 PROCEDURE — 250N000009 HC RX 250: Performed by: EMERGENCY MEDICINE

## 2021-09-08 PROCEDURE — 83690 ASSAY OF LIPASE: CPT | Performed by: NURSE PRACTITIONER

## 2021-09-08 PROCEDURE — 93005 ELECTROCARDIOGRAM TRACING: CPT

## 2021-09-08 PROCEDURE — 36415 COLL VENOUS BLD VENIPUNCTURE: CPT | Performed by: NURSE PRACTITIONER

## 2021-09-08 PROCEDURE — 250N000013 HC RX MED GY IP 250 OP 250 PS 637: Performed by: EMERGENCY MEDICINE

## 2021-09-08 PROCEDURE — 250N000013 HC RX MED GY IP 250 OP 250 PS 637: Performed by: NURSE PRACTITIONER

## 2021-09-08 PROCEDURE — 82040 ASSAY OF SERUM ALBUMIN: CPT | Performed by: NURSE PRACTITIONER

## 2021-09-08 RX ORDER — SUCRALFATE ORAL 1 G/10ML
1 SUSPENSION ORAL ONCE
Status: COMPLETED | OUTPATIENT
Start: 2021-09-08 | End: 2021-09-08

## 2021-09-08 RX ORDER — SUCRALFATE ORAL 1 G/10ML
1 SUSPENSION ORAL 4 TIMES DAILY
Qty: 280 ML | Refills: 0 | Status: SHIPPED | OUTPATIENT
Start: 2021-09-08 | End: 2021-09-15

## 2021-09-08 RX ADMIN — SUCRALFATE 1 G: 1 SUSPENSION ORAL at 15:57

## 2021-09-08 RX ADMIN — LIDOCAINE HYDROCHLORIDE 30 ML: 20 SOLUTION ORAL; TOPICAL at 15:05

## 2021-09-08 ASSESSMENT — ENCOUNTER SYMPTOMS
ABDOMINAL PAIN: 1
SHORTNESS OF BREATH: 0
NAUSEA: 0
CHILLS: 0
DYSURIA: 0
VOMITING: 0
FEVER: 0

## 2021-09-08 NOTE — ED TRIAGE NOTES
Pt with mid-abdominal pain for the past week, worse today. States hx of surgeries and taking meds at home with no relief. Denies n/v. ABC's intact, alert and oriented X3.

## 2021-09-08 NOTE — ED PROVIDER NOTES
History   Chief Complaint:  Abdominal Pain    The history is provided by the patient.      Perico Houston is a 61 year old male with history of hypertension, hyperlipidemia, and diabetes mellitus type two who presents with abdominal pain. Patient developed centralized abdominal pain four days ago. He smokes tobacco occasionally. No alcohol use.     Review of Systems   Constitutional: Negative for chills and fever.   Respiratory: Negative for shortness of breath.    Cardiovascular: Negative for chest pain.   Gastrointestinal: Positive for abdominal pain. Negative for nausea and vomiting.   Genitourinary: Negative for dysuria.   All other systems reviewed and are negative.    Allergies:  Aspirin  Hydrocodone-acetaminophen  Sumatriptan     Medications:  Aspirin 81 mg  Invokana  Klonopin  Neurontin  Glucotrol  Atarax  Prevacid  Prinivil  Lorazepam  Glucophage  Nitrostat  Lipitor  Norvasc  Zyprexa    Past Medical History:    Anxiety  CAD  Depressive disorder  Diabetes mellitus type two  GERD  Hypertension  NSTEMI  Suicidal ideation  Cervical disc herniation   Biceps tendinopathy  AC joint arthropathy  Shoulder impingement  Hyperlipidemia  Gastritis  Biliary colic    Past Surgical History:    Angiogram  Cholecystectomy  Clavicle surgery  Partial liver resection   Total hip arthroplasty  Shoulder arthroscopy    Family History:    Father: diabetes, stroke  Daughter: diabetes, asthma, bipolar disorder, depression  Mother: diabetes  Brother: hypertension    Social History:  Presents to ED alone    Physical Exam     Patient Vitals for the past 24 hrs:   BP Temp Temp src Pulse Resp SpO2   09/08/21 1600 (!) 141/83 -- -- 73 -- 99 %   09/08/21 1545 -- -- -- -- -- 99 %   09/08/21 1530 (!) 147/94 -- -- 77 -- 96 %   09/08/21 1515 (!) 143/86 -- -- -- -- 99 %   09/08/21 1400 (!) 143/89 (!) 96.6  F (35.9  C) Temporal 85 18 97 %       Physical Exam  General: Alert, Moderate discomfort, well kept  Eyes: PERRL, conjunctivae pink no  scleral icterus or conjunctival injection  ENT:   Moist mucus membranes, posterior oropharynx clear without erythema or exudates, No lymphadenopathy, Normal voice  Resp:  Lungs clear to auscultation bilaterally, no crackles/rubs/wheezes. Good air movement  CV:  Normal rate and rhythm, no murmurs/rubs/gallops  GI:  Abdomen soft and non-distended.  Normoactive BS.  No tenderness, guarding or rebound, No masses  Skin:  Warm, dry.  No rashes or petechiae  Musculoskeletal: No peripheral edema or calf tenderness, Normal gross ROM   Neuro: Alert and oriented to person/place/time, normal sensation  Psychiatric: Normal affect, cooperative, good eye contact      Emergency Department Course   Laboratory:  CMP: Sodium 131(L), Glucose 217(H), o/w WNL (Creatinine 0.73)     Lipase: 51(L)    CBC: WBC 10.3, HGB 15.0,      Emergency Department Course:    Reviewed:  I reviewed nursing notes, vitals, past medical history and care everywhere    Assessments:  1536 I obtained history and examined the patient as noted above.   1637 I rechecked the patient and explained findings.     Interventions:  1505 GI Cocktail, 30 mL, PO  1557 Carafate, 1 g, PO    Disposition:  The patient was discharged to home.       Impression & Plan     Medical Decision Making:  Perico Houston is a 61 year old male who presents today for evaluation of chronic epigastric discomfort.  He has a care plan in regards to this.  He was initially given a GI cocktail which he stated did not help his symptoms therefore I obtained laboratory studies which showed no acute process.  She did have the appearance of some relief after Carafate.  Given his history and lack of findings there was no indication for imagery or further testing.  He is not complaining of any cardiac type symptoms.  I discussed appropriate use of medications for him and gave him a prescription for Carafate.  He appears to be safe and appropriate for outpatient management follow-up and is  discharged home.    Covid-19  Perico Houston was evaluated during a global COVID-19 pandemic, which necessitated consideration that the patient might be at risk for infection with the SARS-CoV-2 virus that causes COVID-19.   Applicable protocols for evaluation were followed during the patient's care.     Diagnosis:    ICD-10-CM    1. Chronic epigastric pain  R10.13     G89.29        Discharge Medications:  New Prescriptions    SUCRALFATE (CARAFATE) 1 GM/10ML SUSPENSION    Take 10 mLs (1 g) by mouth 4 times daily for 7 days       Scribe Disclosure:  Santi TALAVERA, am serving as a scribe at 3:02 PM on 9/8/2021 to document services personally performed by Loyd Hernandez APRN CNP based on my observations and the provider's statements to me.            Loyd Hernandez APRN CNP  09/08/21 1701

## 2021-09-08 NOTE — DISCHARGE INSTRUCTIONS
Use Carafate as directed for the next week. Continue taking your omeprazole. Avoid fatty spicy meals as well as coffee, alcohol, cigarettes, and chocolate as these are common things to affect your stomach. Again your laboratory studies were unremarkable. Your blood glucose was elevated a little bit. There is no evidence of any acute problems.

## 2021-09-09 LAB
ATRIAL RATE - MUSE: 78 BPM
DIASTOLIC BLOOD PRESSURE - MUSE: NORMAL MMHG
INTERPRETATION ECG - MUSE: NORMAL
P AXIS - MUSE: 36 DEGREES
PR INTERVAL - MUSE: 176 MS
QRS DURATION - MUSE: 96 MS
QT - MUSE: 398 MS
QTC - MUSE: 453 MS
R AXIS - MUSE: 5 DEGREES
SYSTOLIC BLOOD PRESSURE - MUSE: NORMAL MMHG
T AXIS - MUSE: -7 DEGREES
VENTRICULAR RATE- MUSE: 78 BPM

## 2021-09-11 ENCOUNTER — HOSPITAL ENCOUNTER (EMERGENCY)
Facility: CLINIC | Age: 61
Discharge: HOME | End: 2021-09-11
Attending: EMERGENCY MEDICINE | Admitting: EMERGENCY MEDICINE
Payer: COMMERCIAL

## 2021-09-11 ENCOUNTER — APPOINTMENT (OUTPATIENT)
Dept: GENERAL RADIOLOGY | Facility: CLINIC | Age: 61
End: 2021-09-11
Attending: EMERGENCY MEDICINE
Payer: COMMERCIAL

## 2021-09-11 ENCOUNTER — APPOINTMENT (OUTPATIENT)
Dept: CT IMAGING | Facility: CLINIC | Age: 61
End: 2021-09-11
Attending: EMERGENCY MEDICINE
Payer: COMMERCIAL

## 2021-09-11 VITALS
HEART RATE: 73 BPM | DIASTOLIC BLOOD PRESSURE: 93 MMHG | TEMPERATURE: 98.4 F | OXYGEN SATURATION: 97 % | RESPIRATION RATE: 20 BRPM | SYSTOLIC BLOOD PRESSURE: 167 MMHG

## 2021-09-11 DIAGNOSIS — F41.9 ANXIETY: ICD-10-CM

## 2021-09-11 DIAGNOSIS — G89.29 ABDOMINAL PAIN, CHRONIC, EPIGASTRIC: ICD-10-CM

## 2021-09-11 DIAGNOSIS — R10.13 ABDOMINAL PAIN, CHRONIC, EPIGASTRIC: ICD-10-CM

## 2021-09-11 LAB
ALBUMIN SERPL-MCNC: 3.7 G/DL (ref 3.4–5)
ALP SERPL-CCNC: 71 U/L (ref 40–150)
ALT SERPL W P-5'-P-CCNC: 34 U/L (ref 0–70)
ANION GAP SERPL CALCULATED.3IONS-SCNC: 5 MMOL/L (ref 3–14)
AST SERPL W P-5'-P-CCNC: 20 U/L (ref 0–45)
BASOPHILS # BLD AUTO: 0.1 10E3/UL (ref 0–0.2)
BASOPHILS NFR BLD AUTO: 1 %
BILIRUB SERPL-MCNC: 0.4 MG/DL (ref 0.2–1.3)
BUN SERPL-MCNC: 11 MG/DL (ref 7–30)
CALCIUM SERPL-MCNC: 8.6 MG/DL (ref 8.5–10.1)
CHLORIDE BLD-SCNC: 99 MMOL/L (ref 94–109)
CO2 SERPL-SCNC: 25 MMOL/L (ref 20–32)
CREAT SERPL-MCNC: 0.78 MG/DL (ref 0.66–1.25)
EOSINOPHIL # BLD AUTO: 0.4 10E3/UL (ref 0–0.7)
EOSINOPHIL NFR BLD AUTO: 5 %
ERYTHROCYTE [DISTWIDTH] IN BLOOD BY AUTOMATED COUNT: 12.6 % (ref 10–15)
GFR SERPL CREATININE-BSD FRML MDRD: >90 ML/MIN/1.73M2
GLUCOSE BLD-MCNC: 242 MG/DL (ref 70–99)
HCT VFR BLD AUTO: 46.3 % (ref 40–53)
HGB BLD-MCNC: 15 G/DL (ref 13.3–17.7)
HOLD SPECIMEN: NORMAL
IMM GRANULOCYTES # BLD: 0 10E3/UL
IMM GRANULOCYTES NFR BLD: 1 %
LACTATE SERPL-SCNC: 1.2 MMOL/L (ref 0.7–2)
LIPASE SERPL-CCNC: 56 U/L (ref 73–393)
LYMPHOCYTES # BLD AUTO: 1.9 10E3/UL (ref 0.8–5.3)
LYMPHOCYTES NFR BLD AUTO: 22 %
MCH RBC QN AUTO: 26.8 PG (ref 26.5–33)
MCHC RBC AUTO-ENTMCNC: 32.4 G/DL (ref 31.5–36.5)
MCV RBC AUTO: 83 FL (ref 78–100)
MONOCYTES # BLD AUTO: 0.6 10E3/UL (ref 0–1.3)
MONOCYTES NFR BLD AUTO: 6 %
NEUTROPHILS # BLD AUTO: 5.8 10E3/UL (ref 1.6–8.3)
NEUTROPHILS NFR BLD AUTO: 65 %
NRBC # BLD AUTO: 0 10E3/UL
NRBC BLD AUTO-RTO: 0 /100
PLATELET # BLD AUTO: 273 10E3/UL (ref 150–450)
POTASSIUM BLD-SCNC: 4.2 MMOL/L (ref 3.4–5.3)
PROT SERPL-MCNC: 7.2 G/DL (ref 6.8–8.8)
RBC # BLD AUTO: 5.59 10E6/UL (ref 4.4–5.9)
SODIUM SERPL-SCNC: 129 MMOL/L (ref 133–144)
WBC # BLD AUTO: 8.7 10E3/UL (ref 4–11)

## 2021-09-11 PROCEDURE — 74019 RADEX ABDOMEN 2 VIEWS: CPT

## 2021-09-11 PROCEDURE — 96360 HYDRATION IV INFUSION INIT: CPT | Mod: 59

## 2021-09-11 PROCEDURE — 258N000003 HC RX IP 258 OP 636: Performed by: EMERGENCY MEDICINE

## 2021-09-11 PROCEDURE — 99285 EMERGENCY DEPT VISIT HI MDM: CPT | Mod: 25

## 2021-09-11 PROCEDURE — 83605 ASSAY OF LACTIC ACID: CPT | Performed by: EMERGENCY MEDICINE

## 2021-09-11 PROCEDURE — 80053 COMPREHEN METABOLIC PANEL: CPT | Performed by: EMERGENCY MEDICINE

## 2021-09-11 PROCEDURE — 83690 ASSAY OF LIPASE: CPT | Performed by: EMERGENCY MEDICINE

## 2021-09-11 PROCEDURE — 36415 COLL VENOUS BLD VENIPUNCTURE: CPT | Performed by: EMERGENCY MEDICINE

## 2021-09-11 PROCEDURE — 74177 CT ABD & PELVIS W/CONTRAST: CPT

## 2021-09-11 PROCEDURE — 85025 COMPLETE CBC W/AUTO DIFF WBC: CPT | Performed by: EMERGENCY MEDICINE

## 2021-09-11 PROCEDURE — 250N000009 HC RX 250: Performed by: EMERGENCY MEDICINE

## 2021-09-11 PROCEDURE — 250N000013 HC RX MED GY IP 250 OP 250 PS 637: Performed by: EMERGENCY MEDICINE

## 2021-09-11 PROCEDURE — 250N000011 HC RX IP 250 OP 636: Performed by: EMERGENCY MEDICINE

## 2021-09-11 RX ORDER — IOPAMIDOL 755 MG/ML
500 INJECTION, SOLUTION INTRAVASCULAR ONCE
Status: COMPLETED | OUTPATIENT
Start: 2021-09-11 | End: 2021-09-11

## 2021-09-11 RX ADMIN — SODIUM CHLORIDE 65 ML: 9 INJECTION, SOLUTION INTRAVENOUS at 06:07

## 2021-09-11 RX ADMIN — LIDOCAINE HYDROCHLORIDE 30 ML: 20 SOLUTION ORAL; TOPICAL at 02:46

## 2021-09-11 RX ADMIN — SODIUM CHLORIDE 1000 ML: 9 INJECTION, SOLUTION INTRAVENOUS at 05:43

## 2021-09-11 RX ADMIN — IOPAMIDOL 100 ML: 755 INJECTION, SOLUTION INTRAVENOUS at 06:07

## 2021-09-11 RX ADMIN — LIDOCAINE HYDROCHLORIDE 30 ML: 20 SOLUTION ORAL; TOPICAL at 04:32

## 2021-09-11 ASSESSMENT — ENCOUNTER SYMPTOMS
ABDOMINAL PAIN: 1
VOMITING: 0
SLEEP DISTURBANCE: 1
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
## 2021-09-11 NOTE — ED TRIAGE NOTES
Pt states epigastric pain tonight. Pt states was seen at this facility on 09/08 for same pain. Pt states pain is worse than previous visit. ABCs intact GCS 15

## 2021-09-13 LAB
ATRIAL RATE - MUSE: 69 BPM
DIASTOLIC BLOOD PRESSURE - MUSE: NORMAL MMHG
INTERPRETATION ECG - MUSE: NORMAL
P AXIS - MUSE: 30 DEGREES
PR INTERVAL - MUSE: 182 MS
QRS DURATION - MUSE: 102 MS
QT - MUSE: 426 MS
QTC - MUSE: 456 MS
R AXIS - MUSE: -2 DEGREES
SYSTOLIC BLOOD PRESSURE - MUSE: NORMAL MMHG
T AXIS - MUSE: -11 DEGREES
VENTRICULAR RATE- MUSE: 69 BPM

## 2021-09-17 PROCEDURE — 88342 IMHCHEM/IMCYTCHM 1ST ANTB: CPT | Performed by: PATHOLOGY

## 2021-09-20 ENCOUNTER — LAB REQUISITION (OUTPATIENT)
Dept: LAB | Facility: CLINIC | Age: 61
End: 2021-09-20

## 2021-09-20 DIAGNOSIS — K25.9 GASTRIC ULCER, UNSPECIFIED AS ACUTE OR CHRONIC, WITHOUT HEMORRHAGE OR PERFORATION: ICD-10-CM

## 2021-09-20 DIAGNOSIS — R12 HEARTBURN: ICD-10-CM

## 2021-09-22 LAB
PATH REPORT.ADDENDUM SPEC: NORMAL
PATH REPORT.COMMENTS IMP SPEC: NORMAL
PATH REPORT.COMMENTS IMP SPEC: NORMAL
PATH REPORT.FINAL DX SPEC: NORMAL
PATH REPORT.GROSS SPEC: NORMAL
PATH REPORT.MICROSCOPIC SPEC OTHER STN: NORMAL
PATH REPORT.RELEVANT HX SPEC: NORMAL
PHOTO IMAGE: NORMAL

## 2021-10-11 PROCEDURE — 88305 TISSUE EXAM BY PATHOLOGIST: CPT | Performed by: PATHOLOGY

## 2021-10-12 ENCOUNTER — LAB REQUISITION (OUTPATIENT)
Dept: LAB | Facility: CLINIC | Age: 61
End: 2021-10-12

## 2021-10-12 DIAGNOSIS — K92.2 GASTROINTESTINAL HEMORRHAGE, UNSPECIFIED: ICD-10-CM

## 2021-10-12 DIAGNOSIS — R10.13 EPIGASTRIC PAIN: ICD-10-CM

## 2021-10-12 DIAGNOSIS — K30 FUNCTIONAL DYSPEPSIA: ICD-10-CM

## 2021-10-12 DIAGNOSIS — K44.9 DIAPHRAGMATIC HERNIA WITHOUT OBSTRUCTION OR GANGRENE: ICD-10-CM

## 2021-10-13 LAB
PATH REPORT.COMMENTS IMP SPEC: NORMAL
PATH REPORT.COMMENTS IMP SPEC: NORMAL
PATH REPORT.FINAL DX SPEC: NORMAL
PATH REPORT.GROSS SPEC: NORMAL
PATH REPORT.MICROSCOPIC SPEC OTHER STN: NORMAL
PATH REPORT.RELEVANT HX SPEC: NORMAL
PHOTO IMAGE: NORMAL

## 2021-12-17 ENCOUNTER — APPOINTMENT (OUTPATIENT)
Dept: GENERAL RADIOLOGY | Facility: CLINIC | Age: 61
End: 2021-12-17
Attending: EMERGENCY MEDICINE
Payer: COMMERCIAL

## 2021-12-17 ENCOUNTER — HOSPITAL ENCOUNTER (EMERGENCY)
Facility: CLINIC | Age: 61
Discharge: HOME | End: 2021-12-17
Attending: EMERGENCY MEDICINE | Admitting: EMERGENCY MEDICINE
Payer: COMMERCIAL

## 2021-12-17 VITALS
TEMPERATURE: 97.4 F | RESPIRATION RATE: 18 BRPM | DIASTOLIC BLOOD PRESSURE: 70 MMHG | SYSTOLIC BLOOD PRESSURE: 112 MMHG | OXYGEN SATURATION: 98 % | HEART RATE: 83 BPM

## 2021-12-17 DIAGNOSIS — F41.9 ANXIETY: ICD-10-CM

## 2021-12-17 DIAGNOSIS — R07.89 NON-CARDIAC CHEST PAIN: ICD-10-CM

## 2021-12-17 LAB
ANION GAP SERPL CALCULATED.3IONS-SCNC: 8 MMOL/L (ref 3–14)
ATRIAL RATE - MUSE: 80 BPM
BASOPHILS # BLD AUTO: 0 10E3/UL (ref 0–0.2)
BASOPHILS NFR BLD AUTO: 1 %
BUN SERPL-MCNC: 17 MG/DL (ref 7–30)
CALCIUM SERPL-MCNC: 8.1 MG/DL (ref 8.5–10.1)
CHLORIDE BLD-SCNC: 99 MMOL/L (ref 94–109)
CO2 SERPL-SCNC: 24 MMOL/L (ref 20–32)
CREAT SERPL-MCNC: 0.62 MG/DL (ref 0.66–1.25)
DIASTOLIC BLOOD PRESSURE - MUSE: NORMAL MMHG
EOSINOPHIL # BLD AUTO: 0.3 10E3/UL (ref 0–0.7)
EOSINOPHIL NFR BLD AUTO: 5 %
ERYTHROCYTE [DISTWIDTH] IN BLOOD BY AUTOMATED COUNT: 13.1 % (ref 10–15)
GFR SERPL CREATININE-BSD FRML MDRD: >90 ML/MIN/1.73M2
GLUCOSE BLD-MCNC: 262 MG/DL (ref 70–99)
HCT VFR BLD AUTO: 43.1 % (ref 40–53)
HGB BLD-MCNC: 13.3 G/DL (ref 13.3–17.7)
HOLD SPECIMEN: NORMAL
IMM GRANULOCYTES # BLD: 0 10E3/UL
IMM GRANULOCYTES NFR BLD: 1 %
INTERPRETATION ECG - MUSE: NORMAL
LYMPHOCYTES # BLD AUTO: 2.1 10E3/UL (ref 0.8–5.3)
LYMPHOCYTES NFR BLD AUTO: 32 %
MCH RBC QN AUTO: 26.5 PG (ref 26.5–33)
MCHC RBC AUTO-ENTMCNC: 30.9 G/DL (ref 31.5–36.5)
MCV RBC AUTO: 86 FL (ref 78–100)
MONOCYTES # BLD AUTO: 0.6 10E3/UL (ref 0–1.3)
MONOCYTES NFR BLD AUTO: 9 %
NEUTROPHILS # BLD AUTO: 3.6 10E3/UL (ref 1.6–8.3)
NEUTROPHILS NFR BLD AUTO: 52 %
NRBC # BLD AUTO: 0 10E3/UL
NRBC BLD AUTO-RTO: 0 /100
P AXIS - MUSE: 20 DEGREES
PLATELET # BLD AUTO: 315 10E3/UL (ref 150–450)
POTASSIUM BLD-SCNC: 4 MMOL/L (ref 3.4–5.3)
PR INTERVAL - MUSE: 170 MS
QRS DURATION - MUSE: 96 MS
QT - MUSE: 398 MS
QTC - MUSE: 459 MS
R AXIS - MUSE: 1 DEGREES
RBC # BLD AUTO: 5.02 10E6/UL (ref 4.4–5.9)
SODIUM SERPL-SCNC: 131 MMOL/L (ref 133–144)
SYSTOLIC BLOOD PRESSURE - MUSE: NORMAL MMHG
T AXIS - MUSE: -12 DEGREES
TROPONIN I SERPL HS-MCNC: 7 NG/L
TROPONIN I SERPL HS-MCNC: 8 NG/L
VENTRICULAR RATE- MUSE: 80 BPM
WBC # BLD AUTO: 6.6 10E3/UL (ref 4–11)

## 2021-12-17 PROCEDURE — 93005 ELECTROCARDIOGRAM TRACING: CPT

## 2021-12-17 PROCEDURE — 85025 COMPLETE CBC W/AUTO DIFF WBC: CPT | Performed by: EMERGENCY MEDICINE

## 2021-12-17 PROCEDURE — 99285 EMERGENCY DEPT VISIT HI MDM: CPT | Mod: 25

## 2021-12-17 PROCEDURE — 71046 X-RAY EXAM CHEST 2 VIEWS: CPT

## 2021-12-17 PROCEDURE — 80048 BASIC METABOLIC PNL TOTAL CA: CPT | Performed by: EMERGENCY MEDICINE

## 2021-12-17 PROCEDURE — 36415 COLL VENOUS BLD VENIPUNCTURE: CPT | Performed by: EMERGENCY MEDICINE

## 2021-12-17 PROCEDURE — 84484 ASSAY OF TROPONIN QUANT: CPT | Performed by: EMERGENCY MEDICINE

## 2021-12-17 NOTE — ED TRIAGE NOTES
Patient presents to ED via EMS from home d/t substernal CP that developed 45 min ago . Patient given x2 SL nitroglycerin with minimal relief. 4 mg of zofran    ABC intact   A/ox4

## 2021-12-17 NOTE — ED NOTES
Bed: HW01  Expected date:   Expected time:   Means of arrival:   Comments:  [facility]

## 2021-12-17 NOTE — ED PROVIDER NOTES
"  History     Chief Complaint:  Chest Pain     The history is provided by the patient.      Perico Houston is a 61 year old male with history of anxiety, Covid-19 infection, GERD, type 2 diabetes, NSTEMI, CAD, insomnia who presents with chest pain. The patient reports that he began experiencing substernal chest pain this evening. He also notes a sensation of \"rumbling\" in his chest, which he has been unable to resolve by voluntarily coughing. Of note, the patient reports a recent Covid-19 infection, from which he has been having difficulty sleeping for about a month. He states that he was not vaccinated against Covid-19.    Patient has an ED care plan.  See below.     Review of Systems   Cardiovascular: Positive for chest pain.   All other systems reviewed and are negative.    Allergies:  Aspirin  Hydrocodone-Acetaminophen  Sumatriptan    Medications:  Aspirin  Invokana  Klonopin  Gabapentin  Glipizide  Atarax  Lansoprazole  Lisinopril  Lorazepam  Metformin  Nitroglycerin  Oxycodone  Senna-docusate    Past Medical History:     Anxiety   CAD (coronary artery disease)   Chronic low back pain   Chronic pain syndrome   Depressive disorder   Diabetes mellitus, type 2   Gastro-oesophageal reflux disease   Hypertension   Infection due to 2019 novel coronavirus     Past Surgical History:    Angiogram  Cholecystectomy  Left clavicle surgery  Partial liver resection     Family History:    Father: Diabetes, hypertension, stroke  Mother: Diabetes  Sister: Diabetes  Brother: Hypertension    Social History:  Presents to the emergency department alone  Arrives via ambulance    Physical Exam     Patient Vitals for the past 24 hrs:   BP Temp Pulse Resp SpO2   12/17/21 0530 -- -- 83 -- 98 %   12/17/21 0515 -- -- 74 -- 96 %   12/17/21 0500 112/70 -- 75 -- 96 %   12/17/21 0445 -- -- 78 -- 98 %   12/17/21 0430 118/75 -- 76 -- 96 %   12/17/21 0415 -- -- 74 -- 96 %   12/17/21 0400 109/64 -- 77 -- 96 %   12/17/21 0345 -- -- 78 -- 96 % "   12/17/21 0330 101/65 -- 80 -- 96 %   12/17/21 0315 -- -- 79 -- --   12/17/21 0229 130/75 97.4  F (36.3  C) 92 18 95 %     Physical Exam  Nursing note and vitals reviewed.  Constitutional: Cooperative. Tearful  HENT:   Mouth/Throat: Mucous membranes are normal.   Cardiovascular: Normal rate, regular rhythm and normal heart sounds.  No murmur.  Pulmonary/Chest: Effort normal and breath sounds normal. No respiratory distress. No wheezes. No rales.   Abdominal: Soft. Normal appearance and bowel sounds are normal. No distension. There is no tenderness.  Musculoskeletal: No LE edema.   Neurological: Alert. Oriented x4  Skin: Skin is warm and dry.   Psychiatric: Anxious-appearing.    Emergency Department Course     ECG  ECG obtained at 0309, ECG read at 0321  Normal sinus rhythm  Septal infarct, age undetermined  Inferior infarct, age undetermined   Rate 80 bpm. CO interval 170 ms. QRS duration 96 ms. QT/QTc 398/459 ms. P-R-T axes 20 1 -12.     Imaging:  Chest XR,  PA & LAT   Final Result   IMPRESSION: Heart size within normal limits. A few streaky infiltrates present in the right upper lobe and bilateral lung bases could reflect a developing infectious or inflammatory process. No pneumothorax or pleural effusion. Multiple metallic clips in    the region of the high right liver lobe.        Report per radiology    Laboratory:  Labs Ordered and Resulted from Time of ED Arrival to Time of ED Departure   BASIC METABOLIC PANEL - Abnormal       Result Value    Sodium 131 (*)     Potassium 4.0      Chloride 99      Carbon Dioxide (CO2) 24      Anion Gap 8      Urea Nitrogen 17      Creatinine 0.62 (*)     Calcium 8.1 (*)     Glucose 262 (*)     GFR Estimate >90     CBC WITH PLATELETS AND DIFFERENTIAL - Abnormal    WBC Count 6.6      RBC Count 5.02      Hemoglobin 13.3      Hematocrit 43.1      MCV 86      MCH 26.5      MCHC 30.9 (*)     RDW 13.1      Platelet Count 315      % Neutrophils 52      % Lymphocytes 32      %  Monocytes 9      % Eosinophils 5      % Basophils 1      % Immature Granulocytes 1      NRBCs per 100 WBC 0      Absolute Neutrophils 3.6      Absolute Lymphocytes 2.1      Absolute Monocytes 0.6      Absolute Eosinophils 0.3      Absolute Basophils 0.0      Absolute Immature Granulocytes 0.0      Absolute NRBCs 0.0     TROPONIN I - Normal    Troponin I High Sensitivity 8     TROPONIN I - Normal    Troponin I High Sensitivity 7       Reviewed:  I reviewed nursing notes, vitals, past medical history and Care Everywhere    Assessments:  0238 I obtained history and examined the patient as noted above.   0521 I rechecked the patient and explained findings.     Disposition:  The patient was discharged to home.     Impression & Plan     Medical Decision Making:  Perico Houston is a 61 year old gentleman with known coronary disease who presents with chronic abdominal pain and chest pain quite frequently. He has a large anxiety component. He does have a care plan in place, which I reviewed. Serial troponins here were normal and EKG was non-ischemic. He is tearful, but I see no indication for imaging of the abdomen or further workup at this time. Abdominal exam is non-surgical.  He is perseverating on his recent Covid-19 infection. He is not hypoxic or working to breathe. Clinical concern for thromboembolism or other sequelae are very low. He will be discharged home to follow up with his regular physician.    Diagnosis:    ICD-10-CM    1. Non-cardiac chest pain  R07.89    2. Anxiety  F41.9      Scribe Disclosure:  I, Alban Garcia, am serving as a scribe at 2:32 AM on 12/17/2021 to document services personally performed by Jaquan Hummel MD based on my observations and the provider's statements to me.     ~~~~~~~~~~~~~~~~~~~~~~~~~~~    EMERGENCY CARE PLAN     At each Emergency Department visit, we will evaluate this patient to determine if an emergency medical condition is present.     Acute care plan for the following  conditions: Recurrent visits to ED for recurrent epigastric pain, chest pain, and headaches.  Brief History of Condition:   Perico Houston is a male with type II diabetes, who often presents to the ED with epigastric pain and/or chest pain.  An anxiety component often accompanies his symptoms and likely plays a major role in his frequent presentations.  He has had a diagnosis in 01/2106 of NSTEMI (max Trop 0.944), with subsequent heart cath which showed mild-moderate CAD (see below) without need of intervention.  He has gastritis/duodenitis on resent Upper GI (see below) and has proven with multiple repeat studies to have negative lab tests and imaging studies.   Often he arrives to the ED via EMS and appears to be in extreme pain with associated diaphoresis, which makes differentiating an acute vs. chronic condition difficult and leads to unnecessary testing. In 4746-3455 he stays mostly in the [facility] system with occasional [facility] visits.  He had a CT scan at [facility] over the past year that was negative despite severe abdominal pain for one week. He is maintained in 2019 on sustained release oxycodone apparently via [facility] primary.       Previous Surgical History:  Cholecystectomy; Partial Liver Resection     Authorized treatments in the Emergency Department (with specific medications and doses):      1.  Chest Pain: Perico often presents with chest pain and usually other associated symptoms including headache, back pain, fatigue, dizziness, and epigastric pain.  He is currently medically managed by Cardiology who believes anxiety is likely playing a role in his recurrent presentations.  There may be a stable angina component as well as he feels nitroglycerin often improves his pain.  If symptomatically improved and has unchanged ECG from baseline, with negative serial troponins it has been proven he can be safely discharged home with outpatient follow up.            2. Abdominal/Epigastric pain:  He has  had a cholecystectomy, and has had multiple lab tests and imaging studies in the ED that have proven to be negative.  No history of pancreatitis or liver disease has been found.  His pain is often resolved with a GI cocktail.  He sees a GI specialist from [facility], and is being treated with omeprazole and Hyoscyamine.  Plans are for future gastric emptying study for further evaluation. (see upper endoscopy results below)                                                                                    *Recommend:  limiting unnecessary CT imaging studies and serum lab tests.     3. Depression:  Evaluate for suicidal ideation or patient safety risk.  DEC evaluation if warranted.      Indications for Admission or Consultation:  Hospitalization should be avoided unless clearly medically indicated.   Hospitalization should be considered: if patient signs of acute organ dysfunction, or suicidal ideation.     Expected home rescue plan:  Call Primary Care Clinic     Follow up plan after an ED visit: Primary MD clinic visit     Restricted Status if restricted to Hospital or Prescriber: none     PCP:  HERO Turner  Cardiology: Claudia Melgoza M.D.  Gastroenterology:  Jaquan Cherry M.D., MD  12/17/21 0602

## 2022-04-07 ENCOUNTER — APPOINTMENT (OUTPATIENT)
Dept: CT IMAGING | Facility: CLINIC | Age: 62
End: 2022-04-07
Attending: EMERGENCY MEDICINE
Payer: COMMERCIAL

## 2022-04-07 ENCOUNTER — HOSPITAL ENCOUNTER (EMERGENCY)
Facility: CLINIC | Age: 62
Discharge: HOME | End: 2022-04-07
Attending: EMERGENCY MEDICINE | Admitting: EMERGENCY MEDICINE
Payer: COMMERCIAL

## 2022-04-07 ENCOUNTER — APPOINTMENT (OUTPATIENT)
Dept: GENERAL RADIOLOGY | Facility: CLINIC | Age: 62
End: 2022-04-07
Attending: EMERGENCY MEDICINE
Payer: COMMERCIAL

## 2022-04-07 VITALS
OXYGEN SATURATION: 98 % | SYSTOLIC BLOOD PRESSURE: 146 MMHG | TEMPERATURE: 97.5 F | HEART RATE: 67 BPM | RESPIRATION RATE: 18 BRPM | DIASTOLIC BLOOD PRESSURE: 87 MMHG

## 2022-04-07 DIAGNOSIS — M25.551 HIP PAIN, RIGHT: ICD-10-CM

## 2022-04-07 PROCEDURE — 250N000013 HC RX MED GY IP 250 OP 250 PS 637: Performed by: EMERGENCY MEDICINE

## 2022-04-07 PROCEDURE — 72170 X-RAY EXAM OF PELVIS: CPT

## 2022-04-07 PROCEDURE — 99285 EMERGENCY DEPT VISIT HI MDM: CPT | Mod: 25

## 2022-04-07 PROCEDURE — 73552 X-RAY EXAM OF FEMUR 2/>: CPT | Mod: RT

## 2022-04-07 PROCEDURE — 73700 CT LOWER EXTREMITY W/O DYE: CPT | Mod: RT

## 2022-04-07 RX ORDER — LORAZEPAM 0.5 MG/1
0.5 TABLET ORAL ONCE
Status: COMPLETED | OUTPATIENT
Start: 2022-04-07 | End: 2022-04-07

## 2022-04-07 RX ORDER — ACETAMINOPHEN 325 MG/1
975 TABLET ORAL ONCE
Status: COMPLETED | OUTPATIENT
Start: 2022-04-07 | End: 2022-04-07

## 2022-04-07 RX ADMIN — ACETAMINOPHEN 975 MG: 325 TABLET, FILM COATED ORAL at 10:34

## 2022-04-07 RX ADMIN — LORAZEPAM 0.5 MG: 0.5 TABLET ORAL at 08:44
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
## 2022-04-07 NOTE — DISCHARGE INSTRUCTIONS
Please follow-up with your orthopedic surgeon as soon as possible.  Use Tylenol, heat, ice as needed for pain.  Your orthopedic surgeon can prescribe additional medications to help as needed.  Return to the ER if unable to walk, fevers, chills, swelling or other concerns.  Your imaging all looked okay today by orthopedic surgeon knows you best and would be helpful to evaluate this.

## 2022-04-07 NOTE — ED TRIAGE NOTES
Arrives via EMS or low back pain that radiates down right leg. Started last night at 2000. Unable to get out of bed this morning. No known injury. 100mcg of fentanyl given via EMS PTA. A/Ox4. ABC's. Has not taken BP meds this morning, HTN. Other VSS.

## 2022-05-29 ENCOUNTER — APPOINTMENT (OUTPATIENT)
Dept: CT IMAGING | Facility: CLINIC | Age: 62
End: 2022-05-29
Attending: EMERGENCY MEDICINE
Payer: COMMERCIAL

## 2022-05-29 ENCOUNTER — HOSPITAL ENCOUNTER (EMERGENCY)
Facility: CLINIC | Age: 62
Discharge: HOME | End: 2022-05-29
Attending: EMERGENCY MEDICINE | Admitting: EMERGENCY MEDICINE
Payer: COMMERCIAL

## 2022-05-29 VITALS
SYSTOLIC BLOOD PRESSURE: 189 MMHG | RESPIRATION RATE: 18 BRPM | OXYGEN SATURATION: 96 % | DIASTOLIC BLOOD PRESSURE: 103 MMHG | HEART RATE: 93 BPM | TEMPERATURE: 98.3 F

## 2022-05-29 DIAGNOSIS — M54.2 NECK PAIN: Primary | ICD-10-CM

## 2022-05-29 DIAGNOSIS — E87.1 HYPONATREMIA: ICD-10-CM

## 2022-05-29 DIAGNOSIS — R73.9 HYPERGLYCEMIA: ICD-10-CM

## 2022-05-29 DIAGNOSIS — I10 BENIGN ESSENTIAL HYPERTENSION: ICD-10-CM

## 2022-05-29 DIAGNOSIS — R07.9 CHEST PAIN, UNSPECIFIED TYPE: ICD-10-CM

## 2022-05-29 LAB
ANION GAP SERPL CALCULATED.3IONS-SCNC: 6 MMOL/L (ref 3–14)
BASOPHILS # BLD AUTO: 0.1 10E3/UL (ref 0–0.2)
BASOPHILS NFR BLD AUTO: 1 %
BUN SERPL-MCNC: 25 MG/DL (ref 7–30)
CALCIUM SERPL-MCNC: 9.1 MG/DL (ref 8.5–10.1)
CHLORIDE BLD-SCNC: 99 MMOL/L (ref 94–109)
CO2 SERPL-SCNC: 25 MMOL/L (ref 20–32)
CREAT SERPL-MCNC: 0.8 MG/DL (ref 0.66–1.25)
EOSINOPHIL # BLD AUTO: 0.4 10E3/UL (ref 0–0.7)
EOSINOPHIL NFR BLD AUTO: 3 %
ERYTHROCYTE [DISTWIDTH] IN BLOOD BY AUTOMATED COUNT: 12.8 % (ref 10–15)
GFR SERPL CREATININE-BSD FRML MDRD: >90 ML/MIN/1.73M2
GLUCOSE BLD-MCNC: 154 MG/DL (ref 70–99)
HCT VFR BLD AUTO: 49.6 % (ref 40–53)
HGB BLD-MCNC: 15.7 G/DL (ref 13.3–17.7)
IMM GRANULOCYTES # BLD: 0.1 10E3/UL
IMM GRANULOCYTES NFR BLD: 1 %
LYMPHOCYTES # BLD AUTO: 4.8 10E3/UL (ref 0.8–5.3)
LYMPHOCYTES NFR BLD AUTO: 44 %
MCH RBC QN AUTO: 26.7 PG (ref 26.5–33)
MCHC RBC AUTO-ENTMCNC: 31.7 G/DL (ref 31.5–36.5)
MCV RBC AUTO: 85 FL (ref 78–100)
MONOCYTES # BLD AUTO: 0.8 10E3/UL (ref 0–1.3)
MONOCYTES NFR BLD AUTO: 7 %
NEUTROPHILS # BLD AUTO: 4.8 10E3/UL (ref 1.6–8.3)
NEUTROPHILS NFR BLD AUTO: 44 %
NRBC # BLD AUTO: 0 10E3/UL
NRBC BLD AUTO-RTO: 0 /100
PLATELET # BLD AUTO: 337 10E3/UL (ref 150–450)
POTASSIUM BLD-SCNC: 4 MMOL/L (ref 3.4–5.3)
RBC # BLD AUTO: 5.87 10E6/UL (ref 4.4–5.9)
SODIUM SERPL-SCNC: 130 MMOL/L (ref 133–144)
TROPONIN I SERPL HS-MCNC: 8 NG/L
WBC # BLD AUTO: 10.9 10E3/UL (ref 4–11)

## 2022-05-29 PROCEDURE — 72125 CT NECK SPINE W/O DYE: CPT

## 2022-05-29 PROCEDURE — 36415 COLL VENOUS BLD VENIPUNCTURE: CPT | Performed by: EMERGENCY MEDICINE

## 2022-05-29 PROCEDURE — 99285 EMERGENCY DEPT VISIT HI MDM: CPT | Mod: 25

## 2022-05-29 PROCEDURE — 80048 BASIC METABOLIC PNL TOTAL CA: CPT | Performed by: EMERGENCY MEDICINE

## 2022-05-29 PROCEDURE — 93005 ELECTROCARDIOGRAM TRACING: CPT

## 2022-05-29 PROCEDURE — 70450 CT HEAD/BRAIN W/O DYE: CPT

## 2022-05-29 PROCEDURE — 85025 COMPLETE CBC W/AUTO DIFF WBC: CPT | Performed by: EMERGENCY MEDICINE

## 2022-05-29 PROCEDURE — 250N000013 HC RX MED GY IP 250 OP 250 PS 637: Performed by: EMERGENCY MEDICINE

## 2022-05-29 PROCEDURE — 84484 ASSAY OF TROPONIN QUANT: CPT | Performed by: EMERGENCY MEDICINE

## 2022-05-29 RX ORDER — LIDOCAINE 4 G/G
1 PATCH TOPICAL ONCE
Status: DISCONTINUED | OUTPATIENT
Start: 2022-05-29 | End: 2022-05-29 | Stop reason: HOSPADM

## 2022-05-29 RX ORDER — LORAZEPAM 0.5 MG/1
0.5 TABLET ORAL ONCE
Status: COMPLETED | OUTPATIENT
Start: 2022-05-29 | End: 2022-05-29

## 2022-05-29 RX ORDER — LIDOCAINE 4 G/G
1 PATCH TOPICAL EVERY 24 HOURS
Qty: 5 PATCH | Refills: 0 | Status: SHIPPED | OUTPATIENT
Start: 2022-05-29 | End: 2022-09-30

## 2022-05-29 RX ADMIN — LIDOCAINE 1 PATCH: 246 PATCH TOPICAL at 04:25

## 2022-05-29 RX ADMIN — LORAZEPAM 0.5 MG: 0.5 TABLET ORAL at 04:24

## 2022-05-29 ASSESSMENT — ENCOUNTER SYMPTOMS
NUMBNESS: 0
VOMITING: 0
NAUSEA: 0
NECK PAIN: 1
HEADACHES: 1

## 2022-05-29 NOTE — ED NOTES
Bed: ED17  Expected date: 5/29/22  Expected time: 3:30 AM  Means of arrival: Ambulance  Comments:  [id number]

## 2022-05-29 NOTE — ED TRIAGE NOTES
Pt arrived by EMS for spontaneous neck pain after waking up; EMS states pt was inconsolable upon arrival due to pain;     Fentanyl 100mcg by EMS
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
## 2022-05-31 LAB
ATRIAL RATE - MUSE: 83 BPM
DIASTOLIC BLOOD PRESSURE - MUSE: NORMAL MMHG
INTERPRETATION ECG - MUSE: NORMAL
P AXIS - MUSE: 35 DEGREES
PR INTERVAL - MUSE: 180 MS
QRS DURATION - MUSE: 104 MS
QT - MUSE: 388 MS
QTC - MUSE: 455 MS
R AXIS - MUSE: 8 DEGREES
SYSTOLIC BLOOD PRESSURE - MUSE: NORMAL MMHG
T AXIS - MUSE: 15 DEGREES
VENTRICULAR RATE- MUSE: 83 BPM

## 2022-09-08 ENCOUNTER — HOSPITAL ENCOUNTER (EMERGENCY)
Facility: CLINIC | Age: 62
Discharge: HOME | End: 2022-09-08
Attending: EMERGENCY MEDICINE | Admitting: EMERGENCY MEDICINE
Payer: COMMERCIAL

## 2022-09-08 VITALS
RESPIRATION RATE: 20 BRPM | SYSTOLIC BLOOD PRESSURE: 137 MMHG | OXYGEN SATURATION: 96 % | HEART RATE: 75 BPM | TEMPERATURE: 97.9 F | DIASTOLIC BLOOD PRESSURE: 95 MMHG

## 2022-09-08 DIAGNOSIS — R51.9 NONINTRACTABLE HEADACHE, UNSPECIFIED CHRONICITY PATTERN, UNSPECIFIED HEADACHE TYPE: ICD-10-CM

## 2022-09-08 DIAGNOSIS — R10.13 ABDOMINAL PAIN, EPIGASTRIC: ICD-10-CM

## 2022-09-08 LAB
ALBUMIN SERPL BCG-MCNC: 4.3 G/DL (ref 3.5–5.2)
ALP SERPL-CCNC: 63 U/L (ref 40–129)
ALT SERPL W P-5'-P-CCNC: 22 U/L (ref 10–50)
ANION GAP SERPL CALCULATED.3IONS-SCNC: 10 MMOL/L (ref 7–15)
AST SERPL W P-5'-P-CCNC: 23 U/L (ref 10–50)
BASOPHILS # BLD AUTO: 0 10E3/UL (ref 0–0.2)
BASOPHILS NFR BLD AUTO: 1 %
BILIRUB SERPL-MCNC: 0.2 MG/DL
BUN SERPL-MCNC: 13.2 MG/DL (ref 8–23)
CALCIUM SERPL-MCNC: 9 MG/DL (ref 8.8–10.2)
CHLORIDE SERPL-SCNC: 97 MMOL/L (ref 98–107)
CREAT SERPL-MCNC: 0.62 MG/DL (ref 0.67–1.17)
DEPRECATED HCO3 PLAS-SCNC: 22 MMOL/L (ref 22–29)
EOSINOPHIL # BLD AUTO: 0.3 10E3/UL (ref 0–0.7)
EOSINOPHIL NFR BLD AUTO: 5 %
ERYTHROCYTE [DISTWIDTH] IN BLOOD BY AUTOMATED COUNT: 12.7 % (ref 10–15)
GFR SERPL CREATININE-BSD FRML MDRD: >90 ML/MIN/1.73M2
GLUCOSE SERPL-MCNC: 200 MG/DL (ref 70–99)
HCT VFR BLD AUTO: 48.2 % (ref 40–53)
HGB BLD-MCNC: 15.5 G/DL (ref 13.3–17.7)
HOLD SPECIMEN: NORMAL
HOLD SPECIMEN: NORMAL
IMM GRANULOCYTES # BLD: 0.1 10E3/UL
IMM GRANULOCYTES NFR BLD: 1 %
LIPASE SERPL-CCNC: 34 U/L (ref 13–60)
LYMPHOCYTES # BLD AUTO: 2.3 10E3/UL (ref 0.8–5.3)
LYMPHOCYTES NFR BLD AUTO: 34 %
MCH RBC QN AUTO: 27.3 PG (ref 26.5–33)
MCHC RBC AUTO-ENTMCNC: 32.2 G/DL (ref 31.5–36.5)
MCV RBC AUTO: 85 FL (ref 78–100)
MONOCYTES # BLD AUTO: 0.5 10E3/UL (ref 0–1.3)
MONOCYTES NFR BLD AUTO: 7 %
NEUTROPHILS # BLD AUTO: 3.6 10E3/UL (ref 1.6–8.3)
NEUTROPHILS NFR BLD AUTO: 52 %
NRBC # BLD AUTO: 0 10E3/UL
NRBC BLD AUTO-RTO: 0 /100
PLATELET # BLD AUTO: 306 10E3/UL (ref 150–450)
POTASSIUM SERPL-SCNC: 4.4 MMOL/L (ref 3.4–5.3)
PROT SERPL-MCNC: 7.1 G/DL (ref 6.4–8.3)
RBC # BLD AUTO: 5.67 10E6/UL (ref 4.4–5.9)
SODIUM SERPL-SCNC: 129 MMOL/L (ref 136–145)
WBC # BLD AUTO: 6.9 10E3/UL (ref 4–11)

## 2022-09-08 PROCEDURE — 96361 HYDRATE IV INFUSION ADD-ON: CPT

## 2022-09-08 PROCEDURE — 96374 THER/PROPH/DIAG INJ IV PUSH: CPT

## 2022-09-08 PROCEDURE — 250N000013 HC RX MED GY IP 250 OP 250 PS 637: Performed by: EMERGENCY MEDICINE

## 2022-09-08 PROCEDURE — 85025 COMPLETE CBC W/AUTO DIFF WBC: CPT | Performed by: EMERGENCY MEDICINE

## 2022-09-08 PROCEDURE — 99284 EMERGENCY DEPT VISIT MOD MDM: CPT | Mod: 25

## 2022-09-08 PROCEDURE — 96375 TX/PRO/DX INJ NEW DRUG ADDON: CPT

## 2022-09-08 PROCEDURE — 80053 COMPREHEN METABOLIC PANEL: CPT | Performed by: EMERGENCY MEDICINE

## 2022-09-08 PROCEDURE — 250N000009 HC RX 250: Performed by: EMERGENCY MEDICINE

## 2022-09-08 PROCEDURE — C9113 INJ PANTOPRAZOLE SODIUM, VIA: HCPCS | Performed by: EMERGENCY MEDICINE

## 2022-09-08 PROCEDURE — 250N000011 HC RX IP 250 OP 636: Performed by: EMERGENCY MEDICINE

## 2022-09-08 PROCEDURE — 36415 COLL VENOUS BLD VENIPUNCTURE: CPT | Performed by: EMERGENCY MEDICINE

## 2022-09-08 PROCEDURE — 258N000003 HC RX IP 258 OP 636: Performed by: EMERGENCY MEDICINE

## 2022-09-08 PROCEDURE — 83690 ASSAY OF LIPASE: CPT | Performed by: EMERGENCY MEDICINE

## 2022-09-08 RX ORDER — ALUMINA, MAGNESIA, AND SIMETHICONE 2400; 2400; 240 MG/30ML; MG/30ML; MG/30ML
30 SUSPENSION ORAL EVERY 6 HOURS PRN
Qty: 100 ML | Refills: 0 | Status: SHIPPED | OUTPATIENT
Start: 2022-09-08

## 2022-09-08 RX ORDER — DIPHENHYDRAMINE HYDROCHLORIDE 50 MG/ML
25 INJECTION INTRAMUSCULAR; INTRAVENOUS ONCE
Status: COMPLETED | OUTPATIENT
Start: 2022-09-08 | End: 2022-09-08

## 2022-09-08 RX ORDER — SODIUM CHLORIDE 9 MG/ML
1000 INJECTION, SOLUTION INTRAVENOUS CONTINUOUS
Status: DISCONTINUED | OUTPATIENT
Start: 2022-09-08 | End: 2022-09-08 | Stop reason: HOSPADM

## 2022-09-08 RX ORDER — METOCLOPRAMIDE HYDROCHLORIDE 5 MG/ML
10 INJECTION INTRAMUSCULAR; INTRAVENOUS ONCE
Status: COMPLETED | OUTPATIENT
Start: 2022-09-08 | End: 2022-09-08

## 2022-09-08 RX ORDER — SUCRALFATE 1 G/1
1 TABLET ORAL ONCE
Status: COMPLETED | OUTPATIENT
Start: 2022-09-08 | End: 2022-09-08

## 2022-09-08 RX ORDER — METOCLOPRAMIDE 10 MG/1
10 TABLET ORAL 4 TIMES DAILY PRN
Qty: 12 TABLET | Refills: 0 | Status: SHIPPED | OUTPATIENT
Start: 2022-09-08 | End: 2022-09-11

## 2022-09-08 RX ADMIN — PANTOPRAZOLE SODIUM 40 MG: 40 INJECTION, POWDER, FOR SOLUTION INTRAVENOUS at 11:05

## 2022-09-08 RX ADMIN — METOCLOPRAMIDE HYDROCHLORIDE 10 MG: 5 INJECTION INTRAMUSCULAR; INTRAVENOUS at 08:52

## 2022-09-08 RX ADMIN — DIPHENHYDRAMINE HYDROCHLORIDE 25 MG: 50 INJECTION, SOLUTION INTRAMUSCULAR; INTRAVENOUS at 08:52

## 2022-09-08 RX ADMIN — SODIUM CHLORIDE 1000 ML: 9 INJECTION, SOLUTION INTRAVENOUS at 08:43

## 2022-09-08 RX ADMIN — SUCRALFATE 1 G: 1 TABLET ORAL at 11:07

## 2022-09-08 RX ADMIN — SODIUM CHLORIDE 1000 ML: 9 INJECTION, SOLUTION INTRAVENOUS at 10:04

## 2022-09-08 RX ADMIN — LIDOCAINE HYDROCHLORIDE 30 ML: 20 SOLUTION ORAL; TOPICAL at 08:51

## 2022-09-08 ASSESSMENT — ENCOUNTER SYMPTOMS
DIARRHEA: 0
VOMITING: 0
CONSTIPATION: 0
FEVER: 0
NAUSEA: 0
DYSURIA: 0
CHEST TIGHTNESS: 0
BLOOD IN STOOL: 0
FLANK PAIN: 1
ABDOMINAL PAIN: 1
FREQUENCY: 0

## 2022-09-08 ASSESSMENT — ACTIVITIES OF DAILY LIVING (ADL)
ADLS_ACUITY_SCORE: 37
ADLS_ACUITY_SCORE: 37

## 2022-09-08 NOTE — ED NOTES
Bed: ED23  Expected date: 9/8/22  Expected time: 8:11 AM  Means of arrival: Ambulance  Comments:  [id number]
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
## 2022-09-08 NOTE — ED TRIAGE NOTES
Pt arrives by EMS with abd pain. Nausea and dry heaves. Pain 5/10.      Triage Assessment     Row Name 09/08/22 0827       Triage Assessment (Adult)    Airway WDL WDL       Respiratory WDL    Respiratory WDL WDL       Skin Circulation/Temperature WDL    Skin Circulation/Temperature WDL WDL       Cardiac WDL    Cardiac WDL WDL       Peripheral/Neurovascular WDL    Peripheral Neurovascular WDL WDL       Cognitive/Neuro/Behavioral WDL    Cognitive/Neuro/Behavioral WDL WDL

## 2022-09-08 NOTE — DISCHARGE INSTRUCTIONS
Discharge Instructions  Abdominal Pain    Abdominal pain (belly pain) can be caused by many things. Your evaluation today does not show the exact cause for your pain. Your provider today has decided that it is unlikely your pain is due to a life threatening problem, or a problem requiring surgery or hospital admission. Sometimes those problems cannot be found right away, so it is very important that you follow up as directed.  Sometimes only the changes which occur over time allow the cause of your pain to be found.    Generally, every Emergency Department visit should have a follow-up clinic visit with either a primary or a specialty clinic/provider. Please follow-up as instructed by your emergency provider today. With abdominal pain, we often recommend very close follow-up, such as the following day.    ADULTS:  Return to the Emergency Department right away if:    You get an oral temperature above 102oF or as directed by your provider.  You have blood in your stools. This may be bright red or appear as black, tarry stools.    You keep vomiting (throwing up) or cannot drink liquids.  You see blood when you vomit.   You cannot have a bowel movement or you cannot pass gas.  Your stomach gets bloated or bigger.  Your skin or the whites of your eyes look yellow.  You faint.  You have bloody, frequent or painful urination (peeing).  You have new symptoms or anything that worries you.    CHILDREN:  Return to the Emergency Department right away if your child has any of the above-listed symptoms or the following:    Pushes your hand away or screams/cries when his/her belly is touched.  You notice your child is very fussy or weak.  Your child is very tired and is too tired to eat or drink.  Your child is dehydrated.  Signs of dehydration can be:  Significant change in the amount of wet diapers/urine.  Your infant or child starts to have dry mouth and lips, or no saliva (spit) or tears.    PREGNANT WOMEN:  Return to the  Emergency Department right away if you have any of the above-listed symptoms or the following:    You have bleeding, leaking fluid or passing tissue from the vagina.  You have worse pain or cramping, or pain in your shoulder or back.  You have vomiting that will not stop.  You have a temperature of 100oF or more.  Your baby is not moving as much as usual.  You faint.  You get a bad headache with or without eye problems and abdominal pain.  You have a seizure.  You have unusual discharge from your vagina and abdominal pain.    Abdominal pain is pretty common during pregnancy.  Your pain may or may not be related to your pregnancy. You should follow-up closely with your OB provider so they can evaluate you and your baby.  Until you follow-up with your regular provider, do the following:     Avoid sex and do not put anything in your vagina.  Drink clear fluids.  Only take medications approved by your provider.    MORE INFORMATION:    Appendicitis:  A possible cause of abdominal pain in any person who still has their appendix is acute appendicitis. Appendicitis is often hard to diagnose.  Testing does not always rule out early appendicitis or other causes of abdominal pain. Close follow-up with your provider and re-evaluations may be needed to figure out the reason for your abdominal pain.    Follow-up:  It is very important that you make an appointment with your clinic and go to the appointment.  If you do not follow-up with your primary provider, it may result in missing an important development which could result in permanent injury or disability and/or lasting pain.  If there is any problem keeping your appointment, call your provider or return to the Emergency Department.    Medications:  Take your medications as directed by your provider today.  Before using over-the-counter medications, ask your provider and make sure to take the medications as directed.  If you have any questions about medications, ask your  "provider.    Diet:  Resume your normal diet as much as possible, but do not eat fried, fatty or spicy foods while you have pain.  Do not drink alcohol or have caffeine.  Do not smoke tobacco.    Probiotics: If you have been given an antibiotic, you may want to also take a probiotic pill or eat yogurt with live cultures. Probiotics have \"good bacteria\" to help your intestines stay healthy. Studies have shown that probiotics help prevent diarrhea (loose stools) and other intestine problems (including C. diff infection) when you take antibiotics. You can buy these without a prescription in the pharmacy section of the store.     If you were given a prescription for medicine here today, be sure to read all of the information (including the package insert) that comes with your prescription.  This will include important information about the medicine, its side effects, and any warnings that you need to know about.  The pharmacist who fills the prescription can provide more information and answer questions you may have about the medicine.  If you have questions or concerns that the pharmacist cannot address, please call or return to the Emergency Department.       Remember that you can always come back to the Emergency Department if you are not able to see your regular provider in the amount of time listed above, if you get any new symptoms, or if there is anything that worries you.          Discharge Instructions  Headache    You were seen today for a headache. Headaches may be caused by many different things such as muscle tension, sinus inflammation, anxiety and stress, having too little sleep, too much alcohol, some medical conditions or injury. You may have a migraine, which is caused by changes in the blood vessels in your head.  At this time your provider does not find that your headache is a sign of anything dangerous or life-threatening.  However, sometimes the signs of serious illness do not show up right away.  "     Generally, every Emergency Department visit should have a follow-up clinic visit with either a primary or a specialty clinic/provider. Please follow-up as instructed by your emergency provider today.    Return to the Emergency Department if:  You get a new fever of 100.4 F or higher.  Your headache gets much worse.  You get a stiff neck with your headache.  You get a new headache that is significantly different or worse than headaches you have had before.  You are vomiting (throwing up) and cannot keep food or water down.  You have blurry or double vision or other problems with your eyes.  You have a new weakness on one side of your body.  You have difficulty with balance which is new.  You or your family thinks you are confused.  You have a seizure.    What can I do to help myself?  Pain medications - You may take a pain medication such as Tylenol  (acetaminophen), Advil , Motrin  (ibuprofen) or Aleve  (naproxen).  Take a pain reliever as soon as you notice symptoms.  Starting medications as soon as you start to have symptoms may lessen the amount of pain you have.  Relaxing in a quiet, dark room may help.  Get enough sleep and eat meals regularly.  You may need to watch for certain foods or other things which may trigger your headaches.  Keeping a journal of your headaches and possible triggers may help you and your primary provider to identify things which you should avoid which may be causing your headaches.  If you were given a prescription for medicine here today, be sure to read all of the information (including the package insert) that comes with your prescription.  This will include important information about the medicine, its side effects, and any warnings that you need to know about.  The pharmacist who fills the prescription can provide more information and answer questions you may have about the medicine.  If you have questions or concerns that the pharmacist cannot address, please call or return  to the Emergency Department.   Remember that you can always come back to the Emergency Department if you are not able to see your regular provider in the amount of time listed above, if you get any new symptoms, or if there is anything that worries you.

## 2022-09-30 ENCOUNTER — HOSPITAL ENCOUNTER (OUTPATIENT)
Facility: CLINIC | Age: 62
Setting detail: OBSERVATION
Discharge: LEFT BEFORE DISCHARGE | End: 2022-10-01
Attending: EMERGENCY MEDICINE | Admitting: HOSPITALIST
Payer: COMMERCIAL

## 2022-09-30 ENCOUNTER — APPOINTMENT (OUTPATIENT)
Dept: CT IMAGING | Facility: CLINIC | Age: 62
End: 2022-09-30
Attending: EMERGENCY MEDICINE
Payer: COMMERCIAL

## 2022-09-30 DIAGNOSIS — R10.13 ABDOMINAL PAIN, EPIGASTRIC: ICD-10-CM

## 2022-09-30 DIAGNOSIS — R55 SYNCOPE, UNSPECIFIED SYNCOPE TYPE: ICD-10-CM

## 2022-09-30 DIAGNOSIS — E11.49 DIABETES MELLITUS TYPE 2 WITH NEUROLOGICAL MANIFESTATIONS (H): Primary | ICD-10-CM

## 2022-09-30 DIAGNOSIS — R07.9 CHEST PAIN, UNSPECIFIED TYPE: ICD-10-CM

## 2022-09-30 DIAGNOSIS — I95.9 HYPOTENSIVE EPISODE: ICD-10-CM

## 2022-09-30 DIAGNOSIS — E08.65 DIABETES MELLITUS DUE TO UNDERLYING CONDITION WITH HYPERGLYCEMIA (H): ICD-10-CM

## 2022-09-30 LAB
ALBUMIN SERPL-MCNC: 3.7 G/DL (ref 3.4–5)
ALP SERPL-CCNC: 67 U/L (ref 40–150)
ALT SERPL W P-5'-P-CCNC: 41 U/L (ref 0–70)
ANION GAP SERPL CALCULATED.3IONS-SCNC: 14 MMOL/L (ref 3–14)
AST SERPL W P-5'-P-CCNC: 30 U/L (ref 0–45)
ATRIAL RATE - MUSE: 85 BPM
BASOPHILS # BLD AUTO: 0.1 10E3/UL (ref 0–0.2)
BASOPHILS NFR BLD AUTO: 1 %
BILIRUB SERPL-MCNC: 0.4 MG/DL (ref 0.2–1.3)
BUN SERPL-MCNC: 32 MG/DL (ref 7–30)
CALCIUM SERPL-MCNC: 9.1 MG/DL (ref 8.5–10.1)
CHLORIDE BLD-SCNC: 96 MMOL/L (ref 94–109)
CO2 SERPL-SCNC: 21 MMOL/L (ref 20–32)
CREAT SERPL-MCNC: 1.13 MG/DL (ref 0.66–1.25)
DIASTOLIC BLOOD PRESSURE - MUSE: NORMAL MMHG
EOSINOPHIL # BLD AUTO: 0.4 10E3/UL (ref 0–0.7)
EOSINOPHIL NFR BLD AUTO: 4 %
ERYTHROCYTE [DISTWIDTH] IN BLOOD BY AUTOMATED COUNT: 12.9 % (ref 10–15)
GFR SERPL CREATININE-BSD FRML MDRD: 74 ML/MIN/1.73M2
GLUCOSE BLD-MCNC: 216 MG/DL (ref 70–99)
HCT VFR BLD AUTO: 48.8 % (ref 40–53)
HGB BLD-MCNC: 15.7 G/DL (ref 13.3–17.7)
IMM GRANULOCYTES # BLD: 0.1 10E3/UL
IMM GRANULOCYTES NFR BLD: 1 %
INTERPRETATION ECG - MUSE: NORMAL
LIPASE SERPL-CCNC: 104 U/L (ref 73–393)
LYMPHOCYTES # BLD AUTO: 2.6 10E3/UL (ref 0.8–5.3)
LYMPHOCYTES NFR BLD AUTO: 26 %
MCH RBC QN AUTO: 26.9 PG (ref 26.5–33)
MCHC RBC AUTO-ENTMCNC: 32.2 G/DL (ref 31.5–36.5)
MCV RBC AUTO: 84 FL (ref 78–100)
MONOCYTES # BLD AUTO: 0.6 10E3/UL (ref 0–1.3)
MONOCYTES NFR BLD AUTO: 6 %
NEUTROPHILS # BLD AUTO: 6.2 10E3/UL (ref 1.6–8.3)
NEUTROPHILS NFR BLD AUTO: 62 %
NRBC # BLD AUTO: 0 10E3/UL
NRBC BLD AUTO-RTO: 0 /100
P AXIS - MUSE: 34 DEGREES
PLATELET # BLD AUTO: 288 10E3/UL (ref 150–450)
POTASSIUM BLD-SCNC: 4.8 MMOL/L (ref 3.4–5.3)
PR INTERVAL - MUSE: 178 MS
PROT SERPL-MCNC: 7.6 G/DL (ref 6.8–8.8)
QRS DURATION - MUSE: 94 MS
QT - MUSE: 376 MS
QTC - MUSE: 447 MS
R AXIS - MUSE: -22 DEGREES
RBC # BLD AUTO: 5.84 10E6/UL (ref 4.4–5.9)
SODIUM SERPL-SCNC: 131 MMOL/L (ref 133–144)
SYSTOLIC BLOOD PRESSURE - MUSE: NORMAL MMHG
T AXIS - MUSE: 4 DEGREES
TROPONIN I SERPL HS-MCNC: 5 NG/L
TROPONIN I SERPL HS-MCNC: 7 NG/L
VENTRICULAR RATE- MUSE: 85 BPM
WBC # BLD AUTO: 9.9 10E3/UL (ref 4–11)

## 2022-09-30 PROCEDURE — 93005 ELECTROCARDIOGRAM TRACING: CPT

## 2022-09-30 PROCEDURE — 36415 COLL VENOUS BLD VENIPUNCTURE: CPT | Performed by: EMERGENCY MEDICINE

## 2022-09-30 PROCEDURE — 85025 COMPLETE CBC W/AUTO DIFF WBC: CPT | Performed by: EMERGENCY MEDICINE

## 2022-09-30 PROCEDURE — 83690 ASSAY OF LIPASE: CPT | Performed by: EMERGENCY MEDICINE

## 2022-09-30 PROCEDURE — 258N000003 HC RX IP 258 OP 636: Performed by: EMERGENCY MEDICINE

## 2022-09-30 PROCEDURE — 96361 HYDRATE IV INFUSION ADD-ON: CPT

## 2022-09-30 PROCEDURE — 96374 THER/PROPH/DIAG INJ IV PUSH: CPT

## 2022-09-30 PROCEDURE — 74177 CT ABD & PELVIS W/CONTRAST: CPT

## 2022-09-30 PROCEDURE — 250N000011 HC RX IP 250 OP 636: Performed by: EMERGENCY MEDICINE

## 2022-09-30 PROCEDURE — 80053 COMPREHEN METABOLIC PANEL: CPT | Performed by: EMERGENCY MEDICINE

## 2022-09-30 PROCEDURE — 84484 ASSAY OF TROPONIN QUANT: CPT | Performed by: EMERGENCY MEDICINE

## 2022-09-30 PROCEDURE — 99220 PR INITIAL OBSERVATION CARE,LEVEL III: CPT | Performed by: HOSPITALIST

## 2022-09-30 PROCEDURE — C9803 HOPD COVID-19 SPEC COLLECT: HCPCS

## 2022-09-30 PROCEDURE — 70450 CT HEAD/BRAIN W/O DYE: CPT

## 2022-09-30 PROCEDURE — 80061 LIPID PANEL: CPT | Performed by: HOSPITALIST

## 2022-09-30 PROCEDURE — 83036 HEMOGLOBIN GLYCOSYLATED A1C: CPT | Performed by: HOSPITALIST

## 2022-09-30 PROCEDURE — 99285 EMERGENCY DEPT VISIT HI MDM: CPT | Mod: 25

## 2022-09-30 PROCEDURE — 250N000009 HC RX 250: Performed by: EMERGENCY MEDICINE

## 2022-09-30 RX ORDER — DIPHENHYDRAMINE HYDROCHLORIDE 50 MG/ML
25 INJECTION INTRAMUSCULAR; INTRAVENOUS ONCE
Status: DISCONTINUED | OUTPATIENT
Start: 2022-09-30 | End: 2022-10-01

## 2022-09-30 RX ORDER — HYDROCHLOROTHIAZIDE 25 MG/1
25 TABLET ORAL DAILY
COMMUNITY
End: 2022-10-01

## 2022-09-30 RX ORDER — FENTANYL CITRATE 50 UG/ML
50 INJECTION, SOLUTION INTRAMUSCULAR; INTRAVENOUS ONCE
Status: COMPLETED | OUTPATIENT
Start: 2022-09-30 | End: 2022-09-30

## 2022-09-30 RX ORDER — METHOCARBAMOL 500 MG/1
500 TABLET, FILM COATED ORAL 4 TIMES DAILY PRN
COMMUNITY

## 2022-09-30 RX ORDER — IOPAMIDOL 755 MG/ML
80 INJECTION, SOLUTION INTRAVASCULAR ONCE
Status: COMPLETED | OUTPATIENT
Start: 2022-09-30 | End: 2022-09-30

## 2022-09-30 RX ORDER — METOCLOPRAMIDE HYDROCHLORIDE 5 MG/ML
10 INJECTION INTRAMUSCULAR; INTRAVENOUS ONCE
Status: DISCONTINUED | OUTPATIENT
Start: 2022-09-30 | End: 2022-10-01

## 2022-09-30 RX ADMIN — FENTANYL CITRATE 50 MCG: 50 INJECTION, SOLUTION INTRAMUSCULAR; INTRAVENOUS at 20:24

## 2022-09-30 RX ADMIN — SODIUM CHLORIDE 1000 ML: 9 INJECTION, SOLUTION INTRAVENOUS at 22:19

## 2022-09-30 RX ADMIN — SODIUM CHLORIDE 80 ML: 900 INJECTION INTRAVENOUS at 21:38

## 2022-09-30 RX ADMIN — IOPAMIDOL 80 ML: 755 INJECTION, SOLUTION INTRAVENOUS at 21:38

## 2022-09-30 RX ADMIN — SODIUM CHLORIDE 1000 ML: 9 INJECTION, SOLUTION INTRAVENOUS at 23:19

## 2022-09-30 ASSESSMENT — ENCOUNTER SYMPTOMS
DIAPHORESIS: 1
SHORTNESS OF BREATH: 0
HEADACHES: 1
BACK PAIN: 1
ABDOMINAL PAIN: 1

## 2022-09-30 ASSESSMENT — ACTIVITIES OF DAILY LIVING (ADL)
ADLS_ACUITY_SCORE: 37
ADLS_ACUITY_SCORE: 37

## 2022-10-01 ENCOUNTER — APPOINTMENT (OUTPATIENT)
Dept: CARDIOLOGY | Facility: CLINIC | Age: 62
End: 2022-10-01
Attending: INTERNAL MEDICINE
Payer: COMMERCIAL

## 2022-10-01 VITALS
SYSTOLIC BLOOD PRESSURE: 133 MMHG | HEIGHT: 72 IN | OXYGEN SATURATION: 96 % | RESPIRATION RATE: 16 BRPM | BODY MASS INDEX: 28.59 KG/M2 | TEMPERATURE: 97.8 F | WEIGHT: 211.1 LBS | HEART RATE: 72 BPM | DIASTOLIC BLOOD PRESSURE: 74 MMHG

## 2022-10-01 LAB
AMPHETAMINES UR QL SCN: NORMAL
ANION GAP SERPL CALCULATED.3IONS-SCNC: 10 MMOL/L (ref 3–14)
BARBITURATES UR QL: NORMAL
BENZODIAZ UR QL: NORMAL
BUN SERPL-MCNC: 22 MG/DL (ref 7–30)
CALCIUM SERPL-MCNC: 9.2 MG/DL (ref 8.5–10.1)
CANNABINOIDS UR QL SCN: NORMAL
CHLORIDE BLD-SCNC: 102 MMOL/L (ref 94–109)
CHOLEST SERPL-MCNC: 224 MG/DL
CO2 SERPL-SCNC: 23 MMOL/L (ref 20–32)
COCAINE UR QL: NORMAL
CREAT SERPL-MCNC: 0.75 MG/DL (ref 0.66–1.25)
ERYTHROCYTE [DISTWIDTH] IN BLOOD BY AUTOMATED COUNT: 12.9 % (ref 10–15)
GFR SERPL CREATININE-BSD FRML MDRD: >90 ML/MIN/1.73M2
GLUCOSE BLD-MCNC: 126 MG/DL (ref 70–99)
GLUCOSE BLDC GLUCOMTR-MCNC: 138 MG/DL (ref 70–99)
GLUCOSE BLDC GLUCOMTR-MCNC: 144 MG/DL (ref 70–99)
HBA1C MFR BLD: 9.9 % (ref 0–5.6)
HCT VFR BLD AUTO: 46.2 % (ref 40–53)
HDLC SERPL-MCNC: 50 MG/DL
HGB BLD-MCNC: 15 G/DL (ref 13.3–17.7)
LDLC SERPL CALC-MCNC: 130 MG/DL
LVEF ECHO: NORMAL
MCH RBC QN AUTO: 27 PG (ref 26.5–33)
MCHC RBC AUTO-ENTMCNC: 32.5 G/DL (ref 31.5–36.5)
MCV RBC AUTO: 83 FL (ref 78–100)
NONHDLC SERPL-MCNC: 174 MG/DL
OPIATES UR QL SCN: NORMAL
PCP UR QL SCN: NORMAL
PLATELET # BLD AUTO: 280 10E3/UL (ref 150–450)
POTASSIUM BLD-SCNC: 4.1 MMOL/L (ref 3.4–5.3)
RBC # BLD AUTO: 5.56 10E6/UL (ref 4.4–5.9)
SARS-COV-2 RNA RESP QL NAA+PROBE: NEGATIVE
SODIUM SERPL-SCNC: 135 MMOL/L (ref 133–144)
TRIGL SERPL-MCNC: 219 MG/DL
TROPONIN I SERPL HS-MCNC: 7 NG/L
WBC # BLD AUTO: 8.4 10E3/UL (ref 4–11)

## 2022-10-01 PROCEDURE — 93306 TTE W/DOPPLER COMPLETE: CPT

## 2022-10-01 PROCEDURE — G0378 HOSPITAL OBSERVATION PER HR: HCPCS

## 2022-10-01 PROCEDURE — 36415 COLL VENOUS BLD VENIPUNCTURE: CPT | Performed by: HOSPITALIST

## 2022-10-01 PROCEDURE — 82962 GLUCOSE BLOOD TEST: CPT | Mod: 91

## 2022-10-01 PROCEDURE — 250N000012 HC RX MED GY IP 250 OP 636 PS 637: Performed by: HOSPITALIST

## 2022-10-01 PROCEDURE — 96372 THER/PROPH/DIAG INJ SC/IM: CPT | Performed by: HOSPITALIST

## 2022-10-01 PROCEDURE — 93306 TTE W/DOPPLER COMPLETE: CPT | Mod: 26 | Performed by: INTERNAL MEDICINE

## 2022-10-01 PROCEDURE — 82374 ASSAY BLOOD CARBON DIOXIDE: CPT | Performed by: HOSPITALIST

## 2022-10-01 PROCEDURE — 80307 DRUG TEST PRSMV CHEM ANLYZR: CPT | Performed by: EMERGENCY MEDICINE

## 2022-10-01 PROCEDURE — 250N000013 HC RX MED GY IP 250 OP 250 PS 637: Performed by: HOSPITALIST

## 2022-10-01 PROCEDURE — 84484 ASSAY OF TROPONIN QUANT: CPT | Performed by: HOSPITALIST

## 2022-10-01 PROCEDURE — 96361 HYDRATE IV INFUSION ADD-ON: CPT

## 2022-10-01 PROCEDURE — 82962 GLUCOSE BLOOD TEST: CPT

## 2022-10-01 PROCEDURE — 99204 OFFICE O/P NEW MOD 45 MIN: CPT | Performed by: INTERNAL MEDICINE

## 2022-10-01 PROCEDURE — 85027 COMPLETE CBC AUTOMATED: CPT | Performed by: HOSPITALIST

## 2022-10-01 PROCEDURE — U0003 INFECTIOUS AGENT DETECTION BY NUCLEIC ACID (DNA OR RNA); SEVERE ACUTE RESPIRATORY SYNDROME CORONAVIRUS 2 (SARS-COV-2) (CORONAVIRUS DISEASE [COVID-19]), AMPLIFIED PROBE TECHNIQUE, MAKING USE OF HIGH THROUGHPUT TECHNOLOGIES AS DESCRIBED BY CMS-2020-01-R: HCPCS | Performed by: EMERGENCY MEDICINE

## 2022-10-01 RX ORDER — NITROGLYCERIN 0.4 MG/1
0.4 TABLET SUBLINGUAL EVERY 5 MIN PRN
Status: DISCONTINUED | OUTPATIENT
Start: 2022-10-01 | End: 2022-10-01 | Stop reason: HOSPADM

## 2022-10-01 RX ORDER — ACETAMINOPHEN 650 MG/1
650 SUPPOSITORY RECTAL EVERY 6 HOURS PRN
Status: DISCONTINUED | OUTPATIENT
Start: 2022-10-01 | End: 2022-10-01 | Stop reason: HOSPADM

## 2022-10-01 RX ORDER — NICOTINE POLACRILEX 4 MG
15-30 LOZENGE BUCCAL
Status: DISCONTINUED | OUTPATIENT
Start: 2022-10-01 | End: 2022-10-01 | Stop reason: HOSPADM

## 2022-10-01 RX ORDER — ALUMINA, MAGNESIA, AND SIMETHICONE 2400; 2400; 240 MG/30ML; MG/30ML; MG/30ML
30 SUSPENSION ORAL EVERY 6 HOURS PRN
Status: DISCONTINUED | OUTPATIENT
Start: 2022-10-01 | End: 2022-10-01

## 2022-10-01 RX ORDER — DEXTROSE MONOHYDRATE 25 G/50ML
25-50 INJECTION, SOLUTION INTRAVENOUS
Status: DISCONTINUED | OUTPATIENT
Start: 2022-10-01 | End: 2022-10-01 | Stop reason: HOSPADM

## 2022-10-01 RX ORDER — PANTOPRAZOLE SODIUM 40 MG/1
40 TABLET, DELAYED RELEASE ORAL
Status: DISCONTINUED | OUTPATIENT
Start: 2022-10-01 | End: 2022-10-01 | Stop reason: HOSPADM

## 2022-10-01 RX ORDER — CLONAZEPAM 0.5 MG/1
1 TABLET ORAL DAILY
Status: DISCONTINUED | OUTPATIENT
Start: 2022-10-01 | End: 2022-10-01 | Stop reason: HOSPADM

## 2022-10-01 RX ORDER — ACETAMINOPHEN 325 MG/1
650 TABLET ORAL EVERY 6 HOURS PRN
Status: DISCONTINUED | OUTPATIENT
Start: 2022-10-01 | End: 2022-10-01 | Stop reason: HOSPADM

## 2022-10-01 RX ORDER — METHOCARBAMOL 500 MG/1
500 TABLET, FILM COATED ORAL 4 TIMES DAILY PRN
Status: DISCONTINUED | OUTPATIENT
Start: 2022-10-01 | End: 2022-10-01 | Stop reason: HOSPADM

## 2022-10-01 RX ORDER — MAGNESIUM HYDROXIDE/ALUMINUM HYDROXICE/SIMETHICONE 120; 1200; 1200 MG/30ML; MG/30ML; MG/30ML
30 SUSPENSION ORAL EVERY 4 HOURS PRN
Status: DISCONTINUED | OUTPATIENT
Start: 2022-10-01 | End: 2022-10-01 | Stop reason: HOSPADM

## 2022-10-01 RX ORDER — ASPIRIN 81 MG/1
81 TABLET ORAL DAILY
Status: DISCONTINUED | OUTPATIENT
Start: 2022-10-01 | End: 2022-10-01 | Stop reason: HOSPADM

## 2022-10-01 RX ADMIN — CLONAZEPAM 1 MG: 0.5 TABLET ORAL at 07:29

## 2022-10-01 RX ADMIN — PANTOPRAZOLE SODIUM 40 MG: 40 TABLET, DELAYED RELEASE ORAL at 11:26

## 2022-10-01 RX ADMIN — INSULIN ASPART 1 UNITS: 100 INJECTION, SOLUTION INTRAVENOUS; SUBCUTANEOUS at 12:13

## 2022-10-01 RX ADMIN — ASPIRIN 81 MG: 81 TABLET, COATED ORAL at 07:29

## 2022-10-01 RX ADMIN — METHOCARBAMOL 500 MG: 500 TABLET ORAL at 07:29

## 2022-10-01 ASSESSMENT — ACTIVITIES OF DAILY LIVING (ADL)
ADLS_ACUITY_SCORE: 33
ADLS_ACUITY_SCORE: 37
ADLS_ACUITY_SCORE: 33
ADLS_ACUITY_SCORE: 37

## 2022-10-01 NOTE — PLAN OF CARE
"Orientation/Cognitive: A&Ox4  Observation Goals (Met/ Not Met): not met  Mobility Level/Assist Equipment: SBA  Fall Risk (Y/N): yes   Behavior Concerns: patient was anxious/agitated due to wait time in Emergency Department and NPO status upon arrival to unit.   Pain Management: Denied pain except he did report abdominal pain with palpation-offered Tylenol and heat packs for comfort and patient declined. Pain subsided and patient was able to eat his lunch.    Tele/VS/O2: Tele: SR, all other VSS on RA  ABNL Lab/BG:   Diet: mod cho   Bowel/Bladder: continent   Skin Concerns: none  Drains/Devices: IV-SLx2  Tests/Procedures for next shift: none   Anticipated DC date & active delays: today  Patient Stated Goal for Today: \"Go home\"  Pt was insisting on discharging. Writer explained the discharge process but patient was not willing to wait for hospitalist. Patient left AMA-hospitalist aware. Pt declined any pain. IV's were removed. Son driving patient home.     "

## 2022-10-01 NOTE — PROGRESS NOTES
"Visited pt after he completed lunch and he requested to be discharged immediately. I explained that we would discharge him as soon as we had the paperwork. Went to talk to charge about discharge and pt put call light on. Nursing assistant went in to check on pt and he said he was leaving and wanted his IV out. Nursing assistant passed this information to me and I went to check on the pt. Pt was visually upset and explained \"You can't force me to stay here\" and that if we didn't remove his IV he \"would pull it out himself\". Pt asked multiple times about discharge. Pt asked to have leads and IV removed and was asked to wait until discharge paperwork arrived. Re-entered room approximately 20 minutes later, pt had removed tele leads and was beginging to remove peripheral IV x2. Stopped pt, and removed both peripheral IVs. Pt left AMA at 13:55 with intent to follow up with HCP on Monday 10/3  "

## 2022-10-01 NOTE — DISCHARGE SUMMARY
[facility]    Discharge Summary  Hospitalist    Date of Admission:  9/30/2022  Date of Discharge:  10/1/2022  Discharging Provider: Curtis Benavides MD, MD    Discharge Diagnoses      Syncope  Hypotension  Abnormal EKG  Hyponatremia  Hypertension  Hyperlipidemia  Type 2 diabetes mellitus  Anxiety  Chronic back pain  Chronic abdominal pain  Gastritis    Hospital Course:  Perico Houston is a 62 year old male with a past medical history of hypertension, hyperlipidemia, type 2 diabetes, anxiety, chronic pain syndrome, nonobstructive coronary artery disease, gastritis presents to hospital with a syncopal episode.     Syncope  Hypotension  Abnormal EKG  Patient presenting to the hospital after a syncopal episode.  The patient developed dizziness, diaphoresis, palpitations with a sense of his heart racing, generalized pain of the back, head, abdomen.  Passed out.  EMS found him to be hypotensive with SBP in the 80s.  Tracing in the field was read as an acute ST elevation MI of the anterior leads.  EKG on arrival in the hospital was negative for any acute ischemic changes on EKG. Troponin I negative x2 ruling out acs. Patient's blood pressure improved with IVF in the er. Ct head wnl. Ct aortic survey of chest and abdomen without any acute pathology.  No clear source in history identified for his hypotension and syncope. He denies any medication changes and alcohol use. He reports normal po intake. He does have a history of non-obstructive coronary artery disease but has not had a stress test since 2019.    -Troponins were negative.  Initial plan was to get a stress test however given that this was a weekend we could not do a nuclear stress test.  Patient mentioned that he would not be able to walk to do exercise stress test.  A cardiology consult was requested.  They recommended doing a nuclear stress test on Monday however patient was not willing to wait until then.  They did explain the risk  involved however he still would not want to wait.  We were attempting to make an outpatient arrangement to get a nuclear stress test.  Before I could come and explain to him the details of the discharge planning, patient became very impatient and left AGAINST MEDICAL ADVICE.     Hypertension  -He is recommended to discontinue hydrochlorothiazide for the moment.  But again he left before we could discuss the discharge plan in detail and we were not able to provide him with discharge paperwork    Curtis Benavides MD, MD    Significant Results and Procedures   See below    Pending Results     Unresulted Labs Ordered in the Past 30 Days of this Admission     No orders found for last 31 day(s).          Code Status   Full Code       Primary Care Physician   [facility]    Physical Exam   Temp: (P) 97.8  F (36.6  C) Temp src: (P) Oral BP: (P) 131/68 Pulse: (P) 81   Resp: (P) 16 SpO2: (P) 97 % O2 Device: (P) None (Room air)      Constitutional: AAOX3, NAD  Respiratory: CTA B/L, Normal WOB  Cardiovascular: RRR, No murmur  GI: Soft, Non- tender, BS- normoactive  Neuro: CN- grossly intact, Motor strength 5/5 on all 4 extremities.     Discharge Disposition   Discharged- AMA      Consultations This Hospital Stay   CARDIOLOGY IP CONSULT    Time Spent on this Encounter   I, Curtis Benavides MD, personally saw the patient today and spent less than or equal to 30 minutes discharging this patient.    Discharge Orders      Follow-up and recommended labs and tests    Follow up with primary care provider, [facility], within 7 days for hospital follow- up.     Activity    Your activity upon discharge: activity as tolerated     Diet    Follow this diet upon discharge: Orders Placed This Encounter      Moderate Consistent Carb (60 g CHO per Meal) Diet     NM Lexiscan stress test (nuc card)     Discharge Medications   Discharge Medication List as of 10/1/2022  1:55 PM      CONTINUE these medications which have  CHANGED    Details   metFORMIN (GLUCOPHAGE) 1000 MG tablet Take 1 tablet (1,000 mg) by mouth 2 times daily (with meals), Disp-60 tablet, R-0, No Print Out         CONTINUE these medications which have NOT CHANGED    Details   alum & mag hydroxide-simethicone (MAALOX MAX) 400-400-40 MG/5ML SUSP suspension Take 30 mLs by mouth every 6 hours as needed for indigestion or heartburn, Disp-100 mL, R-0, E-Prescribe      aspirin EC 81 MG EC tablet Take 1 tablet (81 mg) by mouth daily, Disp-30 tablet, R-0, Local Print      canaliflozin (INVOKANA) tablet Take 300 mg by mouth every morning (before breakfast), Historical      clonazePAM (KLONOPIN) 0.5 MG tablet Take 1 mg by mouth daily, Historical      glipiZIDE (GLUCOTROL XL) 5 MG 24 hr tablet Take 10 mg by mouth daily , Historical      lisinopril (PRINIVIL,ZESTRIL) 40 MG tablet Take 1 tablet (40 mg) by mouth daily, Disp-30 tablet, R-0, E-Prescribe      methocarbamol (ROBAXIN) 500 MG tablet Take 500 mg by mouth 4 times daily as needed for muscle spasms, Historical      nitroglycerin (NITROSTAT) 0.4 MG SL tablet Place 1 tablet (0.4 mg) under the tongue every 5 minutes as needed for chest pain if you are still having symptoms after 3 doses (15 minutes) call 911., Disp-25 tablet, R-0, Local Print      omeprazole (PRILOSEC) 20 MG DR capsule Take 2 capsules (40 mg) by mouth daily for 30 days, Disp-60 capsule, R-0, E-Prescribe         STOP taking these medications       hydrochlorothiazide (HYDRODIURIL) 25 MG tablet Comments:   Reason for Stopping:             Allergies   Allergies   Allergen Reactions     Aspirin Nausea     PN: LW Reaction: nausea  Stomach pain  LW Reaction: nausea  PN: LW Reaction: nausea       Hydrocodone-Acetaminophen      Other reaction(s): Other (See Comments)  Sweating/ black out  Other reaction(s): Other, see comments  PN: Sweating/ black out     Sumatriptan Other (See Comments)      Gives patient Hallucinations     Data   Most Recent 3 CBC's:  Recent Labs    Lab Test 10/01/22  0709 09/30/22 2019 09/08/22  0843   WBC 8.4 9.9 6.9   HGB 15.0 15.7 15.5   MCV 83 84 85    288 306      Most Recent 3 BMP's:  Recent Labs   Lab Test 10/01/22  1109 10/01/22  0709 10/01/22  0651 09/30/22 2019 09/08/22  0843   NA  --  135  --  131* 129*   POTASSIUM  --  4.1  --  4.8 4.4   CHLORIDE  --  102  --  96 97*   CO2  --  23  --  21 22   BUN  --  22  --  32* 13.2   CR  --  0.75  --  1.13 0.62*   ANIONGAP  --  10  --  14 10   LATOYA  --  9.2  --  9.1 9.0   * 126* 138* 216* 200*     Most Recent 2 LFT's:  Recent Labs   Lab Test 09/30/22 2019 09/08/22  0843   AST 30 23   ALT 41 22   ALKPHOS 67 63   BILITOTAL 0.4 0.2     Most Recent INR's and Anticoagulation Dosing History:  Anticoagulation Dose History     Recent Dosing and Labs Latest Ref Rng & Units 5/30/2012 9/19/2015 1/22/2016    INR 0.86 - 1.14 0.97 1.04 0.99        Most Recent 3 Troponin's:  Recent Labs   Lab Test 07/26/20 2257 07/26/20 2104 05/05/20  0708 03/13/20  0426 03/13/20  0239 10/31/17  0605 10/11/17  0639 07/08/17  2109 07/04/17  0845   TROPI <0.015 <0.015 <0.015   < >  --    < >  --    < >  --    TROPONIN  --   --   --   --  0.00  --  0.00  --  0.02    < > = values in this interval not displayed.     Most Recent Cholesterol Panel:  Recent Labs   Lab Test 09/30/22  2231   CHOL 224*   *   HDL 50   TRIG 219*     Most Recent 6 Bacteria Isolates From Any Culture (See EPIC Reports for Culture Details):  Recent Labs   Lab Test 07/06/16  0502 01/22/16  0425 07/15/15  2049 07/15/15  2045   CULT No Beta Streptococcus isolated No growth  No growth No growth No growth     Most Recent TSH, T4 and A1c Labs:  Recent Labs   Lab Test 09/30/22 2019 01/01/16  1355 12/01/15  0835   TSH  --   --  0.72   A1C 9.9*   < >  --     < > = values in this interval not displayed.       Results for orders placed or performed during the hospital encounter of 09/30/22   CT Aortic Survey w Contrast    Narrative    EXAM: CT AORTIC SURVEY  W CONTRAST  LOCATION: [facility]  DATE/TIME: 9/30/2022 10:02 PM    INDICATION: back pain, abdominal pain, episode of low BP, syncope  COMPARISON: None.  TECHNIQUE: CT angiogram chest abdomen pelvis during arterial phase of injection of IV contrast. 2D and 3D MIP reconstructions were performed by the CT technologist. Dose reduction techniques were used.   CONTRAST: 80 mL Isovue 370    FINDINGS:   CT ANGIOGRAM CHEST, ABDOMEN, AND PELVIS: The ascending aorta, aortic arch, and descending thoracic aorta normal in size and caliber. The great vessels of the head and neck arise normally and are normal in size and caliber. The abdominal aorta renal,   mesenteric, and iliac vessels are unremarkable. There are scattered atherosclerotic calcifications seen throughout the abdominal aorta.    LUNGS AND PLEURA: Dependent fibrotic atelectatic changes are seen in the lower lobes bilaterally.    MEDIASTINUM/AXILLAE: No lymphadenopathy. No thoracic aortic aneurysms.    CORONARY ARTERY CALCIFICATION: Mild.    HEPATOBILIARY: Postsurgical changes of the posterior right hepatic lobe are noted the gallbladder is surgically absent. There is no evidence of biliary duct dilatation.    PANCREAS: No significant mass, duct dilatation, or inflammatory change.    SPLEEN: Normal size.    ADRENAL GLANDS: No significant nodules.    KIDNEYS/BLADDER: No significant mass, stone, or hydronephrosis.    BOWEL: No obstruction or inflammatory change.    LYMPH NODES: No lymphadenopathy.    PELVIC ORGANS: No pelvic masses.    MUSCULOSKELETAL: Unremarkable.      Impression    IMPRESSION:  1.  Unremarkable CTA of the chest abdomen pelvis, of note there is no evidence of aneurysmal dilatation, with dissection, or hemodynamically significant stenosis.     Head CT w/o contrast    Narrative    EXAM: CT HEAD W/O CONTRAST  LOCATION: [facility]  DATE/TIME: 9/30/2022 10:01 PM    INDICATION: headache, syncope,  hypotension, abnormal EKG  COMPARISON: 5/29/2022  TECHNIQUE: Routine CT Head without IV contrast. Multiplanar reformats. Dose reduction techniques were used.    FINDINGS:  INTRACRANIAL CONTENTS: No intracranial hemorrhage, extraaxial collection, or mass effect.  No CT evidence of acute infarct. Normal parenchymal attenuation. Normal ventricles and sulci.     VISUALIZED ORBITS/SINUSES/MASTOIDS: No intraorbital abnormality. No paranasal sinus mucosal disease. No middle ear or mastoid effusion.    BONES/SOFT TISSUES: No acute abnormality.      Impression    IMPRESSION:  1.  No acute intracranial process.   Echocardiogram Complete     Value    LVEF  55-60%    Narrative    [id number]  [id number]  [id number]  [id number]
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
## 2022-10-01 NOTE — ED NOTES
Assumed care of patient at this time - pt reports mid abdominal pain with headache while at the [facility] tonight - pt was hypotensive and diaphoretic on scene and  EMS noted STEMI on EKG. Given sublingual nitroglycerin with no relief. Denies etoh today.

## 2022-10-01 NOTE — PROGRESS NOTES
Observation goals  PRIOR TO DISCHARGE        Comments: List all goals to be met before discharge home:   - Serial troponins and stress test complete: not met  - Seen and cleared by consultant if applicable: not met   - Adequate pain control on oral analgesia: met   - Vital signs normal or at patient baseline: met   - Safe disposition plan has been identified: not met    - Nurse to notify provider when observation goals have been met and patient is ready for discharge.
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
## 2022-10-01 NOTE — PHARMACY-ADMISSION MEDICATION HISTORY
Pharmacy Medication History  Admission medication history interview status for the 9/30/2022  admission is complete. See EPIC admission navigator for prior to admission medications     Location of Interview: Patient room  Medication history sources: Patient and fill history     Significant changes made to the medication list:    Removed: gabapentin, oxycodone, senna, lidocaine patch, lorazepam, prevacid, hydroxyzine  Added: hydrochlorothiazide, methocarbamol     In the past week, patient estimated taking medication this percent of the time: greater than 90%    Additional medication history information:     Patient is a good historian. He reports he takes all the medications listed in a regular basis. Based on fill history:-     - Invokana last filled 6/24 for 30 days supply     - hydrochlorothiazide last filled 6/17 for 90 days supply     - Metformin last filled 6/18 for 90 days supply     Medication reconciliation completed by provider prior to medication history? No    Time spent in this activity: 20 minutes     Prior to Admission medications    Medication Sig Last Dose Taking? Auth Provider Long Term End Date   alum & mag hydroxide-simethicone (MAALOX MAX) 400-400-40 MG/5ML SUSP suspension Take 30 mLs by mouth every 6 hours as needed for indigestion or heartburn  at PRN Yes Dima Weaver MD     aspirin EC 81 MG EC tablet Take 1 tablet (81 mg) by mouth daily 9/30/2022 at am Yes Filemon Mcdonald MD, MD     canaliflozin (INVOKANA) tablet Take 300 mg by mouth every morning (before breakfast) 9/30/2022 at Unknown time Yes Reported, Patient     clonazePAM (KLONOPIN) 0.5 MG tablet Take 1 mg by mouth daily 9/30/2022 at Unknown time Yes Unknown, Entered By History Yes    glipiZIDE (GLUCOTROL XL) 5 MG 24 hr tablet Take 10 mg by mouth daily  9/30/2022 at Unknown time Yes Unknown, Entered By History No    hydrochlorothiazide (HYDRODIURIL) 25 MG tablet Take 25 mg by mouth daily 9/30/2022 at Unknown time Yes Unknown,  Entered By History Yes    lisinopril (PRINIVIL,ZESTRIL) 40 MG tablet Take 1 tablet (40 mg) by mouth daily 9/30/2022 at Unknown time Yes Inderjit Han MD No    metFORMIN (GLUCOPHAGE) 1000 MG tablet Take 1 tablet (1,000 mg) by mouth 2 times daily (with meals) 9/30/2022 at 5 pm Yes Filemon Mcdonald MD, MD Yes    methocarbamol (ROBAXIN) 500 MG tablet Take 500 mg by mouth 4 times daily as needed for muscle spasms 9/30/2022 at 4 pm Yes Unknown, Entered By History     nitroglycerin (NITROSTAT) 0.4 MG SL tablet Place 1 tablet (0.4 mg) under the tongue every 5 minutes as needed for chest pain if you are still having symptoms after 3 doses (15 minutes) call 911.  at PRN Yes Filemon Mcdonald MD, MD Yes    omeprazole (PRILOSEC) 20 MG DR capsule Take 2 capsules (40 mg) by mouth daily for 30 days  Patient taking differently: Take 20 mg by mouth daily 9/30/2022 at am Yes Dima Weaver MD  10/8/22       The information provided in this note is only as accurate as the sources available at the time of update(s)   Julia Henderson, NicoleD

## 2022-10-01 NOTE — ED NOTES
[facility]  ED Nurse Handoff Report    ED Chief complaint: Abdominal Pain      ED Diagnosis:   Final diagnoses:   Syncope, unspecified syncope type   Hypotensive episode   Chest pain, unspecified type   Abdominal pain, epigastric       Code Status: Full Code    Allergies:   Allergies   Allergen Reactions     Aspirin Nausea     PN: LW Reaction: nausea  Stomach pain  LW Reaction: nausea  PN: LW Reaction: nausea       Hydrocodone-Acetaminophen      Other reaction(s): Other (See Comments)  Sweating/ black out  Other reaction(s): Other, see comments  PN: Sweating/ black out     Sumatriptan Other (See Comments)      Gives patient Hallucinations       Patient Story: EMS was called to [facility] where pt was for severe epigastric/up[per abdominal pain. Pt was diaphoretic and hypotensive in field and had syncopal episode witnessed by EMS. Pt c/o chronic neck and headaches - denies injury or fall.   Focused Assessment:  Pt a&o x4, respirations easy and unlabored. Denies chest pain. C/o mid upper abdominal pain and back pain. Denies nausea, vomiting or diarrhea.     Treatments and/or interventions provided: labs, fluids, imaging results as follows:  Imaging:  CT Aortic Survey w Contrast   Final Result   IMPRESSION:   1.  Unremarkable CTA of the chest abdomen pelvis, of note there is no evidence of aneurysmal dilatation, with dissection, or hemodynamically significant stenosis.           Head CT w/o contrast   Final Result   IMPRESSION:   1.  No acute intracranial process.       Patient's response to treatments and/or interventions: fair    To be done/followed up on inpatient unit:  Nuclear imaging in the am    Does this patient have any cognitive concerns?: none    Activity level - Baseline/Home:  Independent  Activity Level - Current:   Independent    Patient's Preferred language: English   Needed?: No    Isolation: None  Infection: Not Applicable  Patient tested for COVID 19 prior to admission:  YES  Bariatric?: No    Vital Signs:   Vitals:    09/30/22 2345 10/01/22 0000 10/01/22 0015 10/01/22 0030   BP: 128/74 114/71 124/82 (!) 135/92   Pulse: 70 73 77 76   Resp: 11 15 20 10   SpO2:       Weight:           Cardiac Rhythm:     Was the PSS-3 completed:   Yes  What interventions are required if any?               Family Comments: wife's number in chart  OBS brochure/video discussed/provided to patient/family: N/A              Name of person given brochure if not patient: n/a              Relationship to patient: n/a    For the majority of the shift this patient's behavior was Green.   Behavioral interventions performed were reassurance and information.    ED NURSE PHONE NUMBER: **[id number]

## 2022-10-01 NOTE — ED NOTES
Bed: ST02  Expected date:   Expected time:   Means of arrival:   Comments:  Filomena 62M stemi alert eta 1954
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
## 2022-10-01 NOTE — ED TRIAGE NOTES
Patient presents via EMS as field activated STEMI.  Patient was at [facility] and developed abdominal pain and diaphoresis.  EKG by EMS read STEMI.  He was initally hypotensive for EMS with BP 80/50.  EMS gave 500 ml of IV fluid and BP normalized.  EMS gave 1 nitroglycerin with minimal relief of symptoms.      Triage Assessment     Row Name 09/30/22 2044       Triage Assessment (Adult)    Airway WDL WDL       Respiratory WDL    Respiratory WDL WDL       Skin Circulation/Temperature WDL    Skin Circulation/Temperature WDL WDL       Cardiac WDL    Cardiac WDL WDL       Peripheral/Neurovascular WDL    Peripheral Neurovascular WDL WDL       Cognitive/Neuro/Behavioral WDL    Cognitive/Neuro/Behavioral WDL WDL

## 2022-10-01 NOTE — PROVIDER NOTIFICATION
MD Notification    Notified Person: MD    Notified Person Name:  Kennedy    Notification Date/Time: 10/1/22 @1346    Notification Interaction: [employer] messaging     Purpose of Notification: Cardiology saw patient. He is insistent on leaving. Removed his IV, he is all dressed and wants to go.    Orders Received:    Comments: patient left AMA, provider aware.

## 2022-11-01 ENCOUNTER — APPOINTMENT (OUTPATIENT)
Dept: GENERAL RADIOLOGY | Facility: CLINIC | Age: 62
End: 2022-11-01
Attending: EMERGENCY MEDICINE
Payer: COMMERCIAL

## 2022-11-01 ENCOUNTER — TRANSFERRED RECORDS (OUTPATIENT)
Dept: HEALTH INFORMATION MANAGEMENT | Facility: CLINIC | Age: 62
End: 2022-11-01

## 2022-11-01 ENCOUNTER — HOSPITAL ENCOUNTER (EMERGENCY)
Facility: CLINIC | Age: 62
Discharge: LEFT BEFORE DISCHARGE | End: 2022-11-01
Attending: EMERGENCY MEDICINE | Admitting: EMERGENCY MEDICINE
Payer: COMMERCIAL

## 2022-11-01 VITALS
HEIGHT: 73 IN | HEART RATE: 83 BPM | DIASTOLIC BLOOD PRESSURE: 74 MMHG | WEIGHT: 217.59 LBS | BODY MASS INDEX: 28.84 KG/M2 | TEMPERATURE: 97.7 F | RESPIRATION RATE: 15 BRPM | OXYGEN SATURATION: 98 % | SYSTOLIC BLOOD PRESSURE: 100 MMHG

## 2022-11-01 DIAGNOSIS — R07.9 CHEST PAIN, UNSPECIFIED TYPE: ICD-10-CM

## 2022-11-01 LAB
ALBUMIN SERPL-MCNC: 3.8 G/DL (ref 3.4–5)
ALP SERPL-CCNC: 67 U/L (ref 40–150)
ALT SERPL W P-5'-P-CCNC: 32 U/L (ref 0–70)
ANION GAP SERPL CALCULATED.3IONS-SCNC: 4 MMOL/L (ref 3–14)
AST SERPL W P-5'-P-CCNC: 23 U/L (ref 0–45)
ATRIAL RATE - MUSE: 93 BPM
BASOPHILS # BLD AUTO: 0.1 10E3/UL (ref 0–0.2)
BASOPHILS NFR BLD AUTO: 1 %
BILIRUB SERPL-MCNC: 0.3 MG/DL (ref 0.2–1.3)
BUN SERPL-MCNC: 22 MG/DL (ref 7–30)
CALCIUM SERPL-MCNC: 9.6 MG/DL (ref 8.5–10.1)
CHLORIDE BLD-SCNC: 102 MMOL/L (ref 94–109)
CO2 SERPL-SCNC: 25 MMOL/L (ref 20–32)
CREAT SERPL-MCNC: 0.81 MG/DL (ref 0.66–1.25)
DIASTOLIC BLOOD PRESSURE - MUSE: NORMAL MMHG
EOSINOPHIL # BLD AUTO: 0.5 10E3/UL (ref 0–0.7)
EOSINOPHIL NFR BLD AUTO: 6 %
ERYTHROCYTE [DISTWIDTH] IN BLOOD BY AUTOMATED COUNT: 12.8 % (ref 10–15)
GFR SERPL CREATININE-BSD FRML MDRD: >90 ML/MIN/1.73M2
GLUCOSE BLD-MCNC: 165 MG/DL (ref 70–99)
HCT VFR BLD AUTO: 49.3 % (ref 40–53)
HGB BLD-MCNC: 16.5 G/DL (ref 13.3–17.7)
HOLD SPECIMEN: NORMAL
HOLD SPECIMEN: NORMAL
IMM GRANULOCYTES # BLD: 0.1 10E3/UL
IMM GRANULOCYTES NFR BLD: 1 %
INTERPRETATION ECG - MUSE: NORMAL
LIPASE SERPL-CCNC: 111 U/L (ref 73–393)
LYMPHOCYTES # BLD AUTO: 2.5 10E3/UL (ref 0.8–5.3)
LYMPHOCYTES NFR BLD AUTO: 32 %
MCH RBC QN AUTO: 27.7 PG (ref 26.5–33)
MCHC RBC AUTO-ENTMCNC: 33.5 G/DL (ref 31.5–36.5)
MCV RBC AUTO: 83 FL (ref 78–100)
MONOCYTES # BLD AUTO: 0.6 10E3/UL (ref 0–1.3)
MONOCYTES NFR BLD AUTO: 7 %
NEUTROPHILS # BLD AUTO: 4 10E3/UL (ref 1.6–8.3)
NEUTROPHILS NFR BLD AUTO: 53 %
NRBC # BLD AUTO: 0 10E3/UL
NRBC BLD AUTO-RTO: 0 /100
P AXIS - MUSE: 25 DEGREES
PLATELET # BLD AUTO: 279 10E3/UL (ref 150–450)
POTASSIUM BLD-SCNC: 4.7 MMOL/L (ref 3.4–5.3)
PR INTERVAL - MUSE: 178 MS
PROT SERPL-MCNC: 7.6 G/DL (ref 6.8–8.8)
QRS DURATION - MUSE: 88 MS
QT - MUSE: 352 MS
QTC - MUSE: 437 MS
R AXIS - MUSE: -6 DEGREES
RBC # BLD AUTO: 5.95 10E6/UL (ref 4.4–5.9)
SODIUM SERPL-SCNC: 131 MMOL/L (ref 133–144)
SYSTOLIC BLOOD PRESSURE - MUSE: NORMAL MMHG
T AXIS - MUSE: -5 DEGREES
TROPONIN I SERPL HS-MCNC: 7 NG/L
VENTRICULAR RATE- MUSE: 93 BPM
WBC # BLD AUTO: 7.6 10E3/UL (ref 4–11)

## 2022-11-01 PROCEDURE — 83690 ASSAY OF LIPASE: CPT | Performed by: EMERGENCY MEDICINE

## 2022-11-01 PROCEDURE — 93005 ELECTROCARDIOGRAM TRACING: CPT

## 2022-11-01 PROCEDURE — 99285 EMERGENCY DEPT VISIT HI MDM: CPT | Mod: 25

## 2022-11-01 PROCEDURE — 36415 COLL VENOUS BLD VENIPUNCTURE: CPT | Performed by: EMERGENCY MEDICINE

## 2022-11-01 PROCEDURE — 71046 X-RAY EXAM CHEST 2 VIEWS: CPT

## 2022-11-01 PROCEDURE — 80053 COMPREHEN METABOLIC PANEL: CPT | Performed by: EMERGENCY MEDICINE

## 2022-11-01 PROCEDURE — 85025 COMPLETE CBC W/AUTO DIFF WBC: CPT | Performed by: EMERGENCY MEDICINE

## 2022-11-01 PROCEDURE — 250N000013 HC RX MED GY IP 250 OP 250 PS 637: Performed by: EMERGENCY MEDICINE

## 2022-11-01 PROCEDURE — 82040 ASSAY OF SERUM ALBUMIN: CPT | Performed by: EMERGENCY MEDICINE

## 2022-11-01 PROCEDURE — 84484 ASSAY OF TROPONIN QUANT: CPT | Performed by: EMERGENCY MEDICINE

## 2022-11-01 PROCEDURE — 250N000009 HC RX 250: Performed by: EMERGENCY MEDICINE

## 2022-11-01 RX ADMIN — LIDOCAINE HYDROCHLORIDE 30 ML: 20 SOLUTION ORAL; TOPICAL at 07:21

## 2022-11-01 ASSESSMENT — ACTIVITIES OF DAILY LIVING (ADL)
ADLS_ACUITY_SCORE: 37
ADLS_ACUITY_SCORE: 37

## 2022-11-01 ASSESSMENT — ENCOUNTER SYMPTOMS: BACK PAIN: 1
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
## 2022-11-01 NOTE — ED NOTES
Bed: ST03  Expected date:   Expected time:   Means of arrival:   Comments:  513  62M CP/Lead change  NOW

## 2022-11-01 NOTE — ED NOTES
RN rounded on pt due to pt being off vitals monitoring, EKG leads found in bed, pt not in room, pt's belongings gone from room. MD Joing updated as well as Charge RN.

## 2022-11-01 NOTE — ED TRIAGE NOTES
Pt was brought in by in EMS for chest pain. Chest pain woke him up from his sleep, sharp pain that radiates to his back. Wife gave him Aspirin and EMS gave him X 3 nitroglycerin en-route last dose given at 0630 am. 75 mcg of Fentanyl intranasal at 0644.

## 2023-02-28 ENCOUNTER — HOSPITAL ENCOUNTER (EMERGENCY)
Facility: CLINIC | Age: 63
Discharge: HOME | End: 2023-02-28
Payer: COMMERCIAL

## 2023-02-28 VITALS
SYSTOLIC BLOOD PRESSURE: 131 MMHG | HEART RATE: 104 BPM | DIASTOLIC BLOOD PRESSURE: 82 MMHG | OXYGEN SATURATION: 96 % | TEMPERATURE: 97.1 F | RESPIRATION RATE: 16 BRPM

## 2023-02-28 NOTE — ED TRIAGE NOTES
Pt arrives via EMS d/t abd pain/constipation. Last BM x1 wk. Denies relief from OCT meds. No n/v or dysuria. ABC intact.

## 2023-10-19 ENCOUNTER — HOSPITAL ENCOUNTER (EMERGENCY)
Facility: CLINIC | Age: 63
Discharge: LEFT BEFORE DISCHARGE | End: 2023-10-19
Admitting: EMERGENCY MEDICINE
Payer: MEDICAID

## 2023-10-19 VITALS
OXYGEN SATURATION: 99 % | RESPIRATION RATE: 16 BRPM | DIASTOLIC BLOOD PRESSURE: 97 MMHG | HEART RATE: 106 BPM | SYSTOLIC BLOOD PRESSURE: 141 MMHG

## 2023-10-19 PROCEDURE — 99281 EMR DPT VST MAYX REQ PHY/QHP: CPT

## 2023-10-19 NOTE — ED NOTES
[facility] through triage pt. Declined to be seen. Reports all of his symptoms are better and that he wants to go home. Pt. Signed medical screening declination form. Wife of patient here to bring him home.

## 2023-10-19 NOTE — ED TRIAGE NOTES
"Pt. BIBA from home for brief episode of epigastric pain after taking \"a bunch of pills\" at once per pt. Report. Pt. Reports pain has been resolved since EMS arrived. EMS reports HTN, OVSS on RA. EKG WNL. . Refused IV access/ASA. AVSS on RA in triage.         "

## 2023-12-05 NOTE — ED NOTES
57-year-old male presents to the ER with complaints of abd pain, back pain, neck pain, head pain, arm pain and leg pain. Pt states he is also having some chest pain. All of this started about two days ago and he called his doctor who told him to come to the ER yesterday. Pt states he has been up all night with pain.   
Dr. Galloway at bedside  
Yes